# Patient Record
Sex: FEMALE | Race: WHITE | Employment: FULL TIME | URBAN - METROPOLITAN AREA
[De-identification: names, ages, dates, MRNs, and addresses within clinical notes are randomized per-mention and may not be internally consistent; named-entity substitution may affect disease eponyms.]

---

## 2017-03-31 ENCOUNTER — APPOINTMENT (OUTPATIENT)
Dept: GENERAL RADIOLOGY | Age: 42
End: 2017-03-31
Payer: MEDICAID

## 2017-03-31 ENCOUNTER — HOSPITAL ENCOUNTER (EMERGENCY)
Age: 42
Discharge: HOME | End: 2017-03-31
Attending: EMERGENCY MEDICINE
Payer: MEDICAID

## 2017-03-31 VITALS
HEIGHT: 64 IN | HEART RATE: 88 BPM | RESPIRATION RATE: 16 BRPM | DIASTOLIC BLOOD PRESSURE: 51 MMHG | TEMPERATURE: 98.4 F | WEIGHT: 170 LBS | OXYGEN SATURATION: 99 % | SYSTOLIC BLOOD PRESSURE: 90 MMHG | BODY MASS INDEX: 29.02 KG/M2

## 2017-03-31 DIAGNOSIS — R07.9 CHEST PAIN, UNSPECIFIED TYPE: Primary | ICD-10-CM

## 2017-03-31 LAB
ABSOLUTE EOS #: 0.4 K/UL (ref 0–0.4)
ABSOLUTE LYMPH #: 4.1 K/UL (ref 1–4.8)
ABSOLUTE MONO #: 0.6 K/UL (ref 0.1–1.2)
ANION GAP SERPL CALCULATED.3IONS-SCNC: 17 MMOL/L (ref 9–17)
BASOPHILS # BLD: 2 % (ref 0–2)
BASOPHILS ABSOLUTE: 0.2 K/UL (ref 0–0.2)
BILIRUBIN URINE: NEGATIVE
BUN BLDV-MCNC: 10 MG/DL (ref 6–20)
BUN/CREAT BLD: ABNORMAL (ref 9–20)
CALCIUM SERPL-MCNC: 8.7 MG/DL (ref 8.6–10.4)
CHLORIDE BLD-SCNC: 96 MMOL/L (ref 98–107)
CO2: 21 MMOL/L (ref 20–31)
COLOR: YELLOW
COMMENT UA: ABNORMAL
CREAT SERPL-MCNC: 0.61 MG/DL (ref 0.5–0.9)
DIFFERENTIAL TYPE: NORMAL
EKG ATRIAL RATE: 96 BPM
EKG P AXIS: 61 DEGREES
EKG P-R INTERVAL: 152 MS
EKG Q-T INTERVAL: 378 MS
EKG QRS DURATION: 92 MS
EKG QTC CALCULATION (BAZETT): 477 MS
EKG R AXIS: 14 DEGREES
EKG T AXIS: 55 DEGREES
EKG VENTRICULAR RATE: 96 BPM
EOSINOPHILS RELATIVE PERCENT: 4 % (ref 1–4)
ETHANOL PERCENT: 0.3 %
ETHANOL: 298 MG/DL
GFR AFRICAN AMERICAN: >60 ML/MIN
GFR NON-AFRICAN AMERICAN: >60 ML/MIN
GFR SERPL CREATININE-BSD FRML MDRD: ABNORMAL ML/MIN/{1.73_M2}
GFR SERPL CREATININE-BSD FRML MDRD: ABNORMAL ML/MIN/{1.73_M2}
GLUCOSE BLD-MCNC: 103 MG/DL (ref 70–99)
GLUCOSE URINE: NEGATIVE
HCG QUALITATIVE: NEGATIVE
HCT VFR BLD CALC: 40.3 % (ref 36–46)
HEMOGLOBIN: 13.7 G/DL (ref 12–16)
KETONES, URINE: NEGATIVE
LEUKOCYTE ESTERASE, URINE: NEGATIVE
LYMPHOCYTES # BLD: 43 % (ref 24–44)
MCH RBC QN AUTO: 30.9 PG (ref 26–34)
MCHC RBC AUTO-ENTMCNC: 34.1 G/DL (ref 31–37)
MCV RBC AUTO: 90.5 FL (ref 80–100)
MONOCYTES # BLD: 7 % (ref 2–11)
NITRITE, URINE: NEGATIVE
PDW BLD-RTO: 15.4 % (ref 12.5–15.4)
PH UA: 5 (ref 5–8)
PLATELET # BLD: 317 K/UL (ref 140–450)
PLATELET ESTIMATE: NORMAL
PMV BLD AUTO: 8.6 FL (ref 6–12)
POC TROPONIN I: 0 NG/ML (ref 0–0.1)
POC TROPONIN I: 0 NG/ML (ref 0–0.1)
POC TROPONIN INTERP: NORMAL
POC TROPONIN INTERP: NORMAL
POTASSIUM SERPL-SCNC: 3.9 MMOL/L (ref 3.7–5.3)
PROTEIN UA: NEGATIVE
RBC # BLD: 4.45 M/UL (ref 4–5.2)
RBC # BLD: NORMAL 10*6/UL
SEG NEUTROPHILS: 44 % (ref 36–66)
SEGMENTED NEUTROPHILS ABSOLUTE COUNT: 4.1 K/UL (ref 1.8–7.7)
SODIUM BLD-SCNC: 134 MMOL/L (ref 135–144)
SPECIFIC GRAVITY UA: 1 (ref 1–1.03)
TURBIDITY: CLEAR
URINE HGB: NEGATIVE
UROBILINOGEN, URINE: NORMAL
WBC # BLD: 9.4 K/UL (ref 3.5–11)
WBC # BLD: NORMAL 10*3/UL

## 2017-03-31 PROCEDURE — 81003 URINALYSIS AUTO W/O SCOPE: CPT

## 2017-03-31 PROCEDURE — G0480 DRUG TEST DEF 1-7 CLASSES: HCPCS

## 2017-03-31 PROCEDURE — 93005 ELECTROCARDIOGRAM TRACING: CPT

## 2017-03-31 PROCEDURE — 71020 XR CHEST STANDARD TWO VW: CPT

## 2017-03-31 PROCEDURE — 99285 EMERGENCY DEPT VISIT HI MDM: CPT

## 2017-03-31 PROCEDURE — 85025 COMPLETE CBC W/AUTO DIFF WBC: CPT

## 2017-03-31 PROCEDURE — 84484 ASSAY OF TROPONIN QUANT: CPT

## 2017-03-31 PROCEDURE — 84703 CHORIONIC GONADOTROPIN ASSAY: CPT

## 2017-03-31 PROCEDURE — 80048 BASIC METABOLIC PNL TOTAL CA: CPT

## 2017-03-31 RX ORDER — ASPIRIN 81 MG/1
324 TABLET, CHEWABLE ORAL ONCE
Status: DISCONTINUED | OUTPATIENT
Start: 2017-03-31 | End: 2017-03-31

## 2017-03-31 ASSESSMENT — PAIN DESCRIPTION - ORIENTATION: ORIENTATION: RIGHT

## 2017-03-31 ASSESSMENT — ENCOUNTER SYMPTOMS
COUGH: 0
NAUSEA: 0
ABDOMINAL PAIN: 0
ALLERGIC/IMMUNOLOGIC NEGATIVE: 1
VOMITING: 0
SHORTNESS OF BREATH: 0

## 2017-03-31 ASSESSMENT — PAIN DESCRIPTION - DESCRIPTORS: DESCRIPTORS: STABBING;SHARP

## 2017-03-31 ASSESSMENT — PAIN SCALES - GENERAL: PAINLEVEL_OUTOF10: 8

## 2017-03-31 ASSESSMENT — HEART SCORE: ECG: 0

## 2017-03-31 ASSESSMENT — PAIN DESCRIPTION - FREQUENCY: FREQUENCY: CONTINUOUS

## 2017-03-31 ASSESSMENT — PAIN DESCRIPTION - LOCATION: LOCATION: CHEST

## 2017-03-31 ASSESSMENT — PAIN DESCRIPTION - PAIN TYPE: TYPE: ACUTE PAIN

## 2017-04-01 LAB
EKG ATRIAL RATE: 103 BPM
EKG P AXIS: 64 DEGREES
EKG P-R INTERVAL: 140 MS
EKG Q-T INTERVAL: 356 MS
EKG QRS DURATION: 84 MS
EKG QTC CALCULATION (BAZETT): 466 MS
EKG R AXIS: 25 DEGREES
EKG T AXIS: 51 DEGREES
EKG VENTRICULAR RATE: 103 BPM

## 2017-07-02 ENCOUNTER — APPOINTMENT (OUTPATIENT)
Dept: GENERAL RADIOLOGY | Age: 42
End: 2017-07-02
Payer: MEDICAID

## 2017-07-02 ENCOUNTER — HOSPITAL ENCOUNTER (EMERGENCY)
Age: 42
Discharge: HOME | End: 2017-07-02
Attending: EMERGENCY MEDICINE
Payer: MEDICAID

## 2017-07-02 VITALS
DIASTOLIC BLOOD PRESSURE: 78 MMHG | TEMPERATURE: 98.8 F | WEIGHT: 185 LBS | SYSTOLIC BLOOD PRESSURE: 120 MMHG | BODY MASS INDEX: 31.76 KG/M2 | OXYGEN SATURATION: 98 % | RESPIRATION RATE: 18 BRPM | HEART RATE: 88 BPM

## 2017-07-02 DIAGNOSIS — R07.9 CHEST PAIN, UNSPECIFIED TYPE: Primary | ICD-10-CM

## 2017-07-02 LAB
ABSOLUTE EOS #: 0.2 K/UL (ref 0–0.4)
ABSOLUTE LYMPH #: 3 K/UL (ref 1–4.8)
ABSOLUTE MONO #: 0.8 K/UL (ref 0.1–1.2)
ANION GAP SERPL CALCULATED.3IONS-SCNC: 16 MMOL/L (ref 9–17)
BASOPHILS # BLD: 2 %
BASOPHILS ABSOLUTE: 0.2 K/UL (ref 0–0.2)
BUN BLDV-MCNC: 17 MG/DL (ref 6–20)
BUN/CREAT BLD: NORMAL (ref 9–20)
CALCIUM SERPL-MCNC: 8.8 MG/DL (ref 8.6–10.4)
CHLORIDE BLD-SCNC: 99 MMOL/L (ref 98–107)
CO2: 21 MMOL/L (ref 20–31)
CREAT SERPL-MCNC: 0.67 MG/DL (ref 0.5–0.9)
D-DIMER QUANTITATIVE: 0.32 MG/L FEU
DIFFERENTIAL TYPE: ABNORMAL
EOSINOPHILS RELATIVE PERCENT: 3 %
GFR AFRICAN AMERICAN: >60 ML/MIN
GFR NON-AFRICAN AMERICAN: >60 ML/MIN
GFR SERPL CREATININE-BSD FRML MDRD: NORMAL ML/MIN/{1.73_M2}
GFR SERPL CREATININE-BSD FRML MDRD: NORMAL ML/MIN/{1.73_M2}
GLUCOSE BLD-MCNC: 97 MG/DL (ref 70–99)
HCT VFR BLD CALC: 42.6 % (ref 36–46)
HEMOGLOBIN: 14 G/DL (ref 12–16)
LYMPHOCYTES # BLD: 32 %
MCH RBC QN AUTO: 30.2 PG (ref 26–34)
MCHC RBC AUTO-ENTMCNC: 32.9 G/DL (ref 31–37)
MCV RBC AUTO: 91.7 FL (ref 80–100)
MONOCYTES # BLD: 8 %
PDW BLD-RTO: 15.9 % (ref 12.5–15.4)
PLATELET # BLD: 265 K/UL (ref 140–450)
PLATELET ESTIMATE: ABNORMAL
PMV BLD AUTO: 8.5 FL (ref 6–12)
POC TROPONIN I: 0 NG/ML (ref 0–0.1)
POC TROPONIN I: 0 NG/ML (ref 0–0.1)
POC TROPONIN INTERP: NORMAL
POC TROPONIN INTERP: NORMAL
POTASSIUM SERPL-SCNC: 4 MMOL/L (ref 3.7–5.3)
RBC # BLD: 4.64 M/UL (ref 4–5.2)
RBC # BLD: ABNORMAL 10*6/UL
SEG NEUTROPHILS: 55 %
SEGMENTED NEUTROPHILS ABSOLUTE COUNT: 5.2 K/UL (ref 1.8–7.7)
SODIUM BLD-SCNC: 136 MMOL/L (ref 135–144)
WBC # BLD: 9.4 K/UL (ref 3.5–11)
WBC # BLD: ABNORMAL 10*3/UL

## 2017-07-02 PROCEDURE — 85025 COMPLETE CBC W/AUTO DIFF WBC: CPT

## 2017-07-02 PROCEDURE — 71020 XR CHEST STANDARD TWO VW: CPT

## 2017-07-02 PROCEDURE — 6360000002 HC RX W HCPCS: Performed by: EMERGENCY MEDICINE

## 2017-07-02 PROCEDURE — 93005 ELECTROCARDIOGRAM TRACING: CPT

## 2017-07-02 PROCEDURE — 99285 EMERGENCY DEPT VISIT HI MDM: CPT

## 2017-07-02 PROCEDURE — 84484 ASSAY OF TROPONIN QUANT: CPT

## 2017-07-02 PROCEDURE — 85379 FIBRIN DEGRADATION QUANT: CPT

## 2017-07-02 PROCEDURE — 96374 THER/PROPH/DIAG INJ IV PUSH: CPT

## 2017-07-02 PROCEDURE — 6370000000 HC RX 637 (ALT 250 FOR IP): Performed by: EMERGENCY MEDICINE

## 2017-07-02 PROCEDURE — 80048 BASIC METABOLIC PNL TOTAL CA: CPT

## 2017-07-02 RX ORDER — MORPHINE SULFATE 2 MG/ML
2 INJECTION, SOLUTION INTRAMUSCULAR; INTRAVENOUS ONCE
Status: COMPLETED | OUTPATIENT
Start: 2017-07-02 | End: 2017-07-02

## 2017-07-02 RX ORDER — HYDROCODONE BITARTRATE AND ACETAMINOPHEN 5; 325 MG/1; MG/1
1 TABLET ORAL ONCE
Status: COMPLETED | OUTPATIENT
Start: 2017-07-02 | End: 2017-07-02

## 2017-07-02 RX ORDER — HYDROCODONE BITARTRATE AND ACETAMINOPHEN 5; 325 MG/1; MG/1
1 TABLET ORAL EVERY 6 HOURS PRN
Qty: 6 TABLET | Refills: 0 | Status: SHIPPED | OUTPATIENT
Start: 2017-07-02 | End: 2017-08-31

## 2017-07-02 RX ADMIN — HYDROCODONE BITARTRATE AND ACETAMINOPHEN 1 TABLET: 5; 325 TABLET ORAL at 19:49

## 2017-07-02 RX ADMIN — MORPHINE SULFATE 2 MG: 2 INJECTION, SOLUTION INTRAMUSCULAR; INTRAVENOUS at 17:10

## 2017-07-02 ASSESSMENT — PAIN SCALES - GENERAL
PAINLEVEL_OUTOF10: 8
PAINLEVEL_OUTOF10: 10
PAINLEVEL_OUTOF10: 10

## 2017-07-02 ASSESSMENT — ENCOUNTER SYMPTOMS
DIARRHEA: 0
APNEA: 0
SHORTNESS OF BREATH: 1
NAUSEA: 0
BLOOD IN STOOL: 0
ABDOMINAL PAIN: 0
SORE THROAT: 0
VOMITING: 0
COUGH: 0
RHINORRHEA: 0
BACK PAIN: 0
CHEST TIGHTNESS: 0

## 2017-07-02 ASSESSMENT — PAIN DESCRIPTION - DIRECTION: RADIATING_TOWARDS: BACK

## 2017-07-02 ASSESSMENT — PAIN DESCRIPTION - ORIENTATION: ORIENTATION: MID;LEFT

## 2017-07-02 ASSESSMENT — PAIN DESCRIPTION - PROGRESSION: CLINICAL_PROGRESSION: GRADUALLY WORSENING

## 2017-07-02 ASSESSMENT — PAIN DESCRIPTION - PAIN TYPE: TYPE: ACUTE PAIN

## 2017-07-02 ASSESSMENT — PAIN DESCRIPTION - FREQUENCY: FREQUENCY: INTERMITTENT

## 2017-07-02 ASSESSMENT — PAIN DESCRIPTION - LOCATION: LOCATION: CHEST

## 2017-07-02 ASSESSMENT — PAIN DESCRIPTION - ONSET: ONSET: SUDDEN

## 2017-07-03 ENCOUNTER — HOSPITAL ENCOUNTER (EMERGENCY)
Age: 42
Discharge: HOME | End: 2017-07-03
Attending: EMERGENCY MEDICINE
Payer: MEDICAID

## 2017-07-03 ENCOUNTER — APPOINTMENT (OUTPATIENT)
Dept: GENERAL RADIOLOGY | Age: 42
End: 2017-07-03
Payer: MEDICAID

## 2017-07-03 VITALS
RESPIRATION RATE: 24 BRPM | HEIGHT: 64 IN | OXYGEN SATURATION: 98 % | HEART RATE: 77 BPM | WEIGHT: 191.5 LBS | SYSTOLIC BLOOD PRESSURE: 117 MMHG | BODY MASS INDEX: 32.69 KG/M2 | TEMPERATURE: 98.5 F | DIASTOLIC BLOOD PRESSURE: 81 MMHG

## 2017-07-03 DIAGNOSIS — R07.9 CHEST PAIN, UNSPECIFIED TYPE: Primary | ICD-10-CM

## 2017-07-03 LAB
% CKMB: 2 % (ref 0–3)
ABSOLUTE EOS #: 0.1 K/UL (ref 0–0.4)
ABSOLUTE LYMPH #: 1.9 K/UL (ref 1–4.8)
ABSOLUTE MONO #: 0.7 K/UL (ref 0.2–0.8)
ANION GAP SERPL CALCULATED.3IONS-SCNC: 13 MMOL/L (ref 9–17)
BASOPHILS # BLD: 1 %
BASOPHILS ABSOLUTE: 0.1 K/UL (ref 0–0.2)
BUN BLDV-MCNC: 11 MG/DL (ref 6–20)
BUN/CREAT BLD: 20 (ref 9–20)
CALCIUM SERPL-MCNC: 8.6 MG/DL (ref 8.6–10.4)
CHLORIDE BLD-SCNC: 102 MMOL/L (ref 98–107)
CK MB: 1.7 NG/ML
CKMB INTERPRETATION: NORMAL
CO2: 21 MMOL/L (ref 20–31)
CREAT SERPL-MCNC: 0.56 MG/DL (ref 0.5–0.9)
D-DIMER QUANTITATIVE: 0.53 MG/L FEU
DIFFERENTIAL TYPE: NORMAL
EOSINOPHILS RELATIVE PERCENT: 2 %
GFR AFRICAN AMERICAN: >60 ML/MIN
GFR NON-AFRICAN AMERICAN: >60 ML/MIN
GFR SERPL CREATININE-BSD FRML MDRD: ABNORMAL ML/MIN/{1.73_M2}
GFR SERPL CREATININE-BSD FRML MDRD: ABNORMAL ML/MIN/{1.73_M2}
GLUCOSE BLD-MCNC: 105 MG/DL (ref 70–99)
HCT VFR BLD CALC: 38.9 % (ref 36–46)
HEMOGLOBIN: 13.6 G/DL (ref 12–16)
LYMPHOCYTES # BLD: 27 %
MCH RBC QN AUTO: 31.7 PG (ref 26–34)
MCHC RBC AUTO-ENTMCNC: 35.1 G/DL (ref 31–37)
MCV RBC AUTO: 90.2 FL (ref 80–100)
MONOCYTES # BLD: 9 %
MYOGLOBIN: <21 NG/ML (ref 25–58)
PDW BLD-RTO: 14 % (ref 11.5–14.5)
PLATELET # BLD: 225 K/UL (ref 130–400)
PLATELET ESTIMATE: NORMAL
PMV BLD AUTO: NORMAL FL (ref 6–12)
POTASSIUM SERPL-SCNC: 4.2 MMOL/L (ref 3.7–5.3)
RBC # BLD: 4.31 M/UL (ref 4–5.2)
RBC # BLD: NORMAL 10*6/UL
SEG NEUTROPHILS: 61 %
SEGMENTED NEUTROPHILS ABSOLUTE COUNT: 4.5 K/UL (ref 1.8–7.7)
SODIUM BLD-SCNC: 136 MMOL/L (ref 135–144)
TOTAL CK: 87 U/L (ref 26–192)
TROPONIN INTERP: ABNORMAL
TROPONIN T: <0.03 NG/ML
WBC # BLD: 7.3 K/UL (ref 3.5–11)
WBC # BLD: NORMAL 10*3/UL

## 2017-07-03 PROCEDURE — 82553 CREATINE MB FRACTION: CPT

## 2017-07-03 PROCEDURE — 71010 XR CHEST PORTABLE: CPT

## 2017-07-03 PROCEDURE — 85025 COMPLETE CBC W/AUTO DIFF WBC: CPT

## 2017-07-03 PROCEDURE — 93005 ELECTROCARDIOGRAM TRACING: CPT

## 2017-07-03 PROCEDURE — 85379 FIBRIN DEGRADATION QUANT: CPT

## 2017-07-03 PROCEDURE — 80048 BASIC METABOLIC PNL TOTAL CA: CPT

## 2017-07-03 PROCEDURE — 83874 ASSAY OF MYOGLOBIN: CPT

## 2017-07-03 PROCEDURE — 96374 THER/PROPH/DIAG INJ IV PUSH: CPT

## 2017-07-03 PROCEDURE — 84484 ASSAY OF TROPONIN QUANT: CPT

## 2017-07-03 PROCEDURE — 6360000002 HC RX W HCPCS: Performed by: NURSE PRACTITIONER

## 2017-07-03 PROCEDURE — 82550 ASSAY OF CK (CPK): CPT

## 2017-07-03 PROCEDURE — 99285 EMERGENCY DEPT VISIT HI MDM: CPT

## 2017-07-03 RX ORDER — KETOROLAC TROMETHAMINE 30 MG/ML
30 INJECTION, SOLUTION INTRAMUSCULAR; INTRAVENOUS ONCE
Status: COMPLETED | OUTPATIENT
Start: 2017-07-03 | End: 2017-07-03

## 2017-07-03 RX ADMIN — KETOROLAC TROMETHAMINE 30 MG: 30 INJECTION, SOLUTION INTRAMUSCULAR at 08:53

## 2017-07-03 ASSESSMENT — ENCOUNTER SYMPTOMS
WHEEZING: 0
VOMITING: 0
SORE THROAT: 0
COLOR CHANGE: 0
COUGH: 0
SINUS PRESSURE: 0
RHINORRHEA: 0
SHORTNESS OF BREATH: 0
DIARRHEA: 0
NAUSEA: 0
ABDOMINAL PAIN: 0
CONSTIPATION: 0

## 2017-07-03 ASSESSMENT — PAIN SCALES - GENERAL
PAINLEVEL_OUTOF10: 10
PAINLEVEL_OUTOF10: 9

## 2017-07-03 ASSESSMENT — PAIN DESCRIPTION - ONSET: ONSET: ON-GOING

## 2017-07-03 ASSESSMENT — PAIN DESCRIPTION - LOCATION: LOCATION: CHEST

## 2017-07-03 ASSESSMENT — PAIN DESCRIPTION - PAIN TYPE: TYPE: ACUTE PAIN

## 2017-07-03 ASSESSMENT — PAIN DESCRIPTION - FREQUENCY: FREQUENCY: CONTINUOUS

## 2017-07-05 LAB
EKG ATRIAL RATE: 91 BPM
EKG P AXIS: 57 DEGREES
EKG P-R INTERVAL: 154 MS
EKG Q-T INTERVAL: 376 MS
EKG QRS DURATION: 84 MS
EKG QTC CALCULATION (BAZETT): 462 MS
EKG T AXIS: 26 DEGREES
EKG VENTRICULAR RATE: 91 BPM

## 2017-07-11 LAB
EKG ATRIAL RATE: 96 BPM
EKG P AXIS: 65 DEGREES
EKG P-R INTERVAL: 150 MS
EKG Q-T INTERVAL: 364 MS
EKG QRS DURATION: 82 MS
EKG QTC CALCULATION (BAZETT): 459 MS
EKG R AXIS: 2 DEGREES
EKG T AXIS: 47 DEGREES
EKG VENTRICULAR RATE: 96 BPM

## 2017-08-31 ENCOUNTER — HOSPITAL ENCOUNTER (EMERGENCY)
Age: 42
Discharge: HOME | End: 2017-08-31
Attending: EMERGENCY MEDICINE
Payer: MEDICAID

## 2017-08-31 ENCOUNTER — APPOINTMENT (OUTPATIENT)
Dept: GENERAL RADIOLOGY | Age: 42
End: 2017-08-31
Payer: MEDICAID

## 2017-08-31 VITALS
HEIGHT: 66 IN | DIASTOLIC BLOOD PRESSURE: 85 MMHG | RESPIRATION RATE: 18 BRPM | TEMPERATURE: 97.9 F | OXYGEN SATURATION: 100 % | SYSTOLIC BLOOD PRESSURE: 122 MMHG | WEIGHT: 186.56 LBS | HEART RATE: 100 BPM | BODY MASS INDEX: 29.98 KG/M2
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
## 2017-08-31 DIAGNOSIS — M79.18 MUSCULOSKELETAL PAIN: ICD-10-CM

## 2017-08-31 DIAGNOSIS — S39.012A LUMBAR STRAIN, INITIAL ENCOUNTER: Primary | ICD-10-CM

## 2017-08-31 PROCEDURE — 71020 XR CHEST STANDARD TWO VW: CPT

## 2017-08-31 PROCEDURE — 99284 EMERGENCY DEPT VISIT MOD MDM: CPT

## 2017-08-31 PROCEDURE — 72100 X-RAY EXAM L-S SPINE 2/3 VWS: CPT

## 2017-08-31 PROCEDURE — 6370000000 HC RX 637 (ALT 250 FOR IP): Performed by: NURSE PRACTITIONER

## 2017-08-31 RX ORDER — METHOCARBAMOL 500 MG/1
500 TABLET, FILM COATED ORAL 3 TIMES DAILY PRN
Qty: 20 TABLET | Refills: 0 | Status: SHIPPED | OUTPATIENT
Start: 2017-08-31 | End: 2017-09-10

## 2017-08-31 RX ORDER — HYDROCODONE BITARTRATE AND ACETAMINOPHEN 5; 325 MG/1; MG/1
1 TABLET ORAL ONCE
Status: COMPLETED | OUTPATIENT
Start: 2017-08-31 | End: 2017-08-31

## 2017-08-31 RX ORDER — HYDROCODONE BITARTRATE AND ACETAMINOPHEN 5; 325 MG/1; MG/1
1 TABLET ORAL EVERY 6 HOURS PRN
Qty: 20 TABLET | Refills: 0 | Status: SHIPPED | OUTPATIENT
Start: 2017-08-31 | End: 2017-09-07

## 2017-08-31 RX ADMIN — HYDROCODONE BITARTRATE AND ACETAMINOPHEN 1 TABLET: 5; 325 TABLET ORAL at 14:31

## 2017-08-31 ASSESSMENT — PAIN SCALES - WONG BAKER: WONGBAKER_NUMERICALRESPONSE: 6

## 2017-08-31 ASSESSMENT — PAIN DESCRIPTION - PAIN TYPE: TYPE: ACUTE PAIN

## 2017-08-31 ASSESSMENT — PAIN SCALES - GENERAL
PAINLEVEL_OUTOF10: 10
PAINLEVEL_OUTOF10: 10

## 2017-08-31 ASSESSMENT — PAIN DESCRIPTION - LOCATION: LOCATION: GENERALIZED

## 2017-08-31 ASSESSMENT — PAIN DESCRIPTION - FREQUENCY: FREQUENCY: CONTINUOUS

## 2017-08-31 ASSESSMENT — PAIN DESCRIPTION - DESCRIPTORS: DESCRIPTORS: ACHING;SORE

## 2017-09-25 ENCOUNTER — HOSPITAL ENCOUNTER (EMERGENCY)
Age: 42
Discharge: HOME | End: 2017-09-25
Attending: EMERGENCY MEDICINE
Payer: MEDICAID

## 2017-09-25 ENCOUNTER — APPOINTMENT (OUTPATIENT)
Dept: GENERAL RADIOLOGY | Age: 42
End: 2017-09-25
Payer: MEDICAID

## 2017-09-25 VITALS
HEART RATE: 85 BPM | RESPIRATION RATE: 22 BRPM | DIASTOLIC BLOOD PRESSURE: 94 MMHG | TEMPERATURE: 98.2 F | OXYGEN SATURATION: 95 % | SYSTOLIC BLOOD PRESSURE: 140 MMHG

## 2017-09-25 DIAGNOSIS — J40 BRONCHITIS: Primary | ICD-10-CM

## 2017-09-25 DIAGNOSIS — R07.89 OTHER CHEST PAIN: ICD-10-CM

## 2017-09-25 LAB
ABSOLUTE EOS #: 0.4 K/UL (ref 0–0.4)
ABSOLUTE LYMPH #: 2.2 K/UL (ref 1–4.8)
ABSOLUTE MONO #: 0.6 K/UL (ref 0.1–1.2)
ANION GAP SERPL CALCULATED.3IONS-SCNC: 9 MMOL/L (ref 9–17)
BASOPHILS # BLD: 1 %
BASOPHILS ABSOLUTE: 0.1 K/UL (ref 0–0.2)
BUN BLDV-MCNC: 12 MG/DL (ref 6–20)
BUN/CREAT BLD: ABNORMAL (ref 9–20)
CALCIUM SERPL-MCNC: 8.5 MG/DL (ref 8.6–10.4)
CHLORIDE BLD-SCNC: 108 MMOL/L (ref 98–107)
CO2: 22 MMOL/L (ref 20–31)
CREAT SERPL-MCNC: 0.58 MG/DL (ref 0.5–0.9)
D-DIMER QUANTITATIVE: 0.34 MG/L FEU
DIFFERENTIAL TYPE: ABNORMAL
EOSINOPHILS RELATIVE PERCENT: 6 %
GFR AFRICAN AMERICAN: >60 ML/MIN
GFR NON-AFRICAN AMERICAN: >60 ML/MIN
GFR SERPL CREATININE-BSD FRML MDRD: ABNORMAL ML/MIN/{1.73_M2}
GFR SERPL CREATININE-BSD FRML MDRD: ABNORMAL ML/MIN/{1.73_M2}
GLUCOSE BLD-MCNC: 81 MG/DL (ref 70–99)
HCT VFR BLD CALC: 34.9 % (ref 36–46)
HEMOGLOBIN: 11.4 G/DL (ref 12–16)
LYMPHOCYTES # BLD: 31 %
MCH RBC QN AUTO: 30.2 PG (ref 26–34)
MCHC RBC AUTO-ENTMCNC: 32.5 G/DL (ref 31–37)
MCV RBC AUTO: 92.7 FL (ref 80–100)
MONOCYTES # BLD: 8 %
PDW BLD-RTO: 16.2 % (ref 12.5–15.4)
PLATELET # BLD: 260 K/UL (ref 140–450)
PLATELET ESTIMATE: ABNORMAL
PMV BLD AUTO: 9.1 FL (ref 6–12)
POC TROPONIN I: 0 NG/ML (ref 0–0.1)
POC TROPONIN INTERP: NORMAL
POTASSIUM SERPL-SCNC: 3.9 MMOL/L (ref 3.7–5.3)
RBC # BLD: 3.76 M/UL (ref 4–5.2)
RBC # BLD: ABNORMAL 10*6/UL
SEG NEUTROPHILS: 54 %
SEGMENTED NEUTROPHILS ABSOLUTE COUNT: 3.8 K/UL (ref 1.8–7.7)
SODIUM BLD-SCNC: 139 MMOL/L (ref 135–144)
WBC # BLD: 7 K/UL (ref 3.5–11)
WBC # BLD: ABNORMAL 10*3/UL

## 2017-09-25 PROCEDURE — 80048 BASIC METABOLIC PNL TOTAL CA: CPT

## 2017-09-25 PROCEDURE — 85379 FIBRIN DEGRADATION QUANT: CPT

## 2017-09-25 PROCEDURE — 93005 ELECTROCARDIOGRAM TRACING: CPT

## 2017-09-25 PROCEDURE — 85025 COMPLETE CBC W/AUTO DIFF WBC: CPT

## 2017-09-25 PROCEDURE — 71020 XR CHEST STANDARD TWO VW: CPT

## 2017-09-25 PROCEDURE — 84484 ASSAY OF TROPONIN QUANT: CPT

## 2017-09-25 PROCEDURE — G0384 LEV 5 HOSP TYPE B ED VISIT: HCPCS

## 2017-09-25 RX ORDER — AZITHROMYCIN 250 MG/1
TABLET, FILM COATED ORAL
Qty: 1 PACKET | Refills: 0 | Status: SHIPPED | OUTPATIENT
Start: 2017-09-25 | End: 2017-10-05

## 2017-09-25 RX ORDER — BENZONATATE 100 MG/1
100 CAPSULE ORAL 3 TIMES DAILY PRN
Qty: 30 CAPSULE | Refills: 0 | Status: SHIPPED | OUTPATIENT
Start: 2017-09-25 | End: 2017-10-02

## 2017-09-25 ASSESSMENT — ENCOUNTER SYMPTOMS
ABDOMINAL PAIN: 0
SHORTNESS OF BREATH: 0
SORE THROAT: 0
BACK PAIN: 0
NAUSEA: 0
COUGH: 1
VOMITING: 0

## 2017-09-25 ASSESSMENT — PAIN DESCRIPTION - DESCRIPTORS: DESCRIPTORS: ACHING;BURNING

## 2017-09-25 ASSESSMENT — PAIN DESCRIPTION - LOCATION: LOCATION: HEAD;RIB CAGE

## 2017-09-25 ASSESSMENT — PAIN DESCRIPTION - PAIN TYPE: TYPE: ACUTE PAIN

## 2017-09-25 ASSESSMENT — PAIN SCALES - GENERAL: PAINLEVEL_OUTOF10: 9

## 2017-09-25 ASSESSMENT — PAIN DESCRIPTION - FREQUENCY: FREQUENCY: CONTINUOUS

## 2017-09-28 LAB
EKG ATRIAL RATE: 80 BPM
EKG P AXIS: 74 DEGREES
EKG P-R INTERVAL: 162 MS
EKG Q-T INTERVAL: 366 MS
EKG QRS DURATION: 78 MS
EKG QTC CALCULATION (BAZETT): 422 MS
EKG R AXIS: 27 DEGREES
EKG T AXIS: 41 DEGREES
EKG VENTRICULAR RATE: 80 BPM

## 2017-12-04 ENCOUNTER — HOSPITAL ENCOUNTER (EMERGENCY)
Age: 42
Discharge: HOME | End: 2017-12-04
Payer: MEDICAID

## 2017-12-04 VITALS
BODY MASS INDEX: 33.32 KG/M2 | OXYGEN SATURATION: 98 % | HEART RATE: 100 BPM | DIASTOLIC BLOOD PRESSURE: 86 MMHG | WEIGHT: 200 LBS | TEMPERATURE: 98.1 F | HEIGHT: 65 IN | RESPIRATION RATE: 16 BRPM | SYSTOLIC BLOOD PRESSURE: 137 MMHG

## 2017-12-04 DIAGNOSIS — H60.391 INFECTIVE OTITIS EXTERNA OF RIGHT EAR: Primary | ICD-10-CM

## 2017-12-04 PROCEDURE — 99282 EMERGENCY DEPT VISIT SF MDM: CPT

## 2017-12-04 RX ORDER — HYDROCODONE BITARTRATE AND ACETAMINOPHEN 5; 325 MG/1; MG/1
1 TABLET ORAL EVERY 6 HOURS PRN
Qty: 20 TABLET | Refills: 0 | Status: SHIPPED | OUTPATIENT
Start: 2017-12-04 | End: 2017-12-11

## 2017-12-04 RX ORDER — AMOXICILLIN AND CLAVULANATE POTASSIUM 875; 125 MG/1; MG/1
1 TABLET, FILM COATED ORAL 2 TIMES DAILY
Qty: 20 TABLET | Refills: 0 | Status: SHIPPED | OUTPATIENT
Start: 2017-12-04 | End: 2017-12-14

## 2017-12-04 RX ORDER — NEOMYCIN SULFATE, POLYMYXIN B SULFATE, HYDROCORTISONE 3.5; 10000; 1 MG/ML; [USP'U]/ML; MG/ML
2 SOLUTION/ DROPS AURICULAR (OTIC) EVERY 8 HOURS SCHEDULED
Qty: 1 BOTTLE | Refills: 0 | Status: SHIPPED | OUTPATIENT
Start: 2017-12-04 | End: 2017-12-11

## 2017-12-04 ASSESSMENT — PAIN DESCRIPTION - LOCATION: LOCATION: FACE;EAR;NECK

## 2017-12-04 ASSESSMENT — ENCOUNTER SYMPTOMS
VOMITING: 0
RHINORRHEA: 0
ABDOMINAL PAIN: 0
SINUS PAIN: 0
COUGH: 0
NAUSEA: 0
SINUS PRESSURE: 0
SHORTNESS OF BREATH: 0
SORE THROAT: 0

## 2017-12-04 ASSESSMENT — PAIN SCALES - GENERAL
PAINLEVEL_OUTOF10: 10
PAINLEVEL_OUTOF10: 10

## 2017-12-04 ASSESSMENT — PAIN DESCRIPTION - ORIENTATION: ORIENTATION: RIGHT

## 2017-12-04 NOTE — ED NOTES
Pt presents with c/o right ear pain. States right ear itching for past 2 months. Became worse this morning. Noted a green discharge on her pillow this morning. States ear is plugged and has difficulty hearing out of right ear. Rates pain 10/10.      Maria Antonia Moore, ABIEL  12/04/17 [id number]

## 2017-12-04 NOTE — ED PROVIDER NOTES
[address] [place] [address] ED  eMERGENCY dEPARTMENT eNCOUnter      Pt Name: Milly Ordonez  MRN: [id number]  [place] 1975  Date of evaluation: 12/4/2017  Provider: Yarelis Gavin NP    [address] [place] [address]       Chief Complaint   Patient presents with    Otalgia     rt ear pain with hearing loss         HISTORY OF PRESENT ILLNESS  (Location/Symptom, Timing/Onset, Context/Setting, Quality, Duration, Modifying Factors, Severity.)   Milly Ordonez is a 38 y.o. female who presents to the emergency department Via private auto with complaints of right ear pain. She has right ear pain that she rates at a 10 and describes as throbbing. Discussed it without aggravating factors. She has gotten some relief with ibuprofen. Ear pain started 2 months ago. Over the past several days pain has worsened and she developed decreased hearing and green drainage from the right ear over the past 24 hours. No additional upper respiratory symptoms. She denies nasal congestion, rhinorrhea, sore throat, cough or fevers. Nursing Notes were reviewed. ALLERGIES     Shellfish-derived products and Sulfa antibiotics    CURRENT MEDICATIONS       Previous Medications    CLONAZEPAM (KLONOPIN) 0.5 MG TABLET    Take 1 tablet by mouth 2 times daily as needed for Anxiety. PAST MEDICAL HISTORY         Diagnosis Date    Anxiety     Bipolar 1 disorder ([address]     Panic attacks        SURGICAL HISTORY           Procedure Laterality Date    TUBAL LIGATION           FAMILY HISTORY     History reviewed. No pertinent family history. No family status information on file. SOCIAL HISTORY      reports that she has been smoking Cigarettes. She has been smoking about 0.50 packs per day. She does not have any smokeless tobacco history on file. She reports that she drinks alcohol. She reports that she does not use drugs.     REVIEW OF SYSTEMS    (2-9 systems for level 4, 10 or more for level 5)     Review of Systems   Constitutional: Negative for activity change, fatigue and fever. HENT: Positive for ear discharge, ear pain and hearing loss. Negative for congestion, rhinorrhea, sinus pain, sinus pressure, sneezing and sore throat. Respiratory: Negative for cough and shortness of breath. Cardiovascular: Negative for chest pain and palpitations. Gastrointestinal: Negative for abdominal pain, nausea and vomiting. Musculoskeletal: Negative for neck pain and neck stiffness. Neurological: Negative for dizziness and headaches. Except as noted above the remainder of the review of systems was reviewed and negative. PHYSICAL EXAM    (up to 7 for level 4, 8 or more for level 5)     ED Triage Vitals [12/04/17 1130]   BP Temp Temp Source Pulse Resp SpO2 Height Weight   137/86 98.1 °F (36.7 °C) Oral 100 16 98 % 5' 5\" (1.651 m) 200 lb (90.7 kg)       Physical Exam   Constitutional: She is oriented to person, place, and time. She appears well-developed and well-nourished. HENT:   Head: Normocephalic and atraumatic. Left TM and ear canal are within normal limits. Right TM is slightly erythematous without retraction or bulging. No perforation. She has moderate amount of edema to the right ear canal with a scant amount of yellow to green drainage. Right tragal tenderness. No pre-or postauricular lymphadenopathy   Eyes: Conjunctivae are normal. No scleral icterus. Neck: Neck supple. Cardiovascular: Normal rate and regular rhythm. Pulmonary/Chest: Breath sounds normal. No respiratory distress. Lymphadenopathy:     She has no cervical adenopathy. Neurological: She is alert and oriented to person, place, and time. Skin: Skin is warm and dry.          DIAGNOSTIC RESULTS     EKG: All EKG's are interpreted by the Emergency Department Physician who either signs or Co-signs this chart in the absence of a cardiologist.    RADIOLOGY:   Non-plain film images such as CT, Ultrasound and MRI are read by the radiologist. Plain radiographic images are

## 2018-03-29 ENCOUNTER — HOSPITAL ENCOUNTER (EMERGENCY)
Age: 43
Discharge: HOME | End: 2018-03-29
Attending: EMERGENCY MEDICINE
Payer: MEDICAID

## 2018-03-29 ENCOUNTER — APPOINTMENT (OUTPATIENT)
Dept: CT IMAGING | Age: 43
End: 2018-03-29
Payer: MEDICAID

## 2018-03-29 VITALS
DIASTOLIC BLOOD PRESSURE: 93 MMHG | TEMPERATURE: 97.3 F | HEART RATE: 95 BPM | RESPIRATION RATE: 16 BRPM | OXYGEN SATURATION: 98 % | SYSTOLIC BLOOD PRESSURE: 134 MMHG

## 2018-03-29 DIAGNOSIS — I10 HYPERTENSION, UNSPECIFIED TYPE: ICD-10-CM

## 2018-03-29 DIAGNOSIS — R10.9 ABDOMINAL PAIN, UNSPECIFIED ABDOMINAL LOCATION: ICD-10-CM

## 2018-03-29 DIAGNOSIS — N76.0 BACTERIAL VAGINOSIS: Primary | ICD-10-CM

## 2018-03-29 DIAGNOSIS — F17.200 SMOKER: ICD-10-CM

## 2018-03-29 DIAGNOSIS — D25.9 UTERINE LEIOMYOMA, UNSPECIFIED LOCATION: ICD-10-CM

## 2018-03-29 DIAGNOSIS — B96.89 BACTERIAL VAGINOSIS: Primary | ICD-10-CM

## 2018-03-29 LAB
-: ABNORMAL
ABSOLUTE EOS #: 0.3 K/UL (ref 0–0.44)
ABSOLUTE IMMATURE GRANULOCYTE: 0.06 K/UL (ref 0–0.3)
ABSOLUTE LYMPH #: 2.17 K/UL (ref 1.1–3.7)
ABSOLUTE MONO #: 0.61 K/UL (ref 0.1–1.2)
AMORPHOUS: ABNORMAL
ANION GAP SERPL CALCULATED.3IONS-SCNC: 13 MMOL/L (ref 9–17)
BACTERIA: ABNORMAL
BASOPHILS # BLD: 1 % (ref 0–2)
BASOPHILS ABSOLUTE: 0.04 K/UL (ref 0–0.2)
BILIRUBIN URINE: NEGATIVE
BUN BLDV-MCNC: 13 MG/DL (ref 6–20)
BUN/CREAT BLD: ABNORMAL (ref 9–20)
CALCIUM SERPL-MCNC: 9.1 MG/DL (ref 8.6–10.4)
CASTS UA: ABNORMAL /LPF (ref 0–2)
CHLORIDE BLD-SCNC: 101 MMOL/L (ref 98–107)
CO2: 23 MMOL/L (ref 20–31)
COLOR: YELLOW
CREAT SERPL-MCNC: 0.51 MG/DL (ref 0.5–0.9)
CRYSTALS, UA: ABNORMAL /HPF
DIFFERENTIAL TYPE: ABNORMAL
DIRECT EXAM: ABNORMAL
EOSINOPHILS RELATIVE PERCENT: 5 % (ref 1–4)
EPITHELIAL CELLS UA: ABNORMAL /HPF (ref 0–5)
GFR AFRICAN AMERICAN: >60 ML/MIN
GFR NON-AFRICAN AMERICAN: >60 ML/MIN
GFR SERPL CREATININE-BSD FRML MDRD: ABNORMAL ML/MIN/{1.73_M2}
GFR SERPL CREATININE-BSD FRML MDRD: ABNORMAL ML/MIN/{1.73_M2}
GLUCOSE BLD-MCNC: 109 MG/DL (ref 70–99)
GLUCOSE URINE: NEGATIVE
HCG QUALITATIVE: NEGATIVE
HCT VFR BLD CALC: 40.2 % (ref 36.3–47.1)
HEMOGLOBIN: 13 G/DL (ref 11.9–15.1)
IMMATURE GRANULOCYTES: 1 %
KETONES, URINE: NEGATIVE
LACTIC ACID, WHOLE BLOOD: 1.2 MMOL/L (ref 0.7–2.1)
LEUKOCYTE ESTERASE, URINE: NEGATIVE
LYMPHOCYTES # BLD: 35 % (ref 24–43)
Lab: ABNORMAL
MCH RBC QN AUTO: 30.4 PG (ref 25.2–33.5)
MCHC RBC AUTO-ENTMCNC: 32.3 G/DL (ref 28.4–34.8)
MCV RBC AUTO: 94.1 FL (ref 82.6–102.9)
MONOCYTES # BLD: 10 % (ref 3–12)
MUCUS: ABNORMAL
NITRITE, URINE: NEGATIVE
NRBC AUTOMATED: 0 PER 100 WBC
OTHER OBSERVATIONS UA: ABNORMAL
PDW BLD-RTO: 13.2 % (ref 11.8–14.4)
PH UA: 5 (ref 5–8)
PLATELET # BLD: 266 K/UL (ref 138–453)
PLATELET ESTIMATE: ABNORMAL
PMV BLD AUTO: 10.1 FL (ref 8.1–13.5)
POTASSIUM SERPL-SCNC: 4.3 MMOL/L (ref 3.7–5.3)
PROTEIN UA: NEGATIVE
RBC # BLD: 4.27 M/UL (ref 3.95–5.11)
RBC # BLD: ABNORMAL 10*6/UL
RBC UA: ABNORMAL /HPF (ref 0–2)
RENAL EPITHELIAL, UA: ABNORMAL /HPF
SEG NEUTROPHILS: 48 % (ref 36–65)
SEGMENTED NEUTROPHILS ABSOLUTE COUNT: 2.97 K/UL (ref 1.5–8.1)
SODIUM BLD-SCNC: 137 MMOL/L (ref 135–144)
SPECIFIC GRAVITY UA: 1.01 (ref 1–1.03)
SPECIMEN DESCRIPTION: ABNORMAL
STATUS: ABNORMAL
TRICHOMONAS: ABNORMAL
TURBIDITY: CLEAR
URINE HGB: NEGATIVE
UROBILINOGEN, URINE: NORMAL
WBC # BLD: 6.2 K/UL (ref 3.5–11.3)
WBC # BLD: ABNORMAL 10*3/UL
WBC UA: ABNORMAL /HPF (ref 0–5)
YEAST: ABNORMAL

## 2018-03-29 PROCEDURE — 6360000002 HC RX W HCPCS: Performed by: EMERGENCY MEDICINE

## 2018-03-29 PROCEDURE — 81001 URINALYSIS AUTO W/SCOPE: CPT

## 2018-03-29 PROCEDURE — 87591 N.GONORRHOEAE DNA AMP PROB: CPT

## 2018-03-29 PROCEDURE — 87660 TRICHOMONAS VAGIN DIR PROBE: CPT

## 2018-03-29 PROCEDURE — 84703 CHORIONIC GONADOTROPIN ASSAY: CPT

## 2018-03-29 PROCEDURE — 87086 URINE CULTURE/COLONY COUNT: CPT

## 2018-03-29 PROCEDURE — 85025 COMPLETE CBC W/AUTO DIFF WBC: CPT

## 2018-03-29 PROCEDURE — 96374 THER/PROPH/DIAG INJ IV PUSH: CPT

## 2018-03-29 PROCEDURE — 83605 ASSAY OF LACTIC ACID: CPT

## 2018-03-29 PROCEDURE — 74177 CT ABD & PELVIS W/CONTRAST: CPT

## 2018-03-29 PROCEDURE — 87510 GARDNER VAG DNA DIR PROBE: CPT

## 2018-03-29 PROCEDURE — 87491 CHLMYD TRACH DNA AMP PROBE: CPT

## 2018-03-29 PROCEDURE — 6370000000 HC RX 637 (ALT 250 FOR IP): Performed by: EMERGENCY MEDICINE

## 2018-03-29 PROCEDURE — 99284 EMERGENCY DEPT VISIT MOD MDM: CPT

## 2018-03-29 PROCEDURE — 6360000004 HC RX CONTRAST MEDICATION: Performed by: EMERGENCY MEDICINE

## 2018-03-29 PROCEDURE — 80048 BASIC METABOLIC PNL TOTAL CA: CPT

## 2018-03-29 PROCEDURE — 87480 CANDIDA DNA DIR PROBE: CPT

## 2018-03-29 RX ORDER — IBUPROFEN 600 MG/1
600 TABLET ORAL EVERY 6 HOURS PRN
Qty: 30 TABLET | Refills: 0 | Status: SHIPPED | OUTPATIENT
Start: 2018-03-29 | End: 2018-06-17

## 2018-03-29 RX ORDER — MORPHINE SULFATE 4 MG/ML
2 INJECTION, SOLUTION INTRAMUSCULAR; INTRAVENOUS ONCE
Status: COMPLETED | OUTPATIENT
Start: 2018-03-29 | End: 2018-03-29

## 2018-03-29 RX ORDER — NAPROXEN 250 MG/1
250 TABLET ORAL ONCE
Status: COMPLETED | OUTPATIENT
Start: 2018-03-29 | End: 2018-03-29

## 2018-03-29 RX ORDER — METRONIDAZOLE 500 MG/1
500 TABLET ORAL 2 TIMES DAILY
Qty: 14 TABLET | Refills: 0 | Status: SHIPPED | OUTPATIENT
Start: 2018-03-29 | End: 2018-04-05

## 2018-03-29 RX ADMIN — MORPHINE SULFATE 2 MG: 4 INJECTION INTRAVENOUS at 11:39

## 2018-03-29 RX ADMIN — NAPROXEN 250 MG: 250 TABLET ORAL at 09:55

## 2018-03-29 RX ADMIN — IOPAMIDOL 75 ML: 755 INJECTION, SOLUTION INTRAVENOUS at 10:46

## 2018-03-29 ASSESSMENT — PAIN DESCRIPTION - ORIENTATION: ORIENTATION: LOWER

## 2018-03-29 ASSESSMENT — PAIN DESCRIPTION - PAIN TYPE: TYPE: ACUTE PAIN

## 2018-03-29 ASSESSMENT — PAIN DESCRIPTION - LOCATION: LOCATION: ABDOMEN

## 2018-03-29 ASSESSMENT — PAIN SCALES - GENERAL
PAINLEVEL_OUTOF10: 7
PAINLEVEL_OUTOF10: 7
PAINLEVEL_OUTOF10: 8

## 2018-03-29 ASSESSMENT — PAIN DESCRIPTION - FREQUENCY: FREQUENCY: CONTINUOUS

## 2018-03-29 ASSESSMENT — PAIN DESCRIPTION - ONSET: ONSET: ON-GOING

## 2018-03-29 ASSESSMENT — PAIN DESCRIPTION - DESCRIPTORS: DESCRIPTORS: ACHING

## 2018-03-30 LAB
C TRACH DNA GENITAL QL NAA+PROBE: NEGATIVE
CULTURE: NO GROWTH
CULTURE: NORMAL
Lab: NORMAL
N. GONORRHOEAE DNA: NEGATIVE
SPECIMEN DESCRIPTION: NORMAL
STATUS: NORMAL

## 2018-04-05 ENCOUNTER — OFFICE VISIT (OUTPATIENT)
Dept: FAMILY MEDICINE CLINIC | Age: 43
End: 2018-04-05
Payer: MEDICAID

## 2018-04-05 ENCOUNTER — HOSPITAL ENCOUNTER (OUTPATIENT)
Age: 43
Setting detail: SPECIMEN
Discharge: HOME | End: 2018-04-05
Payer: MEDICAID

## 2018-04-05 ENCOUNTER — HOSPITAL ENCOUNTER (OUTPATIENT)
Dept: GENERAL RADIOLOGY | Age: 43
Discharge: HOME | End: 2018-04-07
Payer: MEDICAID

## 2018-04-05 ENCOUNTER — HOSPITAL ENCOUNTER (OUTPATIENT)
Age: 43
Discharge: HOME | End: 2018-04-07
Payer: MEDICAID

## 2018-04-05 VITALS
SYSTOLIC BLOOD PRESSURE: 130 MMHG | BODY MASS INDEX: 36.49 KG/M2 | HEIGHT: 65 IN | HEART RATE: 93 BPM | TEMPERATURE: 98.3 F | WEIGHT: 219 LBS | DIASTOLIC BLOOD PRESSURE: 85 MMHG

## 2018-04-05 DIAGNOSIS — N92.1 MENORRHAGIA WITH IRREGULAR CYCLE: ICD-10-CM

## 2018-04-05 DIAGNOSIS — M25.551 CHRONIC RIGHT HIP PAIN: ICD-10-CM

## 2018-04-05 DIAGNOSIS — G89.29 CHRONIC RIGHT HIP PAIN: ICD-10-CM

## 2018-04-05 DIAGNOSIS — R10.31 RIGHT LOWER QUADRANT ABDOMINAL PAIN: Primary | ICD-10-CM

## 2018-04-05 LAB
HCT VFR BLD CALC: 44.6 % (ref 36.3–47.1)
HEMOGLOBIN: 14.1 G/DL (ref 11.9–15.1)

## 2018-04-05 PROCEDURE — 4004F PT TOBACCO SCREEN RCVD TLK: CPT | Performed by: HOSPITALIST

## 2018-04-05 PROCEDURE — G8417 CALC BMI ABV UP PARAM F/U: HCPCS | Performed by: HOSPITALIST

## 2018-04-05 PROCEDURE — 99213 OFFICE O/P EST LOW 20 MIN: CPT

## 2018-04-05 PROCEDURE — G8427 DOCREV CUR MEDS BY ELIG CLIN: HCPCS | Performed by: HOSPITALIST

## 2018-04-05 PROCEDURE — 73502 X-RAY EXAM HIP UNI 2-3 VIEWS: CPT

## 2018-04-05 PROCEDURE — 99203 OFFICE O/P NEW LOW 30 MIN: CPT | Performed by: HOSPITALIST

## 2018-04-05 RX ORDER — NAPROXEN 500 MG/1
500 TABLET ORAL 2 TIMES DAILY WITH MEALS
Qty: 60 TABLET | Refills: 0 | Status: SHIPPED | OUTPATIENT
Start: 2018-04-05 | End: 2018-06-17

## 2018-04-05 ASSESSMENT — ENCOUNTER SYMPTOMS
DIARRHEA: 0
RHINORRHEA: 0
ABDOMINAL PAIN: 1
SHORTNESS OF BREATH: 0
BACK PAIN: 0
VOMITING: 0
NAUSEA: 0
CONSTIPATION: 0
WHEEZING: 0
COUGH: 0
SORE THROAT: 0

## 2018-04-06 ENCOUNTER — TELEPHONE (OUTPATIENT)
Dept: FAMILY MEDICINE CLINIC | Age: 43
End: 2018-04-06

## 2018-04-12 ENCOUNTER — OFFICE VISIT (OUTPATIENT)
Dept: FAMILY MEDICINE CLINIC | Age: 43
End: 2018-04-12
Payer: MEDICAID

## 2018-04-12 VITALS
BODY MASS INDEX: 36.61 KG/M2 | HEART RATE: 102 BPM | TEMPERATURE: 98.4 F | DIASTOLIC BLOOD PRESSURE: 95 MMHG | WEIGHT: 220 LBS | SYSTOLIC BLOOD PRESSURE: 135 MMHG

## 2018-04-12 DIAGNOSIS — N92.1 MENORRHAGIA WITH IRREGULAR CYCLE: ICD-10-CM

## 2018-04-12 DIAGNOSIS — R10.31 RIGHT LOWER QUADRANT ABDOMINAL PAIN: Primary | ICD-10-CM

## 2018-04-12 PROCEDURE — 4004F PT TOBACCO SCREEN RCVD TLK: CPT | Performed by: HOSPITALIST

## 2018-04-12 PROCEDURE — 99213 OFFICE O/P EST LOW 20 MIN: CPT

## 2018-04-12 PROCEDURE — 99213 OFFICE O/P EST LOW 20 MIN: CPT | Performed by: HOSPITALIST

## 2018-04-12 PROCEDURE — G8428 CUR MEDS NOT DOCUMENT: HCPCS | Performed by: HOSPITALIST

## 2018-04-12 PROCEDURE — G8417 CALC BMI ABV UP PARAM F/U: HCPCS | Performed by: HOSPITALIST

## 2018-04-12 RX ORDER — ETODOLAC 400 MG/1
400 TABLET, FILM COATED ORAL 2 TIMES DAILY
Qty: 30 TABLET | Refills: 0 | Status: SHIPPED | OUTPATIENT
Start: 2018-04-12 | End: 2018-06-17

## 2018-04-12 ASSESSMENT — ENCOUNTER SYMPTOMS
SORE THROAT: 0
SHORTNESS OF BREATH: 0
COUGH: 0
DIARRHEA: 0
CONSTIPATION: 0
WHEEZING: 0
ABDOMINAL PAIN: 0
VOMITING: 0
NAUSEA: 0
RHINORRHEA: 0
BACK PAIN: 0

## 2018-05-08 ENCOUNTER — OFFICE VISIT (OUTPATIENT)
Dept: OBGYN | Age: 43
End: 2018-05-08
Payer: COMMERCIAL

## 2018-05-08 ENCOUNTER — HOSPITAL ENCOUNTER (OUTPATIENT)
Age: 43
Setting detail: SPECIMEN
Discharge: HOME | End: 2018-05-08
Payer: MEDICAID

## 2018-05-08 VITALS
HEART RATE: 101 BPM | BODY MASS INDEX: 35.36 KG/M2 | DIASTOLIC BLOOD PRESSURE: 98 MMHG | WEIGHT: 220 LBS | HEIGHT: 66 IN | SYSTOLIC BLOOD PRESSURE: 128 MMHG

## 2018-05-08 DIAGNOSIS — N93.9 ABNORMAL UTERINE BLEEDING: ICD-10-CM

## 2018-05-08 DIAGNOSIS — Z98.51 HX OF TUBAL LIGATION: ICD-10-CM

## 2018-05-08 DIAGNOSIS — D25.9 UTERINE LEIOMYOMA, UNSPECIFIED LOCATION: Primary | ICD-10-CM

## 2018-05-08 DIAGNOSIS — Z01.419 WOMEN'S ANNUAL ROUTINE GYNECOLOGICAL EXAMINATION: ICD-10-CM

## 2018-05-08 PROBLEM — D21.9 FIBROID: Status: ACTIVE | Noted: 2018-05-08

## 2018-05-08 PROCEDURE — G8417 CALC BMI ABV UP PARAM F/U: HCPCS | Performed by: STUDENT IN AN ORGANIZED HEALTH CARE EDUCATION/TRAINING PROGRAM

## 2018-05-08 PROCEDURE — 99202 OFFICE O/P NEW SF 15 MIN: CPT | Performed by: STUDENT IN AN ORGANIZED HEALTH CARE EDUCATION/TRAINING PROGRAM

## 2018-05-08 PROCEDURE — 99213 OFFICE O/P EST LOW 20 MIN: CPT | Performed by: OBSTETRICS & GYNECOLOGY

## 2018-05-08 PROCEDURE — G8427 DOCREV CUR MEDS BY ELIG CLIN: HCPCS | Performed by: STUDENT IN AN ORGANIZED HEALTH CARE EDUCATION/TRAINING PROGRAM

## 2018-05-08 PROCEDURE — 4004F PT TOBACCO SCREEN RCVD TLK: CPT | Performed by: STUDENT IN AN ORGANIZED HEALTH CARE EDUCATION/TRAINING PROGRAM

## 2018-05-08 ASSESSMENT — PATIENT HEALTH QUESTIONNAIRE - PHQ9
2. FEELING DOWN, DEPRESSED OR HOPELESS: 0
SUM OF ALL RESPONSES TO PHQ9 QUESTIONS 1 & 2: 0
SUM OF ALL RESPONSES TO PHQ QUESTIONS 1-9: 0
1. LITTLE INTEREST OR PLEASURE IN DOING THINGS: 0

## 2018-06-01 ENCOUNTER — OFFICE VISIT (OUTPATIENT)
Dept: OBGYN | Age: 43
End: 2018-06-01
Payer: MEDICAID

## 2018-06-01 VITALS
HEIGHT: 66 IN | WEIGHT: 229 LBS | SYSTOLIC BLOOD PRESSURE: 127 MMHG | BODY MASS INDEX: 36.8 KG/M2 | HEART RATE: 93 BPM | DIASTOLIC BLOOD PRESSURE: 89 MMHG

## 2018-06-01 DIAGNOSIS — N93.9 ABNORMAL UTERINE BLEEDING: ICD-10-CM

## 2018-06-01 PROCEDURE — 99215 OFFICE O/P EST HI 40 MIN: CPT | Performed by: STUDENT IN AN ORGANIZED HEALTH CARE EDUCATION/TRAINING PROGRAM

## 2018-06-01 PROCEDURE — G8417 CALC BMI ABV UP PARAM F/U: HCPCS | Performed by: STUDENT IN AN ORGANIZED HEALTH CARE EDUCATION/TRAINING PROGRAM

## 2018-06-01 PROCEDURE — 4004F PT TOBACCO SCREEN RCVD TLK: CPT | Performed by: STUDENT IN AN ORGANIZED HEALTH CARE EDUCATION/TRAINING PROGRAM

## 2018-06-01 PROCEDURE — G8427 DOCREV CUR MEDS BY ELIG CLIN: HCPCS | Performed by: STUDENT IN AN ORGANIZED HEALTH CARE EDUCATION/TRAINING PROGRAM

## 2018-06-01 PROCEDURE — 99212 OFFICE O/P EST SF 10 MIN: CPT | Performed by: STUDENT IN AN ORGANIZED HEALTH CARE EDUCATION/TRAINING PROGRAM

## 2018-06-01 RX ORDER — DOCUSATE SODIUM 100 MG/1
100 CAPSULE, LIQUID FILLED ORAL 2 TIMES DAILY PRN
Qty: 60 CAPSULE | Refills: 0 | Status: SHIPPED | OUTPATIENT
Start: 2018-06-01 | End: 2018-06-01 | Stop reason: SDUPTHER

## 2018-06-01 RX ORDER — DOCUSATE SODIUM 100 MG/1
100 CAPSULE, LIQUID FILLED ORAL 2 TIMES DAILY PRN
Qty: 60 CAPSULE | Refills: 0 | Status: SHIPPED | OUTPATIENT
Start: 2018-06-01 | End: 2018-06-17

## 2018-06-05 LAB — CYTOLOGY REPORT: NORMAL

## 2018-06-12 ENCOUNTER — TELEPHONE (OUTPATIENT)
Dept: OBGYN | Age: 43
End: 2018-06-12

## 2018-06-12 NOTE — TELEPHONE ENCOUNTER
[facility]   Caller: Unspecified (1 week ago)             Please schedule patient for H scope D&C possible ablation, possible myosure as soon as available.  Please let me know when patient is scheduled so I can try to make it to the surgery.      Thanks :)   Memo Crowley

## 2018-06-17 ENCOUNTER — APPOINTMENT (OUTPATIENT)
Dept: GENERAL RADIOLOGY | Age: 43
End: 2018-06-17
Payer: MEDICAID

## 2018-06-17 ENCOUNTER — HOSPITAL ENCOUNTER (EMERGENCY)
Age: 43
Discharge: HOME | End: 2018-06-17
Attending: EMERGENCY MEDICINE
Payer: MEDICAID

## 2018-06-17 VITALS
HEIGHT: 66 IN | HEART RATE: 88 BPM | WEIGHT: 222.5 LBS | OXYGEN SATURATION: 96 % | TEMPERATURE: 98.1 F | SYSTOLIC BLOOD PRESSURE: 133 MMHG | BODY MASS INDEX: 35.76 KG/M2 | DIASTOLIC BLOOD PRESSURE: 86 MMHG | RESPIRATION RATE: 16 BRPM

## 2018-06-17 DIAGNOSIS — M79.672 LEFT FOOT PAIN: Primary | ICD-10-CM

## 2018-06-17 PROCEDURE — 73630 X-RAY EXAM OF FOOT: CPT

## 2018-06-17 PROCEDURE — 6370000000 HC RX 637 (ALT 250 FOR IP): Performed by: NURSE PRACTITIONER

## 2018-06-17 PROCEDURE — 99283 EMERGENCY DEPT VISIT LOW MDM: CPT

## 2018-06-17 RX ORDER — CEPHALEXIN 500 MG/1
500 CAPSULE ORAL 3 TIMES DAILY
Qty: 21 CAPSULE | Refills: 0 | Status: SHIPPED | OUTPATIENT
Start: 2018-06-17 | End: 2018-06-24

## 2018-06-17 RX ORDER — HYDROCODONE BITARTRATE AND ACETAMINOPHEN 5; 325 MG/1; MG/1
1 TABLET ORAL EVERY 6 HOURS PRN
Qty: 20 TABLET | Refills: 0 | Status: SHIPPED | OUTPATIENT
Start: 2018-06-17 | End: 2018-06-24

## 2018-06-17 RX ORDER — CLONAZEPAM 1 MG/1
1 TABLET ORAL 2 TIMES DAILY PRN
COMMUNITY

## 2018-06-17 RX ORDER — HYDROCODONE BITARTRATE AND ACETAMINOPHEN 5; 325 MG/1; MG/1
1 TABLET ORAL ONCE
Status: DISCONTINUED | OUTPATIENT
Start: 2018-06-17 | End: 2018-06-17 | Stop reason: HOSPADM

## 2018-06-17 RX ORDER — HYDROCODONE BITARTRATE AND ACETAMINOPHEN 5; 325 MG/1; MG/1
1 TABLET ORAL ONCE
Status: COMPLETED | OUTPATIENT
Start: 2018-06-17 | End: 2018-06-17

## 2018-06-17 RX ADMIN — HYDROCODONE BITARTRATE AND ACETAMINOPHEN 1 TABLET: 5; 325 TABLET ORAL at 20:50

## 2018-06-17 ASSESSMENT — ENCOUNTER SYMPTOMS
SHORTNESS OF BREATH: 0
VOMITING: 0
NAUSEA: 0
COLOR CHANGE: 0

## 2018-06-17 ASSESSMENT — PAIN SCALES - GENERAL
PAINLEVEL_OUTOF10: 10
PAINLEVEL_OUTOF10: 10

## 2018-06-25 ENCOUNTER — TELEPHONE (OUTPATIENT)
Dept: OBGYN | Age: 43
End: 2018-06-25

## 2018-06-25 NOTE — TELEPHONE ENCOUNTER
Patient walked into clinic this morning frustrated and  concerned because she has not received a phone call regarding surgery to be scheduled for H-scope, myosure, D&C, possible Jennifer endometrial ablation. Writer apologized to patient for inconvenience and explained that she will receive a call with update as soon as possible.

## 2018-06-29 NOTE — TELEPHONE ENCOUNTER
Please schedule H-scope, D&C and endometrial ablation with Jennifer next available dates. Suggest 8/6/18. Please  Re-confirm with the patient that she wishes to have endometrial ablation performed. Confirm date with Jennifer rep. Order can be entered for preop labs after case is scheduled.

## 2018-07-02 ENCOUNTER — TELEPHONE (OUTPATIENT)
Dept: OBGYN | Age: 43
End: 2018-07-02

## 2018-07-02 NOTE — TELEPHONE ENCOUNTER
pt returned a call but she didnt know who it was from. I told her she has a procedure set up for 8/6/18 but she has questions. Please call her back at your earliest convenience.

## 2018-07-03 DIAGNOSIS — Z01.818 PREOP TESTING: Primary | ICD-10-CM

## 2018-07-23 ENCOUNTER — HOSPITAL ENCOUNTER (OUTPATIENT)
Dept: PREADMISSION TESTING | Age: 43
Discharge: HOME | End: 2018-07-27
Payer: MEDICAID

## 2018-07-23 VITALS
HEIGHT: 66 IN | SYSTOLIC BLOOD PRESSURE: 116 MMHG | HEART RATE: 107 BPM | DIASTOLIC BLOOD PRESSURE: 86 MMHG | OXYGEN SATURATION: 95 % | WEIGHT: 229 LBS | TEMPERATURE: 98.4 F | BODY MASS INDEX: 36.8 KG/M2 | RESPIRATION RATE: 18 BRPM

## 2018-07-23 LAB
ABSOLUTE EOS #: 0.29 K/UL (ref 0–0.44)
ABSOLUTE IMMATURE GRANULOCYTE: 0.1 K/UL (ref 0–0.3)
ABSOLUTE LYMPH #: 2.22 K/UL (ref 1.1–3.7)
ABSOLUTE MONO #: 0.57 K/UL (ref 0.1–1.2)
BASOPHILS # BLD: 1 % (ref 0–2)
BASOPHILS ABSOLUTE: 0.05 K/UL (ref 0–0.2)
DIFFERENTIAL TYPE: ABNORMAL
EKG ATRIAL RATE: 100 BPM
EKG P AXIS: 59 DEGREES
EKG P-R INTERVAL: 146 MS
EKG Q-T INTERVAL: 340 MS
EKG QRS DURATION: 92 MS
EKG QTC CALCULATION (BAZETT): 438 MS
EKG R AXIS: -11 DEGREES
EKG T AXIS: 38 DEGREES
EKG VENTRICULAR RATE: 100 BPM
EOSINOPHILS RELATIVE PERCENT: 4 % (ref 1–4)
HCG(URINE) PREGNANCY TEST: NEGATIVE
HCT VFR BLD CALC: 45.9 % (ref 36.3–47.1)
HEMOGLOBIN: 14.8 G/DL (ref 11.9–15.1)
IMMATURE GRANULOCYTES: 2 %
LYMPHOCYTES # BLD: 33 % (ref 24–43)
MCH RBC QN AUTO: 30.3 PG (ref 25.2–33.5)
MCHC RBC AUTO-ENTMCNC: 32.2 G/DL (ref 28.4–34.8)
MCV RBC AUTO: 94.1 FL (ref 82.6–102.9)
MONOCYTES # BLD: 9 % (ref 3–12)
NRBC AUTOMATED: 0 PER 100 WBC
PDW BLD-RTO: 14.5 % (ref 11.8–14.4)
PLATELET # BLD: 276 K/UL (ref 138–453)
PLATELET ESTIMATE: ABNORMAL
PMV BLD AUTO: 10.4 FL (ref 8.1–13.5)
RBC # BLD: 4.88 M/UL (ref 3.95–5.11)
RBC # BLD: ABNORMAL 10*6/UL
SEG NEUTROPHILS: 51 % (ref 36–65)
SEGMENTED NEUTROPHILS ABSOLUTE COUNT: 3.42 K/UL (ref 1.5–8.1)
WBC # BLD: 6.7 K/UL (ref 3.5–11.3)
WBC # BLD: ABNORMAL 10*3/UL

## 2018-07-23 PROCEDURE — 93005 ELECTROCARDIOGRAM TRACING: CPT

## 2018-07-23 PROCEDURE — 85025 COMPLETE CBC W/AUTO DIFF WBC: CPT

## 2018-07-23 PROCEDURE — 84703 CHORIONIC GONADOTROPIN ASSAY: CPT

## 2018-07-23 PROCEDURE — 36415 COLL VENOUS BLD VENIPUNCTURE: CPT

## 2018-07-23 RX ORDER — SODIUM CHLORIDE, SODIUM LACTATE, POTASSIUM CHLORIDE, CALCIUM CHLORIDE 600; 310; 30; 20 MG/100ML; MG/100ML; MG/100ML; MG/100ML
1000 INJECTION, SOLUTION INTRAVENOUS CONTINUOUS
Status: CANCELLED | OUTPATIENT
Start: 2018-07-23

## 2018-07-23 ASSESSMENT — PAIN DESCRIPTION - ORIENTATION: ORIENTATION: LOWER

## 2018-07-23 ASSESSMENT — PAIN DESCRIPTION - DESCRIPTORS: DESCRIPTORS: ACHING;CONSTANT;SHARP;SHOOTING

## 2018-07-23 ASSESSMENT — PAIN DESCRIPTION - PAIN TYPE: TYPE: CHRONIC PAIN

## 2018-07-23 ASSESSMENT — PAIN DESCRIPTION - PROGRESSION: CLINICAL_PROGRESSION: GRADUALLY WORSENING

## 2018-07-23 ASSESSMENT — PAIN SCALES - GENERAL: PAINLEVEL_OUTOF10: 10

## 2018-07-23 ASSESSMENT — PAIN DESCRIPTION - FREQUENCY: FREQUENCY: CONTINUOUS

## 2018-07-23 ASSESSMENT — PAIN DESCRIPTION - LOCATION: LOCATION: ABDOMEN

## 2018-07-23 NOTE — ANESTHESIA PRE-OP
Anesthesia Focused Assessment    STOP-BANG Sleep Apnea Questionnaire    Obstructive Sleep Apnea:                                                                 No     Machine used:                                                                                  No            SNORE loudly (heard through closed doors)? No      TIRED, fatigued, sleepy during daytime? No      OBSERVED stopping breathing during sleep? No      High blood PRESSURE being treated? No      BMI over 35? Yes  AGE over 48? No  NECK circumference over 16\"? No  GENDER (male)? No                High risk 5-8  Intermediate risk 3-4  Low risk 0-2    Total 3  High risk 5-8  Intermediate risk 3-4  Low risk 0-2      Type 1 DM:                                                                                        No    TYPE 2:                                                                                             No    If yes, insulin dependent? No    Coronary Artery Disease :                                                                No        Active smoker                                                                                   Yes    Drinks Alcohol                                                                                   Yes    Dentition: Dentures                                                                            No              Partial                                                                                                 No    Any loose or missing teeth                                                                 No    Defib / AICD / Pacemaker:                                                               No    Renal Failure: No    If Yes, On dialysis?       Hx of anesthesia complications with Patient                               No    Hx of anesthesia complications with family

## 2018-07-26 ENCOUNTER — HOSPITAL ENCOUNTER (EMERGENCY)
Age: 43
Discharge: HOME | End: 2018-07-26
Attending: EMERGENCY MEDICINE
Payer: MEDICAID

## 2018-07-26 VITALS
HEART RATE: 113 BPM | HEIGHT: 65 IN | WEIGHT: 229 LBS | SYSTOLIC BLOOD PRESSURE: 148 MMHG | BODY MASS INDEX: 38.15 KG/M2 | OXYGEN SATURATION: 97 % | TEMPERATURE: 97.9 F | DIASTOLIC BLOOD PRESSURE: 83 MMHG | RESPIRATION RATE: 16 BRPM

## 2018-07-26 DIAGNOSIS — M72.2 PLANTAR FASCIITIS, BILATERAL: Primary | ICD-10-CM

## 2018-07-26 PROCEDURE — 6370000000 HC RX 637 (ALT 250 FOR IP): Performed by: STUDENT IN AN ORGANIZED HEALTH CARE EDUCATION/TRAINING PROGRAM

## 2018-07-26 PROCEDURE — 99282 EMERGENCY DEPT VISIT SF MDM: CPT

## 2018-07-26 RX ORDER — IBUPROFEN 800 MG/1
800 TABLET ORAL ONCE
Status: COMPLETED | OUTPATIENT
Start: 2018-07-26 | End: 2018-07-26

## 2018-07-26 RX ADMIN — IBUPROFEN 800 MG: 800 TABLET ORAL at 20:51

## 2018-07-26 ASSESSMENT — ENCOUNTER SYMPTOMS
RHINORRHEA: 0
CONSTIPATION: 0
WHEEZING: 0
DIARRHEA: 0
COLOR CHANGE: 1
SHORTNESS OF BREATH: 0
ABDOMINAL PAIN: 0
NAUSEA: 0
VOMITING: 0
PHOTOPHOBIA: 0

## 2018-07-26 ASSESSMENT — PAIN DESCRIPTION - PROGRESSION: CLINICAL_PROGRESSION: GRADUALLY WORSENING

## 2018-07-26 ASSESSMENT — PAIN SCALES - GENERAL
PAINLEVEL_OUTOF10: 10
PAINLEVEL_OUTOF10: 10

## 2018-07-26 ASSESSMENT — PAIN DESCRIPTION - LOCATION: LOCATION: FOOT

## 2018-07-26 ASSESSMENT — PAIN DESCRIPTION - PAIN TYPE: TYPE: ACUTE PAIN

## 2018-07-26 ASSESSMENT — PAIN DESCRIPTION - FREQUENCY: FREQUENCY: CONTINUOUS

## 2018-07-26 ASSESSMENT — PAIN DESCRIPTION - DESCRIPTORS: DESCRIPTORS: BURNING

## 2018-07-26 ASSESSMENT — PAIN DESCRIPTION - ORIENTATION: ORIENTATION: RIGHT;LEFT

## 2018-07-27 NOTE — ED PROVIDER NOTES
[address]  ED  eMERGENCY dEPARTMENT eNCOUnter  Resident    Pt Name: Quyen Leo  MRN: [id number]  [place] 1975  Date of evaluation: 7/26/2018  PCP:  No primary care provider on file. CHIEF COMPLAINT       Chief Complaint   Patient presents with    Foot Pain     bilat foot pain and bluish discoloration when elevating legs X1 day         HISTORY OF PRESENT ILLNESS   HPI  Quyen Leo is a 37 y.o. female who presents To the ED with bilateral feet pain. Patient states that last night pain started on the bottom of her right foot. She said it started to feel warm and painful when walking. She woke up this morning with the same symptoms in her left foot. She states that she has never had this before and the pain has gotten worse over the course of the day. She says her toes feel like they are sausages about to explode, referencing how they feel swollen. She states that symptoms have worsened she feels her entire foot is tender to touch. She says she stands on her feet all day but this pain has not gone away when she attempts to lay down with her period. Patient has taken ibuprofen without significant relief. She denies any trauma or other inciting event, states she is not diabetic. She says she wears shoes all the time and does not walk on any hot surfaces. REVIEW OF SYSTEMS       Review of Systems   Constitutional: Negative for chills and fever. HENT: Negative for congestion and rhinorrhea. Eyes: Negative for photophobia and visual disturbance. Respiratory: Negative for shortness of breath and wheezing. Cardiovascular: Negative for chest pain, palpitations and leg swelling. Gastrointestinal: Negative for abdominal pain, constipation, diarrhea, nausea and vomiting. Genitourinary: Negative for dysuria and hematuria. Musculoskeletal: Positive for gait problem and joint swelling. Skin: Positive for color change. Negative for rash.    Neurological: Negative for dizziness, weakness, numbness and headaches. PAST MEDICAL HISTORY    has a past medical history of Abnormal uterine bleeding; Anxiety; Bipolar 1 disorder ([address] Fibroid; and Panic attacks. SURGICAL HISTORY      has a past surgical history that includes Tubal ligation (). CURRENT MEDICATIONS       Discharge Medication List as of 2018  9:09 PM      CONTINUE these medications which have NOT CHANGED    Details   clonazePAM (KLONOPIN) 1 MG tablet Take 1 mg by mouth daily. Chel Mccord Historical Med             ALLERGIES     is allergic to naproxen; sulfa antibiotics; sulfasalazine; and shellfish-derived products. FAMILY HISTORY     indicated that her mother is alive. She indicated that her father is . She indicated that both of her sisters are alive. She indicated that her maternal grandmother is . She indicated that her maternal grandfather is . She indicated that her paternal grandmother is . She indicated that her paternal grandfather is . She indicated that her daughter is alive. She indicated that her son is alive. family history includes Brain Cancer in her father; Diabetes in her mother; High Blood Pressure in her mother; Robles Marlyn in her father; Mult Sclerosis in her maternal grandmother; No Known Problems in her maternal grandfather, paternal grandfather, and paternal grandmother; Other in her sister. SOCIAL HISTORY      reports that she has been smoking Cigarettes. She started smoking about 34 years ago. She has been smoking about 0.50 packs per day. She has never used smokeless tobacco. She reports that she does not drink alcohol or use drugs. PHYSICAL EXAM     INITIAL VITALS:  height is 5' 5\" (1.651 m) and weight is 229 lb (103.9 kg). Her oral temperature is 97.9 °F (36.6 °C). Her blood pressure is 148/83 (abnormal) and her pulse is 113. Her respiration is 16 and oxygen saturation is 97%.       Physical Exam   Constitutional: She is

## 2018-07-27 NOTE — ED PROVIDER NOTES
[address] [facility]     Emergency Department     Faculty Attestation    I performed a history and physical examination of the patient and discussed management with the resident. I have reviewed and agree with the residents findings including all diagnostic interpretations, and treatment plans as written. Any areas of disagreement are noted on the chart. I was personally present for the key portions of any procedures. I have documented in the chart those procedures where I was not present during the key portions. I have reviewed the emergency nurses triage note. I agree with the chief complaint, past medical history, past surgical history, allergies, medications, social and family history as documented unless otherwise noted below. Documentation of the HPI, Physical Exam and Medical Decision Making performed by scribdot is based on my personal performance of the HPI, PE and MDM. For Physician Assistant/ Nurse Practitioner cases/documentation I have personally evaluated this patient and have completed at least one if not all key elements of the E/M (history, physical exam, and MDM). Additional findings are as noted. Primary Care Physician: No primary care provider on file. History: This is a 37 y.o. female who presents to the Emergency Department with complaint of Foot pain. The patient presents emergency room complaining of pain in the right foot that started yesterday that is now in her left foot as well. She denies any trauma. The patient denies any fever, chills or sweats. She took Motrin one dose over-the-counter this morning without any improvement of her symptoms. The patient was seen approximately one month ago at [facility] ED  for similar complaints and the left foot. At that time she was diagnosed with cellulitis and sent home on Keflex. Patient is also noted to be tachycardic 125 at that time.     Physical:   height is 5' 5\" (1.651 m) and weight is 229

## 2018-07-30 ENCOUNTER — TELEPHONE (OUTPATIENT)
Dept: OBGYN | Age: 43
End: 2018-07-30

## 2018-10-28 ENCOUNTER — HOSPITAL ENCOUNTER (EMERGENCY)
Age: 43
Discharge: HOME | End: 2018-10-28
Attending: EMERGENCY MEDICINE
Payer: MEDICARE

## 2018-10-28 ENCOUNTER — APPOINTMENT (OUTPATIENT)
Dept: GENERAL RADIOLOGY | Age: 43
End: 2018-10-28
Payer: MEDICARE

## 2018-10-28 VITALS
OXYGEN SATURATION: 97 % | RESPIRATION RATE: 17 BRPM | DIASTOLIC BLOOD PRESSURE: 79 MMHG | HEART RATE: 95 BPM | SYSTOLIC BLOOD PRESSURE: 96 MMHG

## 2018-10-28 DIAGNOSIS — R07.89 ATYPICAL CHEST PAIN: Primary | ICD-10-CM

## 2018-10-28 LAB
ABSOLUTE EOS #: 0.32 K/UL (ref 0–0.44)
ABSOLUTE IMMATURE GRANULOCYTE: 0.03 K/UL (ref 0–0.3)
ABSOLUTE LYMPH #: 2.17 K/UL (ref 1.1–3.7)
ABSOLUTE MONO #: 0.56 K/UL (ref 0.1–1.2)
ANION GAP SERPL CALCULATED.3IONS-SCNC: 14 MMOL/L (ref 9–17)
BASOPHILS # BLD: 0 % (ref 0–2)
BASOPHILS ABSOLUTE: 0.03 K/UL (ref 0–0.2)
BUN BLDV-MCNC: 8 MG/DL (ref 6–20)
BUN/CREAT BLD: ABNORMAL (ref 9–20)
CALCIUM SERPL-MCNC: 10.1 MG/DL (ref 8.6–10.4)
CHLORIDE BLD-SCNC: 98 MMOL/L (ref 98–107)
CO2: 22 MMOL/L (ref 20–31)
CREAT SERPL-MCNC: 0.56 MG/DL (ref 0.5–0.9)
D-DIMER QUANTITATIVE: 0.48 MG/L FEU
DIFFERENTIAL TYPE: ABNORMAL
EKG ATRIAL RATE: 99 BPM
EKG P AXIS: 64 DEGREES
EKG P-R INTERVAL: 150 MS
EKG Q-T INTERVAL: 360 MS
EKG QRS DURATION: 88 MS
EKG QTC CALCULATION (BAZETT): 462 MS
EKG R AXIS: 13 DEGREES
EKG T AXIS: 49 DEGREES
EKG VENTRICULAR RATE: 99 BPM
EOSINOPHILS RELATIVE PERCENT: 5 % (ref 1–4)
GFR AFRICAN AMERICAN: >60 ML/MIN
GFR NON-AFRICAN AMERICAN: >60 ML/MIN
GFR SERPL CREATININE-BSD FRML MDRD: ABNORMAL ML/MIN/{1.73_M2}
GFR SERPL CREATININE-BSD FRML MDRD: ABNORMAL ML/MIN/{1.73_M2}
GLUCOSE BLD-MCNC: 108 MG/DL (ref 70–99)
HCT VFR BLD CALC: 41.8 % (ref 36.3–47.1)
HEMOGLOBIN: 13.5 G/DL (ref 11.9–15.1)
IMMATURE GRANULOCYTES: 0 %
LYMPHOCYTES # BLD: 32 % (ref 24–43)
MCH RBC QN AUTO: 29.7 PG (ref 25.2–33.5)
MCHC RBC AUTO-ENTMCNC: 32.3 G/DL (ref 28.4–34.8)
MCV RBC AUTO: 92.1 FL (ref 82.6–102.9)
MONOCYTES # BLD: 8 % (ref 3–12)
NRBC AUTOMATED: 0 PER 100 WBC
PDW BLD-RTO: 14.6 % (ref 11.8–14.4)
PLATELET # BLD: 259 K/UL (ref 138–453)
PLATELET ESTIMATE: ABNORMAL
PMV BLD AUTO: 11.1 FL (ref 8.1–13.5)
POC TROPONIN I: 0 NG/ML (ref 0–0.1)
POC TROPONIN I: 0 NG/ML (ref 0–0.1)
POC TROPONIN INTERP: NORMAL
POC TROPONIN INTERP: NORMAL
POTASSIUM SERPL-SCNC: 3.5 MMOL/L (ref 3.7–5.3)
RBC # BLD: 4.54 M/UL (ref 3.95–5.11)
RBC # BLD: ABNORMAL 10*6/UL
SEG NEUTROPHILS: 55 % (ref 36–65)
SEGMENTED NEUTROPHILS ABSOLUTE COUNT: 3.67 K/UL (ref 1.5–8.1)
SODIUM BLD-SCNC: 134 MMOL/L (ref 135–144)
WBC # BLD: 6.8 K/UL (ref 3.5–11.3)
WBC # BLD: ABNORMAL 10*3/UL

## 2018-10-28 PROCEDURE — 84484 ASSAY OF TROPONIN QUANT: CPT

## 2018-10-28 PROCEDURE — 93005 ELECTROCARDIOGRAM TRACING: CPT

## 2018-10-28 PROCEDURE — 85025 COMPLETE CBC W/AUTO DIFF WBC: CPT

## 2018-10-28 PROCEDURE — 99285 EMERGENCY DEPT VISIT HI MDM: CPT

## 2018-10-28 PROCEDURE — 85379 FIBRIN DEGRADATION QUANT: CPT

## 2018-10-28 PROCEDURE — 80048 BASIC METABOLIC PNL TOTAL CA: CPT

## 2018-10-28 PROCEDURE — 71046 X-RAY EXAM CHEST 2 VIEWS: CPT

## 2018-10-28 ASSESSMENT — PAIN DESCRIPTION - PAIN TYPE: TYPE: ACUTE PAIN

## 2018-10-28 ASSESSMENT — PAIN DESCRIPTION - ORIENTATION: ORIENTATION: MID

## 2018-10-28 ASSESSMENT — PAIN DESCRIPTION - FREQUENCY: FREQUENCY: CONTINUOUS

## 2018-10-28 ASSESSMENT — ENCOUNTER SYMPTOMS
COUGH: 0
SHORTNESS OF BREATH: 0
WHEEZING: 0
VOMITING: 0
NAUSEA: 1
ABDOMINAL PAIN: 0

## 2018-10-28 ASSESSMENT — PAIN DESCRIPTION - DESCRIPTORS: DESCRIPTORS: TIGHTNESS;PRESSURE

## 2018-10-28 ASSESSMENT — PAIN SCALES - GENERAL: PAINLEVEL_OUTOF10: 10

## 2018-10-28 ASSESSMENT — PAIN DESCRIPTION - LOCATION: LOCATION: CHEST

## 2018-10-28 ASSESSMENT — PAIN DESCRIPTION - ONSET: ONSET: SUDDEN

## 2018-10-28 ASSESSMENT — HEART SCORE
ECG: 0
ECG: 0

## 2018-12-14 ENCOUNTER — HOSPITAL ENCOUNTER (EMERGENCY)
Age: 43
Discharge: HOME | End: 2018-12-14
Attending: EMERGENCY MEDICINE
Payer: COMMERCIAL

## 2018-12-14 ENCOUNTER — APPOINTMENT (OUTPATIENT)
Dept: GENERAL RADIOLOGY | Age: 43
End: 2018-12-14
Payer: COMMERCIAL

## 2018-12-14 VITALS
DIASTOLIC BLOOD PRESSURE: 88 MMHG | WEIGHT: 186 LBS | RESPIRATION RATE: 16 BRPM | TEMPERATURE: 97.8 F | HEART RATE: 105 BPM | HEIGHT: 65 IN | SYSTOLIC BLOOD PRESSURE: 129 MMHG | OXYGEN SATURATION: 94 % | BODY MASS INDEX: 30.99 KG/M2

## 2018-12-14 DIAGNOSIS — M25.572 ACUTE LEFT ANKLE PAIN: Primary | ICD-10-CM

## 2018-12-14 PROCEDURE — 99283 EMERGENCY DEPT VISIT LOW MDM: CPT

## 2018-12-14 PROCEDURE — 73610 X-RAY EXAM OF ANKLE: CPT

## 2018-12-14 RX ORDER — HYDROCODONE BITARTRATE AND ACETAMINOPHEN 5; 325 MG/1; MG/1
1 TABLET ORAL EVERY 6 HOURS PRN
Qty: 20 TABLET | Refills: 0 | Status: SHIPPED | OUTPATIENT
Start: 2018-12-14 | End: 2018-12-21

## 2018-12-14 RX ORDER — PREDNISONE 10 MG/1
40 TABLET ORAL DAILY
Qty: 20 TABLET | Refills: 0 | Status: SHIPPED | OUTPATIENT
Start: 2018-12-14 | End: 2018-12-19

## 2018-12-14 ASSESSMENT — ENCOUNTER SYMPTOMS
COLOR CHANGE: 0
BACK PAIN: 0

## 2018-12-14 ASSESSMENT — PAIN DESCRIPTION - PROGRESSION: CLINICAL_PROGRESSION: GRADUALLY WORSENING

## 2018-12-14 ASSESSMENT — PAIN DESCRIPTION - PAIN TYPE: TYPE: ACUTE PAIN

## 2018-12-14 ASSESSMENT — PAIN SCALES - GENERAL: PAINLEVEL_OUTOF10: 10

## 2018-12-14 ASSESSMENT — PAIN DESCRIPTION - LOCATION: LOCATION: ANKLE

## 2018-12-14 ASSESSMENT — PAIN DESCRIPTION - ORIENTATION: ORIENTATION: LEFT

## 2018-12-14 ASSESSMENT — PAIN DESCRIPTION - DESCRIPTORS: DESCRIPTORS: STABBING;NUMBNESS

## 2018-12-14 ASSESSMENT — PAIN DESCRIPTION - FREQUENCY: FREQUENCY: INTERMITTENT

## 2018-12-14 NOTE — ED PROVIDER NOTES
[address] ED  eMERGENCY dEPARTMENT eNCOUnter      Pt Name: Candy Simpson  MRN: [id number]  [place] 1975  Date of evaluation: 2018  Provider: TOR Terry CNP    CHIEF COMPLAINT       Chief Complaint   Patient presents with    Ankle Pain     L sided no known injury onset this AM         HISTORY OF PRESENT ILLNESS  (Location/Symptom, Timing/Onset, Context/Setting, Quality, Duration, Modifying Factors, Severity.)   Candy Simpson is a 37 y.o. female who presents to the emergency department via private auto for pain to her left posterior ankle. Onset was upon waking this morning. Denies recent injury. States she had a previous achilles tendon injury. Denies fever, chills. Rates her pain 10/10 at this time. It increases with ambulation. Nursing Notes were reviewed. ALLERGIES     Naproxen; Sulfa antibiotics; Sulfasalazine; and Shellfish-derived products    CURRENT MEDICATIONS       Previous Medications    CLONAZEPAM (KLONOPIN) 1 MG TABLET    Take 1 mg by mouth daily. Adrianne Gobble PAST MEDICAL HISTORY         Diagnosis Date    Abnormal uterine bleeding 2018    Anxiety     Bipolar 1 disorder (HCC)     No Rx.      Fibroid 2018    Panic attacks        SURGICAL HISTORY           Procedure Laterality Date    TUBAL LIGATION           FAMILY HISTORY       Family History   Problem Relation Age of Onset    High Blood Pressure Mother     Diabetes Mother     Lung Cancer Father         primary    Brain Cancer Father     Other Sister         DDD of the back    Mult Sclerosis Maternal Grandmother     No Known Problems Maternal Grandfather     No Known Problems Paternal Grandmother     No Known Problems Paternal Grandfather      Family Status   Relation Status    Mother Alive    Father     Sister Alive    MGM     MGF     PGM     PGF     Son Alive    Benito Alive    Sister Alive        SOCIAL HISTORY      reports that she

## 2019-01-25 ENCOUNTER — HOSPITAL ENCOUNTER (EMERGENCY)
Age: 44
Discharge: HOME | End: 2019-01-25
Attending: EMERGENCY MEDICINE
Payer: COMMERCIAL

## 2019-01-25 VITALS
HEART RATE: 105 BPM | SYSTOLIC BLOOD PRESSURE: 139 MMHG | OXYGEN SATURATION: 94 % | TEMPERATURE: 97.3 F | BODY MASS INDEX: 33.32 KG/M2 | DIASTOLIC BLOOD PRESSURE: 88 MMHG | RESPIRATION RATE: 20 BRPM | WEIGHT: 200 LBS | HEIGHT: 65 IN

## 2019-01-25 DIAGNOSIS — H66.011 ACUTE SUPPURATIVE OTITIS MEDIA OF RIGHT EAR WITH SPONTANEOUS RUPTURE OF TYMPANIC MEMBRANE, RECURRENCE NOT SPECIFIED: ICD-10-CM

## 2019-01-25 DIAGNOSIS — H66.90 ACUTE OTITIS MEDIA, UNSPECIFIED OTITIS MEDIA TYPE: Primary | ICD-10-CM

## 2019-01-25 DIAGNOSIS — H60.391 INFECTIVE OTITIS EXTERNA OF RIGHT EAR: ICD-10-CM

## 2019-01-25 PROCEDURE — 99282 EMERGENCY DEPT VISIT SF MDM: CPT

## 2019-01-25 PROCEDURE — 6370000000 HC RX 637 (ALT 250 FOR IP): Performed by: EMERGENCY MEDICINE

## 2019-01-25 RX ORDER — AMOXICILLIN 250 MG/1
500 CAPSULE ORAL ONCE
Status: COMPLETED | OUTPATIENT
Start: 2019-01-25 | End: 2019-01-25

## 2019-01-25 RX ORDER — AMOXICILLIN 500 MG/1
500 CAPSULE ORAL 3 TIMES DAILY
Qty: 30 CAPSULE | Refills: 0 | Status: SHIPPED | OUTPATIENT
Start: 2019-01-25 | End: 2019-02-04

## 2019-01-25 RX ORDER — HYDROCODONE BITARTRATE AND ACETAMINOPHEN 5; 325 MG/1; MG/1
1 TABLET ORAL EVERY 8 HOURS PRN
Qty: 10 TABLET | Refills: 0 | Status: SHIPPED | OUTPATIENT
Start: 2019-01-25 | End: 2019-01-28

## 2019-01-25 RX ORDER — CIPROFLOXACIN AND DEXAMETHASONE 3; 1 MG/ML; MG/ML
4 SUSPENSION/ DROPS AURICULAR (OTIC) ONCE
Status: COMPLETED | OUTPATIENT
Start: 2019-01-25 | End: 2019-01-25

## 2019-01-25 RX ADMIN — CIPROFLOXACIN AND DEXAMETHASONE 4 DROP: 3; 1 SUSPENSION/ DROPS AURICULAR (OTIC) at 14:03

## 2019-01-25 RX ADMIN — AMOXICILLIN 500 MG: 250 CAPSULE ORAL at 13:09

## 2019-01-25 ASSESSMENT — PAIN DESCRIPTION - ORIENTATION: ORIENTATION: RIGHT

## 2019-01-25 ASSESSMENT — ENCOUNTER SYMPTOMS
COUGH: 0
NAUSEA: 0
EYE PAIN: 0
ABDOMINAL PAIN: 0
EYE DISCHARGE: 0
VOMITING: 0
DIARRHEA: 0
SHORTNESS OF BREATH: 0
SORE THROAT: 0

## 2019-01-25 ASSESSMENT — PAIN SCALES - GENERAL: PAINLEVEL_OUTOF10: 10

## 2019-01-25 ASSESSMENT — PAIN DESCRIPTION - LOCATION: LOCATION: EAR

## 2019-01-25 ASSESSMENT — PAIN DESCRIPTION - PAIN TYPE: TYPE: ACUTE PAIN

## 2019-05-08 ENCOUNTER — OFFICE VISIT (OUTPATIENT)
Dept: OBGYN | Age: 44
End: 2019-05-08
Payer: COMMERCIAL

## 2019-05-08 VITALS
DIASTOLIC BLOOD PRESSURE: 93 MMHG | WEIGHT: 215.1 LBS | BODY MASS INDEX: 34.57 KG/M2 | HEIGHT: 66 IN | HEART RATE: 102 BPM | SYSTOLIC BLOOD PRESSURE: 124 MMHG

## 2019-05-08 DIAGNOSIS — N93.9 ABNORMAL UTERINE BLEEDING (AUB): Primary | ICD-10-CM

## 2019-05-08 DIAGNOSIS — Z01.419 WELL WOMAN EXAM: ICD-10-CM

## 2019-05-08 PROBLEM — Z72.0 TOBACCO ABUSE: Status: ACTIVE | Noted: 2019-05-08

## 2019-05-08 PROBLEM — F41.0 PANIC ATTACKS: Status: ACTIVE | Noted: 2019-05-08

## 2019-05-08 PROCEDURE — G8427 DOCREV CUR MEDS BY ELIG CLIN: HCPCS | Performed by: OBSTETRICS & GYNECOLOGY

## 2019-05-08 PROCEDURE — 81025 URINE PREGNANCY TEST: CPT | Performed by: OBSTETRICS & GYNECOLOGY

## 2019-05-08 PROCEDURE — 4004F PT TOBACCO SCREEN RCVD TLK: CPT | Performed by: OBSTETRICS & GYNECOLOGY

## 2019-05-08 PROCEDURE — 99215 OFFICE O/P EST HI 40 MIN: CPT | Performed by: OBSTETRICS & GYNECOLOGY

## 2019-05-08 PROCEDURE — G8417 CALC BMI ABV UP PARAM F/U: HCPCS | Performed by: OBSTETRICS & GYNECOLOGY

## 2019-05-08 NOTE — PROGRESS NOTES
Gaurang Jefferst  2019    YOB: 1975    HPI:  Matthieu Rivera is a 37 y.o. female  who is here today requesting a hysterectomy 2/2 a hx of heavy menses since her last child was born in . The patient states she has menses 3 out of every 4 weeks of the month which requires her to change a super plus tampon every 30 minutes as well as wear a pad which she soaks through. She also has severe cramps w/ her menses. She states she takes Motrin around the clock without relief. She states her menses are affecting her ADLs and is requesting intervention. The patient is a 1/2 PPD smoker and is not a good candidate for OCPs. She is interested in definitive surgical management and would like a hysterectomy. An EMB has previously been attempted in office (2018) but was unable to be completed 2/2 patient intolerance. TVUS on 18 showed possible uterine fibroids vs adenomyosis. OB History    Para Term  AB Living   3 3 3 0 0 3   SAB TAB Ectopic Molar Multiple Live Births   0 0 0 0 0 1      # Outcome Date GA Lbr Bhavik/2nd Weight Sex Delivery Anes PTL Lv   3 Term 95    M Vag-Spont      2 Term 94    F Vag-Spont      1 Term 90    Deveron Saver   BRISA     Past Medical History:   Diagnosis Date    Abnormal uterine bleeding 2018    Anxiety     Bipolar 1 disorder ([facility]     No Rx.      Fibroid 2018    Panic attacks      Past Surgical History:   Procedure Laterality Date    TUBAL LIGATION       Family History   Problem Relation Age of Onset    High Blood Pressure Mother     Diabetes Mother     Lung Cancer Father         primary   Nicolas Ishihara Brain Cancer Father     Other Sister         DDD of the back    Mult Sclerosis Maternal Grandmother     No Known Problems Maternal Grandfather     No Known Problems Paternal Grandmother     No Known Problems Paternal Grandfather      Social History     Socioeconomic History    Marital status:      Spouse name: Not on file    Number of children: 3    Years of education: Not on file    Highest education level: Not on file   Occupational History    Not on file   Social Needs    Financial resource strain: Not on file    Food insecurity:     Worry: Not on file     Inability: Not on file    Transportation needs:     Medical: Not on file     Non-medical: Not on file   Tobacco Use    Smoking status: Current Every Day Smoker     Packs/day: 0.50     Types: Cigarettes     Start date: 1984    Smokeless tobacco: Never Used   Substance and Sexual Activity    Alcohol use: No     Comment: seldom    Drug use: No    Sexual activity: Not on file   Lifestyle    Physical activity:     Days per week: Not on file     Minutes per session: Not on file    Stress: Not on file   Relationships    Social connections:     Talks on phone: Not on file     Gets together: Not on file     Attends Islam service: Not on file     Active member of club or organization: Not on file     Attends meetings of clubs or organizations: Not on file     Relationship status: Not on file    Intimate partner violence:     Fear of current or ex partner: Not on file     Emotionally abused: Not on file     Physically abused: Not on file     Forced sexual activity: Not on file   Other Topics Concern    Not on file   Social History Narrative    Not on file     MEDICATIONS:  Current Outpatient Medications   Medication Sig Dispense Refill    clonazePAM (KLONOPIN) 1 MG tablet Take 1 mg by mouth 4 times daily as needed. .       No current facility-administered medications for this visit.       ALLERGIES:  Allergies as of 05/08/2019 - Review Complete 05/08/2019   Allergen Reaction Noted    Naproxen Other (See Comments) 07/23/2018    Sulfa antibiotics Hives 04/10/2014    Sulfasalazine Hives 04/10/2014    Shellfish-derived products Diarrhea and Nausea And Vomiting 07/02/2017     REVIEW OF SYSTEMS:  Constitutional: negative fever, negative chills  HEENT: negative visual disturbances, negative headaches  Respiratory: negative dyspnea, negative cough  Cardiovascular: negative chest pain,  negative palpitations  Gastrointestinal: positive abdominal pain 2/2 cramping, negative RUQ pain, negative N/V, negative diarrhea, negative constipation  Genitourinary: negative dysuria, positive vaginal bleeding   Dermatological: negative rash  Hematologic: negative bruising  Immunologic/Lymphatic: negative recent illness, negative recent sick contact  Musculoskeletal: negative back pain, negative myalgias, negative arthralgias  Neurological:  negative dizziness, negative weakness  Behavior/Psych: negative depression, negative anxiety    Blood pressure (!) 124/93, pulse 102, height 5' 6\" (1.676 m), weight 215 lb 1.6 oz (97.6 kg), last menstrual period 04/19/2019, not currently breastfeeding. Gen: Alert, NAD  Abdomen: Soft non-tender; good bowel sounds. No guarding, rebound or rigidity. No CVA tenderness bilaterally. Extremities: No calf tenderness, DTR 2/4, and No edema bilaterally  Pelvic: Declined today, deferred to OR     Assessment:   Diagnosis Orders   1. Abnormal uterine bleeding (AUB)     2. Well woman exam  KEVIN DIGITAL SCREEN W  [facility], [address] [place] [address], MD, Internal Medicine, [facility]     Patient Active Problem List    Diagnosis Date Noted    Panic attacks 05/08/2019    Tobacco abuse 05/08/2019    Fibroid 05/08/2018     Seen on ED CT scan 3/29/18 \"subcentimeter\", patient scheduled for TVUS       Abnormal uterine bleeding 05/08/2018     Heavy menses, possibly 2/2 fibroids, TVUS pending. Consider EMB pending US results.  Hx of tubal ligation (1994) 05/08/2018 1994 per patient       PLAN:  Return in about 1 month (around 6/5/2019) for Post-op F/U . Will schedule for hysteroscopy, D&C to biopsy endometrium due to AUB in a woman aged >45 y/o  Consent obtained. PAT ordered (CBC, BMP, UPT, EKG). May be done the day of surgery.    Encouraged tobacco cessation   Referral made to [address] [place] [address] for patient to establish primary care   Mammogram ordered. Patient has not yet completed one. Repeat Annual every 1 year  Cervical Cytology Evaluation begins at 24years old. If Negative Cytology, Follow-up screening per current guidelines. Last PAP on 5/8/18 WNL. Counseled on preventative health maintenance follow-up. Orders Placed This Encounter   Procedures    KEVIN DIGITAL SCREEN W CAD BILATERAL     Standing Status:   Future     Standing Expiration Date:   5/8/2020     Order Specific Question:   Reason for exam:     Answer:   SCREENING    CBC Auto Differential     Standing Status:   Future     Standing Expiration Date:   5/8/2020    Basic Metabolic Panel     Standing Status:   Future     Standing Expiration Date:   5/8/2020   Rosa Martin MD, Internal Medicine, [place]     Referral Priority:   Routine     Referral Type:   Eval and Treat     Referral Reason:   Specialty Services Required     Referred to Provider:   Mason Jason MD     Requested Specialty:   Internal Medicine     Number of Visits Requested:   1    POCT urine pregnancy    EKG 12 lead     Standing Status:   Future     Standing Expiration Date:   7/7/2019     Order Specific Question:   Reason for Exam?     Answer:    Other     Comments:   PAT

## 2019-05-10 ENCOUNTER — TELEPHONE (OUTPATIENT)
Dept: OBGYN | Age: 44
End: 2019-05-10

## 2019-05-10 NOTE — PROGRESS NOTES
Attending Physician Statement  I have discussed the care of [address] [facility],[address], including pertinent history and exam findings,  with the resident. I have reviewed the key elements of all parts of the encounter with the resident. I agree with the assessment, plan and orders as documented by the resident. (GE Modifier)      Patient to be scheduled for hysteroscopy, D&C- previously failed EMB. Will have PAT day of surgery.

## 2019-05-13 ENCOUNTER — HOSPITAL ENCOUNTER (OUTPATIENT)
Age: 44
Discharge: HOME | End: 2019-05-13
Payer: COMMERCIAL

## 2019-05-13 DIAGNOSIS — N93.9 ABNORMAL UTERINE BLEEDING (AUB): ICD-10-CM

## 2019-05-13 LAB
ABSOLUTE EOS #: 0.26 K/UL (ref 0–0.44)
ABSOLUTE IMMATURE GRANULOCYTE: 0.06 K/UL (ref 0–0.3)
ABSOLUTE LYMPH #: 2.53 K/UL (ref 1.1–3.7)
ABSOLUTE MONO #: 0.44 K/UL (ref 0.1–1.2)
ANION GAP SERPL CALCULATED.3IONS-SCNC: 12 MMOL/L (ref 9–17)
BASOPHILS # BLD: 1 % (ref 0–2)
BASOPHILS ABSOLUTE: 0.04 K/UL (ref 0–0.2)
BUN BLDV-MCNC: 10 MG/DL (ref 6–20)
BUN/CREAT BLD: ABNORMAL (ref 9–20)
CALCIUM SERPL-MCNC: 9 MG/DL (ref 8.6–10.4)
CHLORIDE BLD-SCNC: 104 MMOL/L (ref 98–107)
CO2: 26 MMOL/L (ref 20–31)
CREAT SERPL-MCNC: 0.67 MG/DL (ref 0.5–0.9)
DIFFERENTIAL TYPE: ABNORMAL
EOSINOPHILS RELATIVE PERCENT: 4 % (ref 1–4)
GFR AFRICAN AMERICAN: >60 ML/MIN
GFR NON-AFRICAN AMERICAN: >60 ML/MIN
GFR SERPL CREATININE-BSD FRML MDRD: ABNORMAL ML/MIN/{1.73_M2}
GFR SERPL CREATININE-BSD FRML MDRD: ABNORMAL ML/MIN/{1.73_M2}
GLUCOSE BLD-MCNC: 137 MG/DL (ref 70–99)
HCT VFR BLD CALC: 41.8 % (ref 36.3–47.1)
HEMOGLOBIN: 13.1 G/DL (ref 11.9–15.1)
IMMATURE GRANULOCYTES: 1 %
LYMPHOCYTES # BLD: 36 % (ref 24–43)
MCH RBC QN AUTO: 29.7 PG (ref 25.2–33.5)
MCHC RBC AUTO-ENTMCNC: 31.3 G/DL (ref 28.4–34.8)
MCV RBC AUTO: 94.8 FL (ref 82.6–102.9)
MONOCYTES # BLD: 6 % (ref 3–12)
NRBC AUTOMATED: 0 PER 100 WBC
PDW BLD-RTO: 14.6 % (ref 11.8–14.4)
PLATELET # BLD: 321 K/UL (ref 138–453)
PLATELET ESTIMATE: ABNORMAL
PMV BLD AUTO: 10.4 FL (ref 8.1–13.5)
POTASSIUM SERPL-SCNC: 4.1 MMOL/L (ref 3.7–5.3)
RBC # BLD: 4.41 M/UL (ref 3.95–5.11)
RBC # BLD: ABNORMAL 10*6/UL
SEG NEUTROPHILS: 52 % (ref 36–65)
SEGMENTED NEUTROPHILS ABSOLUTE COUNT: 3.65 K/UL (ref 1.5–8.1)
SODIUM BLD-SCNC: 142 MMOL/L (ref 135–144)
WBC # BLD: 7 K/UL (ref 3.5–11.3)
WBC # BLD: ABNORMAL 10*3/UL

## 2019-05-13 PROCEDURE — 93005 ELECTROCARDIOGRAM TRACING: CPT

## 2019-05-13 PROCEDURE — 36415 COLL VENOUS BLD VENIPUNCTURE: CPT

## 2019-05-13 PROCEDURE — 80048 BASIC METABOLIC PNL TOTAL CA: CPT

## 2019-05-13 PROCEDURE — 85025 COMPLETE CBC W/AUTO DIFF WBC: CPT

## 2019-05-14 LAB
EKG ATRIAL RATE: 92 BPM
EKG P AXIS: 61 DEGREES
EKG P-R INTERVAL: 160 MS
EKG Q-T INTERVAL: 372 MS
EKG QRS DURATION: 88 MS
EKG QTC CALCULATION (BAZETT): 460 MS
EKG R AXIS: 15 DEGREES
EKG T AXIS: 50 DEGREES
EKG VENTRICULAR RATE: 92 BPM

## 2019-05-16 ENCOUNTER — ANESTHESIA EVENT (OUTPATIENT)
Dept: OPERATING ROOM | Age: 44
End: 2019-05-16
Payer: COMMERCIAL

## 2019-05-16 NOTE — H&P
attacks. PAST SURGICAL HISTORY:   has a past surgical history that includes Tubal ligation (1994). ALLERGIES:  Allergies as of 05/14/2019 - Review Complete 05/08/2019   Allergen Reaction Noted    Naproxen Other (See Comments) 07/23/2018    Sulfa antibiotics Hives 04/10/2014    Sulfasalazine Hives 04/10/2014    Shellfish-derived products Diarrhea and Nausea And Vomiting 07/02/2017       MEDICATIONS:  Current Facility-Administered Medications   Medication Dose Route Frequency Provider Last Rate Last Dose    lactated ringers infusion   Intravenous Continuous Ej Gutierrez MD 50 mL/hr at 05/17/19 0753      lidocaine PF 1 % injection 1 mL  1 mL Intradermal Once PRN Ej Gutierrez MD           FAMILY HISTORY:  family history includes Brain Cancer in her father; Diabetes in her mother; High Blood Pressure in her mother; Huseyin Jabs in her father; Mult Sclerosis in her maternal grandmother; No Known Problems in her maternal grandfather, paternal grandfather, and paternal grandmother; Other in her sister. SOCIAL HISTORY:   reports that she has been smoking cigarettes. She started smoking about 35 years ago. She has been smoking about 0.50 packs per day. She has never used smokeless tobacco. She reports that she does not drink alcohol or use drugs.     VITALS:  Vitals:    05/17/19 0732   BP: 133/87   Pulse: 85   Resp: 18   Temp: 97.2 °F (36.2 °C)   TempSrc: Tympanic   Weight: 215 lb (97.5 kg)   Height: 5' 5\" (1.651 m)                                                                                                                               PHYSICAL EXAM:     Unchanged from Prior H&P    LAB RESULTS:  Hospital Outpatient Visit on 05/13/2019   Component Date Value Ref Range Status    Ventricular Rate 05/13/2019 92  BPM Final    Atrial Rate 05/13/2019 92  BPM Final    P-R Interval 05/13/2019 160  ms Final    QRS Duration 05/13/2019 88  ms Final    Q-T Interval 05/13/2019 372  ms Final    QTc Calculation Final    Monocytes 2019 6  3 - 12 % Final    Eosinophils % 2019 4  1 - 4 % Final    Basophils 2019 1  0 - 2 % Final    Immature Granulocytes 2019 1* 0 % Final    Segs Absolute 2019 3.65  1.50 - 8.10 k/uL Final    Absolute Lymph # 2019 2.53  1.10 - 3.70 k/uL Final    Absolute Mono # 2019 0.44  0.10 - 1.20 k/uL Final    Absolute Eos # 2019 0.26  0.00 - 0.44 k/uL Final    Basophils # 2019 0.04  0.00 - 0.20 k/uL Final    Absolute Immature Granulocyte 2019 0.06  0.00 - 0.30 k/uL Final    WBC Morphology 2019 NOT REPORTED   Final    RBC Morphology 2019 ANISOCYTOSIS PRESENT   Final    Platelet Estimate  NOT REPORTED   Final       DIAGNOSTICS:  EXAMINATION:   PELVIC ULTRASOUND       2018       TECHNIQUE:   Transvaginal pelvic ultrasound was performed.  Color Doppler evaluation was   performed.       COMPARISON:   CT pelvis 2018       HISTORY:   ORDERING SYSTEM PROVIDED HISTORY: Uterine leiomyoma, unspecified location       , abnormal uterine bleeding       FINDINGS:   Measurements:       Uterus:  9.1 x 6.8 x 5.4 cm       Endometrial stripe:  7 mm thickness       Right Ovary:  Not measured       Left Ovary: 2.2 x 2.1 x 3.4 cm       Ultrasound Findings:       Uterus: Uterus demonstrates mildly diffusely heterogeneous myometrial   echotexture.  No discrete mass lesion is seen.       Endometrial stripe: Endometrial stripe is within normal limits.       Right Ovary: Right ovary is not visualized.       Left Ovary:  Left ovary is within normal limits. There is normal arterial and   venous doppler flow.       Free Fluid: No evidence of free fluid.           Impression   Nonspecific findings of the uterine myometrium can be seen with underlying   fibroids or adenomyomatosis.       Unremarkable appearance of the left ovary.  Right ovary is not visualized.        DIAGNOSIS & PLAN:  1. AUB, Dysmenorrhea    - Proceed with planned procedure: hysteroscopy, D&C with Symphion   - Consent signed, on chart. - The patient is ready for transport to the operative suite.       Kalyn Piña DO  Ob/Gyn Resident  Pager: [phone]  5/17/2019, 8:10 AM

## 2019-05-17 ENCOUNTER — HOSPITAL ENCOUNTER (OUTPATIENT)
Age: 44
Setting detail: OUTPATIENT SURGERY
Discharge: HOME | End: 2019-05-17
Attending: OBSTETRICS & GYNECOLOGY | Admitting: OBSTETRICS & GYNECOLOGY
Payer: COMMERCIAL

## 2019-05-17 ENCOUNTER — ANESTHESIA (OUTPATIENT)
Dept: OPERATING ROOM | Age: 44
End: 2019-05-17
Payer: COMMERCIAL

## 2019-05-17 VITALS
OXYGEN SATURATION: 97 % | WEIGHT: 215 LBS | RESPIRATION RATE: 18 BRPM | BODY MASS INDEX: 35.82 KG/M2 | HEIGHT: 65 IN | HEART RATE: 78 BPM | DIASTOLIC BLOOD PRESSURE: 84 MMHG | SYSTOLIC BLOOD PRESSURE: 127 MMHG | TEMPERATURE: 97.7 F

## 2019-05-17 VITALS — DIASTOLIC BLOOD PRESSURE: 72 MMHG | OXYGEN SATURATION: 98 % | SYSTOLIC BLOOD PRESSURE: 106 MMHG | TEMPERATURE: 96.4 F

## 2019-05-17 DIAGNOSIS — Z98.890 STATUS POST HYSTEROSCOPY: Primary | ICD-10-CM

## 2019-05-17 PROCEDURE — 7100000040 HC SPAR PHASE II RECOVERY - FIRST 15 MIN: Performed by: OBSTETRICS & GYNECOLOGY

## 2019-05-17 PROCEDURE — 6360000002 HC RX W HCPCS: Performed by: ANESTHESIOLOGY

## 2019-05-17 PROCEDURE — 58558 HYSTEROSCOPY BIOPSY: CPT | Performed by: OBSTETRICS & GYNECOLOGY

## 2019-05-17 PROCEDURE — 3600000014 HC SURGERY LEVEL 4 ADDTL 15MIN: Performed by: OBSTETRICS & GYNECOLOGY

## 2019-05-17 PROCEDURE — 2580000003 HC RX 258: Performed by: NURSE ANESTHETIST, CERTIFIED REGISTERED

## 2019-05-17 PROCEDURE — 2709999900 HC NON-CHARGEABLE SUPPLY: Performed by: OBSTETRICS & GYNECOLOGY

## 2019-05-17 PROCEDURE — 3700000001 HC ADD 15 MINUTES (ANESTHESIA): Performed by: OBSTETRICS & GYNECOLOGY

## 2019-05-17 PROCEDURE — 7100000000 HC PACU RECOVERY - FIRST 15 MIN: Performed by: OBSTETRICS & GYNECOLOGY

## 2019-05-17 PROCEDURE — 7100000041 HC SPAR PHASE II RECOVERY - ADDTL 15 MIN: Performed by: OBSTETRICS & GYNECOLOGY

## 2019-05-17 PROCEDURE — 3700000000 HC ANESTHESIA ATTENDED CARE: Performed by: OBSTETRICS & GYNECOLOGY

## 2019-05-17 PROCEDURE — 88305 TISSUE EXAM BY PATHOLOGIST: CPT

## 2019-05-17 PROCEDURE — 2720000010 HC SURG SUPPLY STERILE: Performed by: OBSTETRICS & GYNECOLOGY

## 2019-05-17 PROCEDURE — 2500000003 HC RX 250 WO HCPCS: Performed by: NURSE ANESTHETIST, CERTIFIED REGISTERED

## 2019-05-17 PROCEDURE — 7100000001 HC PACU RECOVERY - ADDTL 15 MIN: Performed by: OBSTETRICS & GYNECOLOGY

## 2019-05-17 PROCEDURE — 6360000002 HC RX W HCPCS: Performed by: NURSE ANESTHETIST, CERTIFIED REGISTERED

## 2019-05-17 PROCEDURE — 2580000003 HC RX 258: Performed by: OBSTETRICS & GYNECOLOGY

## 2019-05-17 PROCEDURE — 2580000003 HC RX 258: Performed by: ANESTHESIOLOGY

## 2019-05-17 PROCEDURE — 3600000004 HC SURGERY LEVEL 4 BASE: Performed by: OBSTETRICS & GYNECOLOGY

## 2019-05-17 RX ORDER — ONDANSETRON 2 MG/ML
4 INJECTION INTRAMUSCULAR; INTRAVENOUS
Status: DISCONTINUED | OUTPATIENT
Start: 2019-05-17 | End: 2019-05-17 | Stop reason: HOSPADM

## 2019-05-17 RX ORDER — FENTANYL CITRATE 50 UG/ML
50 INJECTION, SOLUTION INTRAMUSCULAR; INTRAVENOUS EVERY 5 MIN PRN
Status: DISCONTINUED | OUTPATIENT
Start: 2019-05-17 | End: 2019-05-17 | Stop reason: HOSPADM

## 2019-05-17 RX ORDER — FENTANYL CITRATE 50 UG/ML
25 INJECTION, SOLUTION INTRAMUSCULAR; INTRAVENOUS EVERY 5 MIN PRN
Status: DISCONTINUED | OUTPATIENT
Start: 2019-05-17 | End: 2019-05-17 | Stop reason: HOSPADM

## 2019-05-17 RX ORDER — HYDROCODONE BITARTRATE AND ACETAMINOPHEN 5; 325 MG/1; MG/1
1 TABLET ORAL EVERY 8 HOURS PRN
Qty: 6 TABLET | Refills: 0 | Status: SHIPPED | OUTPATIENT
Start: 2019-05-17 | End: 2019-05-19

## 2019-05-17 RX ORDER — DIPHENHYDRAMINE HYDROCHLORIDE 50 MG/ML
12.5 INJECTION INTRAMUSCULAR; INTRAVENOUS
Status: DISCONTINUED | OUTPATIENT
Start: 2019-05-17 | End: 2019-05-17 | Stop reason: HOSPADM

## 2019-05-17 RX ORDER — IBUPROFEN 800 MG/1
800 TABLET ORAL EVERY 8 HOURS PRN
Qty: 30 TABLET | Refills: 1 | Status: SHIPPED | OUTPATIENT
Start: 2019-05-17 | End: 2020-04-26 | Stop reason: SDUPTHER

## 2019-05-17 RX ORDER — MAGNESIUM HYDROXIDE 1200 MG/15ML
LIQUID ORAL CONTINUOUS PRN
Status: COMPLETED | OUTPATIENT
Start: 2019-05-17 | End: 2019-05-17

## 2019-05-17 RX ORDER — HYDROCODONE BITARTRATE AND ACETAMINOPHEN 5; 325 MG/1; MG/1
1 TABLET ORAL ONCE
Status: DISCONTINUED | OUTPATIENT
Start: 2019-05-17 | End: 2019-05-17 | Stop reason: HOSPADM

## 2019-05-17 RX ORDER — SODIUM CHLORIDE, SODIUM LACTATE, POTASSIUM CHLORIDE, CALCIUM CHLORIDE 600; 310; 30; 20 MG/100ML; MG/100ML; MG/100ML; MG/100ML
INJECTION, SOLUTION INTRAVENOUS CONTINUOUS PRN
Status: DISCONTINUED | OUTPATIENT
Start: 2019-05-17 | End: 2019-05-17 | Stop reason: SDUPTHER

## 2019-05-17 RX ORDER — MIDAZOLAM HYDROCHLORIDE 1 MG/ML
2 INJECTION INTRAMUSCULAR; INTRAVENOUS ONCE
Status: COMPLETED | OUTPATIENT
Start: 2019-05-17 | End: 2019-05-17

## 2019-05-17 RX ORDER — LIDOCAINE HYDROCHLORIDE 10 MG/ML
INJECTION, SOLUTION EPIDURAL; INFILTRATION; INTRACAUDAL; PERINEURAL PRN
Status: DISCONTINUED | OUTPATIENT
Start: 2019-05-17 | End: 2019-05-17 | Stop reason: SDUPTHER

## 2019-05-17 RX ORDER — SODIUM CHLORIDE, SODIUM LACTATE, POTASSIUM CHLORIDE, CALCIUM CHLORIDE 600; 310; 30; 20 MG/100ML; MG/100ML; MG/100ML; MG/100ML
INJECTION, SOLUTION INTRAVENOUS CONTINUOUS
Status: DISCONTINUED | OUTPATIENT
Start: 2019-05-17 | End: 2019-05-17 | Stop reason: HOSPADM

## 2019-05-17 RX ORDER — FENTANYL CITRATE 50 UG/ML
INJECTION, SOLUTION INTRAMUSCULAR; INTRAVENOUS PRN
Status: DISCONTINUED | OUTPATIENT
Start: 2019-05-17 | End: 2019-05-17 | Stop reason: SDUPTHER

## 2019-05-17 RX ORDER — ONDANSETRON 2 MG/ML
INJECTION INTRAMUSCULAR; INTRAVENOUS PRN
Status: DISCONTINUED | OUTPATIENT
Start: 2019-05-17 | End: 2019-05-17 | Stop reason: SDUPTHER

## 2019-05-17 RX ORDER — DEXAMETHASONE SODIUM PHOSPHATE 4 MG/ML
INJECTION, SOLUTION INTRA-ARTICULAR; INTRALESIONAL; INTRAMUSCULAR; INTRAVENOUS; SOFT TISSUE PRN
Status: DISCONTINUED | OUTPATIENT
Start: 2019-05-17 | End: 2019-05-17 | Stop reason: SDUPTHER

## 2019-05-17 RX ORDER — MIDAZOLAM HYDROCHLORIDE 1 MG/ML
INJECTION INTRAMUSCULAR; INTRAVENOUS PRN
Status: DISCONTINUED | OUTPATIENT
Start: 2019-05-17 | End: 2019-05-17 | Stop reason: SDUPTHER

## 2019-05-17 RX ORDER — LIDOCAINE HYDROCHLORIDE 10 MG/ML
1 INJECTION, SOLUTION EPIDURAL; INFILTRATION; INTRACAUDAL; PERINEURAL
Status: DISCONTINUED | OUTPATIENT
Start: 2019-05-17 | End: 2019-05-17 | Stop reason: HOSPADM

## 2019-05-17 RX ORDER — PROPOFOL 10 MG/ML
INJECTION, EMULSION INTRAVENOUS PRN
Status: DISCONTINUED | OUTPATIENT
Start: 2019-05-17 | End: 2019-05-17 | Stop reason: SDUPTHER

## 2019-05-17 RX ADMIN — MIDAZOLAM HYDROCHLORIDE 2 MG: 1 INJECTION, SOLUTION INTRAMUSCULAR; INTRAVENOUS at 08:38

## 2019-05-17 RX ADMIN — FENTANYL CITRATE 25 MCG: 50 INJECTION INTRAMUSCULAR; INTRAVENOUS at 08:58

## 2019-05-17 RX ADMIN — FENTANYL CITRATE 25 MCG: 50 INJECTION INTRAMUSCULAR; INTRAVENOUS at 09:01

## 2019-05-17 RX ADMIN — LIDOCAINE HYDROCHLORIDE 50 MG: 10 INJECTION, SOLUTION EPIDURAL; INFILTRATION; INTRACAUDAL; PERINEURAL at 08:38

## 2019-05-17 RX ADMIN — DEXAMETHASONE SODIUM PHOSPHATE 10 MG: 4 INJECTION, SOLUTION INTRAMUSCULAR; INTRAVENOUS at 08:53

## 2019-05-17 RX ADMIN — SODIUM CHLORIDE, POTASSIUM CHLORIDE, SODIUM LACTATE AND CALCIUM CHLORIDE: 600; 310; 30; 20 INJECTION, SOLUTION INTRAVENOUS at 08:38

## 2019-05-17 RX ADMIN — FENTANYL CITRATE 50 MCG: 50 INJECTION INTRAMUSCULAR; INTRAVENOUS at 09:05

## 2019-05-17 RX ADMIN — ONDANSETRON 4 MG: 2 INJECTION INTRAMUSCULAR; INTRAVENOUS at 09:05

## 2019-05-17 RX ADMIN — SODIUM CHLORIDE, POTASSIUM CHLORIDE, SODIUM LACTATE AND CALCIUM CHLORIDE: 600; 310; 30; 20 INJECTION, SOLUTION INTRAVENOUS at 07:53

## 2019-05-17 RX ADMIN — FENTANYL CITRATE 25 MCG: 50 INJECTION, SOLUTION INTRAMUSCULAR; INTRAVENOUS at 10:06

## 2019-05-17 RX ADMIN — PROPOFOL 200 MG: 10 INJECTION, EMULSION INTRAVENOUS at 08:38

## 2019-05-17 RX ADMIN — FENTANYL CITRATE 25 MCG: 50 INJECTION INTRAMUSCULAR; INTRAVENOUS at 08:53

## 2019-05-17 RX ADMIN — MIDAZOLAM HYDROCHLORIDE 2 MG: 1 INJECTION, SOLUTION INTRAMUSCULAR; INTRAVENOUS at 07:55

## 2019-05-17 RX ADMIN — FENTANYL CITRATE 75 MCG: 50 INJECTION INTRAMUSCULAR; INTRAVENOUS at 08:47

## 2019-05-17 ASSESSMENT — PULMONARY FUNCTION TESTS
PIF_VALUE: 3
PIF_VALUE: 3
PIF_VALUE: 2
PIF_VALUE: 6
PIF_VALUE: 2
PIF_VALUE: 0
PIF_VALUE: 4
PIF_VALUE: 2
PIF_VALUE: 3
PIF_VALUE: 3
PIF_VALUE: 2
PIF_VALUE: 2
PIF_VALUE: 3
PIF_VALUE: 2
PIF_VALUE: 2
PIF_VALUE: 1
PIF_VALUE: 4
PIF_VALUE: 3
PIF_VALUE: 2
PIF_VALUE: 3
PIF_VALUE: 2
PIF_VALUE: 1
PIF_VALUE: 0
PIF_VALUE: 2
PIF_VALUE: 3
PIF_VALUE: 1
PIF_VALUE: 6
PIF_VALUE: 4

## 2019-05-17 ASSESSMENT — PAIN SCALES - GENERAL
PAINLEVEL_OUTOF10: 10
PAINLEVEL_OUTOF10: 0
PAINLEVEL_OUTOF10: 10

## 2019-05-17 ASSESSMENT — ENCOUNTER SYMPTOMS
STRIDOR: 0
SHORTNESS OF BREATH: 0

## 2019-05-17 ASSESSMENT — PAIN - FUNCTIONAL ASSESSMENT: PAIN_FUNCTIONAL_ASSESSMENT: 0-10

## 2019-05-17 NOTE — ANESTHESIA PRE PROCEDURE
Department of Anesthesiology  Preprocedure Note       Name:  Kylah Angel   Age:  37 y.o.  :  1975                                          MRN:  [id number]         Date:  2019      Surgeon: Ant Gil):  Raquel Guadalupe DO    Procedure: HYSTEROSCOPY WITH A D AND C , WITH SYNTHION (N/A )    Medications prior to admission:   Prior to Admission medications    Medication Sig Start Date End Date Taking? Authorizing Provider   clonazePAM (KLONOPIN) 1 MG tablet Take 1 mg by mouth 4 times daily as needed. .   Yes Historical Provider, MD       Current medications:    Current Facility-Administered Medications   Medication Dose Route Frequency Provider Last Rate Last Dose    lactated ringers infusion   Intravenous Continuous Francoise Hidalgo MD        lidocaine PF 1 % injection 1 mL  1 mL Intradermal Once PRN Luigi Gallardo MD           Allergies: Allergies   Allergen Reactions    Naproxen Other (See Comments)     headache    Sulfa Antibiotics Hives    Sulfasalazine Hives    Shellfish-Derived Products Diarrhea and Nausea And Vomiting       Problem List:    Patient Active Problem List   Diagnosis Code    Fibroid D21.9    Abnormal uterine bleeding N93.9    Hx of tubal ligation () Z98.51    Panic attacks F41.0    Tobacco abuse Z72.0       Past Medical History:        Diagnosis Date    Abnormal uterine bleeding 2018    Anxiety     Bipolar 1 disorder (HCC)     No Rx.      Fibroid 2018    Panic attacks        Past Surgical History:        Procedure Laterality Date    TUBAL LIGATION         Social History:    Social History     Tobacco Use    Smoking status: Current Every Day Smoker     Packs/day: 0.50     Types: Cigarettes     Start date:     Smokeless tobacco: Never Used   Substance Use Topics    Alcohol use: No     Comment: seldom                                Ready to quit: Not Answered  Counseling given: Not Answered      Vital Signs (Current):   Vitals:    19 0732   BP: 133/87   Pulse: 85   Resp: 18   Temp: 97.2 °F (36.2 °C)   TempSrc: Tympanic   Weight: 215 lb (97.5 kg)   Height: 5' 5\" (1.651 m)                                              BP Readings from Last 3 Encounters:   05/17/19 133/87   05/08/19 (!) 124/93   01/25/19 139/88       NPO Status: Time of last liquid consumption: 2000                        Time of last solid consumption: 2000                        Date of last liquid consumption: 05/16/19                        Date of last solid food consumption: 05/16/19    BMI:   Wt Readings from Last 3 Encounters:   05/17/19 215 lb (97.5 kg)   05/08/19 215 lb 1.6 oz (97.6 kg)   01/25/19 200 lb (90.7 kg)     Body mass index is 35.78 kg/m². CBC:   Lab Results   Component Value Date    WBC 7.0 05/13/2019    RBC 4.41 05/13/2019    HGB 13.1 05/13/2019    HCT 41.8 05/13/2019    MCV 94.8 05/13/2019    RDW 14.6 05/13/2019     05/13/2019       CMP:   Lab Results   Component Value Date     05/13/2019    K 4.1 05/13/2019     05/13/2019    CO2 26 05/13/2019    BUN 10 05/13/2019    CREATININE 0.67 05/13/2019    GFRAA >60 05/13/2019    LABGLOM >60 05/13/2019    GLUCOSE 137 05/13/2019    CALCIUM 9.0 05/13/2019    BILITOT 0.33 12/29/2012    ALKPHOS 58 12/29/2012    AST 26 12/29/2012    ALT 39 12/29/2012       POC Tests: No results for input(s): POCGLU, POCNA, POCK, POCCL, POCBUN, POCHEMO, POCHCT in the last 72 hours.     Coags:   Lab Results   Component Value Date    PROTIME 10.0 01/29/2014    INR 0.9 01/29/2014    APTT 31.9 01/29/2014       HCG (If Applicable):   Lab Results   Component Value Date    PREGTESTUR NEGATIVE 07/23/2018    HCG NEGATIVE 11/12/2015        ABGs: No results found for: PHART, PO2ART, YDY5ZEB, HII9UIL, BEART, P1KFYSBC     Type & Screen (If Applicable):  No results found for: LABABO, LABRH    Anesthesia Evaluation   no history of anesthetic complications:   Airway: Mallampati: II     Neck ROM: full  Mouth opening: > = 3 FB Dental: Pulmonary:       (-) COPD, asthma, shortness of breath, sleep apnea and stridor                           Cardiovascular:        (-) pacemaker, past MI, CABG/stent,  angina and  CHF        Rate: normal                    Neuro/Psych:   (+) depression/anxiety    (-) seizures, TIA and CVA           GI/Hepatic/Renal:        (-) GERD, liver disease and no renal disease       Endo/Other:        (-) diabetes mellitus, hypothyroidism, hyperthyroidism, blood dyscrasia                ROS comment: ABNORMAL UTERINE BLEEDING Abdominal:       Abdomen: soft. Vascular:                                   Narrative   Performed by: MIRNA STV MUSE   Normal sinus rhythm  Cannot rule out Anterior infarct , age undetermined  Abnormal ECG  When compared with ECG of 28-OCT-2018 03:17,  No significant change was found   Lab and Collection     EKG 12 Lead - 5/13/2019        Anesthesia Plan      general     ASA 2       Induction: intravenous. Anesthetic plan and risks discussed with patient.                       Sigrid Neal MD   5/17/2019

## 2019-05-17 NOTE — OP NOTE
draped in the usual sterile fashion. The bladder was drained with a straight catheter, and a weighted speculum was placed into the vagina to visualize the cervix. The cervix was grasped with a single-tooth tenaculum. The uterus and the cervix were sounded and measured 10 cm. The cervix was dilated with andres dilators to size 12.  6mm size hysteroscope was inserted into the uterine cavity showing normal endocervical and endometrial cavity. Endometrial curettage was carried out with sharp curettage yielding curettings which were collected and sent for pathology examination. All instruments were then removed. Hemostasis was visualized at the tenaculum site on the cervix. Instrument, sponge, and needle counts were correct at the conclusion of the case. SCDs for DVT prophylaxis remain in place for the post operative period. Dr. Rkaesh Kearney was present for the entire operation.     Findings:  Two 5mm condylomas on right buttocks, normal appearing vulva without lesions, normal appearing vaginal mucosa, 10cm sized anteverted uterus, normal endocervical and endometrial canal without polyps or fibroids, bilateral tubal ostia visualized  Estimated Blood Loss: 10ml  Drains:  None  Total IV Fluids: 1000ml  Urine output: 50ml clear urine   Specimens:  Sharp endometrial curettings  Instrument and Sponge Count: Correct  Complications: none  Condition: stable, transfer to post anesthesia recovery    Nicholas Nguyen DO  Ob/Gyn Resident  5/17/2019, 8:32 AM

## 2019-05-17 NOTE — ANESTHESIA POSTPROCEDURE EVALUATION
Department of Anesthesiology  Postprocedure Note    Patient: Louis Hamilton  MRN: [id number]  YOB: 1975  Date of evaluation: 5/17/2019  Time:  10:41 AM     Procedure Summary     Date:  05/17/19 Room / Location:  [facility] OR [address] [place] [address] OR    Anesthesia Start:  9308 Anesthesia Stop:  4219    Procedure:  HYSTEROSCOPY WITH A D AND C (N/A ) Diagnosis:  (ABNORMAL UTERINE BLEEDING)    Surgeon:  Moshe Buck DO Responsible Provider:  Camryn Yarbrough MD    Anesthesia Type:  general ASA Status:  2          Anesthesia Type: general    Tanner Phase I: Tanner Score: 10    Tanner Phase II:      Last vitals: Reviewed and per EMR flowsheets.        Anesthesia Post Evaluation    Patient location during evaluation: PACU  Patient participation: complete - patient participated  Level of consciousness: awake  Pain score: 2  Airway patency: patent  Nausea & Vomiting: no vomiting  Complications: no  Cardiovascular status: hemodynamically stable  Respiratory status: spontaneous ventilation  Hydration status: stable

## 2019-05-20 ENCOUNTER — HOSPITAL ENCOUNTER (OUTPATIENT)
Dept: MAMMOGRAPHY | Age: 44
Discharge: HOME | End: 2019-05-22
Payer: COMMERCIAL

## 2019-05-20 DIAGNOSIS — Z01.419 WELL WOMAN EXAM: ICD-10-CM

## 2019-05-20 LAB — SURGICAL PATHOLOGY REPORT: NORMAL

## 2019-05-20 PROCEDURE — 77067 SCR MAMMO BI INCL CAD: CPT

## 2019-05-21 DIAGNOSIS — R92.8 ABNORMAL MAMMOGRAM OF LEFT BREAST: Primary | ICD-10-CM

## 2019-05-23 ENCOUNTER — OFFICE VISIT (OUTPATIENT)
Dept: INTERNAL MEDICINE | Age: 44
End: 2019-05-23
Payer: COMMERCIAL

## 2019-05-23 VITALS
HEART RATE: 120 BPM | HEIGHT: 64 IN | SYSTOLIC BLOOD PRESSURE: 129 MMHG | WEIGHT: 215.8 LBS | DIASTOLIC BLOOD PRESSURE: 89 MMHG | OXYGEN SATURATION: 98 % | BODY MASS INDEX: 36.84 KG/M2

## 2019-05-23 DIAGNOSIS — Z13.220 SCREENING FOR HYPERLIPIDEMIA: ICD-10-CM

## 2019-05-23 DIAGNOSIS — Z13.1 ENCOUNTER FOR SCREENING FOR DIABETES MELLITUS: ICD-10-CM

## 2019-05-23 LAB — HBA1C MFR BLD: 5.4 %

## 2019-05-23 PROCEDURE — 99203 OFFICE O/P NEW LOW 30 MIN: CPT | Performed by: STUDENT IN AN ORGANIZED HEALTH CARE EDUCATION/TRAINING PROGRAM

## 2019-05-23 PROCEDURE — G8427 DOCREV CUR MEDS BY ELIG CLIN: HCPCS | Performed by: STUDENT IN AN ORGANIZED HEALTH CARE EDUCATION/TRAINING PROGRAM

## 2019-05-23 PROCEDURE — 4004F PT TOBACCO SCREEN RCVD TLK: CPT | Performed by: STUDENT IN AN ORGANIZED HEALTH CARE EDUCATION/TRAINING PROGRAM

## 2019-05-23 PROCEDURE — 83036 HEMOGLOBIN GLYCOSYLATED A1C: CPT | Performed by: STUDENT IN AN ORGANIZED HEALTH CARE EDUCATION/TRAINING PROGRAM

## 2019-05-23 PROCEDURE — 99211 OFF/OP EST MAY X REQ PHY/QHP: CPT | Performed by: INTERNAL MEDICINE

## 2019-05-23 PROCEDURE — G8417 CALC BMI ABV UP PARAM F/U: HCPCS | Performed by: STUDENT IN AN ORGANIZED HEALTH CARE EDUCATION/TRAINING PROGRAM

## 2019-05-23 ASSESSMENT — PATIENT HEALTH QUESTIONNAIRE - PHQ9
SUM OF ALL RESPONSES TO PHQ QUESTIONS 1-9: 1
1. LITTLE INTEREST OR PLEASURE IN DOING THINGS: 1
2. FEELING DOWN, DEPRESSED OR HOPELESS: 0
SUM OF ALL RESPONSES TO PHQ9 QUESTIONS 1 & 2: 1
SUM OF ALL RESPONSES TO PHQ QUESTIONS 1-9: 1

## 2019-05-23 NOTE — PROGRESS NOTES
Visit Information    Have you changed or started any medications since your last visit including any over-the-counter medicines, vitamins, or herbal medicines? no   Have you stopped taking any of your medications? Is so, why? -  no  Are you having any side effects from any of your medications? - no    Have you seen any other physician or provider since your last visit?  no   Have you had any other diagnostic tests since your last visit?  no   Have you been seen in the emergency room and/or had an admission in a hospital since we last saw you?  no   Have you had your routine dental cleaning in the past 6 months?  no     Do you have an active MyChart account? If no, what is the barrier?   Yes    Patient Care Team:  Fanny Tinslye MD as PCP - General (Internal Medicine)  TOR Harris CNP as PCP - [facility] Attributed Provider    Medical History Review  Past Medical, Family, and Social History reviewed and does contribute to the patient presenting condition    Health Maintenance   Topic Date Due    Pneumococcal 0-64 years Vaccine (1 of 1 - PPSV23) 05/20/1979    HIV screen  05/20/1988    DTaP/Tdap/Td vaccine (1 - Tdap) 05/20/1992    Diabetes screen  05/20/2013    Lipid screen  12/29/2017    Flu vaccine (Season Ended) 09/01/2019    Cervical cancer screen  05/08/2021

## 2019-05-23 NOTE — PATIENT INSTRUCTIONS
Return To Clinic 8/27/19. After Visit Summary given and reviewed. BB    It is very important for your care that you keep your appointment. If for some reason you are unable to keep your appointment it is equally important that you call our office at [phone] to cancel your appointment and reschedule. Failure to do so may result in your termination from our practice. LABORATORY INSTRUCTIONS    Your doctor has ordered blood or urine testing. You can get this testing done at the Lab located on the first floor of the [facility], or at any other [facility]. Please stop at Main Registration, before going to the lab, as you must be registered first.     Please get this lab done before your next visit. You may NOT eat, drink, smoke, or chew anything before this test for 8 hours. You may still have water.

## 2019-05-23 NOTE — PROGRESS NOTES
Internal Medicine Clinic New Patient Note    Date of patient's visit: 5/23/2019  Name:  Clarice Sierra  Primary Care Physician: Felix Portillo MD    Reason for visit: First Visit, establish care     HISTORY OF PRESENTING ILLNESS:    History was obtained from the patient. Clarice Sierra is a 53 y.o. with PMH of Anxiety, BPD and heavy menstrual bleeding is here to establish care. No Formal PCP. She had recent hysteroscopy with D and C  last week for heavy menstrual bleeding and postoperatively she was doing well clinically without any Symptoms. Her Hemoglobin Has Been Stable. She also is recently diagnosed with a lump in the left outer quadrant of breast and is scheduled for ultrasound and further testing tomorrow at [place] [address]. She complains of generalized body aches and joint pains. Denies any acute issues chest pain, shortness of breath, fever chills, flulike symptoms or any change in bowel habits. She denies any medical problems in the past.  She has been taking clonazepam for her anxiety every day and has been following up at [facility] for her bipolar disorder. She denied any s/s/o depression or diomedes currently. Not on any medication currently for bipolar disorder. She is a smoker and has been smoking 0.5-1 pack per day since many years. Was counseled for smoking cessation. Patient not interested in quitting currently. Denied History of alcohol or drug abuse. Health maintenance due- HIV screen, pneumococcal and flu vaccine- patient denied all screening currently. Sent HbA1c for diabetes screening and lipid screen for now. PAST MEDICAL HISTORY:          Diagnosis Date    Abnormal uterine bleeding 05/2018    Anxiety     Bipolar 1 disorder (HCC)     No Rx.      Fibroid 05/2018    Panic attacks        PAST SURGICAL HISTORY:          Procedure Laterality Date    HYSTEROSCOPY N/A 5/17/2019    HYSTEROSCOPY WITH A D AND C performed by Ann aClderon DO at [facility] ALLERGIES:      Allergies   Allergen Reactions    Naproxen Other (See Comments)     headache    Sulfa Antibiotics Hives    Sulfasalazine Hives    Shellfish-Derived Products Diarrhea and Nausea And Vomiting         HOME MEDICATION:      Current Outpatient Medications on File Prior to Visit   Medication Sig Dispense Refill    ibuprofen (ADVIL;MOTRIN) 800 MG tablet Take 1 tablet by mouth every 8 hours as needed for Pain 30 tablet 1    clonazePAM (KLONOPIN) 1 MG tablet Take 1 mg by mouth 4 times daily as needed. .       No current facility-administered medications on file prior to visit. SOCIAL HISTORY:    TOBACCO:   reports that she has been smoking cigarettes. She started smoking about 35 years ago. She has been smoking about 0.50 packs per day. She has never used smokeless tobacco.  ETOH:   reports that she does not drink alcohol. DRUGS:  reports that she does not use drugs. OCCUPATION:      FAMILY HISTORY:          Problem Relation Age of Onset    High Blood Pressure Mother     Diabetes Mother     Lung Cancer Father         primary    Brain Cancer Father     Other Sister         DDD of the back    Mult Sclerosis Maternal Grandmother     No Known Problems Maternal Grandfather     No Known Problems Paternal Grandmother     No Known Problems Paternal Grandfather        REVIEW OF SYSTEMS:    ENT: negative  Respiratory: no cough, shortness of breath, or wheezing  Cardiovascular: no chest pain or dyspnea on exertion  Breast- lump in left breast  Gastrointestinal: no abdominal pain, black or bloody stools  Neurological: no TIA or stroke symptoms  Endocrine: negative  Genito-Urinary: no dysuria, trouble voiding, or hematuria  Allergy and Immunology: negative  Hematological and Lymphatic: negative  Musculoskeletal: negative  Dermatological: negative  Menstrual history -positive for menorrhagia.       PHYSICAL EXAM:      Vitals:    05/23/19 1321   BP: (!) 132/91   Pulse: 122   SpO2: 98% Physician Exam:  General appearance - alert, well appearing, and in no distress  Lymphatics - no palpable lymphadenopathy, no hepatosplenomegaly  Chest -lump in left breast clear to auscultation, no wheezes, rales or rhonchi, symmetric air entry  Heart - normal rate, regular rhythm, normal S1, S2, no murmurs, rubs, clicks or gallops  Abdomen - soft, nontender, nondistended, no masses or organomegaly  Back exam - full range of motion, no tenderness, palpable spasm or pain on motion  Neurological - alert, oriented, normal speech, no focal findings or movement disorder noted  Musculoskeletal - generalised joint tenderness, deformity or swelling  Extremities - peripheral pulses normal, no pedal edema, no clubbing or cyanosis  Skin - normal coloration and turgor, no rashes, no suspicious skin lesions noted    LABORATORY FINDINGS:    CBC:   Lab Results   Component Value Date    WBC 7.0 05/13/2019    HGB 13.1 05/13/2019     05/13/2019     BMP:    Lab Results   Component Value Date     05/13/2019    K 4.1 05/13/2019     05/13/2019    CO2 26 05/13/2019    BUN 10 05/13/2019    CREATININE 0.67 05/13/2019    GLUCOSE 137 05/13/2019     Hemoglobin A1C: No results found for: LABA1C  Lipid profile:   Lab Results   Component Value Date    CHOL 162 12/29/2012    TRIG 159 12/29/2012    HDL 36 12/29/2012     Thyroid functions: No results found for: TSH   Hepatic functions:   Lab Results   Component Value Date    ALT 39 12/29/2012    AST 26 12/29/2012    BILITOT 0.33 12/29/2012    BILIDIR 0.09 12/29/2012    LABALBU 4.2 12/29/2012       Any additional findings:    Assessment and Plan:   1. Abnormal Menstrual bleeding s/o hysteroscopy and D&C  Follows up with obs/gynae    2. Left breast lump of 1.4 cms in upper outer quadrant  Had mammogram showing microcalcifications in left breast with 1.4 cm Mass in upper outer left breast.  Scheduled for ultrasound and further testing tomorrow.     3.  Generalized joint pains  F/u ca and vit d levels . On ibuprofen 800 mg aspirin needed    4. Anxiety and bipolar disorder  On clonazepam as per needed. [facility]    5. Nicotine dependence  History of smoking 0.5-1 pack per day. Counseled for smoking cessation. Denying quitting at this time. 6.Screening for hyperlipidemia  - Lipid, Fasting; Future    7 Encounter for screening for diabetes mellitus  - Glucose, Fasting; Future        Follow-up:   1. Follow up in 3 months    Gaurang received counseling on the following healthy behaviors: nutrition, exercise, medication adherence and tobacco cessation      Was a self-tracking handout given in paper form or via [employer]? No  If yes, see orders or list here. Discussed use, benefit, and side effects of prescribed medications. Barriers to medication compliance addressed. All patient questions answered. Pt voiced understanding. Nancie Seip, MD           5/23/2019, 1:55 PM   Attending Physician Statement  I have discussed the care of [address],[address], including pertinent history and exam findings,  with the resident. I have reviewed the key elements of all parts of the encounter with the resident. I agree with the assessment, plan and orders as documented by the resident. Pt seen discussed and examined. ([address] [place] [address]) 54 yo woman with hx cigarette smoking, menorrhagia, anxiety, abnormal mammogram. Following with Gyn service for eval of menorrhagia. Follows with [facility] for anxiety/possible bipolar. Pt reports that she is scheduled to have ultrasound of breast tomorrow for followup of abnormal mammogram. Urged smoking cessation, urged careful followup with radiology and Gyn re menorrhagia and abnormal mammogram and [address] [facility] [facility]. re psychologic issues. Pt agrees.

## 2019-05-24 ENCOUNTER — HOSPITAL ENCOUNTER (OUTPATIENT)
Dept: WOMENS IMAGING | Age: 44
Discharge: HOME | End: 2019-05-26
Payer: COMMERCIAL

## 2019-05-24 DIAGNOSIS — R92.8 ABNORMAL MAMMOGRAM OF LEFT BREAST: ICD-10-CM

## 2019-05-24 PROCEDURE — 77065 DX MAMMO INCL CAD UNI: CPT

## 2019-05-24 PROCEDURE — 76642 ULTRASOUND BREAST LIMITED: CPT

## 2019-05-28 DIAGNOSIS — R92.8 ABNORMAL MAMMOGRAM OF LEFT BREAST: Primary | ICD-10-CM

## 2019-06-18 ENCOUNTER — TELEPHONE (OUTPATIENT)
Dept: OBGYN | Age: 44
End: 2019-06-18

## 2019-06-18 NOTE — TELEPHONE ENCOUNTER
Called and left a message for patient to give the office a call back regarding her post op appointment.

## 2019-06-18 NOTE — TELEPHONE ENCOUNTER
----- Message from Loren Carmen DO sent at 6/13/2019  9:20 AM EDT -----  Hello,    Please notify this patient that she needs to schedule an appt to follow up on results from her surgery last month and discuss next steps. Please schedule her w/ Ranjana Steiner or Alexi Del Angel. Thank you.      Maida Hernandez

## 2019-06-19 ENCOUNTER — TELEPHONE (OUTPATIENT)
Dept: OBGYN | Age: 44
End: 2019-06-19

## 2019-06-19 PROBLEM — Z91.199 NON-COMPLIANT PATIENT: Status: ACTIVE | Noted: 2019-06-19

## 2019-08-30 ENCOUNTER — TELEPHONE (OUTPATIENT)
Dept: INTERNAL MEDICINE | Age: 44
End: 2019-08-30

## 2019-09-26 ENCOUNTER — APPOINTMENT (OUTPATIENT)
Dept: ULTRASOUND IMAGING | Age: 44
End: 2019-09-26
Payer: COMMERCIAL

## 2019-09-26 ENCOUNTER — HOSPITAL ENCOUNTER (EMERGENCY)
Age: 44
Discharge: HOME | End: 2019-09-26
Attending: EMERGENCY MEDICINE
Payer: COMMERCIAL

## 2019-09-26 ENCOUNTER — TELEPHONE (OUTPATIENT)
Dept: OBGYN | Age: 44
End: 2019-09-26

## 2019-09-26 VITALS
HEART RATE: 87 BPM | SYSTOLIC BLOOD PRESSURE: 129 MMHG | WEIGHT: 190 LBS | RESPIRATION RATE: 18 BRPM | OXYGEN SATURATION: 94 % | DIASTOLIC BLOOD PRESSURE: 88 MMHG | TEMPERATURE: 97.9 F | HEIGHT: 65 IN | BODY MASS INDEX: 31.65 KG/M2

## 2019-09-26 DIAGNOSIS — N93.8 DYSFUNCTIONAL UTERINE BLEEDING: Primary | ICD-10-CM

## 2019-09-26 LAB
-: ABNORMAL
ABSOLUTE EOS #: 0.3 K/UL (ref 0–0.44)
ABSOLUTE IMMATURE GRANULOCYTE: 0.03 K/UL (ref 0–0.3)
ABSOLUTE LYMPH #: 2.4 K/UL (ref 1.1–3.7)
ABSOLUTE MONO #: 0.68 K/UL (ref 0.1–1.2)
AMORPHOUS: ABNORMAL
BACTERIA: ABNORMAL
BASOPHILS # BLD: 1 % (ref 0–2)
BASOPHILS ABSOLUTE: 0.06 K/UL (ref 0–0.2)
BILIRUBIN URINE: NEGATIVE
CASTS UA: ABNORMAL /LPF (ref 0–8)
COLOR: YELLOW
CRYSTALS, UA: ABNORMAL /HPF
DIFFERENTIAL TYPE: ABNORMAL
DIRECT EXAM: ABNORMAL
EOSINOPHILS RELATIVE PERCENT: 3 % (ref 1–4)
EPITHELIAL CELLS UA: ABNORMAL /HPF (ref 0–5)
GLUCOSE URINE: NEGATIVE
HCG QUALITATIVE: NEGATIVE
HCT VFR BLD CALC: 43.3 % (ref 36.3–47.1)
HEMOGLOBIN: 13 G/DL (ref 11.9–15.1)
IMMATURE GRANULOCYTES: 0 %
KETONES, URINE: NEGATIVE
LEUKOCYTE ESTERASE, URINE: ABNORMAL
LYMPHOCYTES # BLD: 27 % (ref 24–43)
Lab: ABNORMAL
MCH RBC QN AUTO: 28.5 PG (ref 25.2–33.5)
MCHC RBC AUTO-ENTMCNC: 30 G/DL (ref 28.4–34.8)
MCV RBC AUTO: 95 FL (ref 82.6–102.9)
MONOCYTES # BLD: 8 % (ref 3–12)
MUCUS: ABNORMAL
NITRITE, URINE: NEGATIVE
NRBC AUTOMATED: 0 PER 100 WBC
OTHER OBSERVATIONS UA: ABNORMAL
PDW BLD-RTO: 14.5 % (ref 11.8–14.4)
PH UA: 5 (ref 5–8)
PLATELET # BLD: 359 K/UL (ref 138–453)
PLATELET ESTIMATE: ABNORMAL
PMV BLD AUTO: 10.5 FL (ref 8.1–13.5)
PROTEIN UA: ABNORMAL
RBC # BLD: 4.56 M/UL (ref 3.95–5.11)
RBC # BLD: ABNORMAL 10*6/UL
RBC UA: ABNORMAL /HPF (ref 0–4)
RENAL EPITHELIAL, UA: ABNORMAL /HPF
SEG NEUTROPHILS: 61 % (ref 36–65)
SEGMENTED NEUTROPHILS ABSOLUTE COUNT: 5.47 K/UL (ref 1.5–8.1)
SPECIFIC GRAVITY UA: 1.03 (ref 1–1.03)
SPECIMEN DESCRIPTION: ABNORMAL
TRICHOMONAS: ABNORMAL
TURBIDITY: ABNORMAL
URINE HGB: ABNORMAL
UROBILINOGEN, URINE: NORMAL
WBC # BLD: 8.9 K/UL (ref 3.5–11.3)
WBC # BLD: ABNORMAL 10*3/UL
WBC UA: ABNORMAL /HPF (ref 0–5)
YEAST: ABNORMAL

## 2019-09-26 PROCEDURE — 85025 COMPLETE CBC W/AUTO DIFF WBC: CPT

## 2019-09-26 PROCEDURE — 81001 URINALYSIS AUTO W/SCOPE: CPT

## 2019-09-26 PROCEDURE — 6370000000 HC RX 637 (ALT 250 FOR IP): Performed by: STUDENT IN AN ORGANIZED HEALTH CARE EDUCATION/TRAINING PROGRAM

## 2019-09-26 PROCEDURE — 87660 TRICHOMONAS VAGIN DIR PROBE: CPT

## 2019-09-26 PROCEDURE — 87480 CANDIDA DNA DIR PROBE: CPT

## 2019-09-26 PROCEDURE — 87591 N.GONORRHOEAE DNA AMP PROB: CPT

## 2019-09-26 PROCEDURE — 84703 CHORIONIC GONADOTROPIN ASSAY: CPT

## 2019-09-26 PROCEDURE — 76830 TRANSVAGINAL US NON-OB: CPT

## 2019-09-26 PROCEDURE — 87491 CHLMYD TRACH DNA AMP PROBE: CPT

## 2019-09-26 PROCEDURE — 99284 EMERGENCY DEPT VISIT MOD MDM: CPT

## 2019-09-26 PROCEDURE — 87510 GARDNER VAG DNA DIR PROBE: CPT

## 2019-09-26 RX ORDER — HYDROCODONE BITARTRATE AND ACETAMINOPHEN 5; 325 MG/1; MG/1
1 TABLET ORAL ONCE
Status: COMPLETED | OUTPATIENT
Start: 2019-09-26 | End: 2019-09-26

## 2019-09-26 RX ADMIN — HYDROCODONE BITARTRATE AND ACETAMINOPHEN 1 TABLET: 5; 325 TABLET ORAL at 17:31

## 2019-09-26 ASSESSMENT — PAIN DESCRIPTION - ORIENTATION: ORIENTATION: LOWER;RIGHT

## 2019-09-26 ASSESSMENT — ENCOUNTER SYMPTOMS
NAUSEA: 1
ABDOMINAL PAIN: 1
CONSTIPATION: 0
VOMITING: 0
SHORTNESS OF BREATH: 0
COUGH: 0
EYES NEGATIVE: 1
RECTAL PAIN: 0
BLOOD IN STOOL: 0
DIARRHEA: 0
ALLERGIC/IMMUNOLOGIC NEGATIVE: 1

## 2019-09-26 ASSESSMENT — PAIN SCALES - GENERAL
PAINLEVEL_OUTOF10: 10
PAINLEVEL_OUTOF10: 8

## 2019-09-26 ASSESSMENT — PAIN DESCRIPTION - LOCATION: LOCATION: ABDOMEN

## 2019-09-26 ASSESSMENT — PAIN DESCRIPTION - PAIN TYPE: TYPE: ACUTE PAIN

## 2019-09-26 ASSESSMENT — PAIN DESCRIPTION - DESCRIPTORS: DESCRIPTORS: THROBBING

## 2019-09-26 NOTE — ED NOTES
Pt medicated for pain, provided with hospital socks, boxed lunch and tony mist.     Kevin Johnston RN  09/26/19 6273

## 2019-09-26 NOTE — ED PROVIDER NOTES
[facility]  ED  Emergency Department Encounter  EmergencyMedicine Resident     Pt Name:Gaurang Morelos  MRN: [id number]  [place] 1975  Date of evaluation: 9/26/19  PCP:  Nayely Cruz MD    [address] [place] [address]       Chief Complaint   Patient presents with    Vaginal Bleeding    Abdominal Pain       HISTORY OF PRESENT ILLNESS  (Location/Symptom, Timing/Onset, Context/Setting, Quality, Duration, Modifying Factors, Severity.)      Donaldo Chavez is a 53 y.o. female who presents with abnormal vaginal bleeding for 4 days. States she had a D&C and endometrial ablation in May 2019 and was told she [address] ay more periods but she is still getting periods on/off but for past 4 days she is having severe vaginal bleeding, require her to change tampons every 15 to 30 minutes with large clots. Has been unable to follow-up with OB/GYN states due to work schedule issues that she cannot get to the clinic. She states,this is affecting her ADLs. She requested for hysterectomy in the past but because of her busy schedule she could not get off of work for that. She has been feeling nauseous for the past two days without vomiting and aslo have right lower abdominal pain. She denied fever, vomiting, bowel/bladder changes, OCP use, blood thinner use, foul smelling odor of the vaginal bleed, discharge with the bleeding, trauma, shortness of breath, chest pain, palpitation. TVUS on 5/17/18 showed possible uterine fibroids vs adenomyosis. PAST MEDICAL / SURGICAL / SOCIAL / FAMILY HISTORY      has a past medical history of Abnormal uterine bleeding, Anxiety, Bipolar 1 disorder ([address] Fibroid, and Panic attacks. has a past surgical history that includes Tubal ligation (6253) and hysteroscopy (N/A, 5/17/2019).     Social History     Socioeconomic History    Marital status:      Spouse name: Not on file    Number of children: 3    Years of education: Not on file    Highest education level: COMPARISON: None HISTORY: ORDERING SYSTEM PROVIDED HISTORY: abnormal vaginal bleeding, history of D&C and ablation. history of fibroids FINDINGS: Measurements: Uterus:  11.2 x 6 x 7.4 cm Endometrial stripe:  13 mm Right Ovary:  2.8 x 2.4 x 2.3 cm Left Ovary:  2.5 x 2.4 x 2.4 cm Ultrasound Findings: Uterus: Uterus is heterogeneous in echotexture. Endometrial stripe: Endometrial stripe is heterogeneous in echotexture. Right Ovary: Right ovary is within normal limits. Left Ovary:  Left ovary is within normal limits. Free Fluid: No evidence of free fluid. Heterogeneous thickening of the immature stripe without discrete lesion. Correlate with pelvic exam. Heterogeneous echotexture of the uterus is noted without discrete lesion. Similar findings were noted on the old exam from 2018. EKG  None    All EKG's are interpreted by the Emergency Department Physician who either signs or Co-signs this chart in the absence of a cardiologist.    EMERGENCY DEPARTM  Patient with history of uterine fibroids presented to the emergency department for vaginal bleeding for 4 days, requires her to change tampons every 15 to 30 minutes, affecting her ADL. Had D&C and ablation in May 2019. On examination patient is in NAD, abdomen soft, respiratory and cardiovascular examination unremarkable. Pelvic examination showed blood in the vagina with clots. Spoke to the OB, advised transvaginal ultrasound and follow-up as outpatient with OB. Hemoglobin stable, urinalysis showed large Hb, pregnancy test negative. TV US findings as above. Advised patient follow up with OB and advised ED return precaution. PROCEDURES:    CONSULTS:  IP CONSULT TO OB GYN    CRITICAL CARE:  None    FINAL IMPRESSION      1.  Dysfunctional uterine bleeding          DISPOSITION / PLAN     DISPOSITION    home    PATIENT REFERRED TO:  [facility] [address] [place],[address] [phone]    Call  ed follow up       DISCHARGE MEDICATIONS:  New Prescriptions    No medications on file       Arina Reyes MD  Emergency Medicine Resident    (Please note that portions of thisnote were completed with a voice recognition program.  Efforts were made to edit the dictations but occasionally words are mis-transcribed.)       Renae Pichardo MD  Resident  09/26/19 7014

## 2019-09-30 LAB
C TRACH DNA GENITAL QL NAA+PROBE: ABNORMAL
N. GONORRHOEAE DNA: ABNORMAL
SPECIMEN DESCRIPTION: ABNORMAL

## 2019-10-01 ENCOUNTER — TELEPHONE (OUTPATIENT)
Dept: PHARMACY | Age: 44
End: 2019-10-01

## 2019-10-02 ENCOUNTER — TELEPHONE (OUTPATIENT)
Dept: OBGYN | Age: 44
End: 2019-10-02

## 2019-10-14 ENCOUNTER — TELEPHONE (OUTPATIENT)
Dept: OBGYN | Age: 44
End: 2019-10-14

## 2019-10-31 ENCOUNTER — TELEPHONE (OUTPATIENT)
Dept: ADMINISTRATIVE | Age: 44
End: 2019-10-31

## 2019-11-06 ENCOUNTER — OFFICE VISIT (OUTPATIENT)
Dept: OBGYN | Age: 44
End: 2019-11-06
Payer: COMMERCIAL

## 2019-11-06 VITALS
DIASTOLIC BLOOD PRESSURE: 76 MMHG | HEIGHT: 65 IN | WEIGHT: 193 LBS | SYSTOLIC BLOOD PRESSURE: 115 MMHG | HEART RATE: 83 BPM | BODY MASS INDEX: 32.15 KG/M2

## 2019-11-06 DIAGNOSIS — N93.9 ABNORMAL UTERINE BLEEDING (AUB): ICD-10-CM

## 2019-11-06 DIAGNOSIS — N93.9 VAGINAL BLEEDING: Primary | ICD-10-CM

## 2019-11-06 PROCEDURE — 99213 OFFICE O/P EST LOW 20 MIN: CPT | Performed by: OBSTETRICS & GYNECOLOGY

## 2019-11-06 RX ORDER — IBUPROFEN 800 MG/1
800 TABLET ORAL 2 TIMES DAILY PRN
Qty: 60 TABLET | Refills: 5 | Status: SHIPPED | OUTPATIENT
Start: 2019-11-06 | End: 2020-01-29

## 2020-01-29 ENCOUNTER — OFFICE VISIT (OUTPATIENT)
Dept: OBGYN | Age: 45
End: 2020-01-29
Payer: COMMERCIAL

## 2020-01-29 VITALS
BODY MASS INDEX: 33.26 KG/M2 | OXYGEN SATURATION: 93 % | WEIGHT: 199.6 LBS | DIASTOLIC BLOOD PRESSURE: 109 MMHG | SYSTOLIC BLOOD PRESSURE: 153 MMHG | HEART RATE: 100 BPM | HEIGHT: 65 IN

## 2020-01-29 PROCEDURE — 99213 OFFICE O/P EST LOW 20 MIN: CPT | Performed by: STUDENT IN AN ORGANIZED HEALTH CARE EDUCATION/TRAINING PROGRAM

## 2020-01-29 ASSESSMENT — PATIENT HEALTH QUESTIONNAIRE - PHQ9
1. LITTLE INTEREST OR PLEASURE IN DOING THINGS: 0
SUM OF ALL RESPONSES TO PHQ QUESTIONS 1-9: 0
2. FEELING DOWN, DEPRESSED OR HOPELESS: 0
SUM OF ALL RESPONSES TO PHQ9 QUESTIONS 1 & 2: 0
SUM OF ALL RESPONSES TO PHQ QUESTIONS 1-9: 0

## 2020-01-29 NOTE — PROGRESS NOTES
OB/GYN Problem Visit    Pallavi Leroy  1/29/2020                       Primary Care Physician: Manohar Crystal MD    CC:   Chief Complaint   Patient presents with    Other     discuss hysterectomy, set up appointment? due to AUB         HPI: Pallavi Leroy is a 53 y.o. female [id number]    The patient was seen and examined. She is here for follow up AUB and is complaining of continued heavy bleeding. She states her menstrual cycles are so heavy she misses up to two weeks of work per menses. They affect her sleep. She tries tampons and pads and bleeds through them together. Her Patient's last menstrual period was 01/06/2020. She has had 3 vaginal deliveries in the past, denies abdominal surgery other than tubal ligation. Her bowel habits are regular. She denies any bloating. She denies dysuria. She denies urinary leaking. She denies vaginal discharge. She is not sexually active for the past 6 months, possibly more. Patient states \"it's uncomfortable and I dont have time for that sh*t. \" She is s/p tubal ligation.     REVIEW OF SYSTEMS:   Constitutional: negative fever, negative chills   HEENT: negative visual disturbances, negative headaches  Respiratory: negative dyspnea, negative cough  Cardiovascular: negative chest pain,  negative palpitations  Gastrointestinal: positive abdominal pain, negative RUQ pain, negative N/V, negative diarrhea, negative constipation  Genitourinary: negative dysuria, negative vaginal discharge  Dermatological: negative rash  Hematologic: negative bruising  Immunologic/Lymphatic: negative recent illness, negative recent sick contact  Musculoskeletal: negative back pain, negative myalgias, negative arthralgias  Neurological:  negative dizziness, negative weakness  Behavior/Psych: negative depression, negative anxiety  ________________________________________________________________________    GYNECOLOGICAL HISTORY:  Age of Menarche: 13  Age of Menopause: NA Sexually Active: has sex with males, reports it has been >6mo  STD History: denies, indeterminate     Pap History: Last PAP was normal; May/2018. Permanent Sterilization: yes - na  Reversible Birth Control: no  Hormone Replacement Exposure: no    OBSTETRICAL HISTORY:  OB History    Para Term  AB Living   3 3 3     3   SAB TAB Ectopic Molar Multiple Live Births             1      # Outcome Date GA Lbr Bhavik/2nd Weight Sex Delivery Anes PTL Lv   3 Term 95    M Vag-Spont      2 Term 94    F Vag-Spont      1 Term 90    M Vag-Spont   BRISA       PAST MEDICAL HISTORY:      Diagnosis Date    Abnormal uterine bleeding 2018    Anxiety     Bipolar 1 disorder (HCC)     No Rx.  Fibroid 2018    Panic attacks        PAST SURGICAL HISTORY:                                                                    Procedure Laterality Date    HYSTEROSCOPY N/A 2019    HYSTEROSCOPY WITH A D AND C performed by Abhi Olivarez DO at [address] [place] [address] [place]:  Current Outpatient Medications   Medication Sig Dispense Refill    ibuprofen (ADVIL;MOTRIN) 800 MG tablet Take 1 tablet by mouth every 8 hours as needed for Pain 30 tablet 1    clonazePAM (KLONOPIN) 1 MG tablet Take 1 mg by mouth 4 times daily as needed. .       No current facility-administered medications for this visit.         ALLERGIES:  Allergies as of 2020 - Review Complete 2020   Allergen Reaction Noted    Naproxen Other (See Comments) 2018    Sulfa antibiotics Hives 04/10/2014    Sulfasalazine Hives 04/10/2014    Shellfish-derived products Diarrhea and Nausea And Vomiting 2017                                   VITALS:  Vitals:    20 1459 20 1500   BP: (!) 138/105 (!) 153/109   Site: Right Upper Arm    Position: Sitting    Cuff Size: Large Adult    Pulse: 97 100   SpO2: 93%    Weight: 199 lb 9.6 oz (90.5 kg)    Height: 5' 5\" (1.651 m) Heterogeneous thickening of the immature stripe without discrete lesion. Correlate with pelvic exam.       Heterogeneous echotexture of the uterus is noted without discrete lesion. Similar findings were noted on the old exam from 2018. ASSESSMENT & PLAN:    Roger Godoy is a 53 y.o. female       Birads cat 4A    - Patient states she was informed of her need for biopsy and is declining biopsy because \"no one is putting a needle in my boob while i'm awake\"    - R/B/A discussed in length, patient declining further treatment    - Patient offered referral to alternative provider for biopsy and declining     AUB w/ known fibroids    - GC/C collected, Vaginitis collected    - Schedule for TLH, BSO, cysto possible open if patient decides to have breast biopsy performed     Elevated BP    - Denies hx htn, denies s/s stroke MI     Hx tubal ligation       Patient Active Problem List    Diagnosis Date Noted    Non-compliant patient 2019     Patient did not present for scheduled breast biopsy 19. Registered letter sent 19.  Abnormal mammogram of left breast 2019     Stereotactic biopsy planned 19.  Hysteroscopy, D&C 2019    Panic attacks 2019    Tobacco abuse 2019    Fibroid 2018     Seen on ED CT scan 3/29/18 \"subcentimeter\", patient scheduled for TVUS       Abnormal uterine bleeding (AUB) 2018     Heavy menses, possibly 2/2 fibroids, TVUS pending. Consider EMB pending US results.  Hx of tubal ligation () 2018     1994 per patient         Return in about 6 weeks (around 3/11/2020) for post op follow up . Counseling Completed:  discussed need for repeat pap every 5 years, if negative. discussed need for mammograms every 1 year, If >44 yo and last mammogram was negative. discussed birth control and barrier recommendations. discussed STD counseling and prevention.   Hereditary Breast, Ovarian, Colon and Uterine Cancer screening discussed. Tobacco & Secondary smoke risks discussed; with recommendation for cessation and avoidance. Routine health maintenance per patients PCP discussed. Patient was seen with total face to face time of 15 minutes. More than 50% of this visit was on counseling and education regarding her diagnose(s) as listed below and options. She was also counseled on her preventative health maintenance recommendations and follow-up.       Maya Haines  Ob/Gyn Resident  [facility], [address]  1/29/2020, 3:31 PM

## 2020-03-26 ENCOUNTER — TELEPHONE (OUTPATIENT)
Dept: INTERNAL MEDICINE | Age: 45
End: 2020-03-26

## 2020-03-26 NOTE — LETTER
[address]  [address] [address]  Phone: [phone]  Fax: [phone]    Lisandro Hernández MD        March 26, 2020    Karen Fioer  [address]      Dear [facility]:    We are sending this letter because your PCP ordered [facility] for you to have done at your last visit here and they have not yet been completed. If you can please come to our office on the 2nd floor to  your orders to have them compelted. If you do not have a follow-up appointment scheduled you can either contact the office to make an appointment with us or you can make one when you come in to pick-up your orders. If you have any questions or concerns, please don't hesitate to call.     Sincerely,        Lisandro Hernández MD

## 2020-04-25 ENCOUNTER — APPOINTMENT (OUTPATIENT)
Dept: CT IMAGING | Age: 45
End: 2020-04-25
Payer: COMMERCIAL

## 2020-04-25 ENCOUNTER — APPOINTMENT (OUTPATIENT)
Dept: GENERAL RADIOLOGY | Age: 45
End: 2020-04-25
Payer: COMMERCIAL

## 2020-04-25 ENCOUNTER — HOSPITAL ENCOUNTER (EMERGENCY)
Age: 45
Discharge: HOME | End: 2020-04-26
Attending: EMERGENCY MEDICINE
Payer: COMMERCIAL

## 2020-04-25 VITALS
TEMPERATURE: 97.4 F | WEIGHT: 180 LBS | SYSTOLIC BLOOD PRESSURE: 111 MMHG | HEIGHT: 66 IN | RESPIRATION RATE: 20 BRPM | BODY MASS INDEX: 28.93 KG/M2 | HEART RATE: 91 BPM | OXYGEN SATURATION: 97 % | DIASTOLIC BLOOD PRESSURE: 77 MMHG

## 2020-04-25 LAB
ABSOLUTE EOS #: 0.33 K/UL (ref 0–0.44)
ABSOLUTE IMMATURE GRANULOCYTE: 0.05 K/UL (ref 0–0.3)
ABSOLUTE LYMPH #: 3.31 K/UL (ref 1.1–3.7)
ABSOLUTE MONO #: 0.89 K/UL (ref 0.1–1.2)
ANION GAP SERPL CALCULATED.3IONS-SCNC: 15 MMOL/L (ref 9–17)
BASOPHILS # BLD: 1 % (ref 0–2)
BASOPHILS ABSOLUTE: 0.05 K/UL (ref 0–0.2)
BUN BLDV-MCNC: 15 MG/DL (ref 6–20)
BUN/CREAT BLD: ABNORMAL (ref 9–20)
CALCIUM SERPL-MCNC: 9.4 MG/DL (ref 8.6–10.4)
CHLORIDE BLD-SCNC: 101 MMOL/L (ref 98–107)
CO2: 22 MMOL/L (ref 20–31)
CREAT SERPL-MCNC: 0.62 MG/DL (ref 0.5–0.9)
D-DIMER QUANTITATIVE: 0.65 MG/L FEU
DIFFERENTIAL TYPE: ABNORMAL
EOSINOPHILS RELATIVE PERCENT: 4 % (ref 1–4)
GFR AFRICAN AMERICAN: >60 ML/MIN
GFR NON-AFRICAN AMERICAN: >60 ML/MIN
GFR SERPL CREATININE-BSD FRML MDRD: ABNORMAL ML/MIN/{1.73_M2}
GFR SERPL CREATININE-BSD FRML MDRD: ABNORMAL ML/MIN/{1.73_M2}
GLUCOSE BLD-MCNC: 82 MG/DL (ref 70–99)
HCT VFR BLD CALC: 44.2 % (ref 36.3–47.1)
HEMOGLOBIN: 14.2 G/DL (ref 11.9–15.1)
IMMATURE GRANULOCYTES: 1 %
LYMPHOCYTES # BLD: 37 % (ref 24–43)
MCH RBC QN AUTO: 29.8 PG (ref 25.2–33.5)
MCHC RBC AUTO-ENTMCNC: 32.1 G/DL (ref 28.4–34.8)
MCV RBC AUTO: 92.7 FL (ref 82.6–102.9)
MONOCYTES # BLD: 10 % (ref 3–12)
NRBC AUTOMATED: 0 PER 100 WBC
PDW BLD-RTO: 14.9 % (ref 11.8–14.4)
PLATELET # BLD: 347 K/UL (ref 138–453)
PLATELET ESTIMATE: ABNORMAL
PMV BLD AUTO: 10.5 FL (ref 8.1–13.5)
POTASSIUM SERPL-SCNC: 3.3 MMOL/L (ref 3.7–5.3)
RBC # BLD: 4.77 M/UL (ref 3.95–5.11)
RBC # BLD: ABNORMAL 10*6/UL
SEG NEUTROPHILS: 47 % (ref 36–65)
SEGMENTED NEUTROPHILS ABSOLUTE COUNT: 4.39 K/UL (ref 1.5–8.1)
SODIUM BLD-SCNC: 138 MMOL/L (ref 135–144)
TROPONIN INTERP: NORMAL
TROPONIN T: NORMAL NG/ML
TROPONIN, HIGH SENSITIVITY: <6 NG/L (ref 0–14)
WBC # BLD: 9 K/UL (ref 3.5–11.3)
WBC # BLD: ABNORMAL 10*3/UL

## 2020-04-25 PROCEDURE — 85379 FIBRIN DEGRADATION QUANT: CPT

## 2020-04-25 PROCEDURE — 2580000003 HC RX 258: Performed by: EMERGENCY MEDICINE

## 2020-04-25 PROCEDURE — 93005 ELECTROCARDIOGRAM TRACING: CPT | Performed by: EMERGENCY MEDICINE

## 2020-04-25 PROCEDURE — 6370000000 HC RX 637 (ALT 250 FOR IP): Performed by: EMERGENCY MEDICINE

## 2020-04-25 PROCEDURE — 96374 THER/PROPH/DIAG INJ IV PUSH: CPT

## 2020-04-25 PROCEDURE — 6360000002 HC RX W HCPCS: Performed by: EMERGENCY MEDICINE

## 2020-04-25 PROCEDURE — 6360000004 HC RX CONTRAST MEDICATION: Performed by: EMERGENCY MEDICINE

## 2020-04-25 PROCEDURE — 84484 ASSAY OF TROPONIN QUANT: CPT

## 2020-04-25 PROCEDURE — 96375 TX/PRO/DX INJ NEW DRUG ADDON: CPT

## 2020-04-25 PROCEDURE — 71260 CT THORAX DX C+: CPT

## 2020-04-25 PROCEDURE — 99285 EMERGENCY DEPT VISIT HI MDM: CPT

## 2020-04-25 PROCEDURE — 71045 X-RAY EXAM CHEST 1 VIEW: CPT

## 2020-04-25 PROCEDURE — 80048 BASIC METABOLIC PNL TOTAL CA: CPT

## 2020-04-25 PROCEDURE — 85025 COMPLETE CBC W/AUTO DIFF WBC: CPT

## 2020-04-25 RX ORDER — KETOROLAC TROMETHAMINE 30 MG/ML
30 INJECTION, SOLUTION INTRAMUSCULAR; INTRAVENOUS ONCE
Status: COMPLETED | OUTPATIENT
Start: 2020-04-25 | End: 2020-04-25

## 2020-04-25 RX ORDER — DIPHENHYDRAMINE HYDROCHLORIDE 50 MG/ML
25 INJECTION INTRAMUSCULAR; INTRAVENOUS ONCE
Status: COMPLETED | OUTPATIENT
Start: 2020-04-25 | End: 2020-04-25

## 2020-04-25 RX ORDER — ONDANSETRON 2 MG/ML
4 INJECTION INTRAMUSCULAR; INTRAVENOUS ONCE
Status: DISCONTINUED | OUTPATIENT
Start: 2020-04-25 | End: 2020-04-26 | Stop reason: HOSPADM

## 2020-04-25 RX ORDER — 0.9 % SODIUM CHLORIDE 0.9 %
1000 INTRAVENOUS SOLUTION INTRAVENOUS ONCE
Status: COMPLETED | OUTPATIENT
Start: 2020-04-25 | End: 2020-04-26

## 2020-04-25 RX ORDER — ASPIRIN 81 MG/1
324 TABLET, CHEWABLE ORAL ONCE
Status: COMPLETED | OUTPATIENT
Start: 2020-04-25 | End: 2020-04-25

## 2020-04-25 RX ORDER — PROCHLORPERAZINE EDISYLATE 5 MG/ML
10 INJECTION INTRAMUSCULAR; INTRAVENOUS ONCE
Status: COMPLETED | OUTPATIENT
Start: 2020-04-25 | End: 2020-04-25

## 2020-04-25 RX ADMIN — KETOROLAC TROMETHAMINE 30 MG: 30 INJECTION, SOLUTION INTRAMUSCULAR at 23:26

## 2020-04-25 RX ADMIN — PROCHLORPERAZINE EDISYLATE 10 MG: 5 INJECTION INTRAMUSCULAR; INTRAVENOUS at 23:26

## 2020-04-25 RX ADMIN — SODIUM CHLORIDE 1000 ML: 9 INJECTION, SOLUTION INTRAVENOUS at 23:25

## 2020-04-25 RX ADMIN — ASPIRIN 324 MG: 81 TABLET ORAL at 23:33

## 2020-04-25 RX ADMIN — DIPHENHYDRAMINE HYDROCHLORIDE 25 MG: 50 INJECTION, SOLUTION INTRAMUSCULAR; INTRAVENOUS at 23:25

## 2020-04-25 RX ADMIN — IOHEXOL 75 ML: 350 INJECTION, SOLUTION INTRAVENOUS at 23:59

## 2020-04-25 ASSESSMENT — PAIN DESCRIPTION - LOCATION: LOCATION: CHEST;HEAD;BACK

## 2020-04-25 ASSESSMENT — PAIN DESCRIPTION - PAIN TYPE: TYPE: ACUTE PAIN

## 2020-04-25 ASSESSMENT — PAIN SCALES - GENERAL: PAINLEVEL_OUTOF10: 10

## 2020-04-26 LAB
TROPONIN INTERP: NORMAL
TROPONIN T: NORMAL NG/ML
TROPONIN, HIGH SENSITIVITY: <6 NG/L (ref 0–14)

## 2020-04-26 PROCEDURE — 6370000000 HC RX 637 (ALT 250 FOR IP): Performed by: EMERGENCY MEDICINE

## 2020-04-26 PROCEDURE — 6360000002 HC RX W HCPCS: Performed by: EMERGENCY MEDICINE

## 2020-04-26 PROCEDURE — 96375 TX/PRO/DX INJ NEW DRUG ADDON: CPT

## 2020-04-26 PROCEDURE — 84484 ASSAY OF TROPONIN QUANT: CPT

## 2020-04-26 RX ORDER — IBUPROFEN 800 MG/1
800 TABLET ORAL EVERY 8 HOURS PRN
Qty: 30 TABLET | Refills: 0 | Status: SHIPPED | OUTPATIENT
Start: 2020-04-26 | End: 2021-08-31

## 2020-04-26 RX ORDER — FAMOTIDINE 20 MG/1
20 TABLET, FILM COATED ORAL 2 TIMES DAILY
Qty: 60 TABLET | Refills: 0 | Status: SHIPPED | OUTPATIENT
Start: 2020-04-26 | End: 2021-06-08

## 2020-04-26 RX ADMIN — POTASSIUM & SODIUM PHOSPHATES POWDER PACK 280-160-250 MG 500 MG: 280-160-250 PACK at 00:03

## 2020-04-26 ASSESSMENT — ENCOUNTER SYMPTOMS
ABDOMINAL PAIN: 0
DIARRHEA: 0
SORE THROAT: 0
COUGH: 0
NAUSEA: 0
VOMITING: 0
EYE PAIN: 0
SHORTNESS OF BREATH: 1

## 2020-04-26 NOTE — ED PROVIDER NOTES
[facility]  ED  Emergency Department Encounter  EmergencyMedicine Resident     Pt Name:Gaurang Proctor  MRN: [id number]  [place] 1975  Date of evaluation: 4/26/20  PCP:  Pilo Zelaya MD    CHIEF COMPLAINT       Chief Complaint   Patient presents with    Chest Pain     Pt c/o CP substernal denies radiation, headache all over, back pain constant for the last 5 days       HISTORY OF PRESENT ILLNESS  (Location/Symptom, Timing/Onset, Context/Setting, Quality, Duration, Modifying Factors, Severity.)      Ezio Wilkins is a 53 y.o. female who presents with complaints of chest pain and headache for the past 4 days. Patient reports the symptoms have been pretty constant since that time. She reports the pain in her chest is from her epigastrium all the way up through her neck. She reports it is more of a pressure that goes through to her back and she feels short of breath associated with it. No nausea or vomiting. No fevers or chills. No cough. She has never had this in the past.  She has no other abdominal pain. Normal urination and defecation. Normal menstrual cycle, she is not pregnant. She reports the headache worsened yesterday, she feels it in the frontal region, not a migraine no other associated aura no lightheadedness or dizziness, no syncope. She does have some slight numbness and tingling in her bilateral hands associated with the headache and chest pain. She reports no lower extremity swelling or calf tenderness. No history of DVT or PE. She is not on any blood thinners. PAST MEDICAL / SURGICAL / SOCIAL / FAMILY HISTORY      has a past medical history of Abnormal uterine bleeding, Anxiety, Bipolar 1 disorder ([address] Fibroid, and Panic attacks. has a past surgical history that includes Tubal ligation (5712) and hysteroscopy (N/A, 5/17/2019).     Social History     Socioeconomic History    Marital status:      Spouse name: Not on file    Number of children: 3  Years of education: Not on file    Highest education level: Not on file   Occupational History    Not on file   Social Needs    Financial resource strain: Not on file    Food insecurity     Worry: Not on file     Inability: Not on file    Transportation needs     Medical: Not on file     Non-medical: Not on file   Tobacco Use    Smoking status: Current Every Day Smoker     Packs/day: 0.50     Types: Cigarettes     Start date: 44    Smokeless tobacco: Never Used   Substance and Sexual Activity    Alcohol use: No     Comment: seldom    Drug use: No    Sexual activity: Not on file   Lifestyle    Physical activity     Days per week: Not on file     Minutes per session: Not on file    Stress: Not on file   Relationships    Social connections     Talks on phone: Not on file     Gets together: Not on file     Attends Scientologist service: Not on file     Active member of club or organization: Not on file     Attends meetings of clubs or organizations: Not on file     Relationship status: Not on file    Intimate partner violence     Fear of current or ex partner: Not on file     Emotionally abused: Not on file     Physically abused: Not on file     Forced sexual activity: Not on file   Other Topics Concern    Not on file   Social History Narrative    Not on file       Family History   Problem Relation Age of Onset    High Blood Pressure Mother     Diabetes Mother     Lung Cancer Father         primary    Brain Cancer Father     Other Sister         DDD of the back    Mult Sclerosis Maternal Grandmother     No Known Problems Maternal Grandfather     No Known Problems Paternal Grandmother     No Known Problems Paternal Grandfather        Allergies:  Naproxen; Sulfa antibiotics; Sulfasalazine; and Shellfish-derived products    Home Medications:  Prior to Admission medications    Medication Sig Start Date End Date Taking?  Authorizing Provider   ibuprofen (ADVIL;MOTRIN) 800 MG tablet Take 1 tablet by Pupils: Pupils are equal, round, and reactive to light. Neck:      Musculoskeletal: Normal range of motion and neck supple. Cardiovascular:      Rate and Rhythm: Normal rate and regular rhythm. Heart sounds: Normal heart sounds. No murmur. No friction rub. No gallop. Pulmonary:      Effort: Pulmonary effort is normal. No respiratory distress. Breath sounds: Normal breath sounds. No wheezing, rhonchi or rales. Chest:      Chest wall: Tenderness present. Abdominal:      General: Bowel sounds are normal. There is no distension. Palpations: Abdomen is soft. Tenderness: There is no abdominal tenderness. There is no right CVA tenderness, left CVA tenderness, guarding or rebound. Musculoskeletal: Normal range of motion. Thoracic back: She exhibits tenderness. She exhibits normal range of motion and no bony tenderness. Skin:     General: Skin is warm and dry. Findings: No rash. Neurological:      Mental Status: She is alert and oriented to person, place, and time. GCS: GCS eye subscore is 4. GCS verbal subscore is 5. GCS motor subscore is 6. Cranial Nerves: Cranial nerves are intact. Sensory: Sensation is intact. Motor: Motor function is intact.       Comments: Neuro exam intact   Psychiatric:         Behavior: Behavior normal.         DIFFERENTIAL  DIAGNOSIS     PLAN (LABS / IMAGING / EKG):  Orders Placed This Encounter   Procedures    XR CHEST PORTABLE    CT Chest Pulmonary Embolism W Contrast    CBC WITH AUTO DIFFERENTIAL    BASIC METABOLIC PANEL    D-Dimer, Quantitative    Troponin    EKG 12 Lead       MEDICATIONS ORDERED:  Orders Placed This Encounter   Medications    aspirin chewable tablet 324 mg    ketorolac (TORADOL) injection 30 mg    diphenhydrAMINE (BENADRYL) injection 25 mg    prochlorperazine (COMPAZINE) injection 10 mg    0.9 % sodium chloride bolus    DISCONTD: ondansetron (ZOFRAN) injection 4 mg    potassium & sodium mmol/L    Chloride 101 98 - 107 mmol/L    CO2 22 20 - 31 mmol/L    Anion Gap 15 9 - 17 mmol/L    GFR Non-African American >60 >60 mL/min    GFR African American >60 >60 mL/min    GFR Comment          GFR Staging NOT REPORTED    D-Dimer, Quantitative   Result Value Ref Range    D-Dimer, Quant 0.65 mg/L FEU   Troponin   Result Value Ref Range    Troponin, High Sensitivity <6 0 - 14 ng/L    Troponin T NOT REPORTED <0.03 ng/mL    Troponin Interp NOT REPORTED    Troponin   Result Value Ref Range    Troponin, High Sensitivity <6 0 - 14 ng/L    Troponin T NOT REPORTED <0.03 ng/mL    Troponin Interp NOT REPORTED        RADIOLOGY:  Xr Chest Portable    Result Date: 4/25/2020  EXAMINATION: ONE XRAY VIEW OF THE CHEST 4/25/2020 11:15 pm COMPARISON: 10/28/2018 HISTORY: ORDERING SYSTEM PROVIDED HISTORY: chest pain TECHNOLOGIST PROVIDED HISTORY: chest pain Reason for Exam: chest pain Acuity: Unknown Type of Exam: Unknown FINDINGS: Heart size and configuration are normal.  The lungs are clear. No pneumothorax or pleural fluid. No acute bone finding. No acute cardiopulmonary disease. Ct Chest Pulmonary Embolism W Contrast    Result Date: 4/26/2020  EXAMINATION: CTA OF THE CHEST 4/25/2020 11:55 pm TECHNIQUE: CTA of the chest was performed after the administration of intravenous contrast.  Multiplanar reformatted images are provided for review. MIP images are provided for review. Dose modulation, iterative reconstruction, and/or weight based adjustment of the mA/kV was utilized to reduce the radiation dose to as low as reasonably achievable. COMPARISON: None. HISTORY: ORDERING SYSTEM PROVIDED HISTORY: chest pain, elevated d-dimer TECHNOLOGIST PROVIDED HISTORY: No suspected COVID chest pain, elevated d-dimer Reason for Exam: History: This is a 53 y.o. female who presents to the Emergency Department with complaint of chest pain. The patient presents emerged with complaint of chest pain that is been ongoing for last 5 days. The patient says the pain comes and goes. There is some radiation of the pain. She does feel a nauseated and short of breath. Patient denies any diaphoresis. Coughing exacerbates her symptoms. She denies any recent long travel or pain or swelling in her legs. Acuity: Unknown Type of Exam: Unknown FINDINGS: Pulmonary Arteries: Pulmonary arteries are adequately opacified for evaluation. No evidence of intraluminal filling defect to suggest pulmonary embolism. Main pulmonary artery is normal in caliber. Mediastinum: No evidence of mediastinal lymphadenopathy. The heart and pericardium demonstrate no acute abnormality. There is no acute abnormality of the thoracic aorta. Lungs/pleura: The lungs are without acute process apart from minor atelectatic changes. No focal consolidation or pulmonary edema. No evidence of pleural effusion or pneumothorax. Upper Abdomen: Limited images of the upper abdomen are unremarkable. Soft Tissues/Bones: No acute bone or soft tissue abnormality. No evidence of pulmonary embolism or acute pulmonary abnormality. EKG  EKG Interpretation    Interpreted by emergency department physician    Rhythm: sinus tachycardia  Rate: tachycardia  Axis: normal  Ectopy: none  Conduction: normal  ST Segments: normal  T Waves: normal  Q Waves: none    EKG  Impression: no acute changes, non-specific EKG and sinus tachycardia        All EKG's are interpreted by the Emergency Department Physician who either signs or Co-signs this chart in the absence of a cardiologist.    EMERGENCY DEPARTMENT COURSE:  Patient seen and evaluated. She is laying in the bed comfortably, in no acute distress. Nontoxic-appearing. She appears as though she does not feel well. On examination, patient is mildly tachycardic in the low 100s, regular rhythm. Her lungs are clear to auscultation bilaterally. She has minimal reproducible anterior and posterior chest pain, but reports it is most of the deep within. were completed with a voice recognition program.  Efforts were made to edit the dictations but occasionally words are mis-transcribed.)       Nikhil Gutierres MD  Resident  04/26/20 0699

## 2020-04-26 NOTE — ED PROVIDER NOTES
travel or pain or swelling in her legs. Physical:   height is 5' 6\" (1.676 m) and weight is 180 lb (81.6 kg). Her temperature is 97.4 °F (36.3 °C). Her blood pressure is 111/77 and her pulse is 91. Her respiration is 20 and oxygen saturation is 97%. Patient is awake alert nontoxic-appearing, patient is not tachycardic or tachypneic and has a normal oxygen saturation. Impression: Chest pain    Plan: Chest x-ray, EKG, CBC, BMP, troponin      EKG Interpretation    Interpreted by me  Sinus tachycardia ventricular 101, normal NJ interval, normal QRS duration, normal axis, cannot rule out anterior infarct, age undetermined,  Compared EKG of May 13, 2019, ventricular rate increased by 9        (Please note that portions of this note were completed with a voice recognition program.  Efforts were made to edit the dictations but occasionally words are mis-transcribed.)    Monserrat Langford.  Nancy Reyes MD, [facility]  Attending Emergency Medicine Physician        Abhay Abbasi MD  04/25/20 7072

## 2020-04-27 LAB
EKG ATRIAL RATE: 101 BPM
EKG P AXIS: 75 DEGREES
EKG P-R INTERVAL: 158 MS
EKG Q-T INTERVAL: 356 MS
EKG QRS DURATION: 88 MS
EKG QTC CALCULATION (BAZETT): 461 MS
EKG R AXIS: 49 DEGREES
EKG T AXIS: 63 DEGREES
EKG VENTRICULAR RATE: 101 BPM

## 2020-04-27 PROCEDURE — 93010 ELECTROCARDIOGRAM REPORT: CPT | Performed by: INTERNAL MEDICINE

## 2020-05-16 ENCOUNTER — APPOINTMENT (OUTPATIENT)
Dept: CT IMAGING | Age: 45
End: 2020-05-16
Payer: COMMERCIAL

## 2020-05-16 ENCOUNTER — HOSPITAL ENCOUNTER (EMERGENCY)
Age: 45
Discharge: HOME | End: 2020-05-17
Attending: EMERGENCY MEDICINE
Payer: COMMERCIAL

## 2020-05-16 LAB
ABSOLUTE EOS #: 0.14 K/UL (ref 0–0.4)
ABSOLUTE IMMATURE GRANULOCYTE: ABNORMAL K/UL (ref 0–0.3)
ABSOLUTE LYMPH #: 2.79 K/UL (ref 1–4.8)
ABSOLUTE MONO #: 0.54 K/UL (ref 0.1–1.3)
ALBUMIN SERPL-MCNC: 4.1 G/DL (ref 3.5–5.2)
ALBUMIN/GLOBULIN RATIO: ABNORMAL (ref 1–2.5)
ALP BLD-CCNC: 56 U/L (ref 35–104)
ALT SERPL-CCNC: 26 U/L (ref 5–33)
ANION GAP SERPL CALCULATED.3IONS-SCNC: 15 MMOL/L (ref 9–17)
AST SERPL-CCNC: 19 U/L
ATYPICAL LYMPHOCYTE ABSOLUTE COUNT: 0.07 K/UL
ATYPICAL LYMPHOCYTES: 1 %
BASOPHILS # BLD: 0 % (ref 0–2)
BASOPHILS ABSOLUTE: 0 K/UL (ref 0–0.2)
BILIRUB SERPL-MCNC: 0.18 MG/DL (ref 0.3–1.2)
BUN BLDV-MCNC: 15 MG/DL (ref 6–20)
BUN/CREAT BLD: ABNORMAL (ref 9–20)
C-REACTIVE PROTEIN: 0.4 MG/L (ref 0–5)
CALCIUM SERPL-MCNC: 9.1 MG/DL (ref 8.6–10.4)
CHLORIDE BLD-SCNC: 102 MMOL/L (ref 98–107)
CO2: 23 MMOL/L (ref 20–31)
CREAT SERPL-MCNC: 0.72 MG/DL (ref 0.5–0.9)
DIFFERENTIAL TYPE: ABNORMAL
EOSINOPHILS RELATIVE PERCENT: 2 % (ref 0–4)
GFR AFRICAN AMERICAN: >60 ML/MIN
GFR NON-AFRICAN AMERICAN: >60 ML/MIN
GFR SERPL CREATININE-BSD FRML MDRD: ABNORMAL ML/MIN/{1.73_M2}
GFR SERPL CREATININE-BSD FRML MDRD: ABNORMAL ML/MIN/{1.73_M2}
GLUCOSE BLD-MCNC: 112 MG/DL (ref 70–99)
HCG QUALITATIVE: NEGATIVE
HCT VFR BLD CALC: 38.9 % (ref 36–46)
HEMOGLOBIN: 13.2 G/DL (ref 12–16)
IMMATURE GRANULOCYTES: ABNORMAL %
INR BLD: 0.9
LACTATE DEHYDROGENASE: 196 U/L (ref 135–214)
LYMPHOCYTES # BLD: 41 % (ref 24–44)
MCH RBC QN AUTO: 29.8 PG (ref 26–34)
MCHC RBC AUTO-ENTMCNC: 33.8 G/DL (ref 31–37)
MCV RBC AUTO: 88 FL (ref 80–100)
MONOCYTES # BLD: 8 % (ref 1–7)
MORPHOLOGY: ABNORMAL
NRBC AUTOMATED: ABNORMAL PER 100 WBC
PARTIAL THROMBOPLASTIN TIME: 33.6 SEC (ref 24–36)
PDW BLD-RTO: 16.1 % (ref 11.5–14.9)
PLATELET # BLD: 316 K/UL (ref 150–450)
PLATELET ESTIMATE: ABNORMAL
PMV BLD AUTO: 8.5 FL (ref 6–12)
POTASSIUM SERPL-SCNC: 3.9 MMOL/L (ref 3.7–5.3)
PROCALCITONIN: 0.05 NG/ML
PROTHROMBIN TIME: 12.3 SEC (ref 11.8–14.6)
RBC # BLD: 4.42 M/UL (ref 4–5.2)
RBC # BLD: ABNORMAL 10*6/UL
SEG NEUTROPHILS: 48 % (ref 36–66)
SEGMENTED NEUTROPHILS ABSOLUTE COUNT: 3.26 K/UL (ref 1.3–9.1)
SODIUM BLD-SCNC: 140 MMOL/L (ref 135–144)
TOTAL PROTEIN: 7.1 G/DL (ref 6.4–8.3)
TROPONIN INTERP: NORMAL
TROPONIN T: NORMAL NG/ML
TROPONIN, HIGH SENSITIVITY: <6 NG/L (ref 0–14)
WBC # BLD: 6.8 K/UL (ref 3.5–11)
WBC # BLD: ABNORMAL 10*3/UL

## 2020-05-16 PROCEDURE — 93005 ELECTROCARDIOGRAM TRACING: CPT | Performed by: EMERGENCY MEDICINE

## 2020-05-16 PROCEDURE — 85610 PROTHROMBIN TIME: CPT

## 2020-05-16 PROCEDURE — 84484 ASSAY OF TROPONIN QUANT: CPT

## 2020-05-16 PROCEDURE — 99285 EMERGENCY DEPT VISIT HI MDM: CPT

## 2020-05-16 PROCEDURE — 85025 COMPLETE CBC W/AUTO DIFF WBC: CPT

## 2020-05-16 PROCEDURE — 82728 ASSAY OF FERRITIN: CPT

## 2020-05-16 PROCEDURE — 80053 COMPREHEN METABOLIC PANEL: CPT

## 2020-05-16 PROCEDURE — 86140 C-REACTIVE PROTEIN: CPT

## 2020-05-16 PROCEDURE — 85730 THROMBOPLASTIN TIME PARTIAL: CPT

## 2020-05-16 PROCEDURE — 84703 CHORIONIC GONADOTROPIN ASSAY: CPT

## 2020-05-16 PROCEDURE — 2580000003 HC RX 258: Performed by: EMERGENCY MEDICINE

## 2020-05-16 PROCEDURE — 6360000004 HC RX CONTRAST MEDICATION: Performed by: EMERGENCY MEDICINE

## 2020-05-16 PROCEDURE — 71260 CT THORAX DX C+: CPT

## 2020-05-16 PROCEDURE — 36415 COLL VENOUS BLD VENIPUNCTURE: CPT

## 2020-05-16 PROCEDURE — 6370000000 HC RX 637 (ALT 250 FOR IP): Performed by: EMERGENCY MEDICINE

## 2020-05-16 PROCEDURE — 83615 LACTATE (LD) (LDH) ENZYME: CPT

## 2020-05-16 PROCEDURE — 84145 PROCALCITONIN (PCT): CPT

## 2020-05-16 RX ORDER — 0.9 % SODIUM CHLORIDE 0.9 %
80 INTRAVENOUS SOLUTION INTRAVENOUS ONCE
Status: COMPLETED | OUTPATIENT
Start: 2020-05-16 | End: 2020-05-16

## 2020-05-16 RX ORDER — ASPIRIN 81 MG/1
324 TABLET, CHEWABLE ORAL ONCE
Status: COMPLETED | OUTPATIENT
Start: 2020-05-16 | End: 2020-05-16

## 2020-05-16 RX ORDER — 0.9 % SODIUM CHLORIDE 0.9 %
1000 INTRAVENOUS SOLUTION INTRAVENOUS ONCE
Status: COMPLETED | OUTPATIENT
Start: 2020-05-16 | End: 2020-05-16

## 2020-05-16 RX ORDER — SODIUM CHLORIDE 0.9 % (FLUSH) 0.9 %
10 SYRINGE (ML) INJECTION ONCE
Status: COMPLETED | OUTPATIENT
Start: 2020-05-16 | End: 2020-05-16

## 2020-05-16 RX ADMIN — SODIUM CHLORIDE 1000 ML: 9 INJECTION, SOLUTION INTRAVENOUS at 22:22

## 2020-05-16 RX ADMIN — ASPIRIN 81 MG 324 MG: 81 TABLET ORAL at 22:22

## 2020-05-16 RX ADMIN — IOVERSOL 75 ML: 741 INJECTION INTRA-ARTERIAL; INTRAVENOUS at 23:07

## 2020-05-16 RX ADMIN — Medication 10 ML: at 23:07

## 2020-05-16 RX ADMIN — SODIUM CHLORIDE 80 ML: 9 INJECTION, SOLUTION INTRAVENOUS at 23:07

## 2020-05-16 ASSESSMENT — HEART SCORE: ECG: 0

## 2020-05-16 ASSESSMENT — PAIN SCALES - GENERAL: PAINLEVEL_OUTOF10: 5

## 2020-05-17 VITALS
HEIGHT: 65 IN | SYSTOLIC BLOOD PRESSURE: 155 MMHG | HEART RATE: 87 BPM | RESPIRATION RATE: 23 BRPM | BODY MASS INDEX: 33.32 KG/M2 | TEMPERATURE: 99.7 F | DIASTOLIC BLOOD PRESSURE: 90 MMHG | WEIGHT: 200 LBS | OXYGEN SATURATION: 97 %

## 2020-05-17 LAB
FERRITIN: 25 UG/L (ref 13–150)
TROPONIN INTERP: NORMAL
TROPONIN T: NORMAL NG/ML
TROPONIN, HIGH SENSITIVITY: <6 NG/L (ref 0–14)

## 2020-05-17 NOTE — ED NOTES
Mode of arrival (squad #, walk in, police, etc) : Walk in from home        Chief complaint(s): Shortness of breath, chest pain        Arrival Note (brief scenario, treatment PTA, etc). : Pt presented to the ED c/o shortness of breath and chest pain. Pt states she recently had diarrhea for the past two days, and today developed shortness of breath and chest pain. Pt states the chest pain is mid sternal that radiated straight up to her neck. Pt states the pain is an intermittent sharp shooting. Pt states she feels nauseous at times. Pt denies chills and cough. Pt alert and oriented, lung sounds clear. C= \"Have you ever felt that you should Cut down on your drinking? \"  No  A= \"Have people Annoyed you by criticizing your drinking? \"  No  G= \"Have you ever felt bad or Guilty about your drinking? \"  No  E= \"Have you ever had a drink as an Eye-opener first thing in the morning to steady your nerves or to help a hangover? \"  No      Deferred []      Reason for deferring: N/A    *If yes to two or more: probable alcohol abuse. Rylee Pollard RN  05/17/20 0193

## 2020-05-18 ENCOUNTER — CARE COORDINATION (OUTPATIENT)
Dept: CARE COORDINATION | Age: 45
End: 2020-05-18

## 2020-05-18 LAB
EKG ATRIAL RATE: 103 BPM
EKG P AXIS: 58 DEGREES
EKG P-R INTERVAL: 148 MS
EKG Q-T INTERVAL: 336 MS
EKG QRS DURATION: 82 MS
EKG QTC CALCULATION (BAZETT): 440 MS
EKG R AXIS: 19 DEGREES
EKG T AXIS: 52 DEGREES
EKG VENTRICULAR RATE: 103 BPM

## 2020-05-19 ASSESSMENT — ENCOUNTER SYMPTOMS
NAUSEA: 0
DIARRHEA: 1
SINUS PAIN: 0
CHEST TIGHTNESS: 0
BACK PAIN: 0
ABDOMINAL PAIN: 0
VOMITING: 0
CONSTIPATION: 0
EYE PAIN: 0
SHORTNESS OF BREATH: 1
RHINORRHEA: 0
COUGH: 0

## 2020-05-19 NOTE — ED PROVIDER NOTES
Home Meds:   Prior to Visit Medications    Medication Sig Taking? Authorizing Provider   ibuprofen (ADVIL;MOTRIN) 800 MG tablet Take 1 tablet by mouth every 8 hours as needed for Pain Yes Maryann Pinzon MD   famotidine (PEPCID) 20 MG tablet Take 1 tablet by mouth 2 times daily Yes Maryann Pinzon MD   aluminum & magnesium hydroxide-simethicone (MAALOX ADVANCED MAX ST) 400-400-40 MG/5ML SUSP Take 30 mLs by mouth 3 times daily as needed (heartburn) Yes Maryann Pinzon MD   clonazePAM (KLONOPIN) 1 MG tablet Take 1 mg by mouth 4 times daily as needed. . Yes Historical Provider, MD     Please note that medications prescribed at discharge will auto-populate into this medication list when note is refreshed. Please look at prescription date andprescriber to clarify. Family History:family history includes Brain Cancer in her father; Diabetes in her mother; High Blood Pressure in her mother; Lucita Nadine in her father; Mult Sclerosis in her maternal grandmother; No Known Problems in her maternal grandfather, paternal grandfather, and paternal grandmother; Other in her sister. Social History: She reports that she has been smoking cigarettes. She started smoking about 36 years ago. She has been smoking about 0.50 packs per day. She has never used smokeless tobacco. She reports that she does not drink alcohol or use drugs. She has no history on file for sexual activity. REVIEW OF SYSTEMS    (2-9 systems for level 4, 10 or more for level 5)      Review of Systems   Constitutional: Negative for chills and fever. HENT: Negative for congestion, rhinorrhea and sinus pain. Eyes: Negative for pain and visual disturbance. Respiratory: Positive for shortness of breath. Negative for cough and chest tightness. Cardiovascular: Positive for chest pain. Negative for palpitations. Gastrointestinal: Positive for diarrhea. Negative for abdominal pain, constipation, nausea and vomiting.    Genitourinary: Neurological:      Mental Status: She is alert and oriented to person, place, and time. Motor: No weakness. Coordination: Coordination normal.   Psychiatric:         Behavior: Behavior normal.         Thought Content: Thought content normal.         Judgment: Judgment normal.         DIFFERENTIAL DIAGNOSIS/IMPRESSION     DDX: angina, ACS, NSTEMI, STEMI, arrhythmia, PE, pneumonia, GERD, musculoskeletal, anxiety, CoVID infection     Impression: 53 y.o. female who presents with chest pain and shortness of breath starting today. Chest pain is left sided and intermittent and associated with deep inspiration. Patient does have a history of DVT and not on anticoagulation. Also had diarrhea 2 days ago. On exam is tachycardic, otherwise no actue findings. Lungs clear and no leg swelling. Suspcion of anxiety vs msk. However, given the prior DVT and tachycardia will get CT r/o PE. ACS workup. Check electrolytes. Given these symptoms and diarrhea, placed on CoVID precautions and will check the inflammatory markers. PPE:  Given that patient presented with viral like symptoms consistent with possible CoVID infection, proper PPE was donned. Patient was wearing mask at all time when I was in the room. I was wearing mask and goggles at all times when in the room including doorway and when > 6 feet away. During direct patient exam and when I was within 6 feet of the patient I was wearing hair net, procedural mask, goggles, gown, and double gloves.                DIAGNOSTIC RESULTS     EKG: All EKG's are interpreted by the Emergency Department Physician who either signs or Co-signs this chart in the absence of a cardiologist.    EKG Interpretation    Interpreted by emergency department physician    Rhythm: sinus tachycardia  Rate: tachycardia  Axis: normal  Ectopy: none  Conduction: normal  ST Segments: no acute change  T Waves: no acute change  Q Waves: none    Clinical Impression: sinus tachycardia demonstrate no acute abnormality. There is no acute abnormality of the thoracic aorta. Lungs/pleura: The lungs are without acute process. No focal consolidation or pulmonary edema. No evidence of pleural effusion or pneumothorax. Upper Abdomen: Limited images of the upper abdomen are unremarkable. Soft Tissues/Bones: No acute bone or soft tissue abnormality. No evidence of pulmonary embolism or acute pulmonary abnormality. BEDSIDE ULTRASOUND:  Not clinically indicated at this time. ED COURSE      ED Medication Orders (From admission, onward)    Start Ordered     Status Ordering Provider    05/16/20 2300 05/16/20 2250  sodium chloride flush 0.9 % injection 10 mL  ONCE      Last MAR action:  [facility]    05/16/20 2300 05/16/20 2250  0.9 % sodium chloride bolus  ONCE      Last MAR action:  [facility]    05/16/20 2250 05/16/20 2250  ioversol (OPTIRAY) 74 % injection 75 mL  IMG ONCE PRN      Last MAR action:  Given - by Wendy Walker on 05/16/20 at [address]    05/16/20 2215 05/16/20 2212  0.9 % sodium chloride bolus  ONCE      Last MAR action:  Stopped - by My Bolton on 05/16/20 at [address]    05/16/20 2215 05/16/20 2212  aspirin chewable tablet 324 mg  ONCE      Last MAR action:  Given - by My Bolton on 05/16/20 at [address]        Heart Score:   Heart Score for chest pain patients  History:  Moderately Suspicious  ECG: Normal  Patient Age: < 45 years  *Risk factors for Atherosclerotic disease: Obesity, Cigarette smoking  Risk Factors: 1 or 2 risk factors  Troponin: < 1X normal limit  Heart Score Total: 2    Score 0-3: 2.5% MACE over next 6 weeks = Discharge Home  Score 4-6: 20.3% MACE over next 6 weeks = Admit for Clinical Observation  Score 7-10: 72.7% MACE over next 6 weeks = Early Invasive Strategies   Chest Pain in the Emergency Room:

## 2020-07-27 ENCOUNTER — TELEPHONE (OUTPATIENT)
Dept: OBGYN | Age: 45
End: 2020-07-27

## 2020-07-27 NOTE — TELEPHONE ENCOUNTER
Called to discuss surgery scheduling. Mailbox full. Letter to be mailed to patient asking her to call the office.

## 2021-05-24 ENCOUNTER — APPOINTMENT (OUTPATIENT)
Dept: GENERAL RADIOLOGY | Age: 46
End: 2021-05-24
Payer: COMMERCIAL

## 2021-05-24 ENCOUNTER — HOSPITAL ENCOUNTER (EMERGENCY)
Age: 46
Discharge: HOME | End: 2021-05-24
Attending: EMERGENCY MEDICINE
Payer: COMMERCIAL

## 2021-05-24 VITALS
HEART RATE: 99 BPM | OXYGEN SATURATION: 95 % | SYSTOLIC BLOOD PRESSURE: 126 MMHG | RESPIRATION RATE: 20 BRPM | TEMPERATURE: 100.2 F | DIASTOLIC BLOOD PRESSURE: 83 MMHG

## 2021-05-24 DIAGNOSIS — S62.616A CLOSED DISPLACED FRACTURE OF PROXIMAL PHALANX OF RIGHT LITTLE FINGER, INITIAL ENCOUNTER: Primary | ICD-10-CM

## 2021-05-24 PROCEDURE — 99282 EMERGENCY DEPT VISIT SF MDM: CPT

## 2021-05-24 PROCEDURE — 73130 X-RAY EXAM OF HAND: CPT

## 2021-05-24 PROCEDURE — 29131 APPL FINGER SPLINT DYNAMIC: CPT

## 2021-05-24 RX ORDER — ACETAMINOPHEN 325 MG/1
325 TABLET ORAL EVERY 6 HOURS PRN
Qty: 30 TABLET | Refills: 0 | Status: SHIPPED | OUTPATIENT
Start: 2021-05-24

## 2021-05-24 ASSESSMENT — PAIN DESCRIPTION - PAIN TYPE: TYPE: ACUTE PAIN

## 2021-05-24 ASSESSMENT — PAIN DESCRIPTION - LOCATION: LOCATION: HAND

## 2021-05-25 PROBLEM — S62.616A CLOSED DISPLACED FRACTURE OF PROXIMAL PHALANX OF RIGHT LITTLE FINGER: Status: ACTIVE | Noted: 2021-05-25

## 2021-05-25 NOTE — ED NOTES
Patient arrived alert and oriented x4  Patient has complaints or right hand pain/ swelling S/P fall down steps  Patient states she has been taking ibuprofen for pain  Patient just wanted to make sure it was not broken       Veto Gutierrez RN  05/24/21 2053

## 2021-05-25 NOTE — ED PROVIDER NOTES
[facility]     Emergency Department     Faculty Note/ Attestation      Pt Name: Jerald Saenz                                       MRN: [id number]  [place] 1975  Date of evaluation: 5/24/2021    Patients PCP:    Phyllis Mathur MD      Attestation  I performed a history and physical examination of the patient and discussed management with the resident. I reviewed the residents note and agree with the documented findings and plan of care. Any areas of disagreement are noted on the chart. I was personally present for the key portions of any procedures. I have documented in the chart those procedures where I was not present during the key portions. I have reviewed the emergency nurses triage note. I agree with the chief complaint, past medical history, past surgical history, allergies, medications, social and family history as documented unless otherwise noted below. For Physician Assistant/ Nurse Practitioner cases/documentation I have personally evaluated this patient and have completed at least one if not all key elements of the E/M (history, physical exam, and MDM). Additional findings are as noted. Initial Screens:             Vitals:    Vitals:    05/24/21 2004   BP: 126/83   Pulse: 99   Resp: 20   Temp: 100.2 °F (37.9 °C)   TempSrc: Oral   SpO2: 95%       CHIEF COMPLAINT       Chief Complaint   Patient presents with    Hand Injury     Pain to right hand s/p fall down steps             DIAGNOSTIC RESULTS             RADIOLOGY:   XR HAND RIGHT (MIN 3 VIEWS)   Final Result   Acute transverse fracture of the proximal aspect of the right 5th proximal   phalanx. Mild ulnar and dorsal angulation of the distal fracture fragment.                LABS:  Labs Reviewed - No data to display      EMERGENCY DEPARTMENT COURSE:     -------------------------  BP: 126/83, Temp: 100.2 °F (37.9 °C), Pulse: 99, Resp: 20      Comments    Fall, proximal fifth phalanx fracture, does not seem

## 2021-05-28 ENCOUNTER — HOSPITAL ENCOUNTER (OUTPATIENT)
Dept: LAB | Age: 46
Setting detail: SPECIMEN
Discharge: HOME | End: 2021-05-28
Payer: COMMERCIAL

## 2021-05-28 DIAGNOSIS — Z20.822 COVID-19 RULED OUT BY LABORATORY TESTING: Primary | ICD-10-CM

## 2021-05-28 PROCEDURE — U0005 INFEC AGEN DETEC AMPLI PROBE: HCPCS

## 2021-05-28 PROCEDURE — U0003 INFECTIOUS AGENT DETECTION BY NUCLEIC ACID (DNA OR RNA); SEVERE ACUTE RESPIRATORY SYNDROME CORONAVIRUS 2 (SARS-COV-2) (CORONAVIRUS DISEASE [COVID-19]), AMPLIFIED PROBE TECHNIQUE, MAKING USE OF HIGH THROUGHPUT TECHNOLOGIES AS DESCRIBED BY CMS-2020-01-R: HCPCS

## 2021-05-30 LAB
SARS-COV-2: NORMAL
SARS-COV-2: NOT DETECTED
SOURCE: NORMAL

## 2021-06-01 ENCOUNTER — ANESTHESIA EVENT (OUTPATIENT)
Dept: OPERATING ROOM | Age: 46
End: 2021-06-01
Payer: COMMERCIAL

## 2021-06-01 ENCOUNTER — APPOINTMENT (OUTPATIENT)
Dept: GENERAL RADIOLOGY | Age: 46
End: 2021-06-01
Attending: PLASTIC SURGERY
Payer: COMMERCIAL

## 2021-06-01 ENCOUNTER — ANESTHESIA (OUTPATIENT)
Dept: OPERATING ROOM | Age: 46
End: 2021-06-01
Payer: COMMERCIAL

## 2021-06-01 ENCOUNTER — HOSPITAL ENCOUNTER (OUTPATIENT)
Age: 46
Setting detail: OUTPATIENT SURGERY
Discharge: HOME | End: 2021-06-01
Attending: PLASTIC SURGERY | Admitting: PLASTIC SURGERY
Payer: COMMERCIAL

## 2021-06-01 VITALS
TEMPERATURE: 97.9 F | OXYGEN SATURATION: 100 % | SYSTOLIC BLOOD PRESSURE: 88 MMHG | DIASTOLIC BLOOD PRESSURE: 55 MMHG | RESPIRATION RATE: 10 BRPM

## 2021-06-01 VITALS
DIASTOLIC BLOOD PRESSURE: 69 MMHG | RESPIRATION RATE: 16 BRPM | HEART RATE: 81 BPM | BODY MASS INDEX: 32.77 KG/M2 | HEIGHT: 65 IN | TEMPERATURE: 96.8 F | SYSTOLIC BLOOD PRESSURE: 100 MMHG | OXYGEN SATURATION: 98 % | WEIGHT: 196.68 LBS

## 2021-06-01 DIAGNOSIS — S62.616A CLOSED DISPLACED FRACTURE OF PROXIMAL PHALANX OF RIGHT LITTLE FINGER, INITIAL ENCOUNTER: Primary | ICD-10-CM

## 2021-06-01 DIAGNOSIS — G89.18 POSTOPERATIVE PAIN: ICD-10-CM

## 2021-06-01 LAB — HCG, PREGNANCY URINE (POC): NEGATIVE

## 2021-06-01 PROCEDURE — 2500000003 HC RX 250 WO HCPCS: Performed by: NURSE ANESTHETIST, CERTIFIED REGISTERED

## 2021-06-01 PROCEDURE — 6360000002 HC RX W HCPCS: Performed by: ANESTHESIOLOGY

## 2021-06-01 PROCEDURE — 7100000001 HC PACU RECOVERY - ADDTL 15 MIN: Performed by: PLASTIC SURGERY

## 2021-06-01 PROCEDURE — 3209999900 FLUORO FOR SURGICAL PROCEDURES

## 2021-06-01 PROCEDURE — 6370000000 HC RX 637 (ALT 250 FOR IP): Performed by: PLASTIC SURGERY

## 2021-06-01 PROCEDURE — 3700000000 HC ANESTHESIA ATTENDED CARE: Performed by: PLASTIC SURGERY

## 2021-06-01 PROCEDURE — 3700000001 HC ADD 15 MINUTES (ANESTHESIA): Performed by: PLASTIC SURGERY

## 2021-06-01 PROCEDURE — 2500000003 HC RX 250 WO HCPCS: Performed by: PLASTIC SURGERY

## 2021-06-01 PROCEDURE — 2580000003 HC RX 258: Performed by: ANESTHESIOLOGY

## 2021-06-01 PROCEDURE — 3600000004 HC SURGERY LEVEL 4 BASE: Performed by: PLASTIC SURGERY

## 2021-06-01 PROCEDURE — 2709999900 HC NON-CHARGEABLE SUPPLY: Performed by: PLASTIC SURGERY

## 2021-06-01 PROCEDURE — 3600000014 HC SURGERY LEVEL 4 ADDTL 15MIN: Performed by: PLASTIC SURGERY

## 2021-06-01 PROCEDURE — C1713 ANCHOR/SCREW BN/BN,TIS/BN: HCPCS | Performed by: PLASTIC SURGERY

## 2021-06-01 PROCEDURE — 6370000000 HC RX 637 (ALT 250 FOR IP): Performed by: ANESTHESIOLOGY

## 2021-06-01 PROCEDURE — 2580000003 HC RX 258: Performed by: PLASTIC SURGERY

## 2021-06-01 PROCEDURE — 7100000000 HC PACU RECOVERY - FIRST 15 MIN: Performed by: PLASTIC SURGERY

## 2021-06-01 PROCEDURE — 81025 URINE PREGNANCY TEST: CPT

## 2021-06-01 PROCEDURE — 7100000040 HC SPAR PHASE II RECOVERY - FIRST 15 MIN: Performed by: PLASTIC SURGERY

## 2021-06-01 PROCEDURE — 6360000002 HC RX W HCPCS: Performed by: NURSE ANESTHETIST, CERTIFIED REGISTERED

## 2021-06-01 DEVICE — K WIRE FIX L4IN DIA0.045IN DBL END TRCR SMOOTH: Type: IMPLANTABLE DEVICE | Status: FUNCTIONAL

## 2021-06-01 RX ORDER — PROPOFOL 10 MG/ML
INJECTION, EMULSION INTRAVENOUS PRN
Status: DISCONTINUED | OUTPATIENT
Start: 2021-06-01 | End: 2021-06-01 | Stop reason: SDUPTHER

## 2021-06-01 RX ORDER — DEXAMETHASONE SODIUM PHOSPHATE 4 MG/ML
INJECTION, SOLUTION INTRA-ARTICULAR; INTRALESIONAL; INTRAMUSCULAR; INTRAVENOUS; SOFT TISSUE PRN
Status: DISCONTINUED | OUTPATIENT
Start: 2021-06-01 | End: 2021-06-01 | Stop reason: SDUPTHER

## 2021-06-01 RX ORDER — HYDROCODONE BITARTRATE AND ACETAMINOPHEN 5; 325 MG/1; MG/1
2 TABLET ORAL PRN
Status: COMPLETED | OUTPATIENT
Start: 2021-06-01 | End: 2021-06-01

## 2021-06-01 RX ORDER — SODIUM CHLORIDE, SODIUM LACTATE, POTASSIUM CHLORIDE, CALCIUM CHLORIDE 600; 310; 30; 20 MG/100ML; MG/100ML; MG/100ML; MG/100ML
INJECTION, SOLUTION INTRAVENOUS CONTINUOUS
Status: DISCONTINUED | OUTPATIENT
Start: 2021-06-01 | End: 2021-06-01 | Stop reason: HOSPADM

## 2021-06-01 RX ORDER — BUPIVACAINE HYDROCHLORIDE 2.5 MG/ML
INJECTION, SOLUTION INFILTRATION; PERINEURAL PRN
Status: DISCONTINUED | OUTPATIENT
Start: 2021-06-01 | End: 2021-06-01 | Stop reason: ALTCHOICE

## 2021-06-01 RX ORDER — KETOROLAC TROMETHAMINE 30 MG/ML
INJECTION, SOLUTION INTRAMUSCULAR; INTRAVENOUS PRN
Status: DISCONTINUED | OUTPATIENT
Start: 2021-06-01 | End: 2021-06-01 | Stop reason: SDUPTHER

## 2021-06-01 RX ORDER — DIPHENHYDRAMINE HYDROCHLORIDE 50 MG/ML
INJECTION INTRAMUSCULAR; INTRAVENOUS PRN
Status: DISCONTINUED | OUTPATIENT
Start: 2021-06-01 | End: 2021-06-01 | Stop reason: SDUPTHER

## 2021-06-01 RX ORDER — FENTANYL CITRATE 50 UG/ML
INJECTION, SOLUTION INTRAMUSCULAR; INTRAVENOUS PRN
Status: DISCONTINUED | OUTPATIENT
Start: 2021-06-01 | End: 2021-06-01 | Stop reason: SDUPTHER

## 2021-06-01 RX ORDER — LIDOCAINE HYDROCHLORIDE 10 MG/ML
INJECTION, SOLUTION EPIDURAL; INFILTRATION; INTRACAUDAL; PERINEURAL PRN
Status: DISCONTINUED | OUTPATIENT
Start: 2021-06-01 | End: 2021-06-01 | Stop reason: SDUPTHER

## 2021-06-01 RX ORDER — ONDANSETRON 2 MG/ML
INJECTION INTRAMUSCULAR; INTRAVENOUS PRN
Status: DISCONTINUED | OUTPATIENT
Start: 2021-06-01 | End: 2021-06-01 | Stop reason: SDUPTHER

## 2021-06-01 RX ORDER — FENTANYL CITRATE 50 UG/ML
50 INJECTION, SOLUTION INTRAMUSCULAR; INTRAVENOUS EVERY 5 MIN PRN
Status: COMPLETED | OUTPATIENT
Start: 2021-06-01 | End: 2021-06-01

## 2021-06-01 RX ORDER — MAGNESIUM HYDROXIDE 1200 MG/15ML
LIQUID ORAL CONTINUOUS PRN
Status: COMPLETED | OUTPATIENT
Start: 2021-06-01 | End: 2021-06-01

## 2021-06-01 RX ORDER — SCOLOPAMINE TRANSDERMAL SYSTEM 1 MG/1
1 PATCH, EXTENDED RELEASE TRANSDERMAL
Status: DISCONTINUED | OUTPATIENT
Start: 2021-06-01 | End: 2021-06-01 | Stop reason: HOSPADM

## 2021-06-01 RX ORDER — HYDROCODONE BITARTRATE AND ACETAMINOPHEN 5; 325 MG/1; MG/1
1 TABLET ORAL EVERY 4 HOURS PRN
Qty: 42 TABLET | Refills: 0 | Status: SHIPPED | OUTPATIENT
Start: 2021-06-01 | End: 2021-06-08

## 2021-06-01 RX ORDER — HYDROCODONE BITARTRATE AND ACETAMINOPHEN 5; 325 MG/1; MG/1
1 TABLET ORAL PRN
Status: COMPLETED | OUTPATIENT
Start: 2021-06-01 | End: 2021-06-01

## 2021-06-01 RX ORDER — MIDAZOLAM HYDROCHLORIDE 2 MG/2ML
2 INJECTION, SOLUTION INTRAMUSCULAR; INTRAVENOUS ONCE
Status: COMPLETED | OUTPATIENT
Start: 2021-06-01 | End: 2021-06-01

## 2021-06-01 RX ORDER — FENTANYL CITRATE 50 UG/ML
25 INJECTION, SOLUTION INTRAMUSCULAR; INTRAVENOUS EVERY 5 MIN PRN
Status: DISCONTINUED | OUTPATIENT
Start: 2021-06-01 | End: 2021-06-01 | Stop reason: HOSPADM

## 2021-06-01 RX ADMIN — HYDROCODONE BITARTRATE AND ACETAMINOPHEN 2 TABLET: 5; 325 TABLET ORAL at 08:34

## 2021-06-01 RX ADMIN — DEXAMETHASONE SODIUM PHOSPHATE 4 MG: 4 INJECTION, SOLUTION INTRAMUSCULAR; INTRAVENOUS at 07:47

## 2021-06-01 RX ADMIN — PROPOFOL 200 MG: 10 INJECTION, EMULSION INTRAVENOUS at 07:42

## 2021-06-01 RX ADMIN — FENTANYL CITRATE 25 MCG: 50 INJECTION, SOLUTION INTRAMUSCULAR; INTRAVENOUS at 07:50

## 2021-06-01 RX ADMIN — FENTANYL CITRATE 50 MCG: 50 INJECTION, SOLUTION INTRAMUSCULAR; INTRAVENOUS at 08:34

## 2021-06-01 RX ADMIN — ONDANSETRON 4 MG: 2 INJECTION INTRAMUSCULAR; INTRAVENOUS at 07:59

## 2021-06-01 RX ADMIN — CEFAZOLIN SODIUM 2000 MG: 10 INJECTION, POWDER, FOR SOLUTION INTRAVENOUS at 07:49

## 2021-06-01 RX ADMIN — KETOROLAC TROMETHAMINE 30 MG: 30 INJECTION, SOLUTION INTRAMUSCULAR at 07:59

## 2021-06-01 RX ADMIN — LIDOCAINE HYDROCHLORIDE 50 MG: 10 INJECTION, SOLUTION EPIDURAL; INFILTRATION; INTRACAUDAL; PERINEURAL at 07:42

## 2021-06-01 RX ADMIN — MIDAZOLAM HYDROCHLORIDE 2 MG: 1 INJECTION, SOLUTION INTRAMUSCULAR; INTRAVENOUS at 07:12

## 2021-06-01 RX ADMIN — FENTANYL CITRATE 25 MCG: 50 INJECTION, SOLUTION INTRAMUSCULAR; INTRAVENOUS at 07:57

## 2021-06-01 RX ADMIN — FENTANYL CITRATE 25 MCG: 50 INJECTION, SOLUTION INTRAMUSCULAR; INTRAVENOUS at 07:47

## 2021-06-01 RX ADMIN — FENTANYL CITRATE 50 MCG: 50 INJECTION, SOLUTION INTRAMUSCULAR; INTRAVENOUS at 08:39

## 2021-06-01 RX ADMIN — FENTANYL CITRATE 50 MCG: 50 INJECTION, SOLUTION INTRAMUSCULAR; INTRAVENOUS at 08:20

## 2021-06-01 RX ADMIN — Medication 12.5 MG: at 07:47

## 2021-06-01 RX ADMIN — FENTANYL CITRATE 25 MCG: 50 INJECTION, SOLUTION INTRAMUSCULAR; INTRAVENOUS at 07:54

## 2021-06-01 RX ADMIN — SODIUM CHLORIDE, POTASSIUM CHLORIDE, SODIUM LACTATE AND CALCIUM CHLORIDE: 600; 310; 30; 20 INJECTION, SOLUTION INTRAVENOUS at 07:27

## 2021-06-01 RX ADMIN — FENTANYL CITRATE 50 MCG: 50 INJECTION, SOLUTION INTRAMUSCULAR; INTRAVENOUS at 08:12

## 2021-06-01 ASSESSMENT — PULMONARY FUNCTION TESTS
PIF_VALUE: 1
PIF_VALUE: 1
PIF_VALUE: 11
PIF_VALUE: 1
PIF_VALUE: 11
PIF_VALUE: 1
PIF_VALUE: 11
PIF_VALUE: 12
PIF_VALUE: 11
PIF_VALUE: 12
PIF_VALUE: 12
PIF_VALUE: 11
PIF_VALUE: 1
PIF_VALUE: 8
PIF_VALUE: 12
PIF_VALUE: 11
PIF_VALUE: 13
PIF_VALUE: 11
PIF_VALUE: 1
PIF_VALUE: 11
PIF_VALUE: 12

## 2021-06-01 ASSESSMENT — PAIN - FUNCTIONAL ASSESSMENT: PAIN_FUNCTIONAL_ASSESSMENT: FLACC

## 2021-06-01 ASSESSMENT — LIFESTYLE VARIABLES: SMOKING_STATUS: 1

## 2021-06-01 ASSESSMENT — PAIN SCALES - GENERAL
PAINLEVEL_OUTOF10: 10
PAINLEVEL_OUTOF10: 9

## 2021-06-01 ASSESSMENT — ENCOUNTER SYMPTOMS
STRIDOR: 0
SHORTNESS OF BREATH: 0

## 2021-06-01 ASSESSMENT — PAIN DESCRIPTION - PAIN TYPE
TYPE: SURGICAL PAIN
TYPE: SURGICAL PAIN

## 2021-06-01 NOTE — ANESTHESIA POSTPROCEDURE EVALUATION
Department of Anesthesiology  Postprocedure Note    Patient: Sylwia Lagos  MRN: [id number]  YOB: 1975  Date of evaluation: 6/1/2021  Time:  9:42 AM     Procedure Summary     Date: 06/01/21 Room / Location: [address]    Anesthesia Start: 6150 Anesthesia Stop: 3074    Procedure: CLOSED REDUCTION PERCUTANEOUS  PINNING , RIGHT LITTLE FINGER , SPLINT C-ARM (Right ) Diagnosis: (FRACTURE RIGHT SMALL PROXIMAL PHALANX)    Surgeons: Latesha Rice MD Responsible Provider: Jonathan Cunningham MD    Anesthesia Type: general ASA Status: 2          Anesthesia Type: general    Tanner Phase I: Tanner Score: 10    Tanner Phase II: Tanner Score: 10    Last vitals: Reviewed and per EMR flowsheets.        Anesthesia Post Evaluation    Patient location during evaluation: PACU  Patient participation: complete - patient participated  Level of consciousness: awake and alert  Pain score: 2  Airway patency: patent  Nausea & Vomiting: no nausea and no vomiting  Complications: no  Cardiovascular status: hemodynamically stable  Respiratory status: acceptable  Hydration status: euvolemic

## 2021-06-01 NOTE — BRIEF OP NOTE
Brief Postoperative Note      Patient: Karly Middleton  YOB: 1975  MRN: [id number]    Date of Procedure: 6/1/2021    Pre-Op Diagnosis: FRACTURE RIGHT SMALL PROXIMAL PHALANX    Post-Op Diagnosis: Same       Procedure(s):  CLOSED REDUCTION PERCUTANEOUS  PINNING, C-ARM    Surgeon(s):  Lexii Mims MD    Assistant:  * No surgical staff found *    Anesthesia: General    Estimated Blood Loss (mL): Minimal    Complications: None    Specimens:   * No specimens in log *    Implants:  Implant Name Type Inv.  Item Serial No.  Lot No. LRB No. Used Action   K WIRE FIX L4IN DIA0.045IN DBL END TRCR SMOOTH  K WIRE FIX L4IN DIA0.045IN [address] END TRCR SMOOTH  [employer]  Right 1 Implanted         Drains: * No LDAs found *    Findings:     Electronically signed by Lexii Mims MD on 6/1/2021 at 8:08 AM

## 2021-06-01 NOTE — OP NOTE
[facility]                                OPERATIVE REPORT    PATIENT NAME: Edna Rivera                    :        1975  MED REC NO:   [id number]                             ROOM:  ACCOUNT NO:   [de-identified]                           ADMIT DATE: 2021  PROVIDER:     Alyssa Kearney    DATE OF PROCEDURE:  2021    PREOPERATIVE DIAGNOSIS:  Displaced fracture, right small finger proximal  phalanx. POSTOPERATIVE DIAGNOSIS:  Displaced fracture, right small finger  proximal phalanx. OPERATION PERFORMED:  Closed reduction and percutaneous pin fixation,  right small finger proximal phalanx fracture. ATTENDING PHYSICIAN AND SURGEON:  Dr. Coni Severin:  General.    INDICATIONS:  The patient is a 49-year-old female who suffered a fall  causing the above-listed fracture. She presents at this time for  reduction and fixation. The procedure, the risks, the benefits were  discussed with her. All questions were answered to her satisfaction. Consent was signed. NARRATIVE SUMMARY:  The patient was brought to the operating suite,  placed under general anesthetic in the supine position. She was prepped  and draped in usual sterile fashion. Initially, manual reduction was  carried out of the fracture. Then, checked with C-arm, which showed  good reduction. A 0.045-inch pin was then fired proximal to distal  maintaining the fracture in reduction. The pin was trimmed and capped. She was then dressed with 4x4s, Kerlix roll, and a 4-inch one-step  splint for immobilization. The patient tolerated the procedure well. Blood loss minimal.  Specimens none. Complications none. The patient  was transferred to Recovery in stable condition.         Deng Solorzano    D: 2021 8:44:26       T: 2021 8:50:27     [id number]  Job#: [id number]     Doc#: [id number]    CC:

## 2021-06-01 NOTE — ANESTHESIA PRE PROCEDURE
Department of Anesthesiology  Preprocedure Note       Name:  Sam Figures   Age:  48 y.o.  :  1975                                          MRN:  [id number]         Date:  2021      Surgeon: Margarito Colmenares):  Susan Magana MD    Procedure: CLOSED REDUCTION PERC. PINNING, POSSIBLE ORIF LITTLE FINGER PROXIMAL PHALANX FRACTURE, C-ARM (Right )    Medications prior to admission:   Prior to Admission medications    Medication Sig Start Date End Date Taking? Authorizing Provider   cephALEXin (KEFLEX) 250 MG capsule Take one PO QID til gone. Start one day prior to surgery / procedure. 21   Susan Magana MD   metoclopramide (REGLAN) 10 MG tablet Take 1 tablet by mouth once for 1 dose Take morning of surgery with SMALL SIP of water. 21  Susan Magana MD   famotidine (PEPCID) 20 MG tablet Take 1 tablet by mouth once for 1 dose Take morning of surgery with minimal sip of water. 21  Susan Magana MD   amphetamine-dextroamphetamine (ADDERALL) 20 MG tablet dextroamphetamine-amphetamine 20 mg tablet    Historical Provider, MD   acetaminophen (TYLENOL) 325 MG tablet Take 1 tablet by mouth every 6 hours as needed for Pain 21   Kelly Avila DO   ibuprofen (ADVIL;MOTRIN) 800 MG tablet Take 1 tablet by mouth every 8 hours as needed for Pain  Patient not taking: Reported on 2021   Abelino Trevino MD   famotidine (PEPCID) 20 MG tablet Take 1 tablet by mouth 2 times daily 20   Abelino Trevino MD   aluminum & magnesium hydroxide-simethicone (MAALOX ADVANCED MAX ST) 400-400-40 MG/5ML SUSP Take 30 mLs by mouth 3 times daily as needed (heartburn) 20   Abelino Trevino MD   clonazePAM (KLONOPIN) 1 MG tablet Take 1 mg by mouth 4 times daily as needed. Yadira Collins Historical Provider, MD       Current medications:    No current facility-administered medications for this visit. No current outpatient medications on file.      Facility-Administered Medications Ordered in Other Visits   Medication Dose Route Frequency Provider Last Rate Last Admin    scopolamine (TRANSDERM-SCOP) transdermal patch 1 patch  1 patch Transdermal Q72H Ned Javier MD           Allergies: Allergies   Allergen Reactions    Naproxen Other (See Comments)     headache    Sulfa Antibiotics Hives    Sulfasalazine Hives    Shellfish-Derived Products Diarrhea and Nausea And Vomiting       Problem List:    Patient Active Problem List   Diagnosis Code    Fibroid D21.9    Abnormal uterine bleeding (AUB) N93.9    Hx of tubal ligation (1994) Z98.51    Panic attacks F41.0    Tobacco abuse Z72.0    Hysteroscopy, D&C 5/17/19 Z98.890    Abnormal mammogram of left breast R92.8    Non-compliant patient Z91.19    Closed displaced fracture of proximal phalanx of right little finger S62.945T       Past Medical History:        Diagnosis Date    Abnormal uterine bleeding 05/2018    Anxiety     [facility]     No Rx.  Blood clot in vein 2000    right calf    Fibroid 05/2018    Panic attacks        Past Surgical History:        Procedure Laterality Date    [facility]    HYSTEROSCOPY N/A 05/17/2019    HYSTEROSCOPY WITH A D AND C performed by Oleksandr Lira DO at [facility]       Social History:    Social History     Tobacco Use    Smoking status: Current Every Day Smoker     Packs/day: 0.50     Types: Cigarettes     Start date: 1984    Smokeless tobacco: Never Used   Substance Use Topics    Alcohol use: No     Comment: seldom                                Ready to quit: Not Answered  Counseling given: Not Answered      Vital Signs (Current): There were no vitals filed for this visit.                                            BP Readings from Last 3 Encounters:   05/24/21 126/83   05/17/20 (!) 155/90   04/25/20 111/77       NPO Status: BMI:   Wt Readings from Last 3 Encounters:   05/25/21 150 lb (68 kg)   05/16/20 200 lb (90.7 kg)   04/25/20 180 lb (81.6 kg)     There is no height or weight on file to calculate BMI.    CBC:   Lab Results   Component Value Date    WBC 6.8 05/16/2020    RBC 4.42 05/16/2020    HGB 13.2 05/16/2020    HCT 38.9 05/16/2020    MCV 88.0 05/16/2020    RDW 16.1 05/16/2020     05/16/2020       CMP:   Lab Results   Component Value Date     05/16/2020    K 3.9 05/16/2020     05/16/2020    CO2 23 05/16/2020    BUN 15 05/16/2020    CREATININE 0.72 05/16/2020    GFRAA >60 05/16/2020    LABGLOM >60 05/16/2020    GLUCOSE 112 05/16/2020    PROT 7.1 05/16/2020    CALCIUM 9.1 05/16/2020    BILITOT 0.18 05/16/2020    ALKPHOS 56 05/16/2020    AST 19 05/16/2020    ALT 26 05/16/2020       POC Tests: No results for input(s): POCGLU, POCNA, POCK, POCCL, POCBUN, POCHEMO, POCHCT in the last 72 hours.     Coags:   Lab Results   Component Value Date    PROTIME 12.3 05/16/2020    INR 0.9 05/16/2020    APTT 33.6 05/16/2020       HCG (If Applicable):   Lab Results   Component Value Date    PREGTESTUR NEGATIVE 07/23/2018    HCG NEGATIVE 11/12/2015        ABGs: No results found for: PHART, PO2ART, AHK1YRW, YBD6FMX, BEART, O0NMDQRL     Type & Screen (If Applicable):  No results found for: LABABO, LABRH    Anesthesia Evaluation   no history of anesthetic complications:   Airway: Mallampati: II     Neck ROM: full  Mouth opening: > = 3 FB Dental:          Pulmonary:   (+) current smoker    (-) COPD, asthma, shortness of breath, sleep apnea and stridor                           Cardiovascular:        (-) pacemaker, past MI, CABG/stent,  angina and  CHF      Rhythm: regular  Rate: normal                    Neuro/Psych:   (+) psychiatric history:depression/anxiety    (-) seizures, TIA and CVA           GI/Hepatic/Renal:        (-) GERD, liver disease and no renal disease       Endo/Other:        (-) diabetes mellitus, hypothyroidism, hyperthyroidism, blood dyscrasia                ROS comment: ABNORMAL UTERINE BLEEDING Abdominal:       Abdomen: soft. Vascular:                                   Narrative   Performed by: [facility]   Normal sinus rhythm  Cannot rule out Anterior infarct , age undetermined  Abnormal ECG  When compared with ECG of 28-OCT-2018 03:17,  No significant change was found   Lab and Collection     EKG 12 Lead - 5/13/2019          Anesthesia Plan      general     ASA 2       Induction: intravenous. Anesthetic plan and risks discussed with patient. Plan discussed with CRNA.                   Fátima Pang MD   6/1/2021

## 2021-06-01 NOTE — H&P
History and Physical    Pt Name: Yomaira Guerra  MRN: [id number]  YOB: 1975  Date of evaluation: 6/1/2021    SUBJECTIVE:   History of Chief Complaint:    Patient presents preprocedure for CRPP possible ORIF 5th digit. She had a fall injury 5/23, fell on the stairs with her grandchildren. She states that she braced herself with her hand and suffered fracture. She has been in a splint and has been scheduled for above procedure. Past Medical History    has a past medical history of Abnormal uterine bleeding, Anxiety, Bipolar 1 disorder ([address] Blood clot in vein, Fibroid, and Panic attacks. Past Surgical History   has a past surgical history that includes Tubal ligation (1994); hysteroscopy (N/A, 05/17/2019); and Hysterectomy (01/07/2021). Medications  Prior to Admission medications    Medication Sig Start Date End Date Taking? Authorizing Provider   cephALEXin (KEFLEX) 250 MG capsule Take one PO QID til gone. Start one day prior to surgery / procedure. 5/27/21  Yes Erick Reyna MD   metoclopramide (REGLAN) 10 MG tablet Take 1 tablet by mouth once for 1 dose Take morning of surgery with SMALL SIP of water. 5/27/21 6/1/21 Yes Erick Reyna MD   clonazePAM (KLONOPIN) 1 MG tablet Take 1 mg by mouth 4 times daily as needed. .   Yes Historical Provider, MD   famotidine (PEPCID) 20 MG tablet Take 1 tablet by mouth once for 1 dose Take morning of surgery with minimal sip of water.  5/27/21 5/27/21  Erick Reyna MD   amphetamine-dextroamphetamine (ADDERALL) 20 MG tablet dextroamphetamine-amphetamine 20 mg tablet    Historical Provider, MD   acetaminophen (TYLENOL) 325 MG tablet Take 1 tablet by mouth every 6 hours as needed for Pain 5/24/21   Wallace Appiah DO   ibuprofen (ADVIL;MOTRIN) 800 MG tablet Take 1 tablet by mouth every 8 hours as needed for Pain  Patient not taking: Reported on 5/25/2021 4/26/20   Kvng Pittman MD   famotidine (PEPCID) 20 MG tablet Take 1 tablet by mouth 2 times daily 4/26/20   Ayah Holden MD   aluminum & magnesium hydroxide-simethicone (MAALOX ADVANCED MAX ST) 400-400-40 MG/5ML SUSP Take 30 mLs by mouth 3 times daily as needed (heartburn) 4/26/20   Ayah Holden MD     Allergies  is allergic to naproxen, sulfa antibiotics, sulfasalazine, and shellfish-derived products. Family History  family history includes Brain Cancer in her father; Diabetes in her mother; High Blood Pressure in her mother; Sharran Gather in her father; Mult Sclerosis in her maternal grandmother; No Known Problems in her maternal grandfather, paternal grandfather, and paternal grandmother; Other in her sister. Social History   reports that she has been smoking cigarettes. She started smoking about 37 years ago. She has been smoking about 0.50 packs per day. She has never used smokeless tobacco.   reports no history of alcohol use. reports no history of drug use. Marital Status     OBJECTIVE:   VITALS:  height is 5' 5\" (1.651 m) and weight is 196 lb 10.9 oz (89.2 kg). Her temporal temperature is 98.1 °F (36.7 °C). Her blood pressure is 113/73 and her pulse is 92. Her respiration is 16 and oxygen saturation is 98%. CONSTITUTIONAL:alert & cooperative, no acute distress. SKIN:  Warm and dry, no rashes on exposed areas of skin  HEAD:  Normocephalic, atraumatic   EYES: EOMs intact. EARS:  Hearing grossly WNL. NOSE:  Nares patent. No rhinorrhea. MOUTH/THROAT:  benign  NECK:supple, no lymphadenopathy  LUNGS: Clear to auscultation bilaterally, no wheezes. CARDIOVASCULAR: Heart sounds are normal.  Regular rate and rhythm without murmur. ABDOMEN: soft, non tender, non distended. EXTREMITIES: no edema bilateral lower extremities. Right 5th digit in splint    IMPRESSIONS:     1.  has a past medical history of Abnormal uterine bleeding (05/2018), Anxiety, Bipolar 1 disorder ([address] Blood clot in vein (2000), Fibroid (05/2018), and Panic attacks.    PLANS:   CRPP possible ORIF 5th digit    AJ Singleton PA-C  Electronically signed 6/1/2021 at 6:57 AM\

## 2021-07-20 ENCOUNTER — HOSPITAL ENCOUNTER (OUTPATIENT)
Dept: OCCUPATIONAL THERAPY | Age: 46
Setting detail: THERAPIES SERIES
Discharge: HOME | End: 2021-07-20
Payer: COMMERCIAL

## 2021-07-20 PROCEDURE — 97033 APP MDLTY 1+IONTPHRSIS EA 15: CPT

## 2021-07-20 PROCEDURE — 97165 OT EVAL LOW COMPLEX 30 MIN: CPT

## 2021-07-20 PROCEDURE — 97110 THERAPEUTIC EXERCISES: CPT

## 2021-07-20 NOTE — CONSULTS
[x] [facility]       Occupational Therapy 1st floor       [address], [place]         Phone: [phone]       Fax: [phone] [] [place] [address] Occupational Therapy  [address], [place]  Phone: [phone]  Fax: [phone]       Occupational Therapy Hand & Upper Extremity  Initial Evaluation    Date: 2021      Patient: Low Bruner  : 1975  MRN: [id number]  Referring Provider:  Vanna Mensah MD  Insurance: Other [employer] 60/60 visit per year hard max no Preauth required  Medical Diagnosis: S62.616A Closed displaced fracture of proximal phalanx of R little finger, initial encounter   Rehab Codes: pain in hand M79.646,, pain in wrist M25.53,, stiffness in hand M25.64,, stiffness in wrist M25.63,, numbness R20.2,, malaise R53.0, , lack of coordination R27.8,, fine motor skills loss R29.818,, muscle weakness generalized M62.81,, adherent scar L90.5, or edema localized R60.0,  Onset Date: May [address] 2021   Next Dr. Kirby Jackson: 8- pm    Subjective:   CC: I cannot stand the pain all the time, I was writing and it hurt so bad. It shakes, the more it shakes the more it hurts  HPI: (onset date) Pt reports having overstepped a grand child and fell onto a set of steps    Closed reduction and percutaneous pin fixation,  right small finger proximal phalanx fracture. Mechanism of Injury: s/p fall onto steps  Surgery Date: CRPP   Precautions: None    Involved Extremity: Right   Dominant Extremity: Right    Past Medical History: Unremarkable          Comorbidities: NA    Medications: None Allergies:   Other sulfer    Pain: Intensity:  9 /10 Location: Ulnar distribution dorsal side R hand proximal to wrist    Pain Type: constant  Pain Altered Tx: no  Action Taken:none            Home Environment:    Pt lives in a  and House With 2 and R Handrail NILSA  The washing machine is on and the main level    [facility]   Job Status []Normal duty []Light duty [x] Off due to condition []Retired  []Not employed []Disability  []Other:  []  Return to work: Work activities/duties [employer]     Avocational Activities     [] [address]     [x] Grandparenting     [] Care giver [] OTHER    [] NA       ADL/IADL Previous level of function Current level of function Who assists or modifications made or needed   Bathing  [x] Independent    [] Assist  [x] Independent    [] Assist  difficult   Dress/grooming [x] Independent    [] Assist  [x] Independent    [] Assist  difficult   Transfer/mobility [x] Independent    [] Assist  [x] Independent    [] Assist     Feeding [x] Independent    [] Assist  [] Independent    [x] Assist  Cutting up food   Toileting [x] Independent    [] Assist  [x] Independent    [] Assist  difficult   Driving [x] Independent    [] Assist  [x] Independent    [] Assist     Housekeeping [x] Independent    [] Assist  [x] Independent    [] Assist     Grocery shop/meal prep [x] Independent    [] Assist  [x] Independent    [] Assist       Device: Independent   Orthosis: NA    Objective: Tests/Measurements: Upper Extremity Functional Index  Current Functional Level:  2/80 functionally impaired as measured with the Upper Extremity Functional Index Survey. 0-80 scale, with 80 = no Deficits  (The UEFI model does not provide any specific cut off points that could classify the upper limb disability degree, however, a minimal detectable change of 9 points is provided. This means that for improvement or deterioration to be considered, between two subsequent evaluations, the scores must differ by at least 9 points.)    Sensibility: Burning Edema: Min      Skin Color: Red Skin/Scar condition Dry, Scar Firm and Scar Tethered    Problems: Pain, ROM, Strength, Function, Edema, Adherent Scar, Coordination, Sensation and Falls: History or Risk of     STRENGTH (Measured in pounds)  7-20-21  pounds RIGHT LEFT    9 47   Lateral pinch 11 14   2 point pinch 4 11   3 jaw pinch 9 16   The affected extremity is 81% weaker than the unaffected extremity. (affected score/unaffected score, take the total and subtract from 100)    R MCP  19.2 mm  L MCP   18.6 mm    R Small P1 5.8 mm L Small P1 4.9 mm  R small P2      4.4 mm L Small P2 4.4    DIGITS   Right Extension/Flexion  degrees INDEX MIDDLE RING LITTLE   MCP 0\64 0\44 0\70 +5\30   PIP 0\70 0\75 0\60 -14\24   DIP 0\35 0\30 0\38 0\7            Assessment:  Patient would benefit from skilled occupational therapy services in order to: Improve and Promote  functional /grasp, ROM, Strength, Activity tolerance and Complaint of pain in order to Ensure safe return to work    Short Term Goals: (  12 Treatments)  1. Decrease Pain: to 7/10 with simple ROM for ADLS  2. Increase AROM (degrees) (extension/flexion)  a. R MCPs to 0/75  b. R PIPs to 0/80  c. R DIPs to 0/40  3. Increase strength (pounds);  a. R UE  strength to 20 pounds for increased function with ADLs  b. R Lateral pinch to 14 for increased I with self care tasks  c. R two point pinch to 10 for increased I with home care tasks  d. R three jaw miguel to 15 for increased I with all work tasks  4. Increase function:UE Functional Index Score 25 or more points to promote increased functional abilities  5. Scar will be soft and pliable with minimal tethering. 6. Decrease Edema: R P1 to 5.0 mm and R P2 4.2 mm  7. Demo knowledge of fall prevention in 3 sessions. 8. Patient to be independent with home exercise program as demonstrated by performance with correct form without cues. Long Term Goals: (  24 Treatments)   9. Increase strength (pounds);  a. R UE  strength to 45 pounds for increased function with ADLs  b. R Lateral pinch to 16 for increased I with self care tasks  c. R two point pinch to 13 for increased I with home care tasks  d. R three jaw miguel to 17 for increased I with all work tasks  10. UEFI score of 50 or more for increased function  11.  Scar will be soft and supple with no scar adhesions    Patient Goals: to get back to work    Treatment Potential: Good Suggested Professional Referral: No  Domestic Concerns: No   Barriers to Goal Achievement: No    Comments/Assessment: Pt presents s/p fall with acute transfer fracture of the proximal aspect of the right 5th proximal phalanx. Mild ulnar and dorsal angulation of the distal fracture fragment. Pt s/p CRPP on 6/1/21 for reduction. Pt has noted adhesion of pin site, swelling of hand, and discolored portions of dorsal aspect of the R hand. Pt reports having tremors due to pain in the R hand. Upon inspection of hand pt had noted tenderness, swelling, mild skin deformity (adhesion), crepitus with PROM and decreased motion. Plan to continue to assess for scissoring of digits which would indicate a rotational component. Pt reports extreme numbness/itching in the small digit ulnar side (indicating a potential digital nerve injury). Issued towel crunches this date with HEP for home completion. Pt requesting all notes be faxed to [facility] () at  [phone]    Home Program Initiated: Written, Verbal and Demo Comprehension of Education: Needs Review       Plans, Goals, Risks, Benefits, Discussed with and Patient    Treatment Plan:  Therapeutic Ex 25897, Therapeutic Act 87111, Manual 50722, HEP, Cold/Hot Pack, Ultrasound [id number] and ADL 73966    Treatment Plan:  Frequency: 3 x/weeks for 24 visits     Today's Treatment:  Modalities:  Precautions: na   Exercise Flow Sheet:  Exercise Reps/Time Weight/Level Comments   Towel Crunches 10  Added this date                                 Other: Pt advised on importance of home completion of exercises. Specific Instructions for Next Treatment:   Evaluation Complexity:  History (Personal factors, comorbidities) [x]  0 []  1-2 []  3+   Exam (limitations, restrictions) [x]  1-2 []  3 []  4+   Decision Making [x]  Low []  Moderate []  High   ?  [x]  Low Complexity [] Moderate   Complexity []  High Complexity      Treatment Charges: Mins Units Time In/Out   Evaluation  []  High  []  Moderate  [x]  Low 25  1    [x]  Ther Exercise 15 1    []  Manual Therapy      []  Ther Activities      []  Orthotic fit/train      []  Orthotic recheck      []        Total Treatment time 40 min       Time In: 1000  Time out: 1100  Electronically signed by Sim Schlatter, OTR/L on 7/20/2021 at 10:17 AM    Physician Signature: _________________________ Date: _______________  By signing above or cosigning this note, I have reviewed this plan of care and certify a need for medically necessary rehabilitation services.      *PLEASE SIGN ABOVE AND FAX BACK ALL PAGES*

## 2021-07-23 ENCOUNTER — HOSPITAL ENCOUNTER (OUTPATIENT)
Dept: OCCUPATIONAL THERAPY | Age: 46
Setting detail: THERAPIES SERIES
Discharge: HOME | End: 2021-07-23
Payer: COMMERCIAL

## 2021-07-23 PROCEDURE — 97140 MANUAL THERAPY 1/> REGIONS: CPT

## 2021-07-23 PROCEDURE — 97035 APP MDLTY 1+ULTRASOUND EA 15: CPT

## 2021-07-23 NOTE — FLOWSHEET NOTE
[x] [facility]       Occupational Therapy            1st floor       [address], [place]         Phone: [phone]       Fax: [phone] [] [facility]  [address] [place] [address]  Phone: [phone]  Fax: [phone]      Occupational Therapy Daily Treatment Note    Date:  2021  Patient Name:  Melissa Harris    :  1975  MRN: [id number]  Referring Provider:  Abdiel Moise MD  Insurance: Other [employer] 60/60 visit per year hard max no Preauth required  Medical Diagnosis: S62.616A Closed displaced fracture of proximal phalanx of R little finger, initial encounter   Rehab Codes: pain in hand M79.646,, pain in wrist M25.53,, stiffness in hand M25.64,, stiffness in wrist M25.63,, numbness R20.2,, malaise R53.0, , lack of coordination R27.8,, fine motor skills loss R29.818,, muscle weakness generalized M62.81,, adherent scar L90.5, or edema localized R60.0,  Onset Date: May [address] 2021              Next Dr. Ravi Paci: 8-  Visit# / total visits: ; Progress note for Medicare patient due at visit 8-10    Cancels/No Shows: 0/1    Subjective:    Pain:  [x] Yes  [] No Location: \"the whole hand\" Pain Rating: (0-10 scale) 10/10 Declined ER  Pain altered Tx:  [x] No  [] Yes  Action:  Pt Comments: \" It always hurts\"     Objective:  Modalities: Modality Flow Sheet:  START STOP Tx Modality     Electrical Stim:     21  Ultrasound: _.8__ W/cm2 x __8_ mins  Duty factor: _x_100%  __50%  __33% __20%  Head size:    2_ cm  MHz: __1mHz  _x_3mHz  Location: dorsal side 5th MC     Hot Pack:     Cold Pack:     Precautions: NA  Exercises:  EXERCISE    REPS/     TIME  WEIGHT/    LEVEL COMMENTS   US   See above   PROM 23 mins  MCP, PIP, DIP                                 Other: Pt called due to being stuck by train this date. Pt arrived 30  mins late as a result. Pt tolerated US to dorsal side of R hand 5th MC bone/scar.  Pt tolerated PROM of R digits of the 4th and 5th for promotion of increased I with adls. Treatment Charges: Mins Units   []  Modalities:      [x]  Ultrasound 8 1   []  Ther Exercise     [x]  Manual Therapy 23 2   []  Ther Activities     []  Orthotic fit/train     []  Orthotic recheck     []  Other     Total Treatment time 31      Assessment: [x] Progressing toward goals. [] No change. [] Other    Assessment:  Patient would benefit from skilled occupational therapy services in order to: Improve and Promote  functional /grasp, ROM, Strength, Activity tolerance and Complaint of pain in order to Ensure safe return to work     Short Term Goals: (  12 Treatments)  1. Decrease Pain: to 7/10 with simple ROM for ADLS  2. Increase AROM (degrees) (extension/flexion)  a. R MCPs to 0/75  b. R PIPs to 0/80  c. R DIPs to 0/40  3. Increase strength (pounds);  a. R UE  strength to 20 pounds for increased function with ADLs  b. R Lateral pinch to 14 for increased I with self care tasks  c. R two point pinch to 10 for increased I with home care tasks  d. R three jaw miguel to 15 for increased I with all work tasks  4. Increase function:UE Functional Index Score 25 or more points to promote increased functional abilities  5. Scar will be soft and pliable with minimal tethering. 6. Decrease Edema: R P1 to 5.0 mm and R P2 4.2 mm  7. Demo knowledge of fall prevention in 3 sessions. 8. Patient to be independent with home exercise program as demonstrated by performance with correct form without cues.     Long Term Goals: (  24 Treatments)   9. Increase strength (pounds);  a. R UE  strength to 45 pounds for increased function with ADLs  b. R Lateral pinch to 16 for increased I with self care tasks  c. R two point pinch to 13 for increased I with home care tasks  d. R three jaw miguel to 17 for increased I with all work tasks  10. UEFI score of 50 or more for increased function  11.  Scar will be soft and supple with no scar adhesions     Patient Goals: to get back to work    Pt. Education:  [x] Yes  [] No  [x] Reviewed Prior HEP/Ed  Method of Education: [x] Verbal  [x] Demo  [] Written  Re:  Comprehension of Education:  [] Verbalizes understanding. [] Demonstrates understanding. [] Needs review. [x] Demonstrates/verbalizes HEP/Ed previously given. Plan: [] Continue current frequency toward short and long term goals.   [x] Specific Instructions for subsequent treatments: add in AROM and strengthening for future exercises   [] Other:       Time In:0830 Time Out: 0900       Electronically signed by:  EDMUND Murrieta/GISEL

## 2021-07-26 ENCOUNTER — HOSPITAL ENCOUNTER (OUTPATIENT)
Dept: OCCUPATIONAL THERAPY | Age: 46
Setting detail: THERAPIES SERIES
Discharge: HOME | End: 2021-07-26
Payer: COMMERCIAL

## 2021-07-26 PROCEDURE — 97110 THERAPEUTIC EXERCISES: CPT

## 2021-07-26 PROCEDURE — 97140 MANUAL THERAPY 1/> REGIONS: CPT

## 2021-07-26 PROCEDURE — 97035 APP MDLTY 1+ULTRASOUND EA 15: CPT

## 2021-07-26 NOTE — FLOWSHEET NOTE
[x] [facility]       Occupational Therapy            1st floor       [address] [facility]         Phone: [phone]       Fax: [phone] [] [facility]  [address]   [facility]  Phone: [phone]  Fax: [phone]      Occupational Therapy Daily Treatment Note    Date:  2021  Patient Name:  Sharon Davis    :  1975  MRN: [id number]  Referring Provider:  Tha Castellanos MD  Insurance: Other [employer] 60/60 visit per year hard max no Preauth required  Medical Diagnosis: S62.616A Closed displaced fracture of proximal phalanx of R little finger, initial encounter   Rehab Codes: pain in hand M79.646,, pain in wrist M25.53,, stiffness in hand M25.64,, stiffness in wrist M25.63,, numbness R20.2,, malaise R53.0, , lack of coordination R27.8,, fine motor skills loss R29.818,, muscle weakness generalized M62.81,, adherent scar L90.5, or edema localized R60.0,  Onset Date: May 23 [address] 2021              Next Dr. Abdifatah Lewiser: 8-  Visit# / total visits: 3/24; Progress note for Medicare patient due at visit 8-10    Cancels/No Shows: 0/1    Subjective:    Pain:  [x] Yes  [] No Location: \"the whole hand\" Pain Rating: (0-10 scale) 10/10   Pain altered Tx:  [x] No  [] Yes  Action:  Pt Comments: \" It hurts like F**! \"     Objective:  Modalities: Modality Flow Sheet:  START STOP Tx Modality     Electrical Stim:     21  Ultrasound: _.8__ W/cm2 x __8_ mins  Duty factor: _x_100%  __50%  __33% __20%  Head size:    2_ cm  MHz: __1mHz  _x_3mHz  Location: dorsal side 5th MC     Hot Pack:     Cold Pack:     Precautions: NA  Exercises:  EXERCISE    REPS/     TIME  WEIGHT/    LEVEL COMMENTS   US   See above   PROM 23 mins  MCP, PIP, DIP   Towel Crunches 10  completed   Foam block yellow soft Added this date   Theraputty   Strength  Finger flexion  Finger extension  Radial Dev  Ulnar Dev  Palmar Pinch  Lateral Pinch  Thumb ext  Three jaw miguel  Finger thumb ext       Added this date, not all completed  Plan to issue putty once all exercises are reviewed with pt. Other:   Pt tolerated US to dorsal side of R hand 5th MC bone/scar. Pt tolerated PROM of R digits of the 4th and 5th for promotion of increased I with adls. Pt reports getting physically ill from the trade off of motrin and tylenol (as per doc recommendations). Pt advised to discuss with doctor. Treatment Charges: Mins Units   []  Modalities:      [x]  Ultrasound 8 1   []  Ther Exercise     [x]  Manual Therapy 23 2   [x]  Ther Activities 20 1   []  Orthotic fit/train     []  Orthotic recheck     []  Other     Total Treatment time 51      Assessment: [x] Progressing toward goals. [] No change. [] Other    Assessment:  Patient would benefit from skilled occupational therapy services in order to: Improve and Promote  functional /grasp, ROM, Strength, Activity tolerance and Complaint of pain in order to Ensure safe return to work     Short Term Goals: (  12 Treatments)  1. Decrease Pain: to 7/10 with simple ROM for ADLS  2. Increase AROM (degrees) (extension/flexion)  a. R MCPs to 0/75  b. R PIPs to 0/80  c. R DIPs to 0/40  3. Increase strength (pounds);  a. R UE  strength to 20 pounds for increased function with ADLs  b. R Lateral pinch to 14 for increased I with self care tasks  c. R two point pinch to 10 for increased I with home care tasks  d. R three jaw miguel to 15 for increased I with all work tasks  4. Increase function:UE Functional Index Score 25 or more points to promote increased functional abilities  5. Scar will be soft and pliable with minimal tethering. 6. Decrease Edema: R P1 to 5.0 mm and R P2 4.2 mm  7. Demo knowledge of fall prevention in 3 sessions. 8. Patient to be independent with home exercise program as demonstrated by performance with correct form without cues.     Long Term Goals: (  24 Treatments)   9.  Increase strength (pounds);  a. R UE  strength to 45 pounds for increased function with ADLs  b. R Lateral pinch to 16 for increased I with self care tasks  c. R two point pinch to 13 for increased I with home care tasks  d. R three jaw miguel to 17 for increased I with all work tasks  10. UEFI score of 50 or more for increased function  11. Scar will be soft and supple with no scar adhesions     Patient Goals: to get back to work    Pt. Education:  [x] Yes  [] No  [x] Reviewed Prior HEP/Ed  Method of Education: [x] Verbal  [x] Demo  [] Written  Re:  Comprehension of Education:  [] Verbalizes understanding. [] Demonstrates understanding. [] Needs review. [x] Demonstrates/verbalizes HEP/Ed previously given. Plan: [] Continue current frequency toward short and long term goals.   [x] Specific Instructions for subsequent treatments: add in AROM and strengthening for future exercises   [] Other:       Time In:0700 Time Out: 0800       Electronically signed by:  EDMUND Longo/GISEL

## 2021-07-28 ENCOUNTER — HOSPITAL ENCOUNTER (OUTPATIENT)
Dept: OCCUPATIONAL THERAPY | Age: 46
Setting detail: THERAPIES SERIES
Discharge: HOME | End: 2021-07-28
Payer: COMMERCIAL

## 2021-07-28 PROCEDURE — 97140 MANUAL THERAPY 1/> REGIONS: CPT

## 2021-07-28 PROCEDURE — 97035 APP MDLTY 1+ULTRASOUND EA 15: CPT

## 2021-07-28 NOTE — FLOWSHEET NOTE
ext       Added this date, not all completed  Plan to issue putty once all exercises are reviewed with pt. Other:   Pt tolerated US to dorsal side of R hand 5th MC bone/scar. Pt tolerated PROM of R digits of the 4th and 5th for promotion of increased I with adls. Pt continues to report getting physically ill from the trade off of motrin and tylenol (as per doc recommendations). Pt advised to discuss with doctor. Pt demo min improvement with AROM for towel crunches. Treatment Charges: Mins Units   []  Modalities:      [x]  Ultrasound 8 1   []  Ther Exercise     [x]  Manual Therapy 40 2   []  Ther Activities     []  Orthotic fit/train     []  Orthotic recheck     []  Other     Total Treatment time 48      Assessment: [x] Progressing toward goals. [] No change. [] Other    Assessment:  Patient would benefit from skilled occupational therapy services in order to: Improve and Promote  functional /grasp, ROM, Strength, Activity tolerance and Complaint of pain in order to Ensure safe return to work     Short Term Goals: (  12 Treatments)  1. Decrease Pain: to 7/10 with simple ROM for ADLS  2. Increase AROM (degrees) (extension/flexion)  a. R MCPs to 0/75  b. R PIPs to 0/80  c. R DIPs to 0/40  3. Increase strength (pounds);  a. R UE  strength to 20 pounds for increased function with ADLs  b. R Lateral pinch to 14 for increased I with self care tasks  c. R two point pinch to 10 for increased I with home care tasks  d. R three jaw miguel to 15 for increased I with all work tasks  4. Increase function:UE Functional Index Score 25 or more points to promote increased functional abilities  5. Scar will be soft and pliable with minimal tethering. 6. Decrease Edema: R P1 to 5.0 mm and R P2 4.2 mm  7. Demo knowledge of fall prevention in 3 sessions.   8. Patient to be independent with home exercise program as demonstrated by performance with correct form without cues.     Long Term Goals: (  24 Treatments)   9. Increase strength (pounds);  a. R UE  strength to 45 pounds for increased function with ADLs  b. R Lateral pinch to 16 for increased I with self care tasks  c. R two point pinch to 13 for increased I with home care tasks  d. R three jaw miguel to 17 for increased I with all work tasks  10. UEFI score of 50 or more for increased function  11. Scar will be soft and supple with no scar adhesions     Patient Goals: to get back to work    Pt. Education:  [x] Yes  [] No  [x] Reviewed Prior HEP/Ed  Method of Education: [x] Verbal  [x] Demo  [] Written  Re:  Comprehension of Education:  [] Verbalizes understanding. [] Demonstrates understanding. [] Needs review. [x] Demonstrates/verbalizes HEP/Ed previously given. Plan: [] Continue current frequency toward short and long term goals.   [x] Specific Instructions for subsequent treatments: add in AROM and strengthening for future exercises   [] Other:       Time In:0800 Time Out: 0900       Electronically signed by:  EDMUND Simpson/GISEL

## 2021-07-30 ENCOUNTER — HOSPITAL ENCOUNTER (OUTPATIENT)
Dept: OCCUPATIONAL THERAPY | Age: 46
Setting detail: THERAPIES SERIES
Discharge: HOME | End: 2021-07-30
Payer: COMMERCIAL

## 2021-07-30 PROCEDURE — 97140 MANUAL THERAPY 1/> REGIONS: CPT

## 2021-07-30 PROCEDURE — 97110 THERAPEUTIC EXERCISES: CPT

## 2021-07-30 PROCEDURE — 97035 APP MDLTY 1+ULTRASOUND EA 15: CPT

## 2021-07-30 NOTE — FLOWSHEET NOTE
[x] John Martin       Occupational Therapy            1st floor       [address], [place]         Phone: [phone]       Fax: [phone] [] [facility]  [address] [place] [address]  Phone: [phone]  Fax: [phone]      Occupational Therapy Daily Treatment Note    Date:  2021  Patient Name:  Ayana Lynne    :  1975  MRN: [id number]  Referring Provider:  Ingrid Early MD  Insurance: Other [employer] 60/60 visit per year hard max no Preauth required  Medical Diagnosis: S62.616A Closed displaced fracture of proximal phalanx of R little finger, initial encounter   Rehab Codes: pain in hand M79.646,, pain in wrist M25.53,, stiffness in hand M25.64,, stiffness in wrist M25.63,, numbness R20.2,, malaise R53.0, , lack of coordination R27.8,, fine motor skills loss R29.818,, muscle weakness generalized M62.81,, adherent scar L90.5, or edema localized R60.0,  Onset Date: May [address] 2021              Next Dr. Rodriguez Plant: 8-  Visit# / total visits: ; Progress note for Medicare patient due at visit 8-10    Cancels/No Shows: 0/1    Subjective:    Pain:  [x] Yes  [] No Location: \"the whole hand\" Pain Rating: (0-10 scale) 10/10   Pain altered Tx:  [x] No  [] Yes  Action:  Pt Comments: \" It keeps me awake at night\"     Objective:  Modalities: Modality Flow Sheet:  START STOP Tx Modality     Electrical Stim:     21  Ultrasound: _.8__ W/cm2 x __8_ mins  Duty factor: _x_100%  __50%  __33% __20%  Head size:    2_ cm  MHz: __1mHz  _x_3mHz  Location: dorsal side 5th MC     Hot Pack:     Cold Pack:     Precautions: NA  Exercises:  EXERCISE    REPS/     TIME  WEIGHT/    LEVEL COMMENTS   US   See above   PROM 23 mins  MCP, PIP, DIP   Towel Crunches 10  completed   Foam block yellow soft HEP   Theraputty   Strength  Finger flexion  Finger extension  Radial Dev  Ulnar Dev  Palmar Pinch  Lateral Pinch  Thumb ext  Three jaw miguel  Finger thumb ext       not all completed  As time was short due to manual therapy this date               Other:   Pt tolerated US to dorsal side of R hand 5th MC bone/scar. Pt tolerated PROM of R digits of the 4th and 5th for promotion of increased I with adls. Pt reports doctor appt has been moved to a closer date and medications to contorl pain are to be discussed. Pt has demo min improvement with AROM at MCP of R little digit for towel crunches. Treatment Charges: Mins Units   []  Modalities:      [x]  Ultrasound 8 1   []  Ther Exercise     [x]  Manual Therapy 50 3   []  Ther Activities     []  Orthotic fit/train     []  Orthotic recheck     []  Other     Total Treatment time 58      Assessment: [x] Progressing toward goals. [] No change. [] Other    Assessment:  Patient would benefit from skilled occupational therapy services in order to: Improve and Promote  functional /grasp, ROM, Strength, Activity tolerance and Complaint of pain in order to Ensure safe return to work     Short Term Goals: (  12 Treatments)  1. Decrease Pain: to 7/10 with simple ROM for ADLS  2. Increase AROM (degrees) (extension/flexion)  a. R MCPs to 0/75  b. R PIPs to 0/80  c. R DIPs to 0/40  3. Increase strength (pounds);  a. R UE  strength to 20 pounds for increased function with ADLs  b. R Lateral pinch to 14 for increased I with self care tasks  c. R two point pinch to 10 for increased I with home care tasks  d. R three jaw miguel to 15 for increased I with all work tasks  4. Increase function:UE Functional Index Score 25 or more points to promote increased functional abilities  5. Scar will be soft and pliable with minimal tethering. 6. Decrease Edema: R P1 to 5.0 mm and R P2 4.2 mm  7. Demo knowledge of fall prevention in 3 sessions. 8. Patient to be independent with home exercise program as demonstrated by performance with correct form without cues.     Long Term Goals: (  24 Treatments)   9.  Increase strength (pounds);  a. R

## 2021-08-02 ENCOUNTER — HOSPITAL ENCOUNTER (OUTPATIENT)
Dept: OCCUPATIONAL THERAPY | Age: 46
Setting detail: THERAPIES SERIES
Discharge: HOME | End: 2021-08-02
Payer: COMMERCIAL

## 2021-08-02 PROCEDURE — 97035 APP MDLTY 1+ULTRASOUND EA 15: CPT

## 2021-08-02 PROCEDURE — 97140 MANUAL THERAPY 1/> REGIONS: CPT

## 2021-08-02 NOTE — FLOWSHEET NOTE
[x] John Martin       Occupational Therapy            1st floor       [address], [place]         Phone: [phone]       Fax: [phone] [] [facility]  [address] [place] [address]  Phone: [phone]  Fax: [phone]      Occupational Therapy Daily Treatment Note    Date:  2021  Patient Name:  Anh Nicholas    :  1975  MRN: [id number]  Referring Provider:  Adama Hoang MD  Insurance: Other [employer] 60/60 visit per year hard max no Preauth required  Medical Diagnosis: S62.616A Closed displaced fracture of proximal phalanx of R little finger, initial encounter   Rehab Codes: pain in hand M79.646,, pain in wrist M25.53,, stiffness in hand M25.64,, stiffness in wrist M25.63,, numbness R20.2,, malaise R53.0, , lack of coordination R27.8,, fine motor skills loss R29.818,, muscle weakness generalized M62.81,, adherent scar L90.5, or edema localized R60.0,  Onset Date: May [address] 2021              Next Dr. Zavala Drillin-  Visit# / total visits: ; Progress note for Medicare patient due at visit 8-10    Cancels/No Shows: 0/1    Subjective:    Pain:  [x] Yes  [] No Location: \"the whole hand\" Pain Rating: (0-10 scale) 10/10   Pain altered Tx:  [x] No  [] Yes  Action:  Pt Comments: \"I don't want to sit in the ER for hours, I want to go outside today\"     Objective:  Modalities: Modality Flow Sheet:  START STOP Tx Modality     Electrical Stim:     21  Ultrasound: _.8__ W/cm2 x __8_ mins  Duty factor: _x_100%  __50%  __33% __20%  Head size:    2_ cm  MHz: __1mHz  _x_3mHz  Location: dorsal side 5th MC     Hot Pack:     Cold Pack:     Precautions: NA  Exercises:  EXERCISE    REPS/     TIME  WEIGHT/    LEVEL COMMENTS   US   See above   PROM 23 mins  MCP, PIP, DIP   Towel Crunches 10  completed   Foam block yellow soft HEP   Theraputty   Strength  Finger flexion  Finger extension  Radial Dev  Ulnar Dev  Palmar Pinch  Lateral

## 2021-08-04 ENCOUNTER — HOSPITAL ENCOUNTER (OUTPATIENT)
Dept: OCCUPATIONAL THERAPY | Age: 46
Setting detail: THERAPIES SERIES
Discharge: HOME | End: 2021-08-04
Payer: COMMERCIAL

## 2021-08-04 PROCEDURE — 97110 THERAPEUTIC EXERCISES: CPT

## 2021-08-04 PROCEDURE — 97140 MANUAL THERAPY 1/> REGIONS: CPT

## 2021-08-04 PROCEDURE — 97035 APP MDLTY 1+ULTRASOUND EA 15: CPT

## 2021-08-04 NOTE — FLOWSHEET NOTE
[x] John Martin       Occupational Therapy            1st floor       [address], [place]         Phone: [phone]       Fax: [phone] [] [facility]  [address] [place] [address]  Phone: [phone]  Fax: [phone]      Occupational Therapy Daily Treatment Note    Date:  2021  Patient Name:  Natacha Ferrera    :  1975  MRN: [id number]  Referring Provider:  Erin Nguyen MD  Insurance: Other [employer] 60/60 visit per year hard max no Preauth required  Medical Diagnosis: S62.616A Closed displaced fracture of proximal phalanx of R little finger, initial encounter   Rehab Codes: pain in hand M79.646,, pain in wrist M25.53,, stiffness in hand M25.64,, stiffness in wrist M25.63,, numbness R20.2,, malaise R53.0, , lack of coordination R27.8,, fine motor skills loss R29.818,, muscle weakness generalized M62.81,, adherent scar L90.5, or edema localized R60.0,  Onset Date: May [address] 2021              Next Dr. Radha Goldstein: 8-  Visit# / total visits: ; Progress note for Medicare patient due at visit 8-10    Cancels/No Shows: 0/1    Subjective:    Pain:  [x] Yes  [] No Location: \"the whole hand\" Pain Rating: (0-10 scale) 10/10   Pain altered Tx:  [x] No  [] Yes  Action:  Pt Comments: \"I am so unhappy about my appointment with the doctor yesterday\"     Objective:  Modalities: Modality Flow Sheet:  START STOP Tx Modality     Electrical Stim:     21  Ultrasound: _.8__ W/cm2 x __8_ mins  Duty factor: _x_100%  __50%  __33% __20%  Head size:    2_ cm  MHz: __1mHz  _x_3mHz  Location: dorsal side 5th MC     Hot Pack:     Cold Pack:     Precautions: NA  Exercises:  EXERCISE    REPS/     TIME  WEIGHT/    LEVEL COMMENTS   US   See above   PROM 23 mins  MCP, PIP, DIP   Towel Crunches 10  completed   Foam block yellow soft HEP   Theraputty   Strength  Finger flexion  Finger extension  Radial Dev  Ulnar Dev  Palmar Pinch  Lateral Pinch  Thumb ext  Three jaw miguel  Finger thumb ext       not all completed  As time was short due to manual therapy this date   Wooden pegs with velcro 1 series  Used thumb, ring and small digit to pull up NOT completed   Pinch pins with foam blocks 1/2 series Yellow ( 1 pound) Not completed    Active blocked finger exercises 10  Added and completed   Other:   Pt tolerated US to dorsal side of R hand 5th MC bone/scar. Pt tolerated PROM of R digits of the 4th and 5th for promotion of increased I with adls. Pt reports doctor appt \"went terrible and I cried the whole day\". Pt reports being worried about losing employment as a result of the injury. Treatment Charges: Mins Units   []  Modalities:      [x]  Ultrasound 8 1   [x]  Ther Exercise 4 0   [x]  Manual Therapy 45 3   []  Ther Activities     []  Orthotic fit/train     []  Orthotic recheck     []  Other     Total Treatment time 57      Assessment: [x] Progressing toward goals. [] No change. [] Other    Assessment:  Patient would benefit from skilled occupational therapy services in order to: Improve and Promote  functional /grasp, ROM, Strength, Activity tolerance and Complaint of pain in order to Ensure safe return to work     Short Term Goals: (  12 Treatments)  1. Decrease Pain: to 7/10 with simple ROM for ADLS  2. Increase AROM (degrees) (extension/flexion)  a. R MCPs to 0/75  b. R PIPs to 0/80  c. R DIPs to 0/40  3. Increase strength (pounds);  a. R UE  strength to 20 pounds for increased function with ADLs  b. R Lateral pinch to 14 for increased I with self care tasks  c. R two point pinch to 10 for increased I with home care tasks  d. R three jaw miguel to 15 for increased I with all work tasks  4. Increase function:UE Functional Index Score 25 or more points to promote increased functional abilities  5. Scar will be soft and pliable with minimal tethering. 6. Decrease Edema: R P1 to 5.0 mm and R P2 4.2 mm  7.  Demo knowledge of fall prevention in 3 sessions. 8. Patient to be independent with home exercise program as demonstrated by performance with correct form without cues.     Long Term Goals: (  24 Treatments)   9. Increase strength (pounds);  a. R UE  strength to 45 pounds for increased function with ADLs  b. R Lateral pinch to 16 for increased I with self care tasks  c. R two point pinch to 13 for increased I with home care tasks  d. R three jaw miguel to 17 for increased I with all work tasks  10. UEFI score of 50 or more for increased function  11. Scar will be soft and supple with no scar adhesions     Patient Goals: to get back to work    Pt. Education:  [x] Yes  [] No  [x] Reviewed Prior HEP/Ed  Method of Education: [x] Verbal  [x] Demo  [] Written  Re:  Comprehension of Education:  [] Verbalizes understanding. [] Demonstrates understanding. [] Needs review. [x] Demonstrates/verbalizes HEP/Ed previously given. Plan: [] Continue current frequency toward short and long term goals.   [x] Specific Instructions for subsequent treatments: add in additional AROM and strengthening for future exercises   [] Other:       Time In:0700 Time Out: 0800       Electronically signed by:  EDMUND Dixon/GISEL

## 2021-08-06 ENCOUNTER — HOSPITAL ENCOUNTER (OUTPATIENT)
Dept: OCCUPATIONAL THERAPY | Age: 46
Setting detail: THERAPIES SERIES
Discharge: HOME | End: 2021-08-06
Payer: COMMERCIAL

## 2021-08-06 PROCEDURE — 97140 MANUAL THERAPY 1/> REGIONS: CPT

## 2021-08-06 PROCEDURE — 97035 APP MDLTY 1+ULTRASOUND EA 15: CPT

## 2021-08-06 NOTE — FLOWSHEET NOTE
[x] John Martin       Occupational Therapy            1st floor       [address], [place]         Phone: [phone]       Fax: [phone] [] [facility]  [address], [place]  Phone: [phone]  Fax: [phone]      Occupational Therapy Daily Treatment Note    Date:  2021  Patient Name:  Candi Marrero    :  1975  MRN: [id number]  Referring Provider:  Mario Shearer MD  Insurance: Other [employer] 60/60 visit per year hard max no Preauth required  Medical Diagnosis: S62.616A Closed displaced fracture of proximal phalanx of R little finger, initial encounter   Rehab Codes: pain in hand M79.646,, pain in wrist M25.53,, stiffness in hand M25.64,, stiffness in wrist M25.63,, numbness R20.2,, malaise R53.0, , lack of coordination R27.8,, fine motor skills loss R29.818,, muscle weakness generalized M62.81,, adherent scar L90.5, or edema localized R60.0,  Onset Date: May [address] 2021              Next Dr. Nikkie Fortune: 8-  Visit# / total visits: ; Progress note for Medicare patient due at visit 8-10    Cancels/No Shows: 0/1    Subjective:    Pain:  [x] Yes  [] No Location: \"the whole hand\" Pain Rating: (0-10 scale) 10/10   Pain altered Tx:  [x] No  [] Yes  Action:  Pt Comments: \"I am concerned about the paperwork, if it is not turned in I will have to pay back my disability pay\"     Objective:  Modalities: Modality Flow Sheet:  START STOP Tx Modality     Electrical Stim:     21  Ultrasound: _.8__ W/cm2 x __8_ mins  Duty factor: _x_100%  __50%  __33% __20%  Head size:    2_ cm  MHz: __1mHz  _x_3mHz  Location: dorsal side 5th MC     Hot Pack:     Cold Pack:     Precautions: NA  Exercises:  EXERCISE    REPS/     TIME  WEIGHT/    LEVEL COMMENTS   US   See above   PROM 23 mins  MCP, PIP, DIP   Towel Crunches 10  Not completed   Foam block yellow soft HEP   Theraputty   Strength  Finger flexion  Finger extension  Radial Dev  Ulnar Dev  Palmar Pinch  Lateral Pinch  Thumb ext  Three jaw miguel  Finger thumb ext       not all completed  As time was short due to manual therapy this date   Wooden pegs with velcro 1 series  Used thumb, ring and small digit to pull up NOT completed   Pinch pins with foam blocks 1/2 series Yellow ( 1 pound) Not completed    Active blocked finger exercises 10  NOT  completed   Other:   Pt tolerated US to dorsal side of R hand 5th MC bone/scar. Pt tolerated PROM of R digits of the 4th and 5th for promotion of increased I with adls. Pt reports being worried about losing disability as a result of the paperwork that has not been submitted. Treatment Charges: Mins Units   []  Modalities:      [x]  Ultrasound 8 1   []  Ther Exercise     [x]  Manual Therapy 50 3   []  Ther Activities     []  Orthotic fit/train     []  Orthotic recheck     []  Other     Total Treatment time 58      Assessment: [x] Progressing toward goals. [] No change. [] Other    Assessment:  Patient would benefit from skilled occupational therapy services in order to: Improve and Promote  functional /grasp, ROM, Strength, Activity tolerance and Complaint of pain in order to Ensure safe return to work     Short Term Goals: (  12 Treatments)  1. Decrease Pain: to 7/10 with simple ROM for ADLS  2. Increase AROM (degrees) (extension/flexion)  a. R MCPs to 0/75  b. R PIPs to 0/80  c. R DIPs to 0/40  3. Increase strength (pounds);  a. R UE  strength to 20 pounds for increased function with ADLs  b. R Lateral pinch to 14 for increased I with self care tasks  c. R two point pinch to 10 for increased I with home care tasks  d. R three jaw miguel to 15 for increased I with all work tasks  4. Increase function:UE Functional Index Score 25 or more points to promote increased functional abilities  5. Scar will be soft and pliable with minimal tethering. 6. Decrease Edema: R P1 to 5.0 mm and R P2 4.2 mm  7.  Demo knowledge of fall prevention in 3 sessions. 8. Patient to be independent with home exercise program as demonstrated by performance with correct form without cues.     Long Term Goals: (  24 Treatments)   9. Increase strength (pounds);  a. R UE  strength to 45 pounds for increased function with ADLs  b. R Lateral pinch to 16 for increased I with self care tasks  c. R two point pinch to 13 for increased I with home care tasks  d. R three jaw miguel to 17 for increased I with all work tasks  10. UEFI score of 50 or more for increased function  11. Scar will be soft and supple with no scar adhesions     Patient Goals: to get back to work    Pt. Education:  [x] Yes  [] No  [x] Reviewed Prior HEP/Ed  Method of Education: [x] Verbal  [x] Demo  [] Written  Re:  Comprehension of Education:  [] Verbalizes understanding. [] Demonstrates understanding. [] Needs review. [x] Demonstrates/verbalizes HEP/Ed previously given. Plan: [] Continue current frequency toward short and long term goals.   [x] Specific Instructions for subsequent treatments: add in additional AROM and strengthening for future exercises   [] Other:       Time In:0700 Time Out: 0800       Electronically signed by:  Sim Schlatter, OTR/GISEL

## 2021-08-09 ENCOUNTER — HOSPITAL ENCOUNTER (OUTPATIENT)
Dept: OCCUPATIONAL THERAPY | Age: 46
Setting detail: THERAPIES SERIES
Discharge: HOME | End: 2021-08-09
Payer: COMMERCIAL

## 2021-08-09 PROCEDURE — 97140 MANUAL THERAPY 1/> REGIONS: CPT

## 2021-08-09 PROCEDURE — 97035 APP MDLTY 1+ULTRASOUND EA 15: CPT

## 2021-08-09 NOTE — FLOWSHEET NOTE
miguel  Finger thumb ext       completed  As time was short due to manual therapy this date   Wooden pegs with velcro 1 series  Used thumb, ring and small digit to pull up NOT completed   Pinch pins with foam blocks 1/2 series Yellow ( 1 pound) Not completed    Active blocked finger exercises 10  NOT  completed   Other:   Pt tolerated US to dorsal side of R hand 5th MC bone/scar. Pt tolerated PROM of R digits of the 4th and 5th for promotion of increased I with adls. Pt reports not understanding why the hand is so painful. Questionable adherence with HEP due to difficulty with following the putty exercises. Treatment Charges: Mins Units   []  Modalities:      [x]  Ultrasound 8 1   [x]  Ther Exercise 7 0   [x]  Manual Therapy 47 3   []  Ther Activities     []  Orthotic fit/train     []  Orthotic recheck     []  Other     Total Treatment time 58      Assessment: [x] Progressing toward goals. [x] No change. [] Other    Assessment:  Patient would benefit from skilled occupational therapy services in order to: Improve and Promote  functional /grasp, ROM, Strength, Activity tolerance and Complaint of pain in order to Ensure safe return to work     Short Term Goals: (  12 Treatments)  1. Decrease Pain: to 7/10 with simple ROM for ADLS  2. Increase AROM (degrees) (extension/flexion)  a. R MCPs to 0/75  b. R PIPs to 0/80  c. R DIPs to 0/40  3. Increase strength (pounds);  a. R UE  strength to 20 pounds for increased function with ADLs  b. R Lateral pinch to 14 for increased I with self care tasks  c. R two point pinch to 10 for increased I with home care tasks  d. R three jaw miguel to 15 for increased I with all work tasks  4. Increase function:UE Functional Index Score 25 or more points to promote increased functional abilities  5. Scar will be soft and pliable with minimal tethering. 6. Decrease Edema: R P1 to 5.0 mm and R P2 4.2 mm  7. Demo knowledge of fall prevention in 3 sessions.   8. Patient to be independent with home exercise program as demonstrated by performance with correct form without cues.     Long Term Goals: (  24 Treatments)   9. Increase strength (pounds);  a. R UE  strength to 45 pounds for increased function with ADLs  b. R Lateral pinch to 16 for increased I with self care tasks  c. R two point pinch to 13 for increased I with home care tasks  d. R three jaw miguel to 17 for increased I with all work tasks  10. UEFI score of 50 or more for increased function  11. Scar will be soft and supple with no scar adhesions     Patient Goals: to get back to work    Pt. Education:  [x] Yes  [] No  [x] Reviewed Prior HEP/Ed  Method of Education: [x] Verbal  [x] Demo  [] Written  Re:  Comprehension of Education:  [] Verbalizes understanding. [] Demonstrates understanding. [] Needs review. [x] Demonstrates/verbalizes HEP/Ed previously given. Plan: [] Continue current frequency toward short and long term goals.   [x] Specific Instructions for subsequent treatments: add in additional AROM and strengthening for future exercises   [] Other:       Time In:0700 Time Out: 0800       Electronically signed by:  Sim Schlatter, OTR/GISEL

## 2021-08-11 ENCOUNTER — HOSPITAL ENCOUNTER (OUTPATIENT)
Dept: OCCUPATIONAL THERAPY | Age: 46
Setting detail: THERAPIES SERIES
Discharge: HOME | End: 2021-08-11
Payer: COMMERCIAL

## 2021-08-11 PROCEDURE — 97140 MANUAL THERAPY 1/> REGIONS: CPT

## 2021-08-11 PROCEDURE — 97110 THERAPEUTIC EXERCISES: CPT

## 2021-08-11 NOTE — FLOWSHEET NOTE
[x] John Martin       Occupational Therapy            1st floor       [address], [place]         Phone: [phone]       Fax: [phone] [] [facility]  [address], [place]  Phone: [phone]  Fax: [phone]      Occupational Therapy Daily Treatment Note    Date:  2021  Patient Name:  Candi Marrero    :  1975  MRN: [id number]  Referring Provider:  Mario Shearer MD  Insurance: Other [employer] 60/60 visit per year hard max no Preauth required  Medical Diagnosis: S62.616A Closed displaced fracture of proximal phalanx of R little finger, initial encounter   Rehab Codes: pain in hand M79.646,, pain in wrist M25.53,, stiffness in hand M25.64,, stiffness in wrist M25.63,, numbness R20.2,, malaise R53.0, , lack of coordination R27.8,, fine motor skills loss R29.818,, muscle weakness generalized M62.81,, adherent scar L90.5, or edema localized R60.0,  Onset Date: May [address] 2021              Next Dr. Nikkie Fortune: 8-  Visit# / total visits: ; Progress note for Medicare patient due at visit 8-10    Cancels/No Shows: 0/1    Subjective:    Pain:  [x] Yes  [] No Location: \"the whole hand\" Pain Rating: (0-10 scale) 10/10   Pain altered Tx:  [x] No  [] Yes  Action:  Pt Comments: \"I don't understand how it can be this painful\"     Objective:  Modalities: Modality Flow Sheet:  START STOP Tx Modality     Electrical Stim:     21  Ultrasound: _.8__ W/cm2 x __8_ mins  Duty factor: _x_100%  __50%  __33% __20%  Head size:    2_ cm  MHz: __1mHz  _x_3mHz  Location: dorsal side 5th MC     Hot Pack:     Cold Pack:     Precautions: NA  Exercises:  EXERCISE    REPS/     TIME  WEIGHT/    LEVEL COMMENTS   US   See above   PROM 32 mins  MCP, PIP, DIP   Towel Crunches 10  completed   Foam block yellow soft HEP   Theraputty   Strength  Finger flexion  Finger extension  Radial Dev  Ulnar Dev  Palmar Pinch  Lateral Pinch  Thumb ext  Three jaw miguel  Finger thumb ext       Not completed  As time was short due to manual therapy this date   Wooden pegs with velcro 1 series  Used thumb, ring and small digit to pull up completed   Pinch pins with foam blocks 1/2 series Yellow ( 1 pound)  completed    Active blocked finger exercises 10   completed   Other:   Pt tolerated US to dorsal side of R hand 5th MC bone/scar. Pt tolerated PROM of R digits of the 4th and 5th for promotion of increased I with adls. Pt reports not understanding why the hand is so painful. Treatment Charges: Mins Units   []  Modalities:      [x]  Ultrasound 8    [x]  Ther Exercise 17 2   [x]  Manual Therapy 32 2   []  Ther Activities     []  Orthotic fit/train     []  Orthotic recheck     []  Other     Total Treatment time 58      Assessment: [x] Progressing toward goals. [x] No change. [] Other    Assessment:  Patient would benefit from skilled occupational therapy services in order to: Improve and Promote  functional /grasp, ROM, Strength, Activity tolerance and Complaint of pain in order to Ensure safe return to work     Short Term Goals: (  12 Treatments)  1. Decrease Pain: to 7/10 with simple ROM for ADLS  2. Increase AROM (degrees) (extension/flexion)  a. R MCPs to 0/75  b. R PIPs to 0/80  c. R DIPs to 0/40  3. Increase strength (pounds);  a. R UE  strength to 20 pounds for increased function with ADLs  b. R Lateral pinch to 14 for increased I with self care tasks  c. R two point pinch to 10 for increased I with home care tasks  d. R three jaw miguel to 15 for increased I with all work tasks  4. Increase function:UE Functional Index Score 25 or more points to promote increased functional abilities  5. Scar will be soft and pliable with minimal tethering. 6. Decrease Edema: R P1 to 5.0 mm and R P2 4.2 mm  7. Demo knowledge of fall prevention in 3 sessions.   8. Patient to be independent with home exercise program as demonstrated by performance with correct form without cues.     Long Term Goals: (  24 Treatments)   9. Increase strength (pounds);  a. R UE  strength to 45 pounds for increased function with ADLs  b. R Lateral pinch to 16 for increased I with self care tasks  c. R two point pinch to 13 for increased I with home care tasks  d. R three jaw miguel to 17 for increased I with all work tasks  10. UEFI score of 50 or more for increased function  11. Scar will be soft and supple with no scar adhesions     Patient Goals: to get back to work    Pt. Education:  [x] Yes  [] No  [x] Reviewed Prior HEP/Ed  Method of Education: [x] Verbal  [x] Demo  [] Written  Re:  Comprehension of Education:  [] Verbalizes understanding. [] Demonstrates understanding. [] Needs review. [x] Demonstrates/verbalizes HEP/Ed previously given. Plan: [] Continue current frequency toward short and long term goals.   [x] Specific Instructions for subsequent treatments: add in additional AROM and strengthening for future exercises   [] Other:       Time In:0700 Time Out: 0800       Electronically signed by:  EDMUND Longo/GISEL

## 2021-08-13 ENCOUNTER — HOSPITAL ENCOUNTER (OUTPATIENT)
Dept: OCCUPATIONAL THERAPY | Age: 46
Setting detail: THERAPIES SERIES
Discharge: HOME | End: 2021-08-13
Payer: COMMERCIAL

## 2021-08-13 PROCEDURE — 97140 MANUAL THERAPY 1/> REGIONS: CPT

## 2021-08-13 PROCEDURE — 97110 THERAPEUTIC EXERCISES: CPT

## 2021-08-13 NOTE — PROGRESS NOTES
[x] [facility]  Outpatient Rehabilitation &  Therapy  [address]  [phone]  F: [phone] [] [facility]  [address] [place] [address]  Phone: [phone]  Fax: [phone]        Occupational Therapy Progress Note    Date: 2021      Patient: Denisha Ladd  : 1975  MRN: [id number]    Referring Manuel Beckham MD  Insurance: Other [employer] 60/60 visit per year hard max no Preauth required  Medical Diagnosis: S62.616A Closed displaced fracture of proximal phalanx of R little finger, initial encounter   Rehab Codes: pain in hand M79.646,, pain in wrist M25.53,, stiffness in hand M25.64,, stiffness in wrist M25.63,, numbness R20.2,, malaise R53.0, , lack of coordination R27.8,, fine motor skills loss R29.818,, muscle weakness generalized M62.81,, adherent scar L90.5, or edema localized R60.0,  Onset Date: [address] 2021              Next Dr. Gabbie Albarran  Visit# / total visits: 10/24; Progress note for Medicare patient due at visit 8-10                     Cancels/No Shows: 0/1  Date range of services: 21 to 21    Subjective:  Pain:  [x] Yes  [] No  Location:  N/A Pain Rating: (0-10 scale)10 /10 Pain altered Tx:  [x] No  [] Yes  Action:  Comments: \"I am going to be honest today\" \"I am in a mood because my short term disability got cut off\"    Objective:  STRENGTH (Measured in pounds)  7-20-21  pounds RIGHT LEFT    12 inc 3 47   Lateral pinch 13 inc 2 14   2 point pinch 6 inc 2 11   3 jaw pinch 10 inc 1 16   The affected extremity is 75% weaker than the unaffected extremity.   (affected score/unaffected score, take the total and subtract from 100)     R MCP              19.4 mm inc .2  L MCP                         18.6 mm     R Small P1     5.5 mm  ? .3          L Small P1 4.6 mm  ? .3  R small P2      4.4 mm                L Small P2 4.4     DIGITS   Right Extension/Flexion  degrees LITTLE   MCP +5\30   PIP -14\27   DIP 0\13            Current Functional Level:  8/80 (inc 8) functionally impaired as measured with the Upper Extremity Functional Index Survey. 0-80 scale, with 80  = no Deficits  (The UEFI model does not provide any specific cut off points that could classify the upper limb disability degree, however, a minimal detectable change of 9 points is provided. This means that for improvement or deterioration to be considered, between two subsequent evaluations, the scores must differ by at least 9 points.)    Assessment:  Patient would benefit from skilled occupational therapy services in order to: Improve and Promote  functional /grasp, ROM, Strength, Activity tolerance and Complaint of pain in order to Ensure safe return to work     Short Term Goals: (  12 Treatments)  1. Decrease Pain: to 7/10 with simple ROM for ADLS  2. Increase AROM (degrees) (extension/flexion)  a. R Small MCP to 0/75 ONGOING +5/30  b. R  Small PIP to 0/80 ONGOING -14/27   c. R Small DIP to 0/40  ONGOING 0/13  3. Increase strength (pounds);  a. R UE  strength to 20 pounds for increased function with ADLs ONGOING, Improved 12 pounds  b. R Lateral pinch to 14 for increased I with self care tasks ONGOING, Improved 13 pounds  c. R two point pinch to 10 for increased I with home care tasks ONGOING Improved 6 pounds  4. R three jaw miguel to 15 for increased I with all work tasks ONGOING 10 pounds  5. Increase function:UE Functional Index Score 25 or more points to promote increased functional abilities ONGOING Improved 8/80  6. Scar will be soft and pliable with minimal tethering. ONGOING  7. Decrease Edema: R P1 to 5.0 mm and R P2 4.2 mm ONGOING  8. Demo knowledge of fall prevention in 3 sessions. MET  9. Patient to be independent with home exercise program as demonstrated by performance with correct form without cues. MET     Long Term Goals: (  24 Treatments)   9.  Increase strength (pounds);  a. R UE  strength to 45 pounds for increased function with ADLs  b. R Lateral pinch to 16 for increased I with self care tasks  c. R two point pinch to 13 for increased I with home care tasks  d. R three jaw miguel to 17 for increased I with all work tasks  10. UEFI score of 50 or more for increased function  11. Scar will be soft and supple with no scar adhesions     Patient Goals: to get back to work    Treatment Plan:   Therapeutic Ex 66669, Therapeutic Act 51584, Massage [id number], Manual 57546, HEP, Cold/Hot Pack, Ultrasound [id number] and Iontophoresis (4mg/mL dexamethasone sodium phosphate 40-120mA min) 41922    Patient Status: (requested frequency/duration)     [x] Continue per initial/current plan of care 2-3 times per week for 14 remaining visits. Pt may benefit from additional x ray as    hard end feel for MCP PROM flexion. Discussed pt concerns and advised to discuss this with doctor for further clarification. [] Additional visits necessary. Electronically signed by YAMILETH Krishna on 8/13/2021 at 7:13 AM      If you have any questions or concerns, please don't hesitate to call. Thank you for your referral.    Physician Signature:________________________________Date:__________________  By signing above or cosigning this note, I have reviewed this plan of care and certify a need for medically necessary rehabilitation services.      *PLEASE SIGN ABOVE AND FAX BACK ALL PAGES*

## 2021-08-13 NOTE — FLOWSHEET NOTE
[x] John [facility]       Occupational Therapy            1st floor       [address], [place]         Phone: [phone]       Fax: [phone] [] [facility]  [address], [place]  Phone: [phone]  Fax: [phone]      Occupational Therapy Daily Treatment Note    Date:  2021  Patient Name:  Jena Loza    :  1975  MRN: [id number]  Referring Provider:  Ricardo Almanzar MD  Insurance: Other [employer] 60/60 visit per year hard max no Preauth required  Medical Diagnosis: S62.616A Closed displaced fracture of proximal phalanx of R little finger, initial encounter   Rehab Codes: pain in hand M79.646,, pain in wrist M25.53,, stiffness in hand M25.64,, stiffness in wrist M25.63,, numbness R20.2,, malaise R53.0, , lack of coordination R27.8,, fine motor skills loss R29.818,, muscle weakness generalized M62.81,, adherent scar L90.5, or edema localized R60.0,  Onset Date: May [address] 2021              Next Dr. Heraclio Martinez: 8-  Visit# / total visits: 10/24; Progress note for Medicare patient due at visit 8-10    Cancels/No Shows: 0/1    Subjective:    Pain:  [x] Yes  [] No Location: \"the whole hand\" Pain Rating: (0-10 scale) 10/10   Pain altered Tx:  [x] No  [] Yes  Action:  Pt Comments: \"I think there is something wrong with my hand, I know my body\"     Objective:  Modalities: Modality Flow Sheet:  START STOP Tx Modality     Electrical Stim:     21  Ultrasound: _.8__ W/cm2 x __8_ mins  Duty factor: _x_100%  __50%  __33% __20%  Head size:    2_ cm  MHz: __1mHz  _x_3mHz  Location: dorsal side 5th MC     Hot Pack:     Cold Pack:     Precautions: NA  Exercises:  EXERCISE    REPS/     TIME  WEIGHT/    LEVEL COMMENTS   US   See above   PROM 32 mins  MCP, PIP, DIP   Towel Crunches 10  completed   Foam block yellow soft HEP   Theraputty   Strength  Finger flexion  Finger extension  Radial Dev  Ulnar Dev  Palmar Pinch  Lateral Pinch  Thumb ext  Three jaw miguel  Finger thumb ext       Not completed  As time was short due to manual therapy this date   Wooden pegs with velcro 1 series  NOT completed Used thumb, ring and small digit to pull up completed   Pinch pins with foam blocks 1/2 series Yellow ( 1 pound)  NOT completed    Active blocked finger exercises 10   completed   Other:   Pt tolerated US to dorsal side of R hand 5th MC bone/scar. Pt tolerated PROM of R digits of the 4th and 5th for promotion of increased I with adls. Pt reports not understanding why the hand is so painful. Pt has a hard end feel to the MCP of the 5th MC with flexion. Pt reports concern regarding return to work due to the short term disability being cut off. Pt reports waiting on doctors office to send paperwork for short term disability. Pt asking about additional x ray to the R hand as reporting \"I know my hand and something is wrong\". Treatment Charges: Mins Units   []  Modalities:      [x]  Ultrasound 8 0   [x]  Ther Exercise 15 2   [x]  Manual Therapy 32 2   []  Ther Activities     []  Orthotic fit/train     []  Orthotic recheck     []  Other     Total Treatment time 56      Assessment: [] Progressing toward goals. [x] No change. [] Other    Assessment:  Patient would benefit from skilled occupational therapy services in order to: Improve and Promote  functional /grasp, ROM, Strength, Activity tolerance and Complaint of pain in order to Ensure safe return to work     Patient Goals: to get back to work    Pt. Education:  [x] Yes  [] No  [x] Reviewed Prior HEP/Ed  Method of Education: [x] Verbal  [x] Demo  [] Written  Re:  Comprehension of Education:  [] Verbalizes understanding. [] Demonstrates understanding. [] Needs review. [x] Demonstrates/verbalizes HEP/Ed previously given. Short Term Goals: (  12 Treatments)  1. Decrease Pain: to 7/10 with simple ROM for ADLS  2.  Increase AROM (degrees) (extension/flexion)  a. R Small MCP to 0/75 ONGOING +5/30  b. R  Small PIP to 0/80 ONGOING -14/27   c. R Small DIP to 0/40  ONGOING 0/13  3. Increase strength (pounds);  a. R UE  strength to 20 pounds for increased function with ADLs ONGOING, Improved 12 pounds  b. R Lateral pinch to 14 for increased I with self care tasks ONGOING, Improved 13 pounds  c. R two point pinch to 10 for increased I with home care tasks ONGOING Improved 6 pounds  4. R three jaw miguel to 15 for increased I with all work tasks ONGOING 10 pounds  5. Increase function:UE Functional Index Score 25 or more points to promote increased functional abilities ONGOING Improved 8/80  6. Scar will be soft and pliable with minimal tethering. ONGOING  7. Decrease Edema: R P1 to 5.0 mm and R P2 4.2 mm ONGOING  8. Demo knowledge of fall prevention in 3 sessions. MET  9. Patient to be independent with home exercise program as demonstrated by performance with correct form without cues. MET     Long Term Goals: (  24 Treatments)   9. Increase strength (pounds);  a. R UE  strength to 45 pounds for increased function with ADLs  b. R Lateral pinch to 16 for increased I with self care tasks  c. R two point pinch to 13 for increased I with home care tasks  d. R three jaw miguel to 17 for increased I with all work tasks  10. UEFI score of 50 or more for increased function  11. Scar will be soft and supple with no scar adhesions       Plan: [] Continue current frequency toward short and long term goals.   [x] Specific Instructions for subsequent treatments: continue to add in additional AROM and strengthening for future exercises   [] Other:       Time In:0700 Time Out: 0800       Electronically signed by:  EDMUND Holland/GISEL

## 2021-08-16 ENCOUNTER — HOSPITAL ENCOUNTER (OUTPATIENT)
Dept: OCCUPATIONAL THERAPY | Age: 46
Setting detail: THERAPIES SERIES
Discharge: HOME | End: 2021-08-16
Payer: COMMERCIAL

## 2021-08-16 NOTE — SIGNIFICANT EVENT
[x] [facility]  [address]    [phone]  F: [phone]     Occupational Therapy Cancel/No Show note    Date: 2021  Patient: Awanda Schirmer  : 1975  MRN: [id number]    Cancels/No Shows to date:     For today's appointment patient:    [x]  Cancelled    [] Rescheduled appointment    [] No-show     Reason given by patient:    []  Patient ill    []  Conflicting appointment    [] No transportation      [] Conflict with work    [] No reason given    [] Weather related    [] [id number]    [x] Other:   Caught by train, resched for 21 Comments:        [] Next appointment was confirmed    Electronically signed by: EDMUND Saleem/GISEL

## 2021-08-17 ENCOUNTER — HOSPITAL ENCOUNTER (OUTPATIENT)
Dept: OCCUPATIONAL THERAPY | Age: 46
Setting detail: THERAPIES SERIES
Discharge: HOME | End: 2021-08-17
Payer: COMMERCIAL

## 2021-08-17 PROCEDURE — 97035 APP MDLTY 1+ULTRASOUND EA 15: CPT

## 2021-08-17 PROCEDURE — 97140 MANUAL THERAPY 1/> REGIONS: CPT

## 2021-08-17 NOTE — FLOWSHEET NOTE
[x] John Martin       Occupational Therapy            1st floor       [address] [facility]         Phone: [phone]       Fax: [phone] [] [facility]  [address] [facility]  Phone: [phone]  Fax: [phone]      Occupational Therapy Daily Treatment Note    Date:  2021  Patient Name:  Natacha Ferrera    :  1975  MRN: [id number]  Referring Provider:  Erin Nguyen MD  Insurance: Other [employer] 60/60 visit per year hard max no Preauth required  Medical Diagnosis: S62.616A Closed displaced fracture of proximal phalanx of R little finger, initial encounter   Rehab Codes: pain in hand M79.646,, pain in wrist M25.53,, stiffness in hand M25.64,, stiffness in wrist M25.63,, numbness R20.2,, malaise R53.0, , lack of coordination R27.8,, fine motor skills loss R29.818,, muscle weakness generalized M62.81,, adherent scar L90.5, or edema localized R60.0,  Onset Date: May 23 [address] 2021              Next Dr. Rdaha Goldstein: 8-  Visit# / total visits: ; Progress note for Medicare patient completed at visit 8-10    Cancels/No Shows: 0/1    Subjective:    Pain:  [x] Yes  [] No Location: \"the whole hand\" Pain Rating: (0-10 scale) 10/10   Pain altered Tx:  [x] No  [] Yes  Action:  Pt Comments: \"I really want an xray, I think something is wrong\"     Objective:  Modalities: Modality Flow Sheet:  START STOP Tx Modality     Electrical Stim:     21  Ultrasound: _.8__ W/cm2 x __8_ mins  Duty factor: _x_100%  __50%  __33% __20%  Head size:    2_ cm  MHz: __1mHz  _x_3mHz  Location: dorsal side 5th MC     Hot Pack:     Cold Pack:     Precautions: NA  Exercises:  EXERCISE    REPS/     TIME  WEIGHT/    LEVEL COMMENTS   US   See above   PROM 32 mins  MCP, PIP, DIP   Towel Crunches 10  Not completed   Foam block yellow soft HEP   Theraputty   Strength  Finger flexion  Finger extension  Radial Dev  Ulnar Dev  Palmar Pinch  Lateral Pinch  Thumb ext  Three jaw miguel  Finger thumb ext       Not completed  As time was short due to manual therapy this date   Wooden pegs with velcro 1 series  NOT completed Used thumb, ring and small digit to pull up completed   Pinch pins with foam blocks 1/2 series Yellow ( 1 pound)  NOT completed    Active blocked finger exercises 10   completed   Other:   Pt tolerated US to dorsal side of R hand 5th MC bone/scar. Pt tolerated PROM of R digits of the 4th and 5th for promotion of increased I with adls. Pt reports continuing to not understand why the hand is so painful. Pt has a hard end feel to the MCP of the 5th MC with flexion. Pt reports concern regarding return to work due to the short term disability cut off. Pt reports  doctors office had sent wrong paperwork for short term disability. Pt continues to ask about additional x ray to the R hand as reporting \"I know my hand and something is wrong\". Treatment Charges: Mins Units   []  Modalities:      [x]  Ultrasound 8 1   [x]  Ther Exercise 5 0   [x]  Manual Therapy 40 3   []  Ther Activities     []  Orthotic fit/train     []  Orthotic recheck     []  Other     Total Treatment time 53      Assessment: [] Progressing toward goals. [x] No change. [] Other    Assessment:  Patient would benefit from skilled occupational therapy services in order to: Improve and Promote  functional /grasp, ROM, Strength, Activity tolerance and Complaint of pain in order to Ensure safe return to work     Patient Goals: to get back to work    Pt. Education:  [x] Yes  [] No  [x] Reviewed Prior HEP/Ed  Method of Education: [x] Verbal  [x] Demo  [] Written  Re:  Comprehension of Education:  [] Verbalizes understanding. [] Demonstrates understanding. [] Needs review. [x] Demonstrates/verbalizes HEP/Ed previously given. Short Term Goals: (  12 Treatments)  1. Decrease Pain: to 7/10 with simple ROM for ADLS  2.  Increase AROM (degrees) (extension/flexion)  a. R Small MCP to 0/75 ONGOING +5/30  b. R  Small PIP to 0/80 ONGOING -14/27   c. R Small DIP to 0/40  ONGOING 0/13  3. Increase strength (pounds);  a. R UE  strength to 20 pounds for increased function with ADLs ONGOING, Improved 12 pounds  b. R Lateral pinch to 14 for increased I with self care tasks ONGOING, Improved 13 pounds  c. R two point pinch to 10 for increased I with home care tasks ONGOING Improved 6 pounds  4. R three jaw miguel to 15 for increased I with all work tasks ONGOING 10 pounds  5. Increase function:UE Functional Index Score 25 or more points to promote increased functional abilities ONGOING Improved 8/80  6. Scar will be soft and pliable with minimal tethering. ONGOING  7. Decrease Edema: R P1 to 5.0 mm and R P2 4.2 mm ONGOING  8. Demo knowledge of fall prevention in 3 sessions. MET  9. Patient to be independent with home exercise program as demonstrated by performance with correct form without cues. MET     Long Term Goals: (  24 Treatments)   9. Increase strength (pounds);  a. R UE  strength to 45 pounds for increased function with ADLs  b. R Lateral pinch to 16 for increased I with self care tasks  c. R two point pinch to 13 for increased I with home care tasks  d. R three jaw miguel to 17 for increased I with all work tasks  10. UEFI score of 50 or more for increased function  11. Scar will be soft and supple with no scar adhesions       Plan: [] Continue current frequency toward short and long term goals.   [x] Specific Instructions for subsequent treatments: continue to add in additional AROM and strengthening for future exercises   [] Other:       Time In:0700 Time Out: 0800       Electronically signed by:  Abdirahman Guadarrama OTR/GISEL

## 2021-08-18 ENCOUNTER — HOSPITAL ENCOUNTER (OUTPATIENT)
Dept: OCCUPATIONAL THERAPY | Age: 46
Setting detail: THERAPIES SERIES
Discharge: HOME | End: 2021-08-18
Payer: COMMERCIAL

## 2021-08-18 PROCEDURE — 97140 MANUAL THERAPY 1/> REGIONS: CPT

## 2021-08-18 PROCEDURE — 97035 APP MDLTY 1+ULTRASOUND EA 15: CPT

## 2021-08-18 NOTE — FLOWSHEET NOTE
[x] John Martin       Occupational Therapy            1st floor       [address], [place]         Phone: [phone]       Fax: [phone] [] [facility]  [address] [place] [address]  Phone: [phone]  Fax: [phone]      Occupational Therapy Daily Treatment Note    Date:  2021  Patient Name:  Katie Cedeno    :  1975  MRN: [id number]  Referring Provider:  Janie Darden MD  Insurance: Other [employer] 60/60 visit per year hard max no Preauth required  Medical Diagnosis: S62.616A Closed displaced fracture of proximal phalanx of R little finger, initial encounter   Rehab Codes: pain in hand M79.646,, pain in wrist M25.53,, stiffness in hand M25.64,, stiffness in wrist M25.63,, numbness R20.2,, malaise R53.0, , lack of coordination R27.8,, fine motor skills loss R29.818,, muscle weakness generalized M62.81,, adherent scar L90.5, or edema localized R60.0,  Onset Date: May [address] 2021              Next Dr. Isma Cook: 8-  Visit# / total visits: ; Progress note for Medicare patient completed at visit 8-10    Cancels/No Shows: 0/1    Subjective:    Pain:  [x] Yes  [] No Location: \"the whole hand\" Pain Rating: (0-10 scale) 8/10   Pain altered Tx:  [x] No  [] Yes  Action:  Pt Comments: \"I think I just want my hand cut off\"     Objective:  Modalities: Modality Flow Sheet:  START STOP Tx Modality     Electrical Stim:     21  Ultrasound: _.8__ W/cm2 x __8_ mins  Duty factor: _x_100%  __50%  __33% __20%  Head size:    2_ cm  MHz: __1mHz  _x_3mHz  Location: dorsal side 5th MC     Hot Pack:     Cold Pack:     Precautions: NA  Exercises:  EXERCISE    REPS/     TIME  WEIGHT/    LEVEL COMMENTS   US   See above   PROM 32 mins  MCP, PIP, DIP   Towel Crunches 10  Not completed   Foam block yellow soft HEP   Theraputty   Strength  Finger flexion  Finger extension  Radial Dev  Ulnar Dev  Palmar Pinch  Lateral Pinch  Thumb ext  Three jaw miguel  Finger thumb ext       Not completed     Wooden pegs with velcro 1 series  NOT completed Used thumb, ring and small digit to pull up completed   Pinch pins with foam blocks 1/2 series Yellow ( 1 pound)  NOT completed    Active blocked finger exercises 10   completed   Other:   Pt tolerated US to dorsal side of R hand 5th MC bone/scar. Pt tolerated PROM of R digits of the 4th and 5th for promotion of increased I with adls. Pt asking about where to get medical records, advised pt of where medical records office is for  of necessary paperwork. Treatment Charges: Mins Units   []  Modalities:      [x]  Ultrasound 8 1   [x]  Ther Exercise 5 0   [x]  Manual Therapy 40 3   []  Ther Activities     []  Orthotic fit/train     []  Orthotic recheck     []  Other     Total Treatment time 53      Assessment: [] Progressing toward goals. [x] No change. [] Other    Assessment:  Patient would benefit from skilled occupational therapy services in order to: Improve and Promote  functional /grasp, ROM, Strength, Activity tolerance and Complaint of pain in order to Ensure safe return to work     Patient Goals: to get back to work    Pt. Education:  [x] Yes  [] No  [x] Reviewed Prior HEP/Ed  Method of Education: [x] Verbal  [x] Demo  [] Written  Re:  Comprehension of Education:  [] Verbalizes understanding. [] Demonstrates understanding. [] Needs review. [x] Demonstrates/verbalizes HEP/Ed previously given. Short Term Goals: (  12 Treatments)  1. Decrease Pain: to 7/10 with simple ROM for ADLS  2. Increase AROM (degrees) (extension/flexion)  a. R Small MCP to 0/75 ONGOING +5/30  b. R  Small PIP to 0/80 ONGOING -14/27   c. R Small DIP to 0/40  ONGOING 0/13  3.  Increase strength (pounds);  a. R UE  strength to 20 pounds for increased function with ADLs ONGOING, Improved 12 pounds  b. R Lateral pinch to 14 for increased I with self care tasks ONGOING, Improved 13 pounds  c. R two point pinch to 10 for increased I with home care tasks ONGOING Improved 6 pounds  4. R three jaw miguel to 15 for increased I with all work tasks ONGOING 10 pounds  5. Increase function:UE Functional Index Score 25 or more points to promote increased functional abilities ONGOING Improved 8/80  6. Scar will be soft and pliable with minimal tethering. ONGOING  7. Decrease Edema: R P1 to 5.0 mm and R P2 4.2 mm ONGOING  8. Demo knowledge of fall prevention in 3 sessions. MET  9. Patient to be independent with home exercise program as demonstrated by performance with correct form without cues. MET     Long Term Goals: (  24 Treatments)   9. Increase strength (pounds);  a. R UE  strength to 45 pounds for increased function with ADLs  b. R Lateral pinch to 16 for increased I with self care tasks  c. R two point pinch to 13 for increased I with home care tasks  d. R three jaw miguel to 17 for increased I with all work tasks  10. UEFI score of 50 or more for increased function  11. Scar will be soft and supple with no scar adhesions       Plan: [] Continue current frequency toward short and long term goals.   [x] Specific Instructions for subsequent treatments: continue to add in additional AROM and strengthening for future exercises   [] Other:       Time In:0700 Time Out: 0800       Electronically signed by:  EDMUND Yousif/GISEL

## 2021-08-20 ENCOUNTER — HOSPITAL ENCOUNTER (OUTPATIENT)
Dept: OCCUPATIONAL THERAPY | Age: 46
Setting detail: THERAPIES SERIES
Discharge: HOME | End: 2021-08-20
Payer: COMMERCIAL

## 2021-08-20 NOTE — SIGNIFICANT EVENT
[x] [facility]  [address]    [phone]  F: [phone]     Occupational Therapy Cancel/No Show note    Date: 2021  Patient: Ravi Pandey  : 1975  MRN: [id number]    Cancels/No Shows to date:     For today's appointment patient:    [x]  Cancelled    [] Rescheduled appointment    [] No-show     Reason given by patient:    []  Patient ill    []  Conflicting appointment    [] No transportation      [] Conflict with work    [] No reason given    [] Weather related    [] [id number]    [] Other:    Comments:        [] Next appointment was confirmed    Electronically signed by: Sahil QUACHS

## 2021-08-23 ENCOUNTER — HOSPITAL ENCOUNTER (OUTPATIENT)
Dept: OCCUPATIONAL THERAPY | Age: 46
Setting detail: THERAPIES SERIES
Discharge: HOME | End: 2021-08-23
Payer: COMMERCIAL

## 2021-08-23 PROCEDURE — 97110 THERAPEUTIC EXERCISES: CPT

## 2021-08-23 PROCEDURE — 97140 MANUAL THERAPY 1/> REGIONS: CPT

## 2021-08-23 NOTE — FLOWSHEET NOTE
ext  Three jaw miguel  Finger thumb ext       Not completed     Wooden pegs with velcro 1 series  completed Used thumb, ring and small digit to pull up completed   Pinch pins with foam blocks 1/2 series Yellow ( 1 pound)  NOT completed    Active blocked finger exercises 10   completed   Flexbar   strength  Wrist Flexion/Ext  Wrist pronation  Wrist supination  Wrist ulnar deviation  Wrist radial dev  Shoulder Adb  Shoulder Add   added this date   Other:   Pt tolerated US to dorsal side of R hand 5th MC bone/scar. Pt tolerated PROM of R digits of the 4th and 5th for promotion of increased I with adls. The Flexbar has been designed specifically for improving  strength and upper extremity function. It also is vital for other applications such as oscillation and mobilization. Its specific applications Strength (improve  strength and UE strength), Oscillation (allows osillation movements for neuromuscular training and balance training) and Mobilization (provides soft tissue mobilization and joint mobilization) help to enhance the recovery process with different levels of resistance starting at 6 pounds to 25 pounds. Treatment Charges: Mins Units   []  Modalities:      [x]  Ultrasound 8 0   [x]  Ther Exercise 15 2   [x]  Manual Therapy 33 2   []  Ther Activities     []  Orthotic fit/train     []  Orthotic recheck     []  Other     Total Treatment time 56      Assessment: [] Progressing toward goals. [x] No change. [] Other    Assessment:  Patient would benefit from skilled occupational therapy services in order to: Improve and Promote  functional /grasp, ROM, Strength, Activity tolerance and Complaint of pain in order to Ensure safe return to work     Patient Goals: to get back to work    Pt. Education:  [x] Yes  [] No  [x] Reviewed Prior HEP/Ed  Method of Education: [x] Verbal  [x] Demo  [] Written  Re:  Comprehension of Education:  [] Verbalizes understanding. [] Demonstrates understanding.   []

## 2021-08-25 ENCOUNTER — HOSPITAL ENCOUNTER (EMERGENCY)
Age: 46
Discharge: HOME | End: 2021-08-25
Attending: EMERGENCY MEDICINE
Payer: COMMERCIAL

## 2021-08-25 ENCOUNTER — APPOINTMENT (OUTPATIENT)
Dept: GENERAL RADIOLOGY | Age: 46
End: 2021-08-25
Payer: COMMERCIAL

## 2021-08-25 ENCOUNTER — HOSPITAL ENCOUNTER (OUTPATIENT)
Dept: OCCUPATIONAL THERAPY | Age: 46
Setting detail: THERAPIES SERIES
Discharge: HOME | End: 2021-08-25
Payer: COMMERCIAL

## 2021-08-25 VITALS
RESPIRATION RATE: 16 BRPM | HEART RATE: 100 BPM | SYSTOLIC BLOOD PRESSURE: 143 MMHG | OXYGEN SATURATION: 95 % | WEIGHT: 220 LBS | HEIGHT: 65 IN | DIASTOLIC BLOOD PRESSURE: 102 MMHG | BODY MASS INDEX: 36.65 KG/M2 | TEMPERATURE: 98.4 F

## 2021-08-25 DIAGNOSIS — M79.644 PAIN OF FINGER OF RIGHT HAND: Primary | ICD-10-CM

## 2021-08-25 PROCEDURE — 97110 THERAPEUTIC EXERCISES: CPT

## 2021-08-25 PROCEDURE — 73130 X-RAY EXAM OF HAND: CPT

## 2021-08-25 PROCEDURE — 97140 MANUAL THERAPY 1/> REGIONS: CPT

## 2021-08-25 PROCEDURE — 99282 EMERGENCY DEPT VISIT SF MDM: CPT

## 2021-08-25 RX ORDER — ACETAMINOPHEN AND CODEINE PHOSPHATE 300; 30 MG/1; MG/1
1 TABLET ORAL EVERY 6 HOURS PRN
Qty: 20 TABLET | Refills: 0 | Status: SHIPPED | OUTPATIENT
Start: 2021-08-25 | End: 2021-08-30

## 2021-08-25 ASSESSMENT — PAIN DESCRIPTION - PAIN TYPE: TYPE: CHRONIC PAIN

## 2021-08-25 ASSESSMENT — ENCOUNTER SYMPTOMS
ABDOMINAL PAIN: 0
COUGH: 0
EYE DISCHARGE: 0
CONSTIPATION: 0
EYE REDNESS: 0
SHORTNESS OF BREATH: 0
COLOR CHANGE: 0
DIARRHEA: 0
VOMITING: 0
FACIAL SWELLING: 0

## 2021-08-25 ASSESSMENT — PAIN SCALES - GENERAL: PAINLEVEL_OUTOF10: 10

## 2021-08-25 ASSESSMENT — PAIN DESCRIPTION - ORIENTATION: ORIENTATION: RIGHT

## 2021-08-25 ASSESSMENT — PAIN DESCRIPTION - LOCATION: LOCATION: HAND

## 2021-08-25 NOTE — FLOWSHEET NOTE
ext       Not completed     Wooden pegs with velcro 1 series  completed Used thumb, ring and small digit to pull up completed   Pinch pins with foam blocks 1/2 series Yellow ( 1 pound)  NOT completed    Active blocked finger exercises 10   completed   Flexbar   strength  Wrist Flexion/Ext  Wrist pronation  Wrist supination  Wrist ulnar deviation  Wrist radial dev  Shoulder Adb  Shoulder Add     added this date   Other:   Pt tolerated US to dorsal side of R hand 5th MC bone/scar. Pt tolerated PROM of R digits of the 4th and 5th for promotion of increased I with adls. Pt reports wanting an x ray to determine bone alignment. The Flexbar has been designed specifically for improving  strength and upper extremity function. It also is vital for other applications such as oscillation and mobilization. Its specific applications Strength (improve  strength and UE strength), Oscillation (allows osillation movements for neuromuscular training and balance training) and Mobilization (provides soft tissue mobilization and joint mobilization) help to enhance the recovery process with different levels of resistance starting at 6 pounds to 25 pounds. Treatment Charges: Mins Units   []  Modalities:      [x]  Ultrasound     [x]  Ther Exercise 25 2   [x]  Manual Therapy 33 2   []  Ther Activities     []  Orthotic fit/train     []  Orthotic recheck     []  Other     Total Treatment time 58      Assessment: [] Progressing toward goals. [x] No change. [] Other    Assessment:  Patient would benefit from skilled occupational therapy services in order to: Improve and Promote  functional /grasp, ROM, Strength, Activity tolerance and Complaint of pain in order to Ensure safe return to work     Patient Goals: to get back to work    Pt. Education:  [x] Yes  [] No  [x] Reviewed Prior HEP/Ed  Method of Education: [x] Verbal  [x] Demo  [] Written  Re:  Comprehension of Education:  [] Verbalizes understanding.   [] Demonstrates understanding. [] Needs review. [x] Demonstrates/verbalizes HEP/Ed previously given. Short Term Goals: (  12 Treatments)  1. Decrease Pain: to 7/10 with simple ROM for ADLS  2. Increase AROM (degrees) (extension/flexion)  a. R Small MCP to 0/75 ONGOING +5/30  b. R  Small PIP to 0/80 ONGOING -14/27   c. R Small DIP to 0/40  ONGOING 0/13  3. Increase strength (pounds);  a. R UE  strength to 20 pounds for increased function with ADLs ONGOING, Improved 12 pounds  b. R Lateral pinch to 14 for increased I with self care tasks ONGOING, Improved 13 pounds  c. R two point pinch to 10 for increased I with home care tasks ONGOING Improved 6 pounds  4. R three jaw miguel to 15 for increased I with all work tasks ONGOING 10 pounds  5. Increase function:UE Functional Index Score 25 or more points to promote increased functional abilities ONGOING Improved 8/80  6. Scar will be soft and pliable with minimal tethering. ONGOING  7. Decrease Edema: R P1 to 5.0 mm and R P2 4.2 mm ONGOING  8. Demo knowledge of fall prevention in 3 sessions. MET  9. Patient to be independent with home exercise program as demonstrated by performance with correct form without cues. MET     Long Term Goals: (  24 Treatments)   9. Increase strength (pounds);  a. R UE  strength to 45 pounds for increased function with ADLs  b. R Lateral pinch to 16 for increased I with self care tasks  c. R two point pinch to 13 for increased I with home care tasks  d. R three jaw miguel to 17 for increased I with all work tasks  10. UEFI score of 50 or more for increased function  11. Scar will be soft and supple with no scar adhesions     Plan: [] Continue current frequency toward short and long term goals.   [x] Specific Instructions for subsequent treatments: continue to add in additional AROM and strengthening for future exercises   [] Other:       Time In:0700 Time Out: 0800       Electronically signed by:  EDMUND Yousif/GISEL

## 2021-08-26 NOTE — ED PROVIDER NOTES
[address] [place] [address] ED  EMERGENCY DEPARTMENT ENCOUNTER      Pt Name: Tyrone Alonso  MRN: [id number]  [place] 1975  Date of evaluation: 8/25/2021  Provider: Jose Grajeda MD    CHIEF COMPLAINT       Chief Complaint   Patient presents with    Hand Pain     Right. Onset May, 2021         HISTORY OF PRESENT ILLNESS  (Location/Symptom, Timing/Onset, Context/Setting, Quality, Duration, Modifying Factors, Severity.)   Tyrone Alonso is a 48 y.o. female who presents to the emergency department pain in her right hand and specifically the base of her right fifth finger. She had a fracture of the proximal phalanx in May and then had surgery. She has been physical therapy and does not feel like she is making any progress. She wants to see a different hand specialist.  She is been taking Motrin but it makes her sick to her stomach. She states she wants to get back to work. She rated the pain is a 10. Nursing Notes were reviewed. ALLERGIES     Naproxen, Sulfa antibiotics, Sulfasalazine, and Shellfish-derived products    CURRENT MEDICATIONS       Previous Medications    ACETAMINOPHEN (TYLENOL) 325 MG TABLET    Take 1 tablet by mouth every 6 hours as needed for Pain    ALUMINUM & MAGNESIUM HYDROXIDE-SIMETHICONE (MAALOX ADVANCED MAX ST) 400-400-40 MG/5ML SUSP    Take 30 mLs by mouth 3 times daily as needed (heartburn)    AMPHETAMINE-DEXTROAMPHETAMINE (ADDERALL) 20 MG TABLET    dextroamphetamine-amphetamine 20 mg tablet    CEPHALEXIN (KEFLEX) 250 MG CAPSULE    Take one PO QID til gone. Start one day prior to surgery / procedure. CLONAZEPAM (KLONOPIN) 1 MG TABLET    Take 1 mg by mouth 4 times daily as needed.  .    IBUPROFEN (ADVIL;MOTRIN) 800 MG TABLET    Take 1 tablet by mouth every 8 hours as needed for Pain    IBUPROFEN (ADVIL;MOTRIN) 800 MG TABLET    Take 1 tablet by mouth 3 times daily (with meals)       PAST MEDICAL HISTORY         Diagnosis Date    Abnormal uterine bleeding 05/2018    Anxiety     Bipolar 1 disorder (HCC)     No Rx.  Blood clot in vein     right calf    Fibroid 2018    Panic attacks        SURGICAL HISTORY           Procedure Laterality Date    FINGER CLOSED REDUCTION Right 2021    CLOSED REDUCTION PERCUTANEOUS  PINNING, C-ARM (Right    FINGER SURGERY Right 2021    CLOSED REDUCTION PERCUTANEOUS  PINNING , RIGHT LITTLE FINGER , SPLINT C-ARM performed by Alon [facility]der [facility] 5  2021    [facility]    HYSTEROSCOPY N/A 2019    HYSTEROSCOPY WITH A D AND C performed by Miguel Maravilla DO at [address] HISTORY           Problem Relation Age of Onset    High Blood Pressure Mother     Diabetes Mother    Juanda Bracket Father         primary    Brain Cancer Father     Other Sister         DDD of the back    Mult Sclerosis Maternal Grandmother     No Known Problems Maternal Grandfather     No Known Problems Paternal Grandmother     No Known Problems Paternal Grandfather      Family Status   Relation Name Status    Mother  Alive    Father      Sister  Alive    MGM      MGF      PGM      PGF      Son 2 Alive    Benito  Alive    Sister  Alive        SOCIAL HISTORY      reports that she has been smoking cigarettes. She started smoking about 37 years ago. She has been smoking about 0.50 packs per day. She has never used smokeless tobacco. She reports that she does not drink alcohol and does not use drugs. REVIEW OF SYSTEMS    (2-9 systems for level 4, 10 or more for level 5)     Review of Systems   Constitutional: Negative for chills, fatigue and fever. HENT: Negative for congestion, ear discharge and facial swelling. Eyes: Negative for discharge and redness. Respiratory: Negative for cough and shortness of breath. Cardiovascular: Negative for chest pain. Gastrointestinal: Negative for abdominal pain, constipation, diarrhea and vomiting. Genitourinary: Negative for dysuria and hematuria. Musculoskeletal: Negative for arthralgias. Skin: Negative for color change and rash. Neurological: Negative for syncope, numbness and headaches. Hematological: Negative for adenopathy. Psychiatric/Behavioral: Negative for confusion. The patient is not nervous/anxious. Except as noted above the remainder of the review of systems was reviewed and negative. PHYSICAL EXAM    (up to 7 for level 4, 8 or more for level 5)     Vitals:    08/25/21 2015 08/25/21 2017 08/25/21 2018   BP:   (!) 143/102   Pulse:  100    Resp:  16    Temp:  98.4 °F (36.9 °C)    TempSrc:  Oral    SpO2:  95%    Weight: 220 lb (99.8 kg)     Height: 5' 5\" (1.651 m)         Physical Exam  Vitals reviewed. Constitutional:       General: She is not in acute distress. Appearance: She is well-developed. She is not diaphoretic. HENT:      Head: Normocephalic and atraumatic. Eyes:      General: No scleral icterus. Right eye: No discharge. Left eye: No discharge. Cardiovascular:      Rate and Rhythm: Normal rate and regular rhythm. Pulmonary:      Effort: Pulmonary effort is normal. No respiratory distress. Breath sounds: Normal breath sounds. No stridor. No wheezing or rales. Abdominal:      General: There is no distension. Palpations: Abdomen is soft. Tenderness: There is no abdominal tenderness. Musculoskeletal:      Cervical back: Neck supple. Comments: No swelling in her right hand. The finger has limited range of motion. Surgical pin site is well-healed and closed. Lymphadenopathy:      Cervical: No cervical adenopathy. Skin:     General: Skin is warm and dry. Findings: No erythema or rash. Neurological:      Mental Status: She is alert and oriented to person, place, and time.    Psychiatric:         Behavior: Behavior normal.             DIAGNOSTIC RESULTS     EKG: All EKG's are interpreted by the Emergency Department Physician who either signs or Co-signs this chart in the absence of a cardiologist.    Not indicated    RADIOLOGY:   Non-plain film images such as CT, Ultrasound and MRI are read by the radiologist. Plain radiographic images are visualized and preliminarily interpreted by the emergency physician with the below findings:    Not indicated    Interpretation per the Radiologist below, if available at the time of this note:        LABS:  Labs Reviewed - No data to display    All other labs were within normal range or not returned as of this dictation. EMERGENCY DEPARTMENT COURSE and DIFFERENTIAL DIAGNOSIS/MDM:   Vitals:    Vitals:    08/25/21 2015 08/25/21 2017 08/25/21 2018   BP:   (!) 143/102   Pulse:  100    Resp:  16    Temp:  98.4 °F (36.9 °C)    TempSrc:  Oral    SpO2:  95%    Weight: 220 lb (99.8 kg)     Height: 5' 5\" (1.651 m)         Orders Placed This Encounter   Medications    acetaminophen-codeine (TYLENOL/CODEINE #3) 300-30 MG per tablet     Sig: Take 1 tablet by mouth every 6 hours as needed for Pain for up to 5 days. Dispense:  20 tablet     Refill:  0       Medical Decision Making: She is referred to hand specialist for follow-up. Treatment diagnosis and follow-up were discussed with the patient. CONSULTS:  None    PROCEDURES:  None    FINAL IMPRESSION      1. Pain of finger of right hand          DISPOSITION/PLAN   DISPOSITION Decision To Discharge 08/25/2021 10:02:49 PM      PATIENT REFERRED TO:   [facility] ED  [address] [facility]  [phone]    If symptoms worsen    Bhumi Griffin MD  [address] [facility]  [phone]            DISCHARGE MEDICATIONS:     New Prescriptions    ACETAMINOPHEN-CODEINE (TYLENOL/CODEINE #3) 300-30 MG PER TABLET    Take 1 tablet by mouth every 6 hours as needed for Pain for up to 5 days.          (Please note that portions of this note were completed with a voice recognition program.  Efforts were made to edit the dictations but occasionally words are mis-transcribed.)    Sanju Webster MD  Attending Emergency Physician           Sanju Webster MD  08/25/21 0934

## 2021-08-27 ENCOUNTER — HOSPITAL ENCOUNTER (OUTPATIENT)
Dept: OCCUPATIONAL THERAPY | Age: 46
Setting detail: THERAPIES SERIES
Discharge: HOME | End: 2021-08-27
Payer: COMMERCIAL

## 2021-08-27 PROCEDURE — 97035 APP MDLTY 1+ULTRASOUND EA 15: CPT

## 2021-08-27 PROCEDURE — 97140 MANUAL THERAPY 1/> REGIONS: CPT

## 2021-08-27 NOTE — FLOWSHEET NOTE
[x] John Martin       Occupational Therapy            1st floor       [address], [place]         Phone: [phone]       Fax: [phone] [] [facility]  [address] [place] [address]  Phone: [phone]  Fax: [phone]      Occupational Therapy Daily Treatment Note    Date:  2021  Patient Name:  Katie Cedeno    :  1975  MRN: [id number]  Referring Provider:  Janie Darden MD  Insurance: Other [employer] 60/60 visit per year hard max no Preauth required  Medical Diagnosis: S62.616A Closed displaced fracture of proximal phalanx of R little finger, initial encounter   Rehab Codes: pain in hand M79.646,, pain in wrist M25.53,, stiffness in hand M25.64,, stiffness in wrist M25.63,, numbness R20.2,, malaise R53.0, , lack of coordination R27.8,, fine motor skills loss R29.818,, muscle weakness generalized M62.81,, adherent scar L90.5, or edema localized R60.0,  Onset Date: May [address] 2021              Next Dr. Isma Cook: 8-  Visit# / total visits: 15/24; Progress note for Medicare patient completed at visit 8-10    Cancels/No Shows: 0/1    Subjective:    Pain:  [x] Yes  [] No Location: \"the whole hand\" Pain Rating: (0-10 scale) 8/10   Pain altered Tx:  [x] No  [] Yes  Action:  Pt Comments: \"I went to the ER for a new xray\"     Objective:  Modalities: Modality Flow Sheet:  START STOP Tx Modality     Electrical Stim:     21  Ultrasound: _.8__ W/cm2 x __8_ mins  Duty factor: _x_100%  __50%  __33% __20%  Head size:    2_ cm  MHz: __1mHz  _x_3mHz  Location: dorsal side 5th MC     Hot Pack:     Cold Pack:     Precautions: NA  Exercises:  EXERCISE    REPS/     TIME  WEIGHT/    LEVEL COMMENTS   US   See above   PROM 32 mins  MCP, PIP, DIP   Towel Crunches 10  completed   Foam block yellow soft HEP   Theraputty   Strength  Finger flexion  Finger extension  Radial Dev  Ulnar Dev  Palmar Pinch  Lateral Pinch  Thumb ext  Three jaw miguel  Finger thumb ext       Not completed     Wooden pegs with velcro 1 series  completed Used thumb, ring and small digit to pull up completed   Pinch pins with foam blocks 1/2 series Yellow ( 1 pound)  NOT completed    Active blocked finger exercises 10   completed   Flexbar   strength  Wrist Flexion/Ext  Wrist pronation  Wrist supination  Wrist ulnar deviation  Wrist radial dev  Shoulder Adb  Shoulder Add     Not completed   Other:   Pt tolerated US to dorsal side of R hand 5th MC bone/scar. Pt tolerated PROM of R digits of the 4th and 5th for promotion of increased I with adls. Pt reports getting an x ray to determine bone alignment. Pt sees Dr Talia Blanca on the 31st for follow up. Treatment Charges: Mins Units   []  Modalities:      [x]  Ultrasound 8 1   []  Ther Exercise     [x]  Manual Therapy 45 3   []  Ther Activities     []  Orthotic fit/train     []  Orthotic recheck     []  Other     Total Treatment time 53      Assessment: [] Progressing toward goals. [x] No change. [] Other    Assessment:  Patient would benefit from skilled occupational therapy services in order to: Improve and Promote  functional /grasp, ROM, Strength, Activity tolerance and Complaint of pain in order to Ensure safe return to work     Patient Goals: to get back to work    Pt. Education:  [x] Yes  [] No  [x] Reviewed Prior HEP/Ed  Method of Education: [x] Verbal  [x] Demo  [] Written  Re:  Comprehension of Education:  [] Verbalizes understanding. [] Demonstrates understanding. [] Needs review. [x] Demonstrates/verbalizes HEP/Ed previously given. Short Term Goals: (  12 Treatments)  1. Decrease Pain: to 7/10 with simple ROM for ADLS  2. Increase AROM (degrees) (extension/flexion)  a. R Small MCP to 0/75 ONGOING +5/30  b. R  Small PIP to 0/80 ONGOING -14/27   c. R Small DIP to 0/40  ONGOING 0/13  3.  Increase strength (pounds);  a. R UE  strength to 20 pounds for increased function with ADLs ONGOING, Improved 12 pounds  b. R Lateral pinch to 14 for increased I with self care tasks ONGOING, Improved 13 pounds  c. R two point pinch to 10 for increased I with home care tasks ONGOING Improved 6 pounds  4. R three jaw miguel to 15 for increased I with all work tasks ONGOING 10 pounds  5. Increase function:UE Functional Index Score 25 or more points to promote increased functional abilities ONGOING Improved 8/80  6. Scar will be soft and pliable with minimal tethering. ONGOING  7. Decrease Edema: R P1 to 5.0 mm and R P2 4.2 mm ONGOING  8. Demo knowledge of fall prevention in 3 sessions. MET  9. Patient to be independent with home exercise program as demonstrated by performance with correct form without cues. MET     Long Term Goals: (  24 Treatments)   9. Increase strength (pounds);  a. R UE  strength to 45 pounds for increased function with ADLs  b. R Lateral pinch to 16 for increased I with self care tasks  c. R two point pinch to 13 for increased I with home care tasks  d. R three jaw miguel to 17 for increased I with all work tasks  10. UEFI score of 50 or more for increased function  11. Scar will be soft and supple with no scar adhesions     Plan: [] Continue current frequency toward short and long term goals.   [x] Specific Instructions for subsequent treatments: continue to add in additional AROM and strengthening for future exercises   [] Other:       Time In:0700 Time Out: 0800       Electronically signed by:  Urmila Chavez OTR/GISEL

## 2021-08-30 ENCOUNTER — HOSPITAL ENCOUNTER (OUTPATIENT)
Dept: OCCUPATIONAL THERAPY | Age: 46
Setting detail: THERAPIES SERIES
Discharge: HOME | End: 2021-08-30
Payer: COMMERCIAL

## 2021-08-30 NOTE — SIGNIFICANT EVENT
[x] [facility]  [address]    [phone]  F: [phone]     Occupational Therapy Cancel/No Show note    Date: 2021  Patient: Rex Banegas  : 1975  MRN: [id number]    Cancels/No Shows to date:     For today's appointment patient:    [x]  Cancelled    [] Rescheduled appointment    [] No-show     Reason given by patient:    []  Patient ill    []  Conflicting appointment    [x] No transportation      [] Conflict with work    [] No reason given    [] Weather related    [] COVID-19    [] Other:    Comments:        [x] Next appointment was confirmed    Electronically signed by: EDMUND Keane/GISEL

## 2021-09-01 ENCOUNTER — HOSPITAL ENCOUNTER (OUTPATIENT)
Dept: OCCUPATIONAL THERAPY | Age: 46
Setting detail: THERAPIES SERIES
Discharge: HOME | End: 2021-09-01
Payer: COMMERCIAL

## 2021-09-02 ENCOUNTER — HOSPITAL ENCOUNTER (OUTPATIENT)
Dept: OCCUPATIONAL THERAPY | Age: 46
Setting detail: THERAPIES SERIES
Discharge: HOME | End: 2021-09-02
Payer: COMMERCIAL

## 2021-09-02 PROCEDURE — 97110 THERAPEUTIC EXERCISES: CPT

## 2021-09-02 PROCEDURE — 97140 MANUAL THERAPY 1/> REGIONS: CPT

## 2021-09-02 PROCEDURE — 97035 APP MDLTY 1+ULTRASOUND EA 15: CPT

## 2021-09-02 NOTE — FLOWSHEET NOTE
[x] John Martin       Occupational Therapy            1st floor       [address], [place]         Phone: [phone]       Fax: [phone] [] [facility]  [address], [place]  Phone: [phone]  Fax: [phone]      Occupational Therapy Daily Treatment Note    Date:  2021  Patient Name:  Ayana Batch    :  1975  MRN: [id number]  Referring Provider:  Ingrid Early MD  Insurance: Other [employer] 60/60 visit per year hard max no Preauth required  Medical Diagnosis: S62.616A Closed displaced fracture of proximal phalanx of R little finger, initial encounter   Rehab Codes: pain in hand M79.646,, pain in wrist M25.53,, stiffness in hand M25.64,, stiffness in wrist M25.63,, numbness R20.2,, malaise R53.0, , lack of coordination R27.8,, fine motor skills loss R29.818,, muscle weakness generalized M62.81,, adherent scar L90.5, or edema localized R60.0,  Onset Date: May [address] 2021              Next Dr. Rodriguez Plant: 8-  Visit# / total visits: ; Progress note for Medicare patient completed at visit 8-10    Cancels/No Shows: 0/1    Subjective:    Pain:  [x] Yes  [] No Location: \"the whole hand\" Pain Rating: (0-10 scale) 39/10 \"sucks\"  Pain altered Tx:  [x] No  [] Yes  Action:  Pt Comments:  \"My pain is a 44 today, its awful\"     Objective:  Modalities: Modality Flow Sheet:  START STOP Tx Modality     Electrical Stim:     21  Ultrasound: _.8__ W/cm2 x __8_ mins  Duty factor: _x_100%  __50%  __33% __20%  Head size:    2_ cm  MHz: __1mHz  _x_3mHz  Location: dorsal side 5th MC     Hot Pack:     Cold Pack:     Precautions: NA  Exercises:  EXERCISE    REPS/     TIME  WEIGHT/    LEVEL COMMENTS   US   See above   PROM 32 mins  MCP, PIP, DIP   Towel Crunches 10  completed   Foam block yellow soft HEP   Theraputty   Strength  Finger flexion  Finger extension  Radial Dev  Ulnar Dev  Palmar Pinch  Lateral Pinch  Thumb ext  Three jaw miguel  Finger thumb ext       Not completed     Wooden pegs with velcro 1 series  completed Used thumb, ring and small digit to pull up completed   Pinch pins with foam blocks 1/2 series Yellow ( 1 pound)  NOT completed    Active blocked finger exercises 10   completed   Flexbar   strength  Wrist Flexion/Ext  Wrist pronation  Wrist supination  Wrist ulnar deviation  Wrist radial dev  Shoulder Adb  Shoulder Add     Not completed   Other:   Pt tolerated US to dorsal side of R hand 5th MC bone/scar. Pt tolerated PROM of R digits of the 4th and 5th for promotion of increased I with adls. Pt reports having seen Dr Kunal Luis and has 4 additional weeks of therapy. No new referral processed at this time, however,  note states \"Continue therapy an additional 4 weeks, remain of work thru 10/04/2021\". Treatment Charges: Mins Units   []  Modalities:      [x]  Ultrasound 8 1   []  Ther Exercise     [x]  Manual Therapy 45 3   []  Ther Activities     []  Orthotic fit/train     []  Orthotic recheck     []  Other     Total Treatment time 53      Assessment: [] Progressing toward goals. [x] No change. [] Other    Assessment:  Patient would benefit from skilled occupational therapy services in order to: Improve and Promote  functional /grasp, ROM, Strength, Activity tolerance and Complaint of pain in order to Ensure safe return to work     Patient Goals: to get back to work    Pt. Education:  [x] Yes  [] No  [x] Reviewed Prior HEP/Ed  Method of Education: [x] Verbal  [x] Demo  [] Written  Re:  Comprehension of Education:  [] Verbalizes understanding. [] Demonstrates understanding. [] Needs review. [x] Demonstrates/verbalizes HEP/Ed previously given. Short Term Goals: (  12 Treatments)  1. Decrease Pain: to 7/10 with simple ROM for ADLS  2. Increase AROM (degrees) (extension/flexion)  a. R Small MCP to 0/75 ONGOING +5/30  b. R  Small PIP to 0/80 ONGOING -14/27   c. R Small DIP to 0/40  ONGOING 0/13  3.  Increase strength (pounds);  a. R UE  strength to 20 pounds for increased function with ADLs ONGOING, Improved 12 pounds  b. R Lateral pinch to 14 for increased I with self care tasks ONGOING, Improved 13 pounds  c. R two point pinch to 10 for increased I with home care tasks ONGOING Improved 6 pounds  4. R three jaw miguel to 15 for increased I with all work tasks ONGOING 10 pounds  5. Increase function:UE Functional Index Score 25 or more points to promote increased functional abilities ONGOING Improved 8/80  6. Scar will be soft and pliable with minimal tethering. ONGOING  7. Decrease Edema: R P1 to 5.0 mm and R P2 4.2 mm ONGOING  8. Demo knowledge of fall prevention in 3 sessions. MET  9. Patient to be independent with home exercise program as demonstrated by performance with correct form without cues. MET     Long Term Goals: (  24 Treatments)   9. Increase strength (pounds);  a. R UE  strength to 45 pounds for increased function with ADLs  b. R Lateral pinch to 16 for increased I with self care tasks  c. R two point pinch to 13 for increased I with home care tasks  d. R three jaw miguel to 17 for increased I with all work tasks  10. UEFI score of 50 or more for increased function  11. Scar will be soft and supple with no scar adhesions     Plan: [] Continue current frequency toward short and long term goals.   [x] Specific Instructions for subsequent treatments: continue to add in additional AROM and strengthening for future exercises   [] Other:       Time In:0700 Time Out: 0800       Electronically signed by:  EDMUND Luis/GISEL

## 2021-09-03 ENCOUNTER — HOSPITAL ENCOUNTER (OUTPATIENT)
Dept: OCCUPATIONAL THERAPY | Age: 46
Setting detail: THERAPIES SERIES
Discharge: HOME | End: 2021-09-03
Payer: COMMERCIAL

## 2021-09-03 PROCEDURE — 97140 MANUAL THERAPY 1/> REGIONS: CPT

## 2021-09-03 PROCEDURE — 97035 APP MDLTY 1+ULTRASOUND EA 15: CPT

## 2021-09-03 NOTE — FLOWSHEET NOTE
increased I with self care tasks ONGOING, Improved 13 pounds  c. R two point pinch to 10 for increased I with home care tasks ONGOING Improved 6 pounds  4. R three jaw miguel to 15 for increased I with all work tasks ONGOING 10 pounds  5. Increase function:UE Functional Index Score 25 or more points to promote increased functional abilities ONGOING Improved 8/80  6. Scar will be soft and pliable with minimal tethering. ONGOING  7. Decrease Edema: R P1 to 5.0 mm and R P2 4.2 mm ONGOING  8. Demo knowledge of fall prevention in 3 sessions. MET  9. Patient to be independent with home exercise program as demonstrated by performance with correct form without cues. MET     Long Term Goals: (  24 Treatments)   9. Increase strength (pounds);  a. R UE  strength to 45 pounds for increased function with ADLs  b. R Lateral pinch to 16 for increased I with self care tasks  c. R two point pinch to 13 for increased I with home care tasks  d. R three jaw miguel to 17 for increased I with all work tasks  10. UEFI score of 50 or more for increased function  11. Scar will be soft and supple with no scar adhesions     Plan: [] Continue current frequency toward short and long term goals.   [x] Specific Instructions for subsequent treatments: continue to add in additional AROM and strengthening for future exercises   [] Other:       Time In:0700 Time Out: 0800       Electronically signed by:  EDMUND Bhardwaj/GISEL

## 2021-09-08 ENCOUNTER — HOSPITAL ENCOUNTER (OUTPATIENT)
Dept: OCCUPATIONAL THERAPY | Age: 46
Setting detail: THERAPIES SERIES
Discharge: HOME | End: 2021-09-08
Payer: COMMERCIAL

## 2021-09-08 PROCEDURE — 97035 APP MDLTY 1+ULTRASOUND EA 15: CPT

## 2021-09-08 PROCEDURE — 97140 MANUAL THERAPY 1/> REGIONS: CPT

## 2021-09-08 NOTE — FLOWSHEET NOTE
[x] John Martin       Occupational Therapy            1st floor       [address], [place]         Phone: [phone]       Fax: [phone] [] [facility]  [address], [place]  Phone: [phone]  Fax: [phone]      Occupational Therapy Daily Treatment Note    Date:  2021  Patient Name:  Bridger Jaffe    :  1975  MRN: [id number]  Referring Provider:  Janel Waite MD  Insurance: Other [employer] 60/60 visit per year hard max no Preauth required  Medical Diagnosis: S62.616A Closed displaced fracture of proximal phalanx of R little finger, initial encounter   Rehab Codes: pain in hand M79.646,, pain in wrist M25.53,, stiffness in hand M25.64,, stiffness in wrist M25.63,, numbness R20.2,, malaise R53.0, , lack of coordination R27.8,, fine motor skills loss R29.818,, muscle weakness generalized M62.81,, adherent scar L90.5, or edema localized R60.0,  Onset Date: May [address] 2021              Next Dr. Lynch Ree: 8-  Visit# / total visits: ; Progress note for Medicare patient completed at visit 8-10    Cancels/No Shows: 0/1    Subjective:    Pain:  [x] Yes  [] No Location: \"the whole hand\" Pain Rating: (0-10 scale) 10  /10   Pain altered Tx:  [x] No  [] Yes  Action:  Pt Comments:  \"It is always a  10\"     Objective:  Modalities: Modality Flow Sheet:  START STOP Tx Modality     Electrical Stim:     21  Ultrasound: _.8__ W/cm2 x __8_ mins  Duty factor: _x_100%  __50%  __33% __20%  Head size:    2_ cm  MHz: __1mHz  _x_3mHz  Location: dorsal side 5th MC     Hot Pack:     Cold Pack:     Precautions: NA  Exercises:  EXERCISE    REPS/     TIME  WEIGHT/    LEVEL COMMENTS   US   See above   PROM 32 mins  MCP, PIP, DIP   Towel Crunches 10  completed   Foam block yellow soft HEP   Theraputty   Strength  Finger flexion  Finger extension  Radial Dev  Ulnar Dev  Palmar Pinch  Lateral Pinch  Thumb ext  Three jaw miguel  Finger thumb ext       Not completed     Wooden pegs with velcro 1 series  NOT completed Used thumb, ring and small digit to pull up completed   Pinch pins with foam blocks 1/2 series Yellow ( 1 pound)  NOT completed    Active blocked finger exercises 10   NOT completed   Flexbar   strength  Wrist Flexion/Ext  Wrist pronation  Wrist supination  Wrist ulnar deviation  Wrist radial dev  Shoulder Adb  Shoulder Add     Not completed   Other:   Pt tolerated US to dorsal side of R hand 5th MC bone/scar. Pt tolerated PROM of R digits of the 4th and 5th for promotion of increased I with adls. Pt reports seeing doctor for a second opinion on 9-10-21. Treatment Charges: Mins Units   []  Modalities:      [x]  Ultrasound 8 1   []  Ther Exercise     [x]  Manual Therapy 45 3   []  Ther Activities     []  Orthotic fit/train     []  Orthotic recheck     []  Other     Total Treatment time 53      Assessment: [] Progressing toward goals. [x] No change. [] Other    Assessment:  Patient would benefit from skilled occupational therapy services in order to: Improve and Promote  functional /grasp, ROM, Strength, Activity tolerance and Complaint of pain in order to Ensure safe return to work>      Patient Goals: to get back to work    Pt. Education:  [x] Yes  [] No  [x] Reviewed Prior HEP/Ed  Method of Education: [x] Verbal  [x] Demo  [] Written  Re:  Comprehension of Education:  [] Verbalizes understanding. [] Demonstrates understanding. [] Needs review. [x] Demonstrates/verbalizes HEP/Ed previously given. Short Term Goals: (  12 Treatments)  1. Decrease Pain: to 7/10 with simple ROM for ADLS  2. Increase AROM (degrees) (extension/flexion)  a. R Small MCP to 0/75 ONGOING +5/30  b. R  Small PIP to 0/80 ONGOING -14/27   c. R Small DIP to 0/40  ONGOING 0/13  3.  Increase strength (pounds);  a. R UE  strength to 20 pounds for increased function with ADLs ONGOING, Improved 12 pounds  b. R Lateral pinch to 14 for increased I with

## 2021-09-09 ENCOUNTER — HOSPITAL ENCOUNTER (OUTPATIENT)
Dept: OCCUPATIONAL THERAPY | Age: 46
Setting detail: THERAPIES SERIES
Discharge: HOME | End: 2021-09-09
Payer: COMMERCIAL

## 2021-09-09 PROCEDURE — 97140 MANUAL THERAPY 1/> REGIONS: CPT

## 2021-09-09 PROCEDURE — 97035 APP MDLTY 1+ULTRASOUND EA 15: CPT

## 2021-09-09 NOTE — FLOWSHEET NOTE
[x] John Martin       Occupational Therapy            1st floor       [address], [address]         Phone: [phone]       Fax: [phone] [] [facility]  [address] [facility], [address]  Phone: [phone]  Fax: [phone]      Occupational Therapy Daily Treatment Note    Date:  2021  Patient Name:  Shaggy Christopher    :  1975  MRN: [id number]  Referring Provider:  Billie George MD  Insurance: Other [employer] 60/60 visit per year hard max no Preauth required  Medical Diagnosis: S62.616A Closed displaced fracture of proximal phalanx of R little finger, initial encounter   Rehab Codes: pain in hand M79.646,, pain in wrist M25.53,, stiffness in hand M25.64,, stiffness in wrist M25.63,, numbness R20.2,, malaise R53.0, , lack of coordination R27.8,, fine motor skills loss R29.818,, muscle weakness generalized M62.81,, adherent scar L90.5, or edema localized R60.0,  Onset Date: May [address] 2021              Next Dr. Frank Castle: 8-  Visit# / total visits: ; Progress note for Medicare patient completed at visit 8-10    Cancels/No Shows: 0/1    Subjective:    Pain:  [x] Yes  [] No Location: \"the whole hand\" Pain Rating: (0-10 scale) 10  /10   Pain altered Tx:  [x] No  [] Yes  Action:  Pt Comments: \"It is absolutely terrible. \"     Objective:  Modalities: Modality Flow Sheet:  START STOP Tx Modality     Electrical Stim:     21  Ultrasound: _.8__ W/cm2 x __8_ mins  Duty factor: _x_100%  __50%  __33% __20%  Head size:    2_ cm  MHz: __1mHz  _x_3mHz  Location: dorsal side 5th MC     Hot Pack:     Cold Pack:     Precautions: NA  Exercises:  EXERCISE    REPS/     TIME  WEIGHT/    LEVEL COMMENTS   US   See above   PROM 32 mins  MCP, PIP, DIP   Towel Crunches 10  completed   Foam block yellow soft HEP   Theraputty   Strength  Finger flexion  Finger extension  Radial Dev  Ulnar Dev  Palmar Pinch  Lateral Pinch  Thumb ext  Three jaw miguel  Finger thumb ext       Not completed     Wooden pegs with velcro 1 series  NOT completed Used thumb, ring and small digit to pull up completed   Pinch pins with foam blocks 1/2 series Yellow ( 1 pound)  NOT completed    Active blocked finger exercises 10   NOT completed   Flexbar   strength  Wrist Flexion/Ext  Wrist pronation  Wrist supination  Wrist ulnar deviation  Wrist radial dev  Shoulder Adb  Shoulder Add     Not completed   Other:   Pt tolerated US to dorsal side of R hand 5th MC bone/scar. Pt tolerated PROM of R digits of the 4th and 5th for promotion of increased I with adls. Pt reports seeing doctor for a second opinion on 9-10-21. Treatment Charges: Mins Units   []  Modalities:      [x]  Ultrasound 8 1   []  Ther Exercise     [x]  Manual Therapy 45 3   []  Ther Activities     []  Orthotic fit/train     []  Orthotic recheck     []  Other     Total Treatment time 53      Assessment: [] Progressing toward goals. [x] No change. [] Other    Assessment:  Patient would benefit from skilled occupational therapy services in order to: Improve and Promote  functional /grasp, ROM, Strength, Activity tolerance and Complaint of pain in order to Ensure safe return to work>      Patient Goals: to get back to work    Pt. Education:  [x] Yes  [] No  [x] Reviewed Prior HEP/Ed  Method of Education: [x] Verbal  [x] Demo  [] Written  Re:  Comprehension of Education:  [] Verbalizes understanding. [] Demonstrates understanding. [] Needs review. [x] Demonstrates/verbalizes HEP/Ed previously given. Short Term Goals: (  12 Treatments)  1. Decrease Pain: to 7/10 with simple ROM for ADLS  2. Increase AROM (degrees) (extension/flexion)  a. R Small MCP to 0/75 ONGOING +5/30  b. R  Small PIP to 0/80 ONGOING -14/27   c. R Small DIP to 0/40  ONGOING 0/13  3.  Increase strength (pounds);  a. R UE  strength to 20 pounds for increased function with ADLs ONGOING, Improved 12 pounds  b. R Lateral pinch to 14 for increased I

## 2021-09-10 ENCOUNTER — HOSPITAL ENCOUNTER (OUTPATIENT)
Dept: OCCUPATIONAL THERAPY | Age: 46
Setting detail: THERAPIES SERIES
Discharge: HOME | End: 2021-09-10
Payer: COMMERCIAL

## 2021-09-10 PROCEDURE — 97110 THERAPEUTIC EXERCISES: CPT

## 2021-09-10 PROCEDURE — 97035 APP MDLTY 1+ULTRASOUND EA 15: CPT

## 2021-09-10 NOTE — FLOWSHEET NOTE
thumb ext        completed     Wooden pegs with velcro 1 series  NOT completed Used thumb, ring and small digit to pull up completed   Pinch pins with foam blocks 1 series Yellow ( 1 pound)  completed    Active blocked finger exercises 10   NOT completed   Flexbar   strength  Wrist Flexion/Ext  Wrist pronation  Wrist supination  Wrist ulnar deviation  Wrist radial dev  Shoulder Adb  Shoulder Add 10 Yellow 6 pounds completed   Other:   Pt tolerated US to dorsal side of R hand 5th MC bone/scar. Pt tolerated PROM of R digits of the 4th and 5th for promotion of increased I with adls. Pt reports seeing doctor for a second opinion on 9-10-21. Issued coral putty for increased strength and promotion of ROM at home with HEP. Demo W/V/D. Treatment Charges: Mins Units   []  Modalities:      [x]  Ultrasound 8 1   [x]  Ther Exercise 30 1   [x]  Manual Therapy 15    []  Ther Activities     []  Orthotic fit/train     []  Orthotic recheck     []  Other     Total Treatment time 53      Assessment: [] Progressing toward goals. [x] No change. [x] Other CO TREATMENT  Assessment:  Patient would benefit from skilled occupational therapy services in order to: Improve and Promote  functional /grasp, ROM, Strength, Activity tolerance and Complaint of pain in order to Ensure safe return to work>      Patient Goals: to get back to work    Pt. Education:  [x] Yes  [] No  [x] Reviewed Prior HEP/Ed  Method of Education: [x] Verbal  [x] Demo  [] Written  Re:  Comprehension of Education:  [] Verbalizes understanding. [] Demonstrates understanding. [] Needs review. [x] Demonstrates/verbalizes HEP/Ed previously given. Short Term Goals: (  12 Treatments)  1. Decrease Pain: to 7/10 with simple ROM for ADLS  2. Increase AROM (degrees) (extension/flexion)  a. R Small MCP to 0/75 ONGOING +5/30  b. R  Small PIP to 0/80 ONGOING -14/27   c. R Small DIP to 0/40  ONGOING 0/13  3.  Increase strength (pounds);  a. R UE  strength to 20

## 2021-09-13 ENCOUNTER — HOSPITAL ENCOUNTER (OUTPATIENT)
Dept: OCCUPATIONAL THERAPY | Age: 46
Setting detail: THERAPIES SERIES
Discharge: HOME | End: 2021-09-13
Payer: COMMERCIAL

## 2021-09-13 PROCEDURE — 97035 APP MDLTY 1+ULTRASOUND EA 15: CPT

## 2021-09-13 PROCEDURE — 97110 THERAPEUTIC EXERCISES: CPT

## 2021-09-13 NOTE — FLOWSHEET NOTE
[x] John Martin       Occupational Therapy            1st floor       [address], [place]         Phone: [phone]       Fax: [phone] [] [facility]  [address] [place] [address]  Phone: [phone]  Fax: [phone]      Occupational Therapy Daily Treatment Note    Date:  2021  Patient Name:  Tyrone Alonso    :  1975  MRN: [id number]  Referring Provider:  Eliud Becerril MD  Insurance: Other [employer] 60/60 visit per year hard max no Preauth required  Medical Diagnosis: S62.616A Closed displaced fracture of proximal phalanx of R little finger, initial encounter   Rehab Codes: pain in hand M79.646,, pain in wrist M25.53,, stiffness in hand M25.64,, stiffness in wrist M25.63,, numbness R20.2,, malaise R53.0, , lack of coordination R27.8,, fine motor skills loss R29.818,, muscle weakness generalized M62.81,, adherent scar L90.5, or edema localized R60.0,  Onset Date: May [address] 2021              Next Dr. Shiloh Gunter: 8-  Visit# / total visits: ; Progress note for Medicare patient completed at visit 8-10    Cancels/No Shows: 0/1    Subjective:    Pain:  [x] Yes  [] No Location: \"the whole hand\" Pain Rating: (0-10 scale) 10  /10   Pain altered Tx:  [x] No  [] Yes  Action:  Pt Comments: \"It is absolutely terrible. \"     Objective:  Modalities: Modality Flow Sheet:  START STOP Tx Modality     Electrical Stim:     21  Ultrasound: _.8__ W/cm2 x __8_ mins  Duty factor: _x_100%  __50%  __33% __20%  Head size:    2_ cm  MHz: __1mHz  _x_3mHz  Location: dorsal side 5th MC     Hot Pack:     Cold Pack:     Precautions: NA  Exercises:  EXERCISE    REPS/     TIME  WEIGHT/    LEVEL COMMENTS   US   See above   PROM 32 mins  MCP, PIP, DIP   Towel Crunches 10  completed   Foam block yellow soft HEP   Theraputty   Strength  Finger flexion  Finger extension  Radial Dev  Ulnar Dev  Palmar Pinch  Lateral Pinch  Thumb ext  Three jaw miguel  Finger thumb ext        completed     Wooden pegs with velcro 1 series  NOT completed Used thumb, ring and small digit to pull up completed   Pinch pins with foam blocks 1 series Yellow ( 1 pound)  completed    Active blocked finger exercises 10   NOT completed   Flexbar   strength  Wrist Flexion/Ext  Wrist pronation  Wrist supination  Wrist ulnar deviation  Wrist radial dev  Shoulder Adb  Shoulder Add 10 Yellow 6 pounds   completed   Other:   Pt tolerated US to dorsal side of R hand 5th MC bone/scar. Pt tolerated PROM of R digits of the 4th and 5th for promotion of increased I with adls. Pt reports had seen a doctor for a second opinion on 9-10-21. Pt reports extreme frustration as the medical records were not sent to the doctor for the second opinion. Pt reports discouragement regarding the second opinion as doctor unable to view records. Treatment Charges: Mins Units   []  Modalities:      [x]  Ultrasound 8 1   [x]  Ther Exercise 30 3   [x]  Manual Therapy 16    []  Ther Activities     []  Orthotic fit/train     []  Orthotic recheck     []  Other     Total Treatment time 54      Assessment: [] Progressing toward goals. [x] No change. [] Other   Assessment:  Patient would benefit from skilled occupational therapy services in order to: Improve and Promote  functional /grasp, ROM, Strength, Activity tolerance and Complaint of pain in order to Ensure safe return to work>      Patient Goals: to get back to work    Pt. Education:  [x] Yes  [] No  [x] Reviewed Prior HEP/Ed  Method of Education: [x] Verbal  [x] Demo  [] Written  Re:  Comprehension of Education:  [] Verbalizes understanding. [] Demonstrates understanding. [] Needs review. [x] Demonstrates/verbalizes HEP/Ed previously given. Short Term Goals: (  12 Treatments)  1. Decrease Pain: to 7/10 with simple ROM for ADLS  2.  Increase AROM (degrees) (extension/flexion)  a. R Small MCP to 0/75 ONGOING +5/30  b. R  Small PIP to 0/80 ONGOING -14/27   c. R Small DIP to 0/40  ONGOING 0/13  3. Increase strength (pounds);  a. R UE  strength to 20 pounds for increased function with ADLs ONGOING, Improved 12 pounds  b. R Lateral pinch to 14 for increased I with self care tasks ONGOING, Improved 13 pounds  c. R two point pinch to 10 for increased I with home care tasks ONGOING Improved 6 pounds  4. R three jaw miguel to 15 for increased I with all work tasks ONGOING 10 pounds  5. Increase function:UE Functional Index Score 25 or more points to promote increased functional abilities ONGOING Improved 8/80  6. Scar will be soft and pliable with minimal tethering. ONGOING  7. Decrease Edema: R P1 to 5.0 mm and R P2 4.2 mm ONGOING  8. Demo knowledge of fall prevention in 3 sessions. MET  9. Patient to be independent with home exercise program as demonstrated by performance with correct form without cues. MET     Long Term Goals: (  24 Treatments)   9. Increase strength (pounds);  a. R UE  strength to 45 pounds for increased function with ADLs  b. R Lateral pinch to 16 for increased I with self care tasks  c. R two point pinch to 13 for increased I with home care tasks  d. R three jaw miguel to 17 for increased I with all work tasks  10. UEFI score of 50 or more for increased function  11. Scar will be soft and supple with no scar adhesions     Plan: [] Continue current frequency toward short and long term goals.   [x] Specific Instructions for subsequent treatments: continue to add in additional AROM and strengthening for future exercises   [] Other:       Time In:0700 Time Out: 0800       Electronically signed by:  EDMUND Cabello/GISEL

## 2021-09-13 NOTE — PROGRESS NOTES
[x] [facility]  [address]  [phone]  F: [phone] [] [facility]  [address] [place] [address]  Phone: [phone]  Fax: [phone]        Occupational Therapy Progress Note    Date: 2021      Patient: Lorri Hardin  : 1975  MRN: [id number]    Referring Telma Jensen MD  [address] 60/60 visit per year hard max no Preauth required  Medical Diagnosis: S62.616A Closed displaced fracture of proximal phalanx of R little finger, initial encounter   Rehab Codes: pain in hand M79.646,, pain in wrist M25.53,, stiffness in hand M25.64,, stiffness in wrist M25.63,, numbness R20.2,, malaise R53.0, , lack of coordination R27.8,, fine motor skills loss R29.818,, muscle weakness generalized M62.81,, adherent scar L90.5, or edema localized R60.0,  Onset Date: [address] 2021              Next Dr. Micheline Kidd  Visit# / total visits: ; Progress note for Medicare patient due at visit 8-10                     Cancels/No Shows: 0/1      Subjective:  Pain:  [x] Yes  [] No  Location:  N/A Pain Rating: (0-10 scale)9 /10 Pain altered Tx:  [x] No  [] Yes  Action:  Comments: \"I am so frustrated\"     Objective:  STRENGTH (Measured in pounds)  9-13-21  pounds RIGHT LEFT    24 inc 12 47   Lateral pinch 13 same 14   2 point pinch 9 inc 3 11   3 jaw pinch 10 same 16   The affected extremity is 49% weaker than the unaffected extremity. (affected score/unaffected score, take the total and subtract from 100)     R MCP              19.6 mm inc .2  L MCP                         18.6 mm     R Small P1     5.4 mm  ? .1          L Small P1 4.6 mm  ? .3  R small P2      4.3 mm  ?  .1              L Small P2 4.4     DIGITS   Right Extension/Flexion  degrees LITTLE   MCP +5\20   PIP -20\40   DIP -5\13            Current Functional Level:   (dec ) functionally impaired as measured with the Upper Extremity Functional Index Survey. 0-80 scale, with 80 = no Deficits  (The UEFI model does not provide any specific cut off points that could classify the upper limb disability degree, however, a minimal detectable change of 9 points is provided. This means that for improvement or deterioration to be considered, between two subsequent evaluations, the scores must differ by at least 9 points.)    Assessment:  Patient would benefit from skilled occupational therapy services in order to: Improve and Promote  functional /grasp, ROM, Strength, Activity tolerance and Complaint of pain in order to Ensure safe return to work     Short Term Goals: (  12 Treatments)  1. Decrease Pain: to 7/10 with simple ROM for ADLS NOT MET  2. Increase AROM (degrees) (extension/flexion)  a. R Small MCP to 0/75 ONGOING +5/30  ONGOING  b. R  Small PIP to 0/80 ONGOING -14/27  ONGOING/MET  c. R Small DIP to 0/40  ONGOING 0/13     ONGOING/MET  3. Increase strength (pounds);  a. R UE  strength to 20 pounds for increased function with ADLs MET 24 pounds  b. R Lateral pinch to 14 for increased I with self care tasks ONGOING, Improved 13 pounds  c. R two point pinch to 10 for increased I with home care tasks ONGOING Improved 9 pounds  4. R three jaw miguel to 15 for increased I with all work tasks ONGOING 10 pounds  5. Increase function:UE Functional Index Score 25 or more points to promote increased functional abilities ONGOING  7/80  6. Scar will be soft and pliable with minimal tethering. ONGOING  7. Decrease Edema: R P1 to 5.0 mm and R P2 4.2 mm ONGOING  8. Demo knowledge of fall prevention in 3 sessions. MET  9. Patient to be independent with home exercise program as demonstrated by performance with correct form without cues. MET     Long Term Goals: (  24 Treatments)   9.  Increase strength (pounds);  a. R UE  strength to 45 pounds for increased function with ADLs  b. R Lateral pinch to 16 for increased I with self care tasks  c. R two point pinch to 13 for increased I with home care tasks  d. R three jaw miguel to 17 for increased I with all work tasks  10. UEFI score of 50 or more for increased function  11. Scar will be soft and supple with no scar adhesions     Patient Goals: to get back to work    Treatment Plan:   Therapeutic Ex 16600, Therapeutic Act 72805, Massage [id number], Manual 33707, HEP, Cold/Hot Pack, Ultrasound [id number] and Iontophoresis (4mg/mL dexamethasone sodium phosphate 40-120mA min) 78795    Patient Status: (requested frequency/duration)     [] Continue per initial/current plan of care 2-3 times per week for  remaining visits. clarification. [x] Additional visits necessary. Plan for additional 12 visits to promote strength and ROM for return to work. Electronically signed by YAMILETH Zavala on 9/13/2021 at 7:12 AM      If you have any questions or concerns, please don't hesitate to call. Thank you for your referral.    Physician Signature:________________________________Date:__________________  By signing above or cosigning this note, I have reviewed this plan of care and certify a need for medically necessary rehabilitation services.      *PLEASE SIGN ABOVE AND FAX BACK ALL PAGES*

## 2021-09-15 ENCOUNTER — HOSPITAL ENCOUNTER (OUTPATIENT)
Dept: OCCUPATIONAL THERAPY | Age: 46
Setting detail: THERAPIES SERIES
Discharge: HOME | End: 2021-09-15
Payer: COMMERCIAL

## 2021-09-15 PROCEDURE — 97035 APP MDLTY 1+ULTRASOUND EA 15: CPT

## 2021-09-15 PROCEDURE — 97140 MANUAL THERAPY 1/> REGIONS: CPT

## 2021-09-15 NOTE — FLOWSHEET NOTE
[x] [facility]       Occupational Therapy            1st floor       [address] [place], [place]         Phone: [phone]       Fax: [phone] [] [facility]  [address] [place] [address]  Phone: [phone]  Fax: [phone]      Occupational Therapy Daily Treatment Note    Date:  9/15/2021  Patient Name:  Ace Hobson    :  1975  MRN: [id number]  Referring Provider:  Fernanda Contreras MD  Insurance: Other [employer] 60/60 visit per year hard max no Preauth required  Medical Diagnosis: S62.616A Closed displaced fracture of proximal phalanx of R little finger, initial encounter   Rehab Codes: pain in hand M79.646,, pain in wrist M25.53,, stiffness in hand M25.64,, stiffness in wrist M25.63,, numbness R20.2,, malaise R53.0, , lack of coordination R27.8,, fine motor skills loss R29.818,, muscle weakness generalized M62.81,, adherent scar L90.5, or edema localized R60.0,  Onset Date: May [address] 2021              Next Dr. Myers Bharath: 8-  Visit# / total visits: ; Progress note for Medicare patient completed at visit 8-10    Cancels/No Shows: 0/1    Subjective:    Pain:  [x] Yes  [] No Location:  Pain Rating: (0-10 scale) 10/10   Pain altered Tx:  [x] No  [] Yes  Action:  Pt Comments:  \"It is absolutely terrible and it does not get better\"     Objective:  Modalities: Modality Flow Sheet:  START STOP Tx Modality     Electrical Stim:     21  Ultrasound: _.8__ W/cm2 x __8_ mins  Duty factor: _x_100%  __50%  __33% __20%  Head size:    2_ cm  MHz: __1mHz  _x_3mHz  Location: dorsal side 5th MC     Hot Pack:     Cold Pack:     Precautions: NA  Exercises:  EXERCISE    REPS/     TIME  WEIGHT/    LEVEL COMMENTS   US   See above   PROM    MCP, PIP, DIP   Towel Crunches 10  NOT completed   Foam block yellow soft HEP   Theraputty   Strength  Finger flexion  Finger extension  Radial Dev  Ulnar Dev  Palmar Pinch  Lateral Pinch  Thumb ext  Three jaw miguel  Finger thumb ext        NOT completed     Wooden pegs with velcro 1 series  NOT completed Used thumb, ring and small digit to pull up completed   Pinch pins with foam blocks 1 series Yellow ( 1 pound) completed added finger platter   Active blocked finger exercises 10   NOT completed   Flexbar   strength  Wrist Flexion/Ext  Wrist pronation  Wrist supination  Wrist ulnar deviation  Wrist radial dev  Shoulder Adb  Shoulder Add 10 Red 10  pounds   completed   Other:   Pt tolerated US to dorsal side of R hand 5th MC bone/scar. Pt tolerated PROM of R digits of the 4th and 5th for promotion of increased I with adls. Pt continues to have extrinsic and intrinsic tightness of the small digit with noted discomfort during PROM. Treatment Charges: Mins Units   []  Modalities:      [x]  Ultrasound 8 1   [x]  Ther Exercise 10    [x]  Manual Therapy 35 3   []  Ther Activities     []  Orthotic fit/train     []  Orthotic recheck     []  Other     Total Treatment time 53      Assessment: [] Progressing toward goals. [x] No change. [] Other   Assessment:  Patient would benefit from skilled occupational therapy services in order to: Improve and Promote  functional /grasp, ROM, Strength, Activity tolerance and Complaint of pain in order to Ensure safe return to work     Patient Goals: to get back to work    Pt. Education:  [x] Yes  [] No  [x] Reviewed Prior HEP/Ed  Method of Education: [x] Verbal  [x] Demo  [] Written  Re:  Comprehension of Education:  [] Verbalizes understanding. [] Demonstrates understanding. [] Needs review. [x] Demonstrates/verbalizes HEP/Ed previously given. Short Term Goals: (  12 Treatments)  1. Decrease Pain: to 7/10 with simple ROM for ADLS  2. Increase AROM (degrees) (extension/flexion)  a. R Small MCP to 0/75 ONGOING +5/30  b. R  Small PIP to 0/80 ONGOING -14/27   c. R Small DIP to 0/40  ONGOING 0/13  3.  Increase strength (pounds);  a. R UE  strength to 20 pounds for increased function with ADLs ONGOING, Improved 12 pounds  b. R Lateral pinch to 14 for increased I with self care tasks ONGOING, Improved 13 pounds  c. R two point pinch to 10 for increased I with home care tasks ONGOING Improved 6 pounds  4. R three jaw miguel to 15 for increased I with all work tasks ONGOING 10 pounds  5. Increase function:UE Functional Index Score 25 or more points to promote increased functional abilities ONGOING Improved 8/80  6. Scar will be soft and pliable with minimal tethering. ONGOING  7. Decrease Edema: R P1 to 5.0 mm and R P2 4.2 mm ONGOING  8. Demo knowledge of fall prevention in 3 sessions. MET  9. Patient to be independent with home exercise program as demonstrated by performance with correct form without cues. MET     Long Term Goals: (  24 Treatments)   9. Increase strength (pounds);  a. R UE  strength to 45 pounds for increased function with ADLs  b. R Lateral pinch to 16 for increased I with self care tasks  c. R two point pinch to 13 for increased I with home care tasks  d. R three jaw miguel to 17 for increased I with all work tasks  10. UEFI score of 50 or more for increased function  11. Scar will be soft and supple with no scar adhesions     Plan: [] Continue current frequency toward short and long term goals.   [x] Specific Instructions for subsequent treatments: continue to add in additional AROM and strengthening for future exercises   [] Other:       Time In:0700 Time Out: 0800       Electronically signed by:  Sharyle Antes, OTR/GISEL

## 2021-09-17 ENCOUNTER — HOSPITAL ENCOUNTER (OUTPATIENT)
Dept: OCCUPATIONAL THERAPY | Age: 46
Setting detail: THERAPIES SERIES
Discharge: HOME | End: 2021-09-17
Payer: COMMERCIAL

## 2021-09-17 NOTE — SIGNIFICANT EVENT
[x] [facility]  [address]    [phone]  F: [phone]     Occupational Therapy Cancel/No Show note    Date: 2021  Patient: Quan Stockton  : 1975  MRN: [id number]    Cancels/No Shows to date:     For today's appointment patient:    [x]  Cancelled    [] Rescheduled appointment    [] No-show     Reason given by patient:    []  Patient ill    []  Conflicting appointment    [x] No transportation      [] Conflict with work    [] No reason given    [] Weather related    [] COVID-19    [] Other:    Comments:        [x] Next appointment was confirmed    Electronically signed by: EDMUND Flores/L

## 2021-09-20 ENCOUNTER — HOSPITAL ENCOUNTER (OUTPATIENT)
Dept: OCCUPATIONAL THERAPY | Age: 46
Setting detail: THERAPIES SERIES
Discharge: HOME | End: 2021-09-20
Payer: COMMERCIAL

## 2021-09-20 PROCEDURE — 97035 APP MDLTY 1+ULTRASOUND EA 15: CPT

## 2021-09-20 PROCEDURE — 97140 MANUAL THERAPY 1/> REGIONS: CPT

## 2021-09-20 NOTE — FLOWSHEET NOTE
[x] [facility]       Occupational Therapy            1st floor       [address], [place]         Phone: [phone]       Fax: [phone] [] [facility]  [address] [place] [address]  Phone: [phone]  Fax: [phone]      Occupational Therapy Daily Treatment Note    Date:  2021  Patient Name:  Yamileth Robertson    :  1975  MRN: [id number]  Referring Provider:  Holly Muñoz MD  Insurance: Other [employer] 60/60 visit per year hard max no Preauth required  Medical Diagnosis: S62.616A Closed displaced fracture of proximal phalanx of R little finger, initial encounter   Rehab Codes: pain in hand M79.646,, pain in wrist M25.53,, stiffness in hand M25.64,, stiffness in wrist M25.63,, numbness R20.2,, malaise R53.0, , lack of coordination R27.8,, fine motor skills loss R29.818,, muscle weakness generalized M62.81,, adherent scar L90.5, or edema localized R60.0,  Onset Date: May [address] 2021              Next Dr. Walton Prime: 8-  Visit# / total visits: ; Progress note for Medicare patient completed at visit 8-10    Cancels/No Shows: 0/1    Subjective:    Pain:  [x] Yes  [] No Location:  Pain Rating: (0-10 scale) 10/10   Pain altered Tx:  [x] No  [] Yes  Action:  Pt Comments:  \"My hand did not move much over the weekend\"     Objective:  Modalities: Modality Flow Sheet:  START STOP Tx Modality     Electrical Stim:     21  Ultrasound: _.8__ W/cm2 x __8_ mins  Duty factor: _x_100%  __50%  __33% __20%  Head size:    2_ cm  MHz: __1mHz  _x_3mHz  Location: dorsal side 5th MC     Hot Pack:     Cold Pack:     Precautions: NA  Exercises:  EXERCISE    REPS/     TIME  WEIGHT/    LEVEL COMMENTS   US   See above   PROM    MCP, PIP, DIP   Towel Crunches 10  NOT completed   Foam block yellow soft HEP   Theraputty   Strength  Finger flexion  Finger extension  Radial Dev  Ulnar Dev  Palmar Pinch  Lateral Pinch  Thumb ext  Three jaw miguel  Finger with ADLs ONGOING, Improved 12 pounds  b. R Lateral pinch to 14 for increased I with self care tasks ONGOING, Improved 13 pounds  c. R two point pinch to 10 for increased I with home care tasks ONGOING Improved 6 pounds  4. R three jaw miguel to 15 for increased I with all work tasks ONGOING 10 pounds  5. Increase function:UE Functional Index Score 25 or more points to promote increased functional abilities ONGOING Improved 8/80  6. Scar will be soft and pliable with minimal tethering. ONGOING  7. Decrease Edema: R P1 to 5.0 mm and R P2 4.2 mm ONGOING  8. Demo knowledge of fall prevention in 3 sessions. MET  9. Patient to be independent with home exercise program as demonstrated by performance with correct form without cues. MET     Long Term Goals: (  24 Treatments)   9. Increase strength (pounds);  a. R UE  strength to 45 pounds for increased function with ADLs  b. R Lateral pinch to 16 for increased I with self care tasks  c. R two point pinch to 13 for increased I with home care tasks  d. R three jaw miguel to 17 for increased I with all work tasks  10. UEFI score of 50 or more for increased function  11. Scar will be soft and supple with no scar adhesions     Plan: [] Continue current frequency toward short and long term goals.   [x] Specific Instructions for subsequent treatments: continue to add in additional AROM and strengthening for future exercises   [] Other:       Time [id number] Time Out: 0800       Electronically signed by:  EDMUND Keane/GISEL

## 2021-09-22 ENCOUNTER — HOSPITAL ENCOUNTER (OUTPATIENT)
Dept: OCCUPATIONAL THERAPY | Age: 46
Setting detail: THERAPIES SERIES
Discharge: HOME | End: 2021-09-22
Payer: COMMERCIAL

## 2021-09-22 PROCEDURE — 97035 APP MDLTY 1+ULTRASOUND EA 15: CPT

## 2021-09-22 PROCEDURE — 97140 MANUAL THERAPY 1/> REGIONS: CPT

## 2021-09-22 NOTE — FLOWSHEET NOTE
[x] [facility]       Occupational Therapy            1st floor       [address] [place], [place]         Phone: [phone]       Fax: [phone] [] [facility]  [address], [place]  Phone: [phone]  Fax: [phone]      Occupational Therapy Daily Treatment Note    Date:  2021  Patient Name:  Yamileth Robertson    :  1975  MRN: [id number]  Referring Provider:  Holly Muñoz MD  Insurance: Other [employer] 60/60 visit per year hard max no Preauth required  Medical Diagnosis: S62.616A Closed displaced fracture of proximal phalanx of R little finger, initial encounter   Rehab Codes: pain in hand M79.646,, pain in wrist M25.53,, stiffness in hand M25.64,, stiffness in wrist M25.63,, numbness R20.2,, malaise R53.0, , lack of coordination R27.8,, fine motor skills loss R29.818,, muscle weakness generalized M62.81,, adherent scar L90.5, or edema localized R60.0,  Onset Date: May [address] 2021              Next Dr. Walton Prime: 8-  Visit# / total visits: ; Progress note for Medicare patient completed at visit 8-10    Cancels/No Shows: 0/1    Subjective:    Pain:  [x] Yes  [] No Location:  Pain Rating: (0-10 scale) 10/10   Pain altered Tx:  [x] No  [] Yes  Action:  Pt Comments: \"I stretched my small finger yesterday\"     Objective:  Modalities: Modality Flow Sheet:  START STOP Tx Modality     Electrical Stim:     21  Ultrasound: _.8__ W/cm2 x __8_ mins  Duty factor: _x_100%  __50%  __33% __20%  Head size:    2_ cm  MHz: __1mHz  _x_3mHz  Location: dorsal side 5th MC     Hot Pack:     Cold Pack:     Precautions: NA  Exercises:  EXERCISE    REPS/     TIME  WEIGHT/    LEVEL COMMENTS   US   See above   PROM    MCP, PIP, DIP   Towel Crunches 10  NOT completed   Foam block yellow soft HEP   Theraputty   Strength  Finger flexion  Finger extension  Radial Dev  Ulnar Dev  Palmar Pinch  Lateral Pinch  Thumb ext  Three jaw miguel  Finger thumb ext        NOT completed     Wooden pegs with velcro 1 series  NOT completed Used thumb, ring and small digit to pull up completed   Pinch pins with foam blocks 1 series Yellow ( 1 pound) Not completed added finger platter   Active blocked finger exercises 10   NOT completed   Flexbar   strength  Wrist Flexion/Ext  Wrist pronation  Wrist supination  Wrist ulnar deviation  Wrist radial dev  Shoulder Adb  Shoulder Add 10 Red 10  pounds   completed   Other:   Pt tolerated US to dorsal side of R hand 5th MC bone/scar. Pt tolerated PROM of R digits of the hand for promotion of increased I with adls. Pt continues to have extrinsic and intrinsic tightness of the small digit with noted discomfort during PROM. Increased pain reported this date with MCP flexion PROM. Treatment Charges: Mins Units   []  Modalities:      [x]  Ultrasound 8 1   []  Ther Exercise     [x]  Manual Therapy 35 2   []  Ther Activities     []  Orthotic fit/train     []  Orthotic recheck     []  Other     Total Treatment time 43      Assessment: [] Progressing toward goals. [x] No change. [] Other   Assessment:  Patient would benefit from skilled occupational therapy services in order to: Improve and Promote  functional /grasp, ROM, Strength, Activity tolerance and Complaint of pain in order to Ensure safe return to work     Patient Goals: to get back to work    Pt. Education:  [x] Yes  [] No  [x] Reviewed Prior HEP/Ed  Method of Education: [x] Verbal  [x] Demo  [] Written  Re:  Comprehension of Education:  [] Verbalizes understanding. [] Demonstrates understanding. [] Needs review. [x] Demonstrates/verbalizes HEP/Ed previously given. Short Term Goals: (  12 Treatments)  1. Decrease Pain: to 7/10 with simple ROM for ADLS  2. Increase AROM (degrees) (extension/flexion)  a. R Small MCP to 0/75 ONGOING +5/30  b. R  Small PIP to 0/80 ONGOING -14/27   c. R Small DIP to 0/40  ONGOING 0/13  3.  Increase strength (pounds);  a. R UE  strength to 20 pounds for increased function with ADLs ONGOING, Improved 12 pounds  b. R Lateral pinch to 14 for increased I with self care tasks ONGOING, Improved 13 pounds  c. R two point pinch to 10 for increased I with home care tasks ONGOING Improved 6 pounds  4. R three jaw miguel to 15 for increased I with all work tasks ONGOING 10 pounds  5. Increase function:UE Functional Index Score 25 or more points to promote increased functional abilities ONGOING Improved 8/80  6. Scar will be soft and pliable with minimal tethering. ONGOING  7. Decrease Edema: R P1 to 5.0 mm and R P2 4.2 mm ONGOING  8. Demo knowledge of fall prevention in 3 sessions. MET  9. Patient to be independent with home exercise program as demonstrated by performance with correct form without cues. MET     Long Term Goals: (  24 Treatments)   9. Increase strength (pounds);  a. R UE  strength to 45 pounds for increased function with ADLs  b. R Lateral pinch to 16 for increased I with self care tasks  c. R two point pinch to 13 for increased I with home care tasks  d. R three jaw miguel to 17 for increased I with all work tasks  10. UEFI score of 50 or more for increased function  11. Scar will be soft and supple with no scar adhesions     Plan: [] Continue current frequency toward short and long term goals.   [x] Specific Instructions for subsequent treatments: continue to add in additional AROM and strengthening for future exercises   [] Other:       Time [id number]:[id number] Time Out: 0800       Electronically signed by:  EDMUND Cabello/GISEL

## 2021-09-27 ENCOUNTER — HOSPITAL ENCOUNTER (OUTPATIENT)
Dept: OCCUPATIONAL THERAPY | Age: 46
Setting detail: THERAPIES SERIES
End: 2021-09-27
Payer: COMMERCIAL

## 2021-09-27 PROBLEM — G47.00 INSOMNIA: Status: ACTIVE | Noted: 2020-02-06

## 2021-09-27 PROBLEM — K21.9 GASTROESOPHAGEAL REFLUX DISEASE: Status: ACTIVE | Noted: 2021-09-27

## 2021-09-27 PROBLEM — E66.9 OBESITY WITH BODY MASS INDEX 30 OR GREATER: Status: ACTIVE | Noted: 2020-02-06

## 2021-09-27 PROBLEM — R07.9 CHEST PAIN: Status: ACTIVE | Noted: 2021-09-27

## 2021-09-27 PROBLEM — S62.309A FRACTURE OF METACARPAL BONE: Status: ACTIVE | Noted: 2021-09-10

## 2021-09-27 PROBLEM — E87.6 HYPOKALEMIA: Status: ACTIVE | Noted: 2021-09-27

## 2021-09-27 PROBLEM — F41.9 ANXIETY: Status: ACTIVE | Noted: 2020-02-06

## 2021-09-27 PROBLEM — N60.19 FIBROCYSTIC DISEASE OF BREAST: Status: ACTIVE | Noted: 2020-02-06

## 2021-09-29 ENCOUNTER — APPOINTMENT (OUTPATIENT)
Dept: OCCUPATIONAL THERAPY | Age: 46
End: 2021-09-29
Payer: COMMERCIAL

## 2021-09-30 ENCOUNTER — HOSPITAL ENCOUNTER (OUTPATIENT)
Dept: OCCUPATIONAL THERAPY | Age: 46
Setting detail: THERAPIES SERIES
Discharge: HOME | End: 2021-09-30
Payer: COMMERCIAL

## 2021-09-30 PROCEDURE — 97035 APP MDLTY 1+ULTRASOUND EA 15: CPT

## 2021-09-30 PROCEDURE — 97140 MANUAL THERAPY 1/> REGIONS: CPT

## 2021-09-30 NOTE — FLOWSHEET NOTE
NOT completed Used thumb, ring and small digit to pull up completed   Pinch pins with foam blocks 1 series Yellow ( 1 pound) Not completed added finger platter   Active blocked finger exercises 10   NOT completed   Flexbar   strength  Wrist Flexion/Ext  Wrist pronation  Wrist supination  Wrist ulnar deviation  Wrist radial dev  Shoulder Adb  Shoulder Add 10 Red 10  pounds   completed   Other:  Pt reports having tried to stretch the digit by self while therapist was off ill. Pt reports being upset unable to get into therapy. Scheduled for following week at 7 per pt preference. Pt reports having gone to see doctor and is to see therapy through month of October. Therapist called doctor to request additional script for further treatments. Will follow up with office for visits. Treatment Charges: Mins Units   []  Modalities:      [x]  Ultrasound 8 1   []  Ther Exercise     [x]  Manual Therapy 51 3   []  Ther Activities     []  Orthotic fit/train     []  Orthotic recheck     []  Other     Total Treatment time 59      Assessment: [] Progressing toward goals. [x] No change. [x] Other Needs additional visits for therapy through the month of October. Awaiting script. Assessment:  Patient would benefit from skilled occupational therapy services in order to: Improve and Promote  functional /grasp, ROM, Strength, Activity tolerance and Complaint of pain in order to Ensure safe return to work     Patient Goals: to get back to work    Pt. Education:  [x] Yes  [] No  [x] Reviewed Prior HEP/Ed  Method of Education: [x] Verbal  [x] Demo  [] Written  Re:  Comprehension of Education:  [] Verbalizes understanding. [] Demonstrates understanding. [] Needs review. [x] Demonstrates/verbalizes HEP/Ed previously given. Short Term Goals: (  12 Treatments)  1. Decrease Pain: to 7/10 with simple ROM for ADLS  2.  Increase AROM (degrees) (extension/flexion)  a. R Small MCP to 0/75 ONGOING +5/30  b. R  Small PIP to 0/80 ONGOING -14/27   c. R Small DIP to 0/40  ONGOING 0/13  3. Increase strength (pounds);  a. R UE  strength to 20 pounds for increased function with ADLs ONGOING, Improved 12 pounds  b. R Lateral pinch to 14 for increased I with self care tasks ONGOING, Improved 13 pounds  c. R two point pinch to 10 for increased I with home care tasks ONGOING Improved 6 pounds  4. R three jaw miguel to 15 for increased I with all work tasks ONGOING 10 pounds  5. Increase function:UE Functional Index Score 25 or more points to promote increased functional abilities ONGOING Improved 8/80  6. Scar will be soft and pliable with minimal tethering. ONGOING  7. Decrease Edema: R P1 to 5.0 mm and R P2 4.2 mm ONGOING  8. Demo knowledge of fall prevention in 3 sessions. MET  9. Patient to be independent with home exercise program as demonstrated by performance with correct form without cues. MET     Long Term Goals: (  24 Treatments)   9. Increase strength (pounds);  a. R UE  strength to 45 pounds for increased function with ADLs  b. R Lateral pinch to 16 for increased I with self care tasks  c. R two point pinch to 13 for increased I with home care tasks  d. R three jaw miguel to 17 for increased I with all work tasks  10. UEFI score of 50 or more for increased function  11. Scar will be soft and supple with no scar adhesions     Plan: [] Continue current frequency toward short and long term goals.   [x] Specific Instructions for subsequent treatments: waiting for script [] Other:       Time In:0700 Time Out: 0800       Electronically signed by:  EDMUND Gonzalez/GISEL

## 2021-10-01 ENCOUNTER — HOSPITAL ENCOUNTER (OUTPATIENT)
Dept: OCCUPATIONAL THERAPY | Age: 46
Setting detail: THERAPIES SERIES
End: 2021-10-01
Payer: COMMERCIAL

## 2021-10-04 ENCOUNTER — HOSPITAL ENCOUNTER (OUTPATIENT)
Dept: OCCUPATIONAL THERAPY | Age: 46
Setting detail: THERAPIES SERIES
Discharge: HOME | End: 2021-10-04
Payer: COMMERCIAL

## 2021-10-04 PROCEDURE — 97140 MANUAL THERAPY 1/> REGIONS: CPT

## 2021-10-04 PROCEDURE — 97035 APP MDLTY 1+ULTRASOUND EA 15: CPT

## 2021-10-04 NOTE — FLOWSHEET NOTE
[x] John Martin       Occupational Therapy            1st floor       [address], [place]         Phone: [phone]       Fax: [phone] [] [facility]  [address] [place] [address]  Phone: [phone]  Fax: [phone]      Occupational Therapy Daily Treatment Note    Date:  10/4/2021  Patient Name:  Sobeida Steel    :  1975  MRN: [id number]  Referring Provider:  Addis Kirby MD  Insurance: Other [employer] 60/60 visit per year hard max no Preauth required  Medical Diagnosis: S62.616A Closed displaced fracture of proximal phalanx of R little finger, initial encounter   Rehab Codes: pain in hand M79.646,, pain in wrist M25.53,, stiffness in hand M25.64,, stiffness in wrist M25.63,, numbness R20.2,, malaise R53.0, , lack of coordination R27.8,, fine motor skills loss R29.818,, muscle weakness generalized M62.81,, adherent scar L90.5, or edema localized R60.0,  Onset Date: May [address] 2021              Next Dr. Stephens Runnin-  Visit# / total visits: 25/36; New script 21  Cancels/No Shows: 0/1    Subjective:    Pain:  [x] Yes  [] No Location:  Pain Rating: (0-10 scale) 10/10   Pain altered Tx:  [x] No  [] Yes  Action:  Pt Comments:  \"My hand really hurts today\"     Objective:  Modalities: Modality Flow Sheet:  START STOP Tx Modality     Electrical Stim:     21  Ultrasound: _.8__ W/cm2 x __8_ mins  Duty factor: _x_100%  __50%  __33% __20%  Head size:    2_ cm  MHz: __1mHz  _x_3mHz  Location: dorsal side 5th MC     Hot Pack:     Cold Pack:     Precautions: NA  Exercises:  EXERCISE    REPS/     TIME  WEIGHT/    LEVEL COMMENTS   US   See above   PROM    MCP, PIP, DIP   Towel Crunches 10  NOT completed   Foam block yellow soft HEP   Theraputty   Strength  Finger flexion  Finger extension  Radial Dev  Ulnar Dev  Palmar Pinch  Lateral Pinch  Thumb ext  Three jaw miguel  Finger thumb ext        NOT completed     Wooden pegs with velcro 1 series  NOT completed Used thumb, ring and small digit to pull up completed   Pinch pins with foam blocks 1 series Yellow ( 1 pound) Not completed finger platter   Active blocked finger exercises 10   NOT completed   Flexbar   strength  Wrist Flexion/Ext  Wrist pronation  Wrist supination  Wrist ulnar deviation  Wrist radial dev  Shoulder Adb  Shoulder Add 10 Red 10  pounds   completed   Other:  Pt reports having tried to stretch the digit by over weekend and reports intense pain this date as a result. Additional visits provided by doctor for therapy at 3 times a week for 4 weeks. Script in chart. Treatment Charges: Mins Units   []  Modalities:      [x]  Ultrasound 8 1   []  Ther Exercise     [x]  Manual Therapy 52 3   []  Ther Activities     []  Orthotic fit/train     []  Orthotic recheck     []  Other     Total Treatment time 60      Assessment: [] Progressing toward goals. [x] No change. [] Other   Assessment:  Patient would benefit from skilled occupational therapy services in order to: Improve and Promote  functional /grasp, ROM, Strength, Activity tolerance and Complaint of pain in order to Ensure safe return to work     Patient Goals: to get back to work    Pt. Education:  [x] Yes  [] No  [x] Reviewed Prior HEP/Ed  Method of Education: [x] Verbal  [x] Demo  [] Written  Re:  Comprehension of Education:  [] Verbalizes understanding. [] Demonstrates understanding. [] Needs review. [x] Demonstrates/verbalizes HEP/Ed previously given. Short Term Goals: (  12 Treatments)  1. Decrease Pain: to 7/10 with simple ROM for ADLS  2. Increase AROM (degrees) (extension/flexion)  a. R Small MCP to 0/75 ONGOING +5/30  b. R  Small PIP to 0/80 ONGOING -14/27   c. R Small DIP to 0/40  ONGOING 0/13  3.  Increase strength (pounds);  a. R UE  strength to 20 pounds for increased function with ADLs ONGOING, Improved 12 pounds  b. R Lateral pinch to 14 for increased I with self care tasks ONGOING, Improved 13 pounds  c. R two point pinch to 10 for increased I with home care tasks ONGOING Improved 6 pounds  4. R three jaw miguel to 15 for increased I with all work tasks ONGOING 10 pounds  5. Increase function:UE Functional Index Score 25 or more points to promote increased functional abilities ONGOING Improved 8/80  6. Scar will be soft and pliable with minimal tethering. ONGOING  7. Decrease Edema: R P1 to 5.0 mm and R P2 4.2 mm ONGOING  8. Demo knowledge of fall prevention in 3 sessions. MET  9. Patient to be independent with home exercise program as demonstrated by performance with correct form without cues. MET     Long Term Goals: (  24 Treatments)   9. Increase strength (pounds);  a. R UE  strength to 45 pounds for increased function with ADLs  b. R Lateral pinch to 16 for increased I with self care tasks  c. R two point pinch to 13 for increased I with home care tasks  d. R three jaw miguel to 17 for increased I with all work tasks  10. UEFI score of 50 or more for increased function  11. Scar will be soft and supple with no scar adhesions     Plan: [] Continue current frequency toward short and long term goals.   [x] Specific Instructions for subsequent treatments:   [] Other:       Time In:0700 Time Out: 0800       Electronically signed by:  EDMUND See/GISEL

## 2021-10-06 ENCOUNTER — HOSPITAL ENCOUNTER (OUTPATIENT)
Dept: OCCUPATIONAL THERAPY | Age: 46
Setting detail: THERAPIES SERIES
Discharge: HOME | End: 2021-10-06
Payer: COMMERCIAL

## 2021-10-06 PROCEDURE — 97140 MANUAL THERAPY 1/> REGIONS: CPT

## 2021-10-06 NOTE — FLOWSHEET NOTE
[x] John Mario       Occupational Therapy            1st floor       [address] [place], [place]         Phone: [phone]       Fax: [phone] [] [facility]  [address], [place]  Phone: [phone]  Fax: [phone]      Occupational Therapy Daily Treatment Note    Date:  10/6/2021  Patient Name:  Sobeida Steel    :  1975  MRN: [id number]  Referring Provider:  Addis Kirby MD  Insurance: Other [employer] 60/60 visit per year hard max no Preauth required  Medical Diagnosis: S62.616A Closed displaced fracture of proximal phalanx of R little finger, initial encounter   Rehab Codes: pain in hand M79.646,, pain in wrist M25.53,, stiffness in hand M25.64,, stiffness in wrist M25.63,, numbness R20.2,, malaise R53.0, , lack of coordination R27.8,, fine motor skills loss R29.818,, muscle weakness generalized M62.81,, adherent scar L90.5, or edema localized R60.0,  Onset Date: May [address] 2021              Next Dr. Stephens Runnin-  Visit# / total visits: 26/36; New script 21  Cancels/No Shows: 0/1    Subjective:    Pain:  [x] Yes  [] No Location:  Pain Rating: (0-10 scale) 10/10   Pain altered Tx:  [x] No  [] Yes  Action:  Pt Comments: \"Sorry I was late, the trains kept me from getting here.  \"     Objective:  Modalities: Modality Flow Sheet:  START STOP Tx Modality     Electrical Stim:     21  Ultrasound: _.8__ W/cm2 x __8_ mins  Duty factor: _x_100%  __50%  __33% __20%  Head size:    2_ cm  MHz: __1mHz  _x_3mHz  Location: dorsal side 5th MC     Hot Pack:     Cold Pack:     Precautions: NA  Exercises:  EXERCISE    REPS/     TIME  WEIGHT/    LEVEL COMMENTS   US   See above   PROM    MCP, PIP, DIP   Towel Crunches 10  NOT completed   Foam block yellow soft HEP   Theraputty   Strength  Finger flexion  Finger extension  Radial Dev  Ulnar Dev  Palmar Pinch  Lateral Pinch  Thumb ext  Three jaw miguel  Finger thumb ext        NOT completed pounds  b. R Lateral pinch to 14 for increased I with self care tasks ONGOING, Improved 13 pounds  c. R two point pinch to 10 for increased I with home care tasks ONGOING Improved 6 pounds  4. R three jaw miguel to 15 for increased I with all work tasks ONGOING 10 pounds  5. Increase function:UE Functional Index Score 25 or more points to promote increased functional abilities ONGOING Improved 8/80  6. Scar will be soft and pliable with minimal tethering. ONGOING  7. Decrease Edema: R P1 to 5.0 mm and R P2 4.2 mm ONGOING  8. Demo knowledge of fall prevention in 3 sessions. MET  9. Patient to be independent with home exercise program as demonstrated by performance with correct form without cues. MET     Long Term Goals: (  24 Treatments)   9. Increase strength (pounds);  a. R UE  strength to 45 pounds for increased function with ADLs  b. R Lateral pinch to 16 for increased I with self care tasks  c. R two point pinch to 13 for increased I with home care tasks  d. R three jaw miguel to 17 for increased I with all work tasks  10. UEFI score of 50 or more for increased function  11. Scar will be soft and supple with no scar adhesions     Plan: [] Continue current frequency toward short and long term goals.   [x] Specific Instructions for subsequent treatments:   [] Other:       Time In:0720 Time Out: 0800       Electronically signed by:  EDMUND Lee/GISEL

## 2021-10-08 ENCOUNTER — HOSPITAL ENCOUNTER (OUTPATIENT)
Dept: OCCUPATIONAL THERAPY | Age: 46
Setting detail: THERAPIES SERIES
Discharge: HOME | End: 2021-10-08
Payer: COMMERCIAL

## 2021-10-11 ENCOUNTER — HOSPITAL ENCOUNTER (OUTPATIENT)
Dept: OCCUPATIONAL THERAPY | Age: 46
Setting detail: THERAPIES SERIES
Discharge: HOME | End: 2021-10-11
Payer: COMMERCIAL

## 2021-10-11 PROCEDURE — 97140 MANUAL THERAPY 1/> REGIONS: CPT

## 2021-10-11 NOTE — FLOWSHEET NOTE
[x] John Martin       Occupational Therapy            1st floor       [address], [place]         Phone: [phone]       Fax: [phone] [] [facility]  [address] [place] [address]  Phone: [phone]  Fax: [phone]      Occupational Therapy Daily Treatment Note    Date:  10/11/2021  Patient Name:  José Luis Clemente    :  1975  MRN: [id number]  Referring Provider:  Joey Angeles MD  Insurance: Other [employer] 60/60 visit per year hard max no Preauth required  Medical Diagnosis: S62.616A Closed displaced fracture of proximal phalanx of R little finger, initial encounter   Rehab Codes: pain in hand M79.646,, pain in wrist M25.53,, stiffness in hand M25.64,, stiffness in wrist M25.63,, numbness R20.2,, malaise R53.0, , lack of coordination R27.8,, fine motor skills loss R29.818,, muscle weakness generalized M62.81,, adherent scar L90.5, or edema localized R60.0,  Onset Date: May [address] 2021              Next Dr. Martinez Shirts: 8-  Visit# / total visits: 27/36; New script 21  Cancels/No Shows: 0/1    Subjective:    Pain:  [x] Yes  [] No Location:  Pain Rating: (0-10 scale) 10/10   Pain altered Tx:  [x] No  [] Yes  Action:  Pt Comments: \"I am so upset with the doctor, he is messing with my livelyhood \"     Objective:  Modalities: Modality Flow Sheet:  START STOP Tx Modality     Electrical Stim:     21  Ultrasound: _.8__ W/cm2 x __8_ mins  Duty factor: _x_100%  __50%  __33% __20%  Head size:    2_ cm  MHz: __1mHz  _x_3mHz  Location: dorsal side 5th MC     Hot Pack:     Cold Pack:     Precautions: NA  Exercises:  EXERCISE    REPS/     TIME  WEIGHT/    LEVEL COMMENTS   US   See above   PROM    MCP, PIP, DIP   Towel Crunches 10  NOT completed   Foam block yellow soft HEP   Theraputty   Strength  Finger flexion  Finger extension  Radial Dev  Ulnar Dev  Palmar Pinch  Lateral Pinch  Thumb ext  Three jaw miguel  Finger thumb ext        NOT completed     Wooden pegs with velcro 1 series  NOT completed Used thumb, ring and small digit to pull up completed   Pinch pins with foam blocks 1 series Yellow ( 1 pound) Not completed finger platter   Active blocked finger exercises 10   NOT completed   Flexbar   strength  Wrist Flexion/Ext  Wrist pronation  Wrist supination  Wrist ulnar deviation  Wrist radial dev  Shoulder Adb  Shoulder Add 10 Red 10  pounds   Not completed   Other:  Pt reports hand being stiff from missing Friday appointment. Pt has noted intrinsics and extrinsics of all digits on the R hand this date. Pt tolerated PROM to all digits at all joints with noted pain and discomfort. By end of session pt small digit was ranged to 90 degrees flexion of MCP, 90 degrees flexion of PIP and 70 degrees flexion of DIP (after prolonged stretch). Treatment Charges: Mins Units   []  Modalities:      [x]  Ultrasound 8    []  Ther Exercise     [x]  Manual Therapy 50 4   []  Ther Activities     []  Orthotic fit/train     []  Orthotic recheck     []  Other     Total Treatment time 58      Assessment: [] Progressing toward goals. [x] No change. [] Other   Assessment:  Patient would benefit from skilled occupational therapy services in order to: Improve and Promote  functional /grasp, ROM, Strength, Activity tolerance and Complaint of pain in order to Ensure safe return to work     Patient Goals: to get back to work    Pt. Education:  [x] Yes  [] No  [x] Reviewed Prior HEP/Ed  Method of Education: [x] Verbal  [x] Demo  [] Written  Re:  Comprehension of Education:  [] Verbalizes understanding. [] Demonstrates understanding. [] Needs review. [x] Demonstrates/verbalizes HEP/Ed previously given. Short Term Goals: (  12 Treatments)  1. Decrease Pain: to 7/10 with simple ROM for ADLS  2.  Increase AROM (degrees) (extension/flexion)  a. R Small MCP to 0/75 ONGOING +5/30  b. R  Small PIP to 0/80 ONGOING -14/27   c. R Small DIP to 0/40  ONGOING 0/13  3. Increase strength (pounds);  a. R UE  strength to 20 pounds for increased function with ADLs ONGOING, Improved 12 pounds  b. R Lateral pinch to 14 for increased I with self care tasks ONGOING, Improved 13 pounds  c. R two point pinch to 10 for increased I with home care tasks ONGOING Improved 6 pounds  4. R three jaw miguel to 15 for increased I with all work tasks ONGOING 10 pounds  5. Increase function:UE Functional Index Score 25 or more points to promote increased functional abilities ONGOING Improved 8/80  6. Scar will be soft and pliable with minimal tethering. ONGOING  7. Decrease Edema: R P1 to 5.0 mm and R P2 4.2 mm ONGOING  8. Demo knowledge of fall prevention in 3 sessions. MET  9. Patient to be independent with home exercise program as demonstrated by performance with correct form without cues. MET     Long Term Goals: (  24 Treatments)   9. Increase strength (pounds);  a. R UE  strength to 45 pounds for increased function with ADLs  b. R Lateral pinch to 16 for increased I with self care tasks  c. R two point pinch to 13 for increased I with home care tasks  d. R three jaw miguel to 17 for increased I with all work tasks  10. UEFI score of 50 or more for increased function  11. Scar will be soft and supple with no scar adhesions     Plan: [] Continue current frequency toward short and long term goals.   [x] Specific Instructions for subsequent treatments:   [] Other:       Time In:0700 Time Out: 0800       Electronically signed by:  EDMUND Martinez/GISEL

## 2021-10-15 ENCOUNTER — HOSPITAL ENCOUNTER (OUTPATIENT)
Dept: OCCUPATIONAL THERAPY | Age: 46
Setting detail: THERAPIES SERIES
Discharge: HOME | End: 2021-10-15
Payer: COMMERCIAL

## 2021-10-15 PROCEDURE — 97140 MANUAL THERAPY 1/> REGIONS: CPT

## 2021-10-15 NOTE — FLOWSHEET NOTE
[x] John Martin       Occupational Therapy            1st floor       [address], [place]         Phone: [phone]       Fax: [phone] [] [facility]  [address] [place] [address]  Phone: [phone]  Fax: [phone]      Occupational Therapy Daily Treatment Note    Date:  10/15/2021  Patient Name:  Elva Garber    :  1975  MRN: [id number]  Referring Provider:  Tripp Bowen MD  Insurance: Other [employer] 60/60 visit per year hard max no Preauth required  Medical Diagnosis: S62.616A Closed displaced fracture of proximal phalanx of R little finger, initial encounter   Rehab Codes: pain in hand M79.646,, pain in wrist M25.53,, stiffness in hand M25.64,, stiffness in wrist M25.63,, numbness R20.2,, malaise R53.0, , lack of coordination R27.8,, fine motor skills loss R29.818,, muscle weakness generalized M62.81,, adherent scar L90.5, or edema localized R60.0,  Onset Date: May [address] 2021              Next Dr. Crowell Surendra: 8-  Visit# / total visits: ; New script 21  Cancels/No Shows:     Subjective:    Pain:  [x] Yes  [] No Location:  Pain Rating: (0-10 scale) 10/10   Pain altered Tx:  [x] No  [] Yes  Action:  Pt Comments:  \"It hurts like crazy \"     Objective:  Modalities: Modality Flow Sheet:  START STOP Tx Modality     Electrical Stim:     21  Ultrasound: _.8__ W/cm2 x __8_ mins  Duty factor: _x_100%  __50%  __33% __20%  Head size:    2_ cm  MHz: __1mHz  _x_3mHz  Location: dorsal side 5th MC     Hot Pack:     Cold Pack:     Precautions: NA  Exercises:  EXERCISE    REPS/     TIME  WEIGHT/    LEVEL COMMENTS   US   See above   PROM    MCP, PIP, DIP   Towel Crunches 10  NOT completed   Foam block yellow soft HEP   Theraputty   Strength  Finger flexion  Finger extension  Radial Dev  Ulnar Dev  Palmar Pinch  Lateral Pinch  Thumb ext  Three jaw miguel  Finger thumb ext        NOT completed     Wooden pegs with velcro 1 series NOT completed Used thumb, ring and small digit to pull up completed   Pinch pins with foam blocks 1 series Yellow ( 1 pound) completed   Active blocked finger exercises 10   NOT completed   Flexbar   strength  Wrist Flexion/Ext  Wrist pronation  Wrist supination  Wrist ulnar deviation  Wrist radial dev  Shoulder Adb  Shoulder Add 10 Red 10  pounds   Not completed   Other:  Pt reports hand being stiff from missing Thursday appointment. Pt has noted tight intrinsics and extrinsics of all digits on the R hand this date. Pt tolerated PROM to all digits at all joints with noted pain and discomfort. By end of session pt small digit was ranged to 90 degrees flexion of MCP, 90 degrees flexion of PIP and 70 degrees flexion of DIP (after prolonged stretch). Treatment Charges: Mins Units   []  Modalities:      [x]  Ultrasound 8    []  Ther Exercise     [x]  Manual Therapy 50 2   []  Ther Activities     []  Orthotic fit/train     []  Orthotic recheck     []  Other     Total Treatment time 58      Assessment: [] Progressing toward goals. [x] No change. [] Other   Assessment:  Patient would benefit from skilled occupational therapy services in order to: Improve and Promote  functional /grasp, ROM, Strength, Activity tolerance and Complaint of pain in order to Ensure safe return to work     Patient Goals: to get back to work    Pt. Education:  [x] Yes  [] No  [x] Reviewed Prior HEP/Ed  Method of Education: [x] Verbal  [x] Demo  [] Written  Re:  Comprehension of Education:  [] Verbalizes understanding. [] Demonstrates understanding. [] Needs review. [x] Demonstrates/verbalizes HEP/Ed previously given. Short Term Goals: (  12 Treatments)  1. Decrease Pain: to 7/10 with simple ROM for ADLS  2. Increase AROM (degrees) (extension/flexion)  a. R Small MCP to 0/75 ONGOING +5/30  b. R  Small PIP to 0/80 ONGOING -14/27   c. R Small DIP to 0/40  ONGOING 0/13  3.  Increase strength (pounds);  a. R UE  strength to 20 pounds for increased function with ADLs ONGOING, Improved 12 pounds  b. R Lateral pinch to 14 for increased I with self care tasks ONGOING, Improved 13 pounds  c. R two point pinch to 10 for increased I with home care tasks ONGOING Improved 6 pounds  4. R three jaw miguel to 15 for increased I with all work tasks ONGOING 10 pounds  5. Increase function:UE Functional Index Score 25 or more points to promote increased functional abilities ONGOING Improved 8/80  6. Scar will be soft and pliable with minimal tethering. ONGOING  7. Decrease Edema: R P1 to 5.0 mm and R P2 4.2 mm ONGOING  8. Demo knowledge of fall prevention in 3 sessions. MET  9. Patient to be independent with home exercise program as demonstrated by performance with correct form without cues. MET     Long Term Goals: (  24 Treatments)   9. Increase strength (pounds);  a. R UE  strength to 45 pounds for increased function with ADLs  b. R Lateral pinch to 16 for increased I with self care tasks  c. R two point pinch to 13 for increased I with home care tasks  d. R three jaw miguel to 17 for increased I with all work tasks  10. UEFI score of 50 or more for increased function  11. Scar will be soft and supple with no scar adhesions     Plan: [] Continue current frequency toward short and long term goals.   [x] Specific Instructions for subsequent treatments:   [] Other: co treat       Time In:0700 Time Out: 0800       Electronically signed by:  EDMUND Martinez/GISEL

## 2021-10-18 ENCOUNTER — HOSPITAL ENCOUNTER (OUTPATIENT)
Dept: OCCUPATIONAL THERAPY | Age: 46
Setting detail: THERAPIES SERIES
Discharge: HOME | End: 2021-10-18
Payer: COMMERCIAL

## 2021-10-18 PROCEDURE — 97110 THERAPEUTIC EXERCISES: CPT

## 2021-10-18 PROCEDURE — 97140 MANUAL THERAPY 1/> REGIONS: CPT

## 2021-10-18 NOTE — FLOWSHEET NOTE
[x] John Martin       Occupational Therapy            1st floor       [address], [place]         Phone: [phone]       Fax: [phone] [] [facility]  [address] [place] [address]  Phone: [phone]  Fax: [phone]      Occupational Therapy Daily Treatment Note    Date:  10/18/2021  Patient Name:  Yamilka Eagle    :  1975  MRN: [id number]  Referring Provider:  Ranulfo Connelly MD  Insurance: Other [employer] 60/60 visit per year hard max no Preauth required  Medical Diagnosis: S62.616A Closed displaced fracture of proximal phalanx of R little finger, initial encounter   Rehab Codes: pain in hand M79.646,, pain in wrist M25.53,, stiffness in hand M25.64,, stiffness in wrist M25.63,, numbness R20.2,, malaise R53.0, , lack of coordination R27.8,, fine motor skills loss R29.818,, muscle weakness generalized M62.81,, adherent scar L90.5, or edema localized R60.0,  Onset Date: May [address] 2021              Next Dr. Bey Glance: 8-  Visit# / total visits: ; New script 21  Cancels/No Shows:     Subjective:    Pain:  [x] Yes  [] No Location:  Pain Rating: (0-10 scale) 10/10 (900%)  Pain altered Tx:  [x] No  [] Yes  Action:  Pt Comments:  \"It is still the same, the pain is getting old \"     Objective:  Modalities: Modality Flow Sheet:  START STOP Tx Modality     Electrical Stim:     21  Ultrasound: _.8__ W/cm2 x __8_ mins  Duty factor: _x_100%  __50%  __33% __20%  Head size:    2_ cm  MHz: __1mHz  _x_3mHz  Location: dorsal side 5th MC     Hot Pack:     Cold Pack:     Precautions: NA  Exercises:  EXERCISE    REPS/     TIME  WEIGHT/    LEVEL COMMENTS   US   See above   PROM    MCP, PIP, DIP   Towel Crunches 10  NOT completed   Foam block yellow soft HEP   Theraputty   Strength  Finger flexion  Finger extension  Radial Dev  Ulnar Dev  Palmar Pinch  Lateral Pinch  Thumb ext  Three jaw miguel  Finger thumb ext        NOT completed Wooden pegs with velcro 1 series  NOT completed Used thumb, ring and small digit to pull up completed   Pinch pins with foam blocks 1 series Yellow ( 1 pound) completed   Active blocked finger exercises 10   NOT completed   Flexbar   strength  Wrist Flexion/Ext  Wrist pronation  Wrist supination  Wrist ulnar deviation  Wrist radial dev  Shoulder Adb  Shoulder Add 10 Red 10  pounds   Not completed   Other:  Pt arrived 10 mins late due to being caught by a train. Pt continues to demo intrinsic and extrinsic tightness of the small digit this date. Treatment Charges: Mins Units   []  Modalities:      [x]  Ultrasound 8    []  Ther Exercise     [x]  Manual Therapy 40 3   []  Ther Activities     []  Orthotic fit/train     []  Orthotic recheck     []  Other     Total Treatment time 48      Assessment: [] Progressing toward goals. [x] No change. [] Other   Assessment:  Patient would benefit from skilled occupational therapy services in order to: Improve and Promote  functional /grasp, ROM, Strength, Activity tolerance and Complaint of pain in order to Ensure safe return to work     Patient Goals: to get back to work    Pt. Education:  [x] Yes  [] No  [x] Reviewed Prior HEP/Ed  Method of Education: [x] Verbal  [x] Demo  [] Written  Re:  Comprehension of Education:  [] Verbalizes understanding. [] Demonstrates understanding. [] Needs review. [x] Demonstrates/verbalizes HEP/Ed previously given. Short Term Goals: (  12 Treatments)  1. Decrease Pain: to 7/10 with simple ROM for ADLS  2. Increase AROM (degrees) (extension/flexion)  a. R Small MCP to 0/75 ONGOING +5/30  b. R  Small PIP to 0/80 ONGOING -14/27   c. R Small DIP to 0/40  ONGOING 0/13  3.  Increase strength (pounds);  a. R UE  strength to 20 pounds for increased function with ADLs ONGOING, Improved 12 pounds  b. R Lateral pinch to 14 for increased I with self care tasks ONGOING, Improved 13 pounds  c. R two point pinch to 10 for increased I with home care tasks ONGOING Improved 6 pounds  4. R three jaw miguel to 15 for increased I with all work tasks ONGOING 10 pounds  5. Increase function:UE Functional Index Score 25 or more points to promote increased functional abilities ONGOING Improved 8/80  6. Scar will be soft and pliable with minimal tethering. ONGOING  7. Decrease Edema: R P1 to 5.0 mm and R P2 4.2 mm ONGOING  8. Demo knowledge of fall prevention in 3 sessions. MET  9. Patient to be independent with home exercise program as demonstrated by performance with correct form without cues. MET     Long Term Goals: (  24 Treatments)   9. Increase strength (pounds);  a. R UE  strength to 45 pounds for increased function with ADLs  b. R Lateral pinch to 16 for increased I with self care tasks  c. R two point pinch to 13 for increased I with home care tasks  d. R three jaw miguel to 17 for increased I with all work tasks  10. UEFI score of 50 or more for increased function  11. Scar will be soft and supple with no scar adhesions     Plan: [] Continue current frequency toward short and long term goals.   [x] Specific Instructions for subsequent treatments:   [] Other: co treat       Time In:0710 Time Out: 0800       Electronically signed by:  EDMUND Martinez/GISEL

## 2021-10-20 ENCOUNTER — HOSPITAL ENCOUNTER (OUTPATIENT)
Dept: OCCUPATIONAL THERAPY | Age: 46
Setting detail: THERAPIES SERIES
Discharge: HOME | End: 2021-10-20
Payer: COMMERCIAL

## 2021-10-20 PROCEDURE — 97110 THERAPEUTIC EXERCISES: CPT

## 2021-10-20 NOTE — FLOWSHEET NOTE
[x] John Martin       Occupational Therapy            1st floor       [address] [place], [place]         Phone: [phone]       Fax: [phone] [] [facility]  [address] [place] [address]  Phone: [phone]  Fax: [phone]      Occupational Therapy Daily Treatment Note    Date:  10/20/2021  Patient Name:  Alethea Harmon    :  1975  MRN: [id number]  Referring Provider:  Stefanie Ribeiro MD  Insurance: Other [employer] 60/60 visit per year hard max no Preauth required  Medical Diagnosis: S62.616A Closed displaced fracture of proximal phalanx of R little finger, initial encounter   Rehab Codes: pain in hand M79.646,, pain in wrist M25.53,, stiffness in hand M25.64,, stiffness in wrist M25.63,, numbness R20.2,, malaise R53.0, , lack of coordination R27.8,, fine motor skills loss R29.818,, muscle weakness generalized M62.81,, adherent scar L90.5, or edema localized R60.0,  Onset Date: May [address] 2021              Next Dr. Jesus Mary: 8-  Visit# / total visits: ; New script 21  Cancels/No Shows: /    Subjective:    Pain:  [x] Yes  [] No Location:  Pain Rating: (0-10 scale) 10/10 (900%)  Pain altered Tx:  [x] No  [] Yes  Action:  Pt Comments:  \"It is a bad day for me, the anniversary of my sister\"     Objective:  Modalities: Modality Flow Sheet:  START STOP Tx Modality     Electrical Stim:     21  Ultrasound: _.8__ W/cm2 x __8_ mins  Duty factor: _x_100%  __50%  __33% __20%  Head size:    2_ cm  MHz: __1mHz  _x_3mHz  Location: dorsal side 5th MC     Hot Pack:     Cold Pack:     Precautions: NA  Exercises:  EXERCISE    REPS/     TIME  WEIGHT/    LEVEL COMMENTS   US   See above   PROM    MCP, PIP, DIP   Towel Crunches 10  NOT completed   Foam block red 2 pounds HEP   Theraputty   Strength  Finger flexion  Finger extension  Radial Dev  Ulnar Dev  Palmar Pinch  Lateral Pinch  Thumb ext  Three jaw miguel  Finger thumb ext       Not completed     Wooden pegs with velcro 1 series   completed Used thumb, ring and small digit to pull up completed   Pinch pins with foam blocks 1 series red( 2 pound) Completed increased resistance this date   Active blocked finger exercises 10   NOT completed   Flexbar   strength  Wrist Flexion/Ext  Wrist pronation  Wrist supination  Wrist ulnar deviation  Wrist radial dev  Shoulder Adb  Shoulder Add 10 Red 10  pounds   completed   Other:  Pt arrived 4 mins late due to being caught by a train. Pt continues to demo intrinsic and extrinsic tightness of the small digit this date. Treatment Charges: Mins Units   []  Modalities:      [x]  Ultrasound 8    [x]  Ther Exercise 30 3   [x]  Manual Therapy 15    []  Ther Activities     []  Orthotic fit/train     []  Orthotic recheck     []  Other     Total Treatment time 53      Assessment: [] Progressing toward goals. [x] No change. [] Other   Assessment:  Patient would benefit from skilled occupational therapy services in order to: Improve and Promote  functional /grasp, ROM, Strength, Activity tolerance and Complaint of pain in order to Ensure safe return to work     Patient Goals: to get back to work    Pt. Education:  [x] Yes  [] No  [x] Reviewed Prior HEP/Ed  Method of Education: [x] Verbal  [x] Demo  [] Written  Re:  Comprehension of Education:  [] Verbalizes understanding. [] Demonstrates understanding. [] Needs review. [x] Demonstrates/verbalizes HEP/Ed previously given. Short Term Goals: (  12 Treatments)  1. Decrease Pain: to 7/10 with simple ROM for ADLS  2. Increase AROM (degrees) (extension/flexion)  a. R Small MCP to 0/75 ONGOING +5/30  b. R  Small PIP to 0/80 ONGOING -14/27   c. R Small DIP to 0/40  ONGOING 0/13  3.  Increase strength (pounds);  a. R UE  strength to 20 pounds for increased function with ADLs ONGOING, Improved 12 pounds  b. R Lateral pinch to 14 for increased I with self care tasks ONGOING, Improved 13 pounds  c. R two point pinch to 10 for increased I with home care tasks ONGOING Improved 6 pounds  4. R three jaw miguel to 15 for increased I with all work tasks ONGOING 10 pounds  5. Increase function:UE Functional Index Score 25 or more points to promote increased functional abilities ONGOING Improved 8/80  6. Scar will be soft and pliable with minimal tethering. ONGOING  7. Decrease Edema: R P1 to 5.0 mm and R P2 4.2 mm ONGOING  8. Demo knowledge of fall prevention in 3 sessions. MET  9. Patient to be independent with home exercise program as demonstrated by performance with correct form without cues. MET     Long Term Goals: (  24 Treatments)   9. Increase strength (pounds);  a. R UE  strength to 45 pounds for increased function with ADLs  b. R Lateral pinch to 16 for increased I with self care tasks  c. R two point pinch to 13 for increased I with home care tasks  d. R three jaw miguel to 17 for increased I with all work tasks  10. UEFI score of 50 or more for increased function  11. Scar will be soft and supple with no scar adhesions     Plan: [] Continue current frequency toward short and long term goals.   [x] Specific Instructions for subsequent treatments:   [] Other: co treat       Time In:0704 Time Out: 0800       Electronically signed by:  EDMUND Calvert/GISEL

## 2021-10-22 ENCOUNTER — HOSPITAL ENCOUNTER (OUTPATIENT)
Dept: OCCUPATIONAL THERAPY | Age: 46
Setting detail: THERAPIES SERIES
Discharge: HOME | End: 2021-10-22
Payer: COMMERCIAL

## 2021-10-22 PROCEDURE — 97140 MANUAL THERAPY 1/> REGIONS: CPT

## 2021-10-22 PROCEDURE — 97110 THERAPEUTIC EXERCISES: CPT

## 2021-10-22 NOTE — FLOWSHEET NOTE
[x] [address]       Occupational Therapy            1st floor       [address], [place]         Phone: [phone]       Fax: [phone] [] [facility]  [address] [place] [address]  Phone: [phone]  Fax: [phone]      Occupational Therapy Daily Treatment Note    Date:  10/22/2021  Patient Name:  Elva Garber    :  1975  MRN: [id number]  Referring Provider:  Tripp Bowen MD  Insurance: Other [employer] 60/60 visit per year hard max no Preauth required  Medical Diagnosis: S62.616A Closed displaced fracture of proximal phalanx of R little finger, initial encounter   Rehab Codes: pain in hand M79.646,, pain in wrist M25.53,, stiffness in hand M25.64,, stiffness in wrist M25.63,, numbness R20.2,, malaise R53.0, , lack of coordination R27.8,, fine motor skills loss R29.818,, muscle weakness generalized M62.81,, adherent scar L90.5, or edema localized R60.0,  Onset Date: May [address] 2021              Next Dr. Crowell Surendra: 8-  Visit# / total visits: ; New script 21  Cancels/No Shows:     Subjective:    Pain:  [x] Yes  [] No Location:  Pain Rating: (0-10 scale) 9/10   Pain altered Tx:  [x] No  [] Yes  Action:  Pt Comments: \"I am hoping for an easy day today\"     Objective:  Modalities: Modality Flow Sheet:  START STOP Tx Modality     Electrical Stim:     21  Ultrasound: _.8__ W/cm2 x __8_ mins  Duty factor: _x_100%  __50%  __33% __20%  Head size:    2_ cm  MHz: __1mHz  _x_3mHz  Location: dorsal side 5th MC     Hot Pack:     Cold Pack:     Precautions: NA  Exercises:  EXERCISE    REPS/     TIME  WEIGHT/    LEVEL COMMENTS   US   See above   PROM    MCP, PIP, DIP   Towel Crunches 10  NOT completed   Foam block red 2 pounds HEP   Theraputty   Strength  Finger flexion  Finger extension  Radial Dev  Ulnar Dev  Palmar Pinch  Lateral Pinch  Thumb ext  Three jaw miguel  Finger thumb ext       Not completed     Wooden pegs with velcro 1 series   completed Used thumb, ring and small digit to pull up completed   Pinch pins with foam blocks 1 series red( 2 pound) Completed increased resistance this date   Active blocked finger exercises 10   NOT completed   Flexbar   strength  Wrist Flexion/Ext  Wrist pronation  Wrist supination  Wrist ulnar deviation  Wrist radial dev  Shoulder Adb  Shoulder Add 10 Red 10  pounds   Not completed   Other:   Pt continues to demo intrinsic and extrinsic tightness of the small digit this date. Pt asking about getting a second opinion for potential treatment. Treatment Charges: Mins Units   []  Modalities:      [x]  Ultrasound 8    [x]  Ther Exercise 10 1   [x]  Manual Therapy 41 3   []  Ther Activities     []  Orthotic fit/train     []  Orthotic recheck     []  Other     Total Treatment time 59      Assessment: [] Progressing toward goals. [x] No change. [] Other   Assessment:  Patient would benefit from skilled occupational therapy services in order to: Improve and Promote  functional /grasp, ROM, Strength, Activity tolerance and Complaint of pain in order to Ensure safe return to work     Patient Goals: to get back to work    Pt. Education:  [x] Yes  [] No  [x] Reviewed Prior HEP/Ed  Method of Education: [x] Verbal  [x] Demo  [] Written  Re:  Comprehension of Education:  [] Verbalizes understanding. [] Demonstrates understanding. [] Needs review. [x] Demonstrates/verbalizes HEP/Ed previously given. Short Term Goals: (  12 Treatments)  1. Decrease Pain: to 7/10 with simple ROM for ADLS  2. Increase AROM (degrees) (extension/flexion)  a. R Small MCP to 0/75 ONGOING +5/30  b. R  Small PIP to 0/80 ONGOING -14/27   c. R Small DIP to 0/40  ONGOING 0/13  3.  Increase strength (pounds);  a. R UE  strength to 20 pounds for increased function with ADLs ONGOING, Improved 12 pounds  b. R Lateral pinch to 14 for increased I with self care tasks ONGOING, Improved 13 pounds  c. R two point pinch to 10 for increased I with home care tasks ONGOING Improved 6 pounds  4. R three jaw miguel to 15 for increased I with all work tasks ONGOING 10 pounds  5. Increase function:UE Functional Index Score 25 or more points to promote increased functional abilities ONGOING Improved 8/80  6. Scar will be soft and pliable with minimal tethering. ONGOING  7. Decrease Edema: R P1 to 5.0 mm and R P2 4.2 mm ONGOING  8. Demo knowledge of fall prevention in 3 sessions. MET  9. Patient to be independent with home exercise program as demonstrated by performance with correct form without cues. MET     Long Term Goals: (  24 Treatments)   9. Increase strength (pounds);  a. R UE  strength to 45 pounds for increased function with ADLs  b. R Lateral pinch to 16 for increased I with self care tasks  c. R two point pinch to 13 for increased I with home care tasks  d. R three jaw miguel to 17 for increased I with all work tasks  10. UEFI score of 50 or more for increased function  11. Scar will be soft and supple with no scar adhesions     Plan: [] Continue current frequency toward short and long term goals.   [x] Specific Instructions for subsequent treatments:   [] Other: co treat       Time In:0700 Time Out: 0800       Electronically signed by:  EDMUND Martinez/GISEL

## 2021-10-25 ENCOUNTER — HOSPITAL ENCOUNTER (OUTPATIENT)
Dept: OCCUPATIONAL THERAPY | Age: 46
Setting detail: THERAPIES SERIES
Discharge: HOME | End: 2021-10-25
Payer: COMMERCIAL

## 2021-10-25 PROCEDURE — 97140 MANUAL THERAPY 1/> REGIONS: CPT

## 2021-10-25 PROCEDURE — 97110 THERAPEUTIC EXERCISES: CPT

## 2021-10-25 NOTE — FLOWSHEET NOTE
[x] John Martin       Occupational Therapy            1st floor       [address] [place], [place]         Phone: [phone]       Fax: [phone] [] [facility]  [address] [place] [address]  Phone: [phone]  Fax: [phone]      Occupational Therapy Daily Treatment Note    Date:  10/25/2021  Patient Name:  Krystal Burr    :  1975  MRN: [id number]  Referring Provider:  Omer Heimlich MD  Insurance: Other [employer] 60/60 visit per year hard max no Preauth required  Medical Diagnosis: S62.616A Closed displaced fracture of proximal phalanx of R little finger, initial encounter   Rehab Codes: pain in hand M79.646,, pain in wrist M25.53,, stiffness in hand M25.64,, stiffness in wrist M25.63,, numbness R20.2,, malaise R53.0, , lack of coordination R27.8,, fine motor skills loss R29.818,, muscle weakness generalized M62.81,, adherent scar L90.5, or edema localized R60.0,  Onset Date: May [address] 2021              Next Dr. Renetta Smith: 8-  Visit# / total visits: 32/36; New script 21  Cancels/No Shows: 4/    Subjective:    Pain:  [x] Yes  [] No Location:  Pain Rating: (0-10 scale) 10/10   Pain altered Tx:  [x] No  [] Yes  Action:  Pt Comments:  \"It has been a Monday so far\"     Objective:  Modalities: Modality Flow Sheet:  START STOP Tx Modality     Electrical Stim:     21  Ultrasound: _.8__ W/cm2 x __8_ mins  Duty factor: _x_100%  __50%  __33% __20%  Head size:    2_ cm  MHz: __1mHz  _x_3mHz  Location: dorsal side 5th MC     Hot Pack:     Cold Pack:     Precautions: NA  Exercises:  EXERCISE    REPS/     TIME  WEIGHT/    LEVEL COMMENTS   US   See above   PROM    MCP, PIP, DIP   Towel Crunches 10  NOT completed   Foam block red 2 pounds HEP   Theraputty   Strength  Finger flexion  Finger extension  Radial Dev  Ulnar Dev  Palmar Pinch  Lateral Pinch  Thumb ext  Three jaw miguel  Finger thumb ext       Not completed     Wooden pegs with velcro 1 series   completed Used thumb, ring and small digit to pull up completed   Pinch pins with foam blocks 1 series red( 2 pound) Completed increased resistance this date   Active blocked finger exercises 10   NOT completed   Flexbar   strength  Wrist Flexion/Ext  Wrist pronation  Wrist supination  Wrist ulnar deviation  Wrist radial dev  Shoulder Adb  Shoulder Add 10 Red 10  pounds   completed   Other:   Pt continues to demo intrinsic and extrinsic tightness of the small digit this date. Noted malrotation of the small digit. Pt asking about getting a second opinion for potential treatment. 15 degree angulation radially of the small digit on the R hand with scissoring at the P2 of the R Ring finger. Treatment Charges: Mins Units   []  Modalities:      [x]  Ultrasound 8    [x]  Ther Exercise 10 1   [x]  Manual Therapy 40 3   []  Ther Activities     []  Orthotic fit/train     []  Orthotic recheck     []  Other     Total Treatment time 58      Assessment: [] Progressing toward goals. [x] No change. [] Other   Assessment:  Patient would benefit from skilled occupational therapy services in order to: Improve and Promote  functional /grasp, ROM, Strength, Activity tolerance and Complaint of pain in order to Ensure safe return to work     Patient Goals: to get back to work    Pt. Education:  [x] Yes  [] No  [x] Reviewed Prior HEP/Ed  Method of Education: [x] Verbal  [x] Demo  [] Written  Re:  Comprehension of Education:  [] Verbalizes understanding. [] Demonstrates understanding. [] Needs review. [x] Demonstrates/verbalizes HEP/Ed previously given. Short Term Goals: (  12 Treatments)  1. Decrease Pain: to 7/10 with simple ROM for ADLS  2. Increase AROM (degrees) (extension/flexion)  a. R Small MCP to 0/75 ONGOING +5/30  b. R  Small PIP to 0/80 ONGOING -14/27   c. R Small DIP to 0/40  ONGOING 0/13  3.  Increase strength (pounds);  a. R UE  strength to 20 pounds for increased function with ADLs ONGOING, Improved 12 pounds  b. R Lateral pinch to 14 for increased I with self care tasks ONGOING, Improved 13 pounds  c. R two point pinch to 10 for increased I with home care tasks ONGOING Improved 6 pounds  4. R three jaw miguel to 15 for increased I with all work tasks ONGOING 10 pounds  5. Increase function:UE Functional Index Score 25 or more points to promote increased functional abilities ONGOING Improved 8/80  6. Scar will be soft and pliable with minimal tethering. ONGOING  7. Decrease Edema: R P1 to 5.0 mm and R P2 4.2 mm ONGOING  8. Demo knowledge of fall prevention in 3 sessions. MET  9. Patient to be independent with home exercise program as demonstrated by performance with correct form without cues. MET     Long Term Goals: (  24 Treatments)   9. Increase strength (pounds);  a. R UE  strength to 45 pounds for increased function with ADLs  b. R Lateral pinch to 16 for increased I with self care tasks  c. R two point pinch to 13 for increased I with home care tasks  d. R three jaw miguel to 17 for increased I with all work tasks  10. UEFI score of 50 or more for increased function  11. Scar will be soft and supple with no scar adhesions     Plan: [] Continue current frequency toward short and long term goals.   [x] Specific Instructions for subsequent treatments:   [] Other: co treat       Time In:0700 Time Out: 0800       Electronically signed by:  EDMUND Robertson/GISEL

## 2021-10-27 PROBLEM — M24.649: Status: ACTIVE | Noted: 2021-10-27

## 2021-10-31 ENCOUNTER — ANESTHESIA EVENT (OUTPATIENT)
Dept: OPERATING ROOM | Age: 46
End: 2021-10-31

## 2021-11-01 ENCOUNTER — HOSPITAL ENCOUNTER (OUTPATIENT)
Age: 46
Setting detail: OUTPATIENT SURGERY
Discharge: HOME | End: 2021-11-01
Attending: PLASTIC SURGERY | Admitting: PLASTIC SURGERY

## 2021-11-01 ENCOUNTER — ANESTHESIA (OUTPATIENT)
Dept: OPERATING ROOM | Age: 46
End: 2021-11-01

## 2021-11-01 VITALS — SYSTOLIC BLOOD PRESSURE: 109 MMHG | TEMPERATURE: 94.2 F | OXYGEN SATURATION: 100 % | DIASTOLIC BLOOD PRESSURE: 78 MMHG

## 2021-11-01 VITALS
WEIGHT: 231.48 LBS | TEMPERATURE: 96.8 F | SYSTOLIC BLOOD PRESSURE: 119 MMHG | DIASTOLIC BLOOD PRESSURE: 74 MMHG | HEART RATE: 81 BPM | OXYGEN SATURATION: 94 % | BODY MASS INDEX: 38.57 KG/M2 | HEIGHT: 65 IN | RESPIRATION RATE: 24 BRPM

## 2021-11-01 DIAGNOSIS — G89.18 POSTOPERATIVE PAIN: ICD-10-CM

## 2021-11-01 DIAGNOSIS — M24.649: Primary | ICD-10-CM

## 2021-11-01 LAB — HCG, PREGNANCY URINE (POC): NEGATIVE

## 2021-11-01 PROCEDURE — 81025 URINE PREGNANCY TEST: CPT

## 2021-11-01 PROCEDURE — 2500000003 HC RX 250 WO HCPCS: Performed by: NURSE ANESTHETIST, CERTIFIED REGISTERED

## 2021-11-01 PROCEDURE — 7100000001 HC PACU RECOVERY - ADDTL 15 MIN: Performed by: PLASTIC SURGERY

## 2021-11-01 PROCEDURE — 6370000000 HC RX 637 (ALT 250 FOR IP): Performed by: ANESTHESIOLOGY

## 2021-11-01 PROCEDURE — 6360000002 HC RX W HCPCS: Performed by: ANESTHESIOLOGY

## 2021-11-01 PROCEDURE — 7100000000 HC PACU RECOVERY - FIRST 15 MIN: Performed by: PLASTIC SURGERY

## 2021-11-01 PROCEDURE — 3600000012 HC SURGERY LEVEL 2 ADDTL 15MIN: Performed by: PLASTIC SURGERY

## 2021-11-01 PROCEDURE — 6360000002 HC RX W HCPCS: Performed by: NURSE ANESTHETIST, CERTIFIED REGISTERED

## 2021-11-01 PROCEDURE — 7100000011 HC PHASE II RECOVERY - ADDTL 15 MIN: Performed by: PLASTIC SURGERY

## 2021-11-01 PROCEDURE — 2580000003 HC RX 258: Performed by: NURSE ANESTHETIST, CERTIFIED REGISTERED

## 2021-11-01 PROCEDURE — 3700000000 HC ANESTHESIA ATTENDED CARE: Performed by: PLASTIC SURGERY

## 2021-11-01 PROCEDURE — 7100000010 HC PHASE II RECOVERY - FIRST 15 MIN: Performed by: PLASTIC SURGERY

## 2021-11-01 PROCEDURE — 3600000002 HC SURGERY LEVEL 2 BASE: Performed by: PLASTIC SURGERY

## 2021-11-01 PROCEDURE — 2580000003 HC RX 258: Performed by: ANESTHESIOLOGY

## 2021-11-01 PROCEDURE — 3700000001 HC ADD 15 MINUTES (ANESTHESIA): Performed by: PLASTIC SURGERY

## 2021-11-01 RX ORDER — SODIUM CHLORIDE, SODIUM LACTATE, POTASSIUM CHLORIDE, CALCIUM CHLORIDE 600; 310; 30; 20 MG/100ML; MG/100ML; MG/100ML; MG/100ML
INJECTION, SOLUTION INTRAVENOUS CONTINUOUS
Status: DISCONTINUED | OUTPATIENT
Start: 2021-11-01 | End: 2021-11-01 | Stop reason: HOSPADM

## 2021-11-01 RX ORDER — LIDOCAINE HYDROCHLORIDE 10 MG/ML
INJECTION, SOLUTION EPIDURAL; INFILTRATION; INTRACAUDAL; PERINEURAL PRN
Status: DISCONTINUED | OUTPATIENT
Start: 2021-11-01 | End: 2021-11-01 | Stop reason: SDUPTHER

## 2021-11-01 RX ORDER — FENTANYL CITRATE 50 UG/ML
50 INJECTION, SOLUTION INTRAMUSCULAR; INTRAVENOUS EVERY 5 MIN PRN
Status: DISCONTINUED | OUTPATIENT
Start: 2021-11-01 | End: 2021-11-01 | Stop reason: HOSPADM

## 2021-11-01 RX ORDER — ONDANSETRON 2 MG/ML
INJECTION INTRAMUSCULAR; INTRAVENOUS PRN
Status: DISCONTINUED | OUTPATIENT
Start: 2021-11-01 | End: 2021-11-01 | Stop reason: SDUPTHER

## 2021-11-01 RX ORDER — MIDAZOLAM HYDROCHLORIDE 2 MG/2ML
2 INJECTION, SOLUTION INTRAMUSCULAR; INTRAVENOUS ONCE
Status: DISCONTINUED | OUTPATIENT
Start: 2021-11-01 | End: 2021-11-01 | Stop reason: HOSPADM

## 2021-11-01 RX ORDER — HYDROCODONE BITARTRATE AND ACETAMINOPHEN 5; 325 MG/1; MG/1
2 TABLET ORAL ONCE
Status: COMPLETED | OUTPATIENT
Start: 2021-11-01 | End: 2021-11-01

## 2021-11-01 RX ORDER — FENTANYL CITRATE 50 UG/ML
INJECTION, SOLUTION INTRAMUSCULAR; INTRAVENOUS PRN
Status: DISCONTINUED | OUTPATIENT
Start: 2021-11-01 | End: 2021-11-01 | Stop reason: SDUPTHER

## 2021-11-01 RX ORDER — SODIUM CHLORIDE 9 MG/ML
INJECTION, SOLUTION INTRAVENOUS CONTINUOUS PRN
Status: DISCONTINUED | OUTPATIENT
Start: 2021-11-01 | End: 2021-11-01 | Stop reason: SDUPTHER

## 2021-11-01 RX ORDER — FENTANYL CITRATE 50 UG/ML
25 INJECTION, SOLUTION INTRAMUSCULAR; INTRAVENOUS EVERY 5 MIN PRN
Status: DISCONTINUED | OUTPATIENT
Start: 2021-11-01 | End: 2021-11-01 | Stop reason: HOSPADM

## 2021-11-01 RX ORDER — MIDAZOLAM HYDROCHLORIDE 2 MG/2ML
1 INJECTION, SOLUTION INTRAMUSCULAR; INTRAVENOUS EVERY 10 MIN PRN
Status: DISCONTINUED | OUTPATIENT
Start: 2021-11-01 | End: 2021-11-01 | Stop reason: HOSPADM

## 2021-11-01 RX ORDER — HYDROCODONE BITARTRATE AND ACETAMINOPHEN 5; 325 MG/1; MG/1
1 TABLET ORAL EVERY 6 HOURS PRN
Qty: 12 TABLET | Refills: 0 | Status: SHIPPED | OUTPATIENT
Start: 2021-11-01 | End: 2021-11-01 | Stop reason: HOSPADM

## 2021-11-01 RX ORDER — DEXAMETHASONE SODIUM PHOSPHATE 10 MG/ML
INJECTION INTRAMUSCULAR; INTRAVENOUS PRN
Status: DISCONTINUED | OUTPATIENT
Start: 2021-11-01 | End: 2021-11-01 | Stop reason: SDUPTHER

## 2021-11-01 RX ORDER — PROPOFOL 10 MG/ML
INJECTION, EMULSION INTRAVENOUS PRN
Status: DISCONTINUED | OUTPATIENT
Start: 2021-11-01 | End: 2021-11-01 | Stop reason: SDUPTHER

## 2021-11-01 RX ORDER — HYDROCODONE BITARTRATE AND ACETAMINOPHEN 5; 325 MG/1; MG/1
1 TABLET ORAL EVERY 4 HOURS PRN
Qty: 42 TABLET | Refills: 0 | Status: SHIPPED | OUTPATIENT
Start: 2021-11-01 | End: 2021-11-08

## 2021-11-01 RX ORDER — KETOROLAC TROMETHAMINE 30 MG/ML
15 INJECTION, SOLUTION INTRAMUSCULAR; INTRAVENOUS ONCE
Status: COMPLETED | OUTPATIENT
Start: 2021-11-01 | End: 2021-11-01

## 2021-11-01 RX ADMIN — FENTANYL CITRATE 50 MCG: 50 INJECTION, SOLUTION INTRAMUSCULAR; INTRAVENOUS at 09:50

## 2021-11-01 RX ADMIN — FENTANYL CITRATE 50 MCG: 50 INJECTION, SOLUTION INTRAMUSCULAR; INTRAVENOUS at 09:29

## 2021-11-01 RX ADMIN — SODIUM CHLORIDE: 9 INJECTION, SOLUTION INTRAVENOUS at 09:26

## 2021-11-01 RX ADMIN — FENTANYL CITRATE 25 MCG: 50 INJECTION, SOLUTION INTRAMUSCULAR; INTRAVENOUS at 09:26

## 2021-11-01 RX ADMIN — LIDOCAINE HYDROCHLORIDE 50 MG: 10 INJECTION, SOLUTION EPIDURAL; INFILTRATION; INTRACAUDAL; PERINEURAL at 09:26

## 2021-11-01 RX ADMIN — FENTANYL CITRATE 50 MCG: 50 INJECTION, SOLUTION INTRAMUSCULAR; INTRAVENOUS at 09:40

## 2021-11-01 RX ADMIN — KETOROLAC TROMETHAMINE 15 MG: 30 INJECTION, SOLUTION INTRAMUSCULAR; INTRAVENOUS at 10:05

## 2021-11-01 RX ADMIN — PROPOFOL 200 MG: 10 INJECTION, EMULSION INTRAVENOUS at 09:26

## 2021-11-01 RX ADMIN — ONDANSETRON 4 MG: 2 INJECTION, SOLUTION INTRAMUSCULAR; INTRAVENOUS at 09:26

## 2021-11-01 RX ADMIN — FENTANYL CITRATE 25 MCG: 50 INJECTION, SOLUTION INTRAMUSCULAR; INTRAVENOUS at 09:28

## 2021-11-01 RX ADMIN — HYDROCODONE BITARTRATE AND ACETAMINOPHEN 2 TABLET: 5; 325 TABLET ORAL at 10:05

## 2021-11-01 RX ADMIN — SODIUM CHLORIDE, POTASSIUM CHLORIDE, SODIUM LACTATE AND CALCIUM CHLORIDE: 600; 310; 30; 20 INJECTION, SOLUTION INTRAVENOUS at 07:45

## 2021-11-01 RX ADMIN — DEXAMETHASONE SODIUM PHOSPHATE 10 MG: 10 INJECTION INTRAMUSCULAR; INTRAVENOUS at 09:26

## 2021-11-01 ASSESSMENT — PAIN SCALES - GENERAL
PAINLEVEL_OUTOF10: 10
PAINLEVEL_OUTOF10: 10
PAINLEVEL_OUTOF10: 9
PAINLEVEL_OUTOF10: 9

## 2021-11-01 ASSESSMENT — PULMONARY FUNCTION TESTS
PIF_VALUE: 11
PIF_VALUE: 0
PIF_VALUE: 2
PIF_VALUE: 0
PIF_VALUE: 4
PIF_VALUE: 2
PIF_VALUE: 3
PIF_VALUE: 3
PIF_VALUE: 17
PIF_VALUE: 2
PIF_VALUE: 18
PIF_VALUE: 1
PIF_VALUE: 0
PIF_VALUE: 5
PIF_VALUE: 1

## 2021-11-01 ASSESSMENT — PAIN DESCRIPTION - LOCATION: LOCATION: FINGER (COMMENT WHICH ONE)

## 2021-11-01 ASSESSMENT — PAIN - FUNCTIONAL ASSESSMENT: PAIN_FUNCTIONAL_ASSESSMENT: 0-10

## 2021-11-01 ASSESSMENT — LIFESTYLE VARIABLES: SMOKING_STATUS: 1

## 2021-11-01 ASSESSMENT — PAIN DESCRIPTION - PAIN TYPE: TYPE: SURGICAL PAIN

## 2021-11-01 NOTE — H&P
History and Physical    Pt Name: Sahra Monroy  MRN: [id number]  YOB: 1975  Date of evaluation: 11/1/2021  Primary Care Physician: No primary care provider on file. SUBJECTIVE:   History of Chief Complaint:    Sahra Monroy is a 48 y.o. female who is scheduled today for [facility]. Pt reports history of right small finger fracture with surgical repair approximately 6 months ago, but reports stiffness and pain since. She reports her pain as 10/10 \"all the time\" and reports she is right hand dominant. Allergies  is allergic to sulfa antibiotics, sulfasalazine, naproxen, and shellfish-derived products. Medications  Prior to Admission medications    Medication Sig Start Date End Date Taking? Authorizing Provider   cephALEXin (KEFLEX) 250 MG capsule Take one PO QID til gone. Start one day prior to surgery / procedure. 10/27/21  Yes [employer] Jennifer MD   metoclopramide (REGLAN) 10 MG tablet Take 1 tablet by mouth once for 1 dose Take morning of surgery with SMALL SIP of water. 10/27/21 11/1/21 Yes [employer] Jennifer MD   famotidine (PEPCID) 20 MG tablet Take 1 tablet by mouth once for 1 dose Take morning of surgery with minimal sip of water. 10/27/21 11/1/21 Yes [employer] Jennifer MD   clonazePAM (KLONOPIN) 1 MG tablet Take 1 mg by mouth 2 times daily as needed.     Yes Historical Provider, MD   ibuprofen (ADVIL;MOTRIN) 800 MG tablet Take 1 tablet by mouth 3 times daily (with meals) 9/9/21   [employer] Jennifer MD   acetaminophen (TYLENOL) 325 MG tablet Take 1 tablet by mouth every 6 hours as needed for Pain 5/24/21   Huong Pichardo,    aluminum & magnesium hydroxide-simethicone (MAALOX ADVANCED MAX ST) 069-369-85 MG/5ML SUSP Take 30 mLs by mouth 3 times daily as needed (heartburn) 4/26/20   Lance Mercer MD     Past Medical History    has a past medical history of Abnormal uterine bleeding, Anxiety, Bipolar 1 disorder ([facility] [address] Blood clot in vein, Fibroid, Finger pain, right, Fracture, finger, Panic attacks, Snores, and Under care of team.  Past Surgical History   has a past surgical history that includes Tubal ligation (); hysteroscopy (N/A, 2019); Hysterectomy (2021); Finger Closed Reduction (Right, 2021); and Finger surgery (Right, 2021). Social History   reports that she has been smoking cigarettes. She started smoking about 37 years ago. She has been smoking about 0.50 packs per day. She has never used smokeless tobacco.   reports no history of alcohol use. reports no history of drug use. Marital Status   Children 3  Occupation not since May 2021  Family History  Family Status   Relation Name Status    Mother  Alive    Father      Sister  Alive    MGM      MGF      PGM      PGF      Son 2 Alive    Benito  Alive   Jaswinder Ortega Sister  Alive     family history includes Brain Cancer in her father; Diabetes in her mother; High Blood Pressure in her mother; [place] in her father; Mult Sclerosis in her maternal grandmother; No Known Problems in her maternal grandfather, paternal grandfather, and paternal grandmother; Other in her sister.     Review of Systems:  CONSTITUTIONAL:   negative for fevers, chills, fatigue and malaise    EYES:   negative for double vision, blurred vision and photophobia    HEENT:   negative for tinnitus, epistaxis and sore throat     RESPIRATORY:   negative for shortness of breath, wheezing  +chronic dry cough   CARDIOVASCULAR:   negative for chest pain, palpitations, syncope, edema     GASTROINTESTINAL:   negative for nausea, vomiting     GENITOURINARY:   negative for incontinence     MUSCULOSKELETAL:   negative for neck or back pain +right pinky stiffness and pain   NEUROLOGICAL:   Negative for weakness and tingling  negative for headaches and dizziness     PSYCHIATRIC:   negative for anxiety       OBJECTIVE:   VITALS:  height is 5' 5\" (1.651 m) and weight is 231 lb 7.7 oz (105 kg). Her temperature is 97.3 °F (36.3 °C). Her pulse is 90. Her respiration is 16 and oxygen saturation is 95%. CONSTITUTIONAL:alert & oriented x 3, no acute distress. Calm, pleasant. SKIN:  Warm and dry, no rashes on exposed areas of skin   HEAD:  Normocephalic, atraumatic   EYES: PERRL. EOMs intact. EARS:  Equal bilaterally, no edema or thickening, skin is intact without lumps or lesions. No discharge. Hearing grossly WNL. NOSE:  Nares patent. No rhinorrhea   MOUTH/THROAT:  Mucous membranes moist, tongue is pink and large, uvula midline, teeth appear to be intact  NECK:supple, full ROM  LUNGS: Respirations even and non-labored. Clear to auscultation bilaterally, no wheezes, rales, or rhonchi. Somewhat diminished overall. CARDIOVASCULAR: Regular rate and rhythm, no murmurs/rubs/gallops   ABDOMEN: soft, non-tender, non-distended, bowel sounds active x 4, rotund  EXTREMITIES: No edema bilateral lower extremities. No varicosities bilateral lower extremities. NEUROLOGIC: CN II-XII are grossly intact. Gait not assessed.    IMPRESSIONS:   RIGHT SMALL FINGER FRACTURE  PLANS:   MANIPULATION UNDER ANESTHESIA SMALL FINGER - Right    TOR VILLA CNP   Electronically signed 11/1/2021 at 7:45 AM

## 2021-11-01 NOTE — BRIEF OP NOTE
Brief Postoperative Note      Patient: Catrina Nicole  YOB: 1975  MRN: [id number]    Date of Procedure: 11/1/2021    Pre-Op Diagnosis: RIGHT SMALL FINGER FRACTURE, DECREASED ROM    Post-Op Diagnosis: Same       Procedure(s):  MANIPULATION UNDER ANESTHESIA SMALL FINGER    Surgeon(s):  Rani White MD    Assistant:  * No surgical staff found *    Anesthesia: General    Estimated Blood Loss (mL): Minimal    Complications: None    Specimens:   * No specimens in log *    Implants:  * No implants in log *      Drains: * No LDAs found *    Findings:     Electronically signed by Rani White MD on 11/1/2021 at 9:34 AM

## 2021-11-01 NOTE — ANESTHESIA POSTPROCEDURE EVALUATION
Department of Anesthesiology  Postprocedure Note    Patient: Sasha Morales  MRN: [id number]  YOB: 1975  Date of evaluation: 11/1/2021  Time:  11:32 AM     Procedure Summary     Date: 11/01/21 Room / Location: [facility] / [facility]    Anesthesia Start: 0920 Anesthesia Stop: 3366    Procedure: MANIPULATION UNDER ANESTHESIA SMALL FINGER (Right ) Diagnosis: (RIGHT SMALL FINGER FRACTURE)    Surgeons: Dre St MD Responsible Provider: Adria Smith MD    Anesthesia Type: general ASA Status: 2          Anesthesia Type: general    Tanner Phase I: Tanner Score: 10    Tanner Phase II: Tanner Score: 10    Last vitals: Reviewed and per EMR flowsheets.        Anesthesia Post Evaluation    Patient location during evaluation: PACU  Patient participation: complete - patient participated  Level of consciousness: awake and alert  Pain score: 2  Airway patency: patent  Nausea & Vomiting: no nausea and no vomiting  Complications: no  Cardiovascular status: hemodynamically stable  Respiratory status: acceptable  Hydration status: euvolemic

## 2021-11-01 NOTE — ANESTHESIA PRE PROCEDURE
Department of Anesthesiology  Preprocedure Note       Name:  Krystal Burr   Age:  48 y.o.  :  1975                                          MRN:  [id number]         Date:  2021      Surgeon: Jun Ware):  Nitish Ortiz MD    Procedure: Procedure(s):  MANIPULATION UNDER ANESTHESIA SMALL FINGER    Medications prior to admission:   Prior to Admission medications    Medication Sig Start Date End Date Taking? Authorizing Provider   clonazePAM (KLONOPIN) 1 MG tablet Take 1 mg by mouth 2 times daily as needed. Yes Historical Provider, MD   cephALEXin (KEFLEX) 250 MG capsule Take one PO QID til gone. Start one day prior to surgery / procedure. 10/27/21   Nitish Ortiz MD   metoclopramide (REGLAN) 10 MG tablet Take 1 tablet by mouth once for 1 dose Take morning of surgery with SMALL SIP of water. 10/27/21 10/27/21  Nitish Ortiz MD   famotidine (PEPCID) 20 MG tablet Take 1 tablet by mouth once for 1 dose Take morning of surgery with minimal sip of water. 10/27/21 10/27/21  Nitish Ortiz MD   ibuprofen (ADVIL;MOTRIN) 800 MG tablet Take 1 tablet by mouth 3 times daily (with meals) 21   Nitish Ortiz MD   acetaminophen (TYLENOL) 325 MG tablet Take 1 tablet by mouth every 6 hours as needed for Pain 21   Noe Licona DO   aluminum & magnesium hydroxide-simethicone (MAALOX ADVANCED MAX ST) 400-400-40 MG/5ML SUSP Take 30 mLs by mouth 3 times daily as needed (heartburn) 20   Dana Chopra MD       Current medications:    Current Facility-Administered Medications   Medication Dose Route Frequency Provider Last Rate Last Admin    lactated ringers infusion   IntraVENous Continuous Chey Castro MD        midazolam PF (VERSED) injection 1 mg  1 mg IntraVENous Q10 Min PRN Chey Castro MD           Allergies:     Allergies   Allergen Reactions    Sulfa Antibiotics Hives    Sulfasalazine Hives    Naproxen Other (See Comments)     headache    Shellfish-Derived Products Diarrhea and Nausea And Vomiting       Problem List:    Patient Active Problem List   Diagnosis Code    Fibroid D21.9    Abnormal uterine bleeding (AUB) N93.9    Hx of tubal ligation (1994) Z98.51    Panic attacks F41.0    Tobacco abuse Z72.0    Hysteroscopy, D&C 5/17/19 Z98.890    Abnormal mammogram of left breast R92.8    Non-compliant patient Z91.19    Closed displaced fracture of proximal phalanx of right little finger S62.616A    Obesity with body mass index 30 or greater E66.9    Insomnia G47.00    Hypokalemia E87.6    Gastroesophageal reflux disease K21.9    Fracture of metacarpal bone S62.309A    Fibrocystic disease of breast N60.19    Chest pain R07.9    Anxiety F41.9    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  Blood clot in vein 2000    right calf    Fibroid 05/2018    Finger pain, right     Fracture, finger     right small finger    Panic attacks     Under care of team     No PCP       Past Surgical History:        Procedure Laterality Date    FINGER CLOSED REDUCTION Right 06/01/2021    CLOSED REDUCTION PERCUTANEOUS  PINNING, C-ARM (Right    FINGER SURGERY Right 6/1/2021    CLOSED REDUCTION PERCUTANEOUS  PINNING , RIGHT LITTLE FINGER , SPLINT C-ARM performed by Edna Blank MD at [address] [place] [address] Drive  01/07/2021    [facility]    HYSTEROSCOPY N/A 05/17/2019    HYSTEROSCOPY WITH A D AND C performed by Chela Price DO at [facility]       Social History:    Social History     Tobacco Use    Smoking status: Current Every Day Smoker     Packs/day: 0.50     Types: Cigarettes     Start date: 1984    Smokeless tobacco: Never Used   Substance Use Topics    Alcohol use:  No                                Ready to quit: Not Answered  Counseling given: Not Answered      Vital Signs (Current):   Vitals:    10/27/21 1117 Height: 5' 3\" (1.6 m)                                              BP Readings from Last 3 Encounters:   10/26/21 (!) 135/97   09/28/21 (!) 125/101   08/25/21 (!) 143/102       NPO Status: Time of last liquid consumption: 2200                        Time of last solid consumption: 0830                        Date of last liquid consumption: 10/31/21                        Date of last solid food consumption: 10/31/21    BMI:   Wt Readings from Last 3 Encounters:   10/26/21 220 lb (99.8 kg)   09/28/21 220 lb (99.8 kg)   08/31/21 220 lb (99.8 kg)     Body mass index is 38.97 kg/m². CBC:   Lab Results   Component Value Date    WBC 6.8 05/16/2020    RBC 4.42 05/16/2020    HGB 13.2 05/16/2020    HCT 38.9 05/16/2020    MCV 88.0 05/16/2020    RDW 16.1 05/16/2020     05/16/2020       CMP:   Lab Results   Component Value Date     05/16/2020    K 3.9 05/16/2020     05/16/2020    CO2 23 05/16/2020    BUN 15 05/16/2020    CREATININE 0.72 05/16/2020    GFRAA >60 05/16/2020    LABGLOM >60 05/16/2020    GLUCOSE 112 05/16/2020    PROT 7.1 05/16/2020    CALCIUM 9.1 05/16/2020    BILITOT 0.18 05/16/2020    ALKPHOS 56 05/16/2020    AST 19 05/16/2020    ALT 26 05/16/2020       POC Tests: No results for input(s): POCGLU, POCNA, POCK, POCCL, POCBUN, POCHEMO, POCHCT in the last 72 hours.     Coags:   Lab Results   Component Value Date    PROTIME 12.3 05/16/2020    INR 0.9 05/16/2020    APTT 33.6 05/16/2020       HCG (If Applicable):   Lab Results   Component Value Date    PREGTESTUR NEGATIVE 07/23/2018    HCG NEGATIVE 06/01/2021        ABGs: No results found for: PHART, PO2ART, ANL9JXW, UWJ6RNV, BEART, T1THCLLG     Type & Screen (If Applicable):  No results found for: LABABO, LABRH    Drug/Infectious Status (If Applicable):  No results found for: HIV, HEPCAB    COVID-19 Screening (If Applicable):   Lab Results   Component Value Date    COVID19 Not Detected 05/28/2021           Anesthesia Evaluation    Airway: Mallampati: II        Dental: normal exam         Pulmonary: breath sounds clear to auscultation  (+) current smoker                           Cardiovascular:Negative CV ROS            Rhythm: regular  Rate: normal                    Neuro/Psych:   (+) psychiatric history:depression/anxiety             GI/Hepatic/Renal:   (+) GERD:,           Endo/Other: Negative Endo/Other ROS                    Abdominal:   (+) obese,           Vascular:   + DVT, . Other Findings:             Anesthesia Plan      general     ASA 2       Induction: intravenous. Anesthetic plan and risks discussed with patient. Plan discussed with CRNA.                   Zhao Vega MD   11/1/2021

## 2021-11-02 NOTE — OP NOTE
[facility]                                OPERATIVE REPORT    PATIENT NAME: Karley Gregg                    :        1975  MED REC NO:   [id number]                             ROOM:  ACCOUNT NO:   [de-identified]                           ADMIT DATE: 2021  PROVIDER:     Loyd Guzman    DATE OF PROCEDURE:  2021    PREOPERATIVE DIAGNOSIS:  Stiffness, ankylosis of the joints of the right  small finger post CRPP of a proximal phalanx fracture. POSTOPERATIVE DIAGNOSIS:  Stiffness, ankylosis of the joints of the right  small finger post CRPP of a proximal phalanx fracture. PROCEDURE: Manipulation right small finger joints under anesthesia. SURGEON:  Dr. Loyd Guzman    INDICATIONS:  The patient has undergone several weeks of occupational  therapy with minimal improvement in her range of motion. It is felt  that there is significant scar tissue limiting the motion. She was  brought to the operating room at this time for manipulation under  anesthesia to put her through range of motion to free up the tissues. The procedure, the risks, benefits were discussed with her. All  questions answered to her satisfaction. Consent was signed. NARRATIVE SUMMARY:  The patient was brought to the operative suite,  placed under general aesthetic in supine position. Following this, all  the digits of the right hand were mobilized to full range of motion,  especially working on the small finger. Initial resistance was felt to  slowly release feeling the scar give and open up. I was able to put all  five digits through full range of motion upon completion. The patient  tolerated the procedure well. Blood loss are none. Complications none. The patient was transferred to Recovery in stable condition.         Rachell Velasquez    D: 2021 10:03:43       T: 2021 10:06:09 [email or web address]  Job#: [id number]     Doc#: [id number]    CC:

## 2022-02-17 ENCOUNTER — HOSPITAL ENCOUNTER (EMERGENCY)
Age: 47
Discharge: HOME | End: 2022-02-17
Attending: EMERGENCY MEDICINE
Payer: COMMERCIAL

## 2022-02-17 VITALS
WEIGHT: 190 LBS | OXYGEN SATURATION: 99 % | RESPIRATION RATE: 18 BRPM | SYSTOLIC BLOOD PRESSURE: 127 MMHG | BODY MASS INDEX: 30.53 KG/M2 | TEMPERATURE: 97.9 F | DIASTOLIC BLOOD PRESSURE: 87 MMHG | HEIGHT: 66 IN | HEART RATE: 93 BPM

## 2022-02-17 DIAGNOSIS — M62.830 SPASM OF MUSCLE OF LOWER BACK: Primary | ICD-10-CM

## 2022-02-17 PROCEDURE — 99283 EMERGENCY DEPT VISIT LOW MDM: CPT

## 2022-02-17 PROCEDURE — 96372 THER/PROPH/DIAG INJ SC/IM: CPT

## 2022-02-17 PROCEDURE — 6360000002 HC RX W HCPCS: Performed by: NURSE PRACTITIONER

## 2022-02-17 RX ORDER — IBUPROFEN 800 MG/1
800 TABLET ORAL EVERY 8 HOURS PRN
Qty: 20 TABLET | Refills: 0 | Status: SHIPPED | OUTPATIENT
Start: 2022-02-17

## 2022-02-17 RX ORDER — KETOROLAC TROMETHAMINE 30 MG/ML
30 INJECTION, SOLUTION INTRAMUSCULAR; INTRAVENOUS ONCE
Status: COMPLETED | OUTPATIENT
Start: 2022-02-17 | End: 2022-02-17

## 2022-02-17 RX ORDER — CYCLOBENZAPRINE HCL 10 MG
10 TABLET ORAL 3 TIMES DAILY PRN
Qty: 15 TABLET | Refills: 0 | Status: SHIPPED | OUTPATIENT
Start: 2022-02-17 | End: 2022-02-22

## 2022-02-17 RX ADMIN — KETOROLAC TROMETHAMINE 30 MG: 30 INJECTION, SOLUTION INTRAMUSCULAR at 18:00

## 2022-02-17 ASSESSMENT — ENCOUNTER SYMPTOMS: BACK PAIN: 1

## 2022-02-17 ASSESSMENT — PAIN SCALES - GENERAL
PAINLEVEL_OUTOF10: 10
PAINLEVEL_OUTOF10: 10

## 2022-02-17 ASSESSMENT — PAIN - FUNCTIONAL ASSESSMENT: PAIN_FUNCTIONAL_ASSESSMENT: 0-10

## 2022-02-17 ASSESSMENT — PAIN DESCRIPTION - PAIN TYPE: TYPE: ACUTE PAIN

## 2022-02-17 ASSESSMENT — PAIN DESCRIPTION - LOCATION: LOCATION: BACK;BUTTOCKS

## 2022-02-17 ASSESSMENT — PAIN DESCRIPTION - ORIENTATION: ORIENTATION: LOWER

## 2022-02-17 NOTE — ED PROVIDER NOTES
[address] [place] [address] ED  EMERGENCY DEPARTMENT ENCOUNTER      Pt Name: Jeison Moseley  MRN: [id number]  [place] 1975  Date of evaluation: 2/17/2022  Provider: TOR Means CNP    CHIEF COMPLAINT       Chief Complaint   Patient presents with    Back Pain         HISTORY OFPRESENT ILLNESS  (Location/Symptom, Timing/Onset, Context/Setting, Quality, Duration, Modifying Factors, Severity.)   Jeison Moseley is a 48 y.o. female who presents to the emergency department by private auto for evaluation of right lower back pain after she was walking her porch yesterday and she slipped causing right leg to slide out in front of her but she caught her self did not fall. States she felt a pull in her right lower back area. Pain is a 9 on a 10 scale is a tightness in her back. Pain does not radiate her legs. No loss of bowel or bladder control. Nursing Notes were reviewed. PASTMEDICAL HISTORY     Past Medical History:   Diagnosis Date    Abnormal uterine bleeding 05/2018    Anxiety     Bipolar 1 disorder (HCC)     No Rx.      Blood clot in vein 2000    right calf    Fibroid 05/2018    Finger pain, right     Fracture, finger     right small finger    Panic attacks     Snores     Under care of team     No PCP         SURGICAL HISTORY       Past Surgical History:   Procedure Laterality Date    FINGER CLOSED REDUCTION Right 06/01/2021    CLOSED REDUCTION PERCUTANEOUS  PINNING, C-ARM (Right    FINGER SURGERY Right 6/1/2021    CLOSED REDUCTION PERCUTANEOUS  PINNING , RIGHT LITTLE FINGER , SPLINT C-ARM performed by Dylan Newman MD at [address] [place] [address] Right 11/01/2021    MANIPULATION UNDER ANESTHESIA SMALL FINGER (Right )    HY[facility]  [facility]07[facility]    [facility]    HYSTEROSCOPY N/A 05/17/2019    HYSTEROSCOPY WITH A D AND C performed by Gato Mccarthy DO at [facility]     Previous Medications    ACETAMINOPHEN (TYLENOL) 325 MG TABLET    Take 1 tablet by mouth every 6 hours as needed for Pain    ALUMINUM & MAGNESIUM HYDROXIDE-SIMETHICONE (MAALOX ADVANCED MAX ST) 400-400-40 MG/5ML SUSP    Take 30 mLs by mouth 3 times daily as needed (heartburn)    CLONAZEPAM (KLONOPIN) 1 MG TABLET    Take 1 mg by mouth 2 times daily as needed. ALLERGIES     Sulfa antibiotics, Sulfasalazine, Naproxen, and Shellfish-derived products    FAMILY HISTORY       Family History   Problem Relation Age of Onset    High Blood Pressure Mother     Diabetes Mother    Fred Goldberg Father         primary   Diggs Brain Cancer Father     Other Sister         DDD of the back    Mult Sclerosis Maternal Grandmother     No Known Problems Maternal Grandfather     No Known Problems Paternal Grandmother     No Known Problems Paternal Grandfather           SOCIAL HISTORY       Social History     Socioeconomic History    Marital status:      Spouse name: None    Number of children: 3    Years of education: None    Highest education level: None   Occupational History    None   Tobacco Use    Smoking status: Current Every Day Smoker     Packs/day: 0.50     Types: Cigarettes     Start date: 1984    Smokeless tobacco: Never Used   Vaping Use    Vaping Use: Never used   Substance and Sexual Activity    Alcohol use: No    Drug use: No    Sexual activity: None   Other Topics Concern    None   Social History Narrative    None     Social Determinants of Health     Financial Resource Strain:     Difficulty of Paying Living Expenses: Not on file   Food Insecurity:     Worried About Running Out of Food in the Last Year: Not on file    Maris of Food in the Last Year: Not on file   Transportation Needs:     Lack of Transportation (Medical): Not on file    Lack of Transportation (Non-Medical):  Not on file   Physical Activity:     Days of Exercise per Week: Not on file    Minutes of Exercise per Session: Not on file   Stress:     Feeling of Stress : Not on file   Social Connections:     Frequency of Communication with Friends and Family: Not on file    Frequency of Social Gatherings with Friends and Family: Not on file    Attends Yazdanism Services: Not on file    Active Member of Clubs or Organizations: Not on file    Attends Club or Organization Meetings: Not on file    Marital Status: Not on file   Intimate Partner Violence:     Fear of Current or Ex-Partner: Not on file    Emotionally Abused: Not on file    Physically Abused: Not on file    Sexually Abused: Not on file   Housing Stability:     Unable to Pay for Housing in the Last Year: Not on file    Number of [place] in the Last Year: Not on file    Unstable Housing in the Last Year: Not on file         REVIEW OF SYSTEMS    (2-9 systems for level 4, 10 or more for level 5)     Review of Systems   Constitutional: Positive for activity change. Musculoskeletal: Positive for back pain. Neurological: Negative for numbness. All other systems reviewed and are negative. Except as noted above the remainder of the review of systems was reviewed and negative. PHYSICAL EXAM    (up to 7 for level 4, 8 or more for level 5)     ED Triage Vitals [02/17/22 1724]   BP Temp Temp Source Pulse Resp SpO2 Height Weight   127/87 97.9 °F (36.6 °C) Oral 93 18 99 % 5' 6\" (1.676 m) 190 lb (86.2 kg)       Physical Exam  Constitutional:       Appearance: Normal appearance. She is well-developed, well-groomed and overweight. HENT:      Head: Normocephalic. Right Ear: External ear normal.      Left Ear: External ear normal.      Nose: Nose normal.      Mouth/Throat:      Mouth: Mucous membranes are moist.   Eyes:      Extraocular Movements: Extraocular movements intact. Conjunctiva/sclera: Conjunctivae normal.      Pupils: Pupils are equal, round, and reactive to light. Pulmonary:      Effort: Pulmonary effort is normal. No respiratory distress. Musculoskeletal:      Cervical back: Normal range of motion. Lumbar back: Spasms and tenderness present. No swelling, edema, deformity, signs of trauma or lacerations. Decreased range of motion. Back:       Comments: Examination of the lumbar area shows tenderness and spasm to the right lumbar area. Decreased range of motion. No midline lumbar tenderness. No swelling, erythema, rash or ecchymosis. Skin:     General: Skin is warm. Capillary Refill: Capillary refill takes less than 2 seconds. Findings: No erythema or rash. Neurological:      Mental Status: She is alert and oriented to person, place, and time. Psychiatric:         Mood and Affect: Mood normal.         Behavior: Behavior normal.         Thought Content: Thought content normal.         Judgment: Judgment normal.           DIAGNOSTIC RESULTS     EKG:All EKG's are interpreted by the Emergency Department Physician who either signs or Co-signs this chart in the absence of a cardiologist.        RADIOLOGY:   Non-plain film images such as CT, Ultrasound and MRI are read by theradiologist. Plain radiographic images are visualized and preliminarily interpreted by the emergency physician with the below findings:        Interpretation per the Radiologist below, if available at the time of this note:    No orders to display         EDBEDSIDE ULTRASOUND:   Performed by Aleena Herr - none    LABS:  Labs Reviewed - No data to display    All other labs were within normal range or not returned as of this dictation. EMERGENCY DEPARTMENT COURSE andDIFFERENTIAL DIAGNOSIS/MDM:   Patient evaluated in conjunction attending physician. Examination shows back spasms of the right lumbar area. No midline lumbar tenderness. No rash, erythema or ecchymosis. Patient denies loss of bowel or bladder control. No saddle paresthesia. Neurological deficits. Imaging not indicated. She is given Toradol in the ED and prescriptions written for Flexeril Motrin. Provided with [facility] same-day physician for follow-up.

## 2022-02-20 ENCOUNTER — HOSPITAL ENCOUNTER (EMERGENCY)
Age: 47
Discharge: HOME | End: 2022-02-20
Attending: EMERGENCY MEDICINE
Payer: COMMERCIAL

## 2022-02-20 ENCOUNTER — APPOINTMENT (OUTPATIENT)
Dept: GENERAL RADIOLOGY | Age: 47
End: 2022-02-20
Payer: COMMERCIAL

## 2022-02-20 VITALS
WEIGHT: 190 LBS | SYSTOLIC BLOOD PRESSURE: 135 MMHG | HEIGHT: 65 IN | RESPIRATION RATE: 20 BRPM | TEMPERATURE: 98.3 F | BODY MASS INDEX: 31.65 KG/M2 | OXYGEN SATURATION: 99 % | HEART RATE: 95 BPM | DIASTOLIC BLOOD PRESSURE: 88 MMHG

## 2022-02-20 DIAGNOSIS — M54.31 SCIATICA OF RIGHT SIDE: Primary | ICD-10-CM

## 2022-02-20 PROCEDURE — 99284 EMERGENCY DEPT VISIT MOD MDM: CPT

## 2022-02-20 PROCEDURE — 6360000002 HC RX W HCPCS: Performed by: PHYSICIAN ASSISTANT

## 2022-02-20 PROCEDURE — 72100 X-RAY EXAM L-S SPINE 2/3 VWS: CPT

## 2022-02-20 PROCEDURE — 96372 THER/PROPH/DIAG INJ SC/IM: CPT

## 2022-02-20 PROCEDURE — 6370000000 HC RX 637 (ALT 250 FOR IP): Performed by: PHYSICIAN ASSISTANT

## 2022-02-20 RX ORDER — HYDROCODONE BITARTRATE AND ACETAMINOPHEN 5; 325 MG/1; MG/1
1-2 TABLET ORAL EVERY 8 HOURS PRN
Qty: 12 TABLET | Refills: 0 | Status: SHIPPED | OUTPATIENT
Start: 2022-02-20 | End: 2022-02-23

## 2022-02-20 RX ORDER — ORPHENADRINE CITRATE 30 MG/ML
60 INJECTION INTRAMUSCULAR; INTRAVENOUS ONCE
Status: COMPLETED | OUTPATIENT
Start: 2022-02-20 | End: 2022-02-20

## 2022-02-20 RX ORDER — METHOCARBAMOL 750 MG/1
750 TABLET, FILM COATED ORAL 4 TIMES DAILY
Qty: 40 TABLET | Refills: 0 | Status: SHIPPED | OUTPATIENT
Start: 2022-02-20 | End: 2022-03-02

## 2022-02-20 RX ORDER — PREDNISONE 10 MG/1
TABLET ORAL
Qty: 30 TABLET | Refills: 0 | Status: SHIPPED | OUTPATIENT
Start: 2022-02-20 | End: 2022-10-14

## 2022-02-20 RX ORDER — ONDANSETRON 4 MG/1
4 TABLET, ORALLY DISINTEGRATING ORAL ONCE
Status: COMPLETED | OUTPATIENT
Start: 2022-02-20 | End: 2022-02-20

## 2022-02-20 RX ORDER — MORPHINE SULFATE 4 MG/ML
4 INJECTION, SOLUTION INTRAMUSCULAR; INTRAVENOUS ONCE
Status: COMPLETED | OUTPATIENT
Start: 2022-02-20 | End: 2022-02-20

## 2022-02-20 RX ADMIN — ONDANSETRON 4 MG: 4 TABLET, ORALLY DISINTEGRATING ORAL at 13:44

## 2022-02-20 RX ADMIN — MORPHINE SULFATE 4 MG: 4 INJECTION, SOLUTION INTRAMUSCULAR; INTRAVENOUS at 13:44

## 2022-02-20 RX ADMIN — ORPHENADRINE CITRATE 60 MG: 30 INJECTION INTRAMUSCULAR; INTRAVENOUS at 13:44

## 2022-02-20 ASSESSMENT — PAIN DESCRIPTION - LOCATION: LOCATION: BACK

## 2022-02-20 ASSESSMENT — PAIN SCALES - GENERAL
PAINLEVEL_OUTOF10: 10
PAINLEVEL_OUTOF10: 10
PAINLEVEL_OUTOF10: 8

## 2022-02-20 ASSESSMENT — PAIN DESCRIPTION - ORIENTATION: ORIENTATION: LOWER

## 2022-02-20 NOTE — ED TRIAGE NOTES
Arrives ambulatory with c/o lower back pain since slip on the 16th. States,\"Been taking the meds and resting, but it's not getting any better, and now it's starting to go into my right groin. \"  Denies saddle numbness or loss of control of bowel or bladder.

## 2022-02-20 NOTE — Clinical Note
Eugene Reyes was seen and treated in our emergency department on 2/20/2022. She may return to work on 02/23/2022. If you have any questions or concerns, please don't hesitate to call.       Tanya Ferreira MD

## 2022-02-20 NOTE — ED PROVIDER NOTES
eMERGENCY dEPARTMENT eNCOUnter   Independent Attestation     Pt Name: Nakul Yin  MRN: [id number]  [place] 1975  Date of evaluation: 2/20/22     Nakul Yin is a 48 y.o. female with CC: Back Pain        This visit was performed by both a physician and an APC. I performed all aspects of the MDM as documented. The care is provided during an unprecedented national emergency due to the novel coronavirus, COVID 19.     Acrhie Yates MD  Attending Emergency Physician            Archie Yates MD  02/20/22 3554

## 2022-02-20 NOTE — ED PROVIDER NOTES
eMERGENCY dEPARTMENT eNCOUnter   Independent Attestation     Pt Name: Jeison Moseley  MRN: [id number]  [place] 1975  Date of evaluation: 2/20/22     Jeison Moseley is a 48 y.o. female with CC: Back Pain        This visit was performed by both a physician and an APC. I performed all aspects of the MDM as documented. The care is provided during an unprecedented national emergency due to the novel coronavirus, COVID 19.     Meliza Matos MD  Attending Emergency Physician            Meliza Matos MD  03/28/44 6426

## 2022-02-20 NOTE — ED PROVIDER NOTES
[address] [place] [address] ED  [facility]      Pt Name: Abe Naylor  MRN: [id number]  [place] 1975  Date ofevaluation: 2/20/2022  Provider: Elly Marti PA-C    CHIEF COMPLAINT       Chief Complaint   Patient presents with    Back Pain         HISTORY OF PRESENT ILLNESS  (Location/Symptom, Timing/Onset, Context/Setting, Quality, Duration, Modifying Factors, Severity.)   bAe Naylor is a 48 y.o. female who presents to the emergency department with right sided back pain. Seen in ER a few days ago with same issue. Reports minimal relief with Flexeril on current medication regimen. Patient concerned about missing work. Pain described as radiating down the right side of her leg. Pain worse with movement and relieved with rest.  No urinary or bowel incontinence. No other complaints. Nursing Notes were reviewed. ALLERGIES     Sulfa antibiotics, Sulfasalazine, Naproxen, and Shellfish-derived products    CURRENT MEDICATIONS       Discharge Medication List as of 2/20/2022  2:51 PM      CONTINUE these medications which have NOT CHANGED    Details   cyclobenzaprine (FLEXERIL) 10 MG tablet Take 1 tablet by mouth 3 times daily as needed for Muscle spasms, Disp-15 tablet, R-0Print      ibuprofen (ADVIL;MOTRIN) 800 MG tablet Take 1 tablet by mouth every 8 hours as needed for Pain, Disp-20 tablet, R-0Print      clonazePAM (KLONOPIN) 1 MG tablet Take 1 mg by mouth 2 times daily as needed. Historical Med      acetaminophen (TYLENOL) 325 MG tablet Take 1 tablet by mouth every 6 hours as needed for Pain, Disp-30 tablet, R-0Print      aluminum & magnesium hydroxide-simethicone (MAALOX ADVANCED MAX ST) 400-400-40 MG/5ML SUSP Take 30 mLs by mouth 3 times daily as needed (heartburn), Disp-355 mL, R-0Print             PAST MEDICAL HISTORY         Diagnosis Date    Abnormal uterine bleeding 05/2018    Anxiety     Bipolar 1 disorder (HCC)     No Rx.      Blood clot in vein 2000    right calf  Fibroid 2018    Finger pain, right     Fracture, finger     right small finger    Panic attacks     Snores     Under care of team     No PCP       SURGICAL HISTORY           Procedure Laterality Date    FINGER CLOSED REDUCTION Right 2021    CLOSED REDUCTION PERCUTANEOUS  PINNING, C-ARM (Right    FINGER SURGERY Right 2021    CLOSED REDUCTION PERCUTANEOUS  PINNING , RIGHT LITTLE FINGER , SPLINT C-ARM performed by Becky Vivar MD at [address] Right 2021    MANIPULATION UNDER ANESTHESIA SMALL FINGER (Right )    [facility]Y  2021    [facility]    HYSTEROSCOPY N/A 2019    HYSTEROSCOPY WITH A D AND C performed by Shweta Marcos DO at [address] HISTORY           Problem Relation Age of Onset    High Blood Pressure Mother     Diabetes Mother    Brain Stagger Father         primary    Brain Cancer Father     Other Sister         DDD of the back    Mult Sclerosis Maternal Grandmother     No Known Problems Maternal Grandfather     No Known Problems Paternal Grandmother     No Known Problems Paternal Grandfather      Family Status   Relation Name Status    Mother  Alive    Father      Sister  Alive    MGM      MGF      PGM      PGF      Son 2 Alive    Benito  Alive    Sister  Alive        SOCIAL HISTORY      reports that she has been smoking cigarettes. She started smoking about 38 years ago. She has been smoking about 0.50 packs per day. She has never used smokeless tobacco. She reports that she does not drink alcohol and does not use drugs. REVIEW OFSYSTEMS    (2-9 systems for level 4, 10 or more for level 5)   Review of Systems    Except as noted above the remainder of the review of systems was reviewed and negative.      PHYSICAL EXAM    (up to 7 for level 4, 8 or more for level 5)     ED Triage Vitals [22 1220]   BP Temp Temp Source Pulse Resp SpO2 Height Weight 135/88 98.3 °F (36.8 °C) Oral 95 20 99 % 5' 5\" (1.651 m) 190 lb (86.2 kg)      Physical Exam  Constitutional:       Appearance: She is well-developed. HENT:      Head: Normocephalic and atraumatic. Cardiovascular:      Rate and Rhythm: Normal rate and regular rhythm. Pulmonary:      Effort: Pulmonary effort is normal.      Breath sounds: Normal breath sounds. Abdominal:      Palpations: Abdomen is soft. Musculoskeletal:         General: Normal range of motion. Cervical back: Normal range of motion and neck supple. Back:    Skin:     General: Skin is warm. Findings: No rash. Neurological:      Mental Status: She is alert and oriented to person, place, and time. Psychiatric:         Behavior: Behavior normal.                 DIAGNOSTIC RESULTS     EKG: All EKG's are interpreted by the Emergency Department Physician who either signs or Co-signs this chart in the absence of a cardiologist.        RADIOLOGY:   Non-plain film images such as CT, Ultrasound and MRI are read by the radiologist. Plain radiographic images arevisualized and preliminarily interpreted by the emergency physician with the below findings:        Interpretation per the Radiologist below, if available at thetime of this note:          ED BEDSIDE ULTRASOUND:   Performed by ED Physician - none    LABS:  Labs Reviewed - No data to display    All other labs were within normal range or not returned as of this dictation. EMERGENCY DEPARTMENT COURSE and DIFFERENTIAL DIAGNOSIS/MDM:   Vitals:    Vitals:    02/20/22 1220   BP: 135/88   Pulse: 95   Resp: 20   Temp: 98.3 °F (36.8 °C)   TempSrc: Oral   SpO2: 99%   Weight: 190 lb (86.2 kg)   Height: 5' 5\" (1.651 m)      work up is negative. Feels better. Will DC home. CONSULTS:  None    PROCEDURES:  Procedures        FINAL IMPRESSION      1.  Sciatica of right side          DISPOSITION/PLAN   DISPOSITION Decision To Discharge 02/20/2022 02:49:28 PM      PATIENTREFERRED TO:   No follow-up provider specified. DISCHARGE MEDICATIONS:     Discharge Medication List as of 2/20/2022  2:51 PM      START taking these medications    Details   predniSONE (DELTASONE) 10 MG tablet Take 5 tablets for 2 days  Take 4 tablets for 2 days  Take 3 tablets for 2 days  Take 2 tablets for 2 days  Take 1 tablets for 2 days, Disp-30 tablet, R-0Print      methocarbamol (ROBAXIN-750) 750 MG tablet Take 1 tablet by mouth 4 times daily for 10 days, Disp-40 tablet, R-0Print      HYDROcodone-acetaminophen (NORCO) 5-325 MG per tablet Take 1-2 tablets by mouth every 8 hours as needed for Pain for up to 3 days. , Disp-12 tablet, R-0Print                 (Please note that portions of this note were completed with a voice recognition program.  Efforts were made to edit thedictations but occasionally words are mis-transcribed.)    INDU Kenny PA-C  02/20/22 1808

## 2022-10-14 ENCOUNTER — HOSPITAL ENCOUNTER (EMERGENCY)
Age: 47
Discharge: HOME | End: 2022-10-14
Attending: EMERGENCY MEDICINE
Payer: COMMERCIAL

## 2022-10-14 VITALS
OXYGEN SATURATION: 94 % | WEIGHT: 250 LBS | DIASTOLIC BLOOD PRESSURE: 70 MMHG | TEMPERATURE: 97.7 F | HEART RATE: 96 BPM | HEIGHT: 65 IN | BODY MASS INDEX: 41.65 KG/M2 | RESPIRATION RATE: 19 BRPM | SYSTOLIC BLOOD PRESSURE: 94 MMHG

## 2022-10-14 DIAGNOSIS — M54.50 ACUTE EXACERBATION OF CHRONIC LOW BACK PAIN: Primary | ICD-10-CM

## 2022-10-14 DIAGNOSIS — G89.29 ACUTE EXACERBATION OF CHRONIC LOW BACK PAIN: Primary | ICD-10-CM

## 2022-10-14 PROCEDURE — 6370000000 HC RX 637 (ALT 250 FOR IP): Performed by: PHYSICIAN ASSISTANT

## 2022-10-14 PROCEDURE — 6360000002 HC RX W HCPCS: Performed by: PHYSICIAN ASSISTANT

## 2022-10-14 PROCEDURE — 99284 EMERGENCY DEPT VISIT MOD MDM: CPT

## 2022-10-14 PROCEDURE — 96372 THER/PROPH/DIAG INJ SC/IM: CPT

## 2022-10-14 RX ORDER — METAXALONE 800 MG/1
800 TABLET ORAL ONCE
Status: COMPLETED | OUTPATIENT
Start: 2022-10-14 | End: 2022-10-14

## 2022-10-14 RX ORDER — HYDROCODONE BITARTRATE AND ACETAMINOPHEN 5; 325 MG/1; MG/1
1-2 TABLET ORAL EVERY 8 HOURS PRN
Qty: 15 TABLET | Refills: 0 | Status: SHIPPED | OUTPATIENT
Start: 2022-10-14 | End: 2022-10-19

## 2022-10-14 RX ORDER — METHOCARBAMOL 750 MG/1
750 TABLET, FILM COATED ORAL 4 TIMES DAILY
Qty: 40 TABLET | Refills: 0 | Status: SHIPPED | OUTPATIENT
Start: 2022-10-14 | End: 2022-10-24

## 2022-10-14 RX ORDER — KETOROLAC TROMETHAMINE 30 MG/ML
60 INJECTION, SOLUTION INTRAMUSCULAR; INTRAVENOUS ONCE
Status: COMPLETED | OUTPATIENT
Start: 2022-10-14 | End: 2022-10-14

## 2022-10-14 RX ORDER — DEXAMETHASONE SODIUM PHOSPHATE 10 MG/ML
8 INJECTION, SOLUTION INTRAMUSCULAR; INTRAVENOUS ONCE
Status: COMPLETED | OUTPATIENT
Start: 2022-10-14 | End: 2022-10-14

## 2022-10-14 RX ORDER — PREDNISONE 10 MG/1
TABLET ORAL
Qty: 30 TABLET | Refills: 0 | Status: SHIPPED | OUTPATIENT
Start: 2022-10-14

## 2022-10-14 RX ADMIN — METAXALONE 800 MG: 800 TABLET ORAL at 18:25

## 2022-10-14 RX ADMIN — DEXAMETHASONE SODIUM PHOSPHATE 8 MG: 10 INJECTION, SOLUTION INTRAMUSCULAR; INTRAVENOUS at 18:25

## 2022-10-14 RX ADMIN — KETOROLAC TROMETHAMINE 60 MG: 30 INJECTION, SOLUTION INTRAMUSCULAR at 18:25

## 2022-10-14 ASSESSMENT — PAIN SCALES - GENERAL: PAINLEVEL_OUTOF10: 10

## 2022-10-14 ASSESSMENT — PAIN - FUNCTIONAL ASSESSMENT: PAIN_FUNCTIONAL_ASSESSMENT: 0-10

## 2022-10-14 ASSESSMENT — PAIN DESCRIPTION - DESCRIPTORS: DESCRIPTORS: STABBING;TINGLING

## 2022-10-14 NOTE — LETTER
[facility]  [address] [id number]  Phone: [phone]               October 14, 2022    Patient: Angelica Rock   YOB: 1975   Date of Visit: 10/14/2022       To Whom It May Concern:    Bibi Spangler was seen and treated in our emergency department on 10/14/2022. She may return to work on 10/15/2022.       Sincerely,       Mabel Garcia RN         Signature:__________________________________

## 2022-10-14 NOTE — ED PROVIDER NOTES
eMERGENCY dEPARTMENT eNCOUnter   Independent Attestation     Pt Name: Kylah Up  MRN: [id number]  [place] 1975  Date of evaluation: 10/14/22     Kylah Up is a 50 y.o. female with CC: Back Pain (Lower back pain, wraps around bilat hips, radiates into both legs. Reports numbness/tingling in bilat feet. \"Feels like water running down my spine. \"/No issues going to the bathroom, reports some pressure. Pt took motrin to help with pain, reports last dose at 1330; no relief. Pt had back injury few months ago, was seen here, had a pain management appt. Pt was supposed to get MRI but has not been able to schedule yet. Limping and slow gait, however, able to ambulate without assistance and NAD noted.)        This visit was performed by both a physician and an APC. I performed all aspects of the MDM as documented. The care is provided during an unprecedented national emergency due to the novel coronavirus, COVID 19.     Juvenal Rodgers MD  Attending Emergency Physician            Juvenal Rodgers MD  94/93/25 0816

## 2022-10-14 NOTE — ED PROVIDER NOTES
[facility] ED  eMERGENCY dEPARTMENTeNCOUnter      Pt Name: Lavell Posada  MRN: [id number]  [place] 1975  Date ofevaluation: 10/14/2022  Provider: Lisha Cordova, [address] [place] [address]       Chief Complaint   Patient presents with    Back Pain     Lower back pain, wraps around bilat hips, radiates into both legs. Reports numbness/tingling in bilat feet. \"Feels like water running down my spine. \"  No issues going to the bathroom, reports some pressure. Pt took motrin to help with pain, reports last dose at 1330; no relief. Pt had back injury few months ago, was seen here, had a pain management appt. Pt was supposed to get MRI but has not been able to schedule yet. Limping and slow gait, however, able to ambulate without assistance and NAD noted. HISTORY OF PRESENT ILLNESS  (Location/Symptom, Timing/Onset, Context/Setting, Quality, Duration, Modifying Factors, Severity.)   Lavell Posada is a 50 y.o. female who presents to the emergency department with back pain that radiates down both legs. This is chronic. No falls or injuries  no urinary or bowel incontinence. Pian worse with movement and relieved with rest.        Nursing Notes were reviewed. ALLERGIES     Sulfa antibiotics, Sulfasalazine, Naproxen, and Shellfish-derived products    CURRENT MEDICATIONS       Previous Medications    ACETAMINOPHEN (TYLENOL) 325 MG TABLET    Take 1 tablet by mouth every 6 hours as needed for Pain    ALUMINUM & MAGNESIUM HYDROXIDE-SIMETHICONE (MAALOX ADVANCED MAX ST) 400-400-40 MG/5ML SUSP    Take 30 mLs by mouth 3 times daily as needed (heartburn)    CLONAZEPAM (KLONOPIN) 1 MG TABLET    Take 1 mg by mouth 2 times daily as needed. IBUPROFEN (ADVIL;MOTRIN) 800 MG TABLET    Take 1 tablet by mouth every 8 hours as needed for Pain       PAST MEDICAL HISTORY         Diagnosis Date    Abnormal uterine bleeding 05/2018    Anxiety     Bipolar 1 disorder (HCC)     No Rx.      Blood clot in vein 2000 right calf    Fibroid 2018    Finger pain, right     Fracture, finger     right small finger    Panic attacks     Snores     Under care of team     No PCP       SURGICAL HISTORY           Procedure Laterality Date    FINGER CLOSED REDUCTION Right 2021    CLOSED REDUCTION PERCUTANEOUS  PINNING, C-ARM (Right    FINGER SURGERY Right 2021    CLOSED REDUCTION PERCUTANEOUS  PINNING , RIGHT LITTLE FINGER , SPLINT C-ARM performed by Dong Silva MD at [facility] Right 2021    MANIPULATION UNDER ANESTHESIA SMALL FINGER (Right )    HYSTERECTOMY (CERVIX STATUS UNKNOWN)  2021    [facility]    HYSTEROSCOPY N/A 2019    HYSTEROSCOPY WITH A D AND C performed by Sil Caldwell DO at [facility]         HISTORY           Problem Relation Age of Onset    High Blood Pressure Mother     Diabetes Mother     Lung Cancer Father         primary    Brain Cancer Father     Other Sister         DDD of the back    Mult Sclerosis Maternal Grandmother     No Known Problems Maternal Grandfather     No Known Problems Paternal Grandmother     No Known Problems Paternal Grandfather      Family Status   Relation Name Status    Mother  Alive    Father      Sister  Alive    MGM      MGF      PGM      PGF      Son 2 Alive    Benito  Alive    Sister  Alive        SOCIAL HISTORY      reports that she has been smoking cigarettes. She started smoking about 38 years ago. She has been smoking an average of .5 packs per day. She has never used smokeless tobacco. She reports that she does not drink alcohol and does not use drugs. REVIEW OFSYSTEMS    (2-9 systems for level 4, 10 or more for level 5)   Review of Systems    Except as noted above the remainder of the review of systems was reviewed and negative.      PHYSICAL EXAM    (up to 7 for level 4, 8 or more for level 5)     ED Triage Vitals   BP Temp Temp Source Heart Rate Resp SpO2 Height Weight   10/14/22 [id number] 10/14/22 1517 10/14/22 1517 10/14/22 1514 10/14/22 1514 10/14/22 1514 10/14/22 1514 10/14/22 1514   94/70 97.7 °F (36.5 °C) Oral 96 19 94 % 5' 5\" (1.651 m) 250 lb (113.4 kg)     Physical Exam  Constitutional:       Appearance: She is well-developed. HENT:      Head: Normocephalic and atraumatic. Cardiovascular:      Rate and Rhythm: Normal rate and regular rhythm. Pulmonary:      Effort: Pulmonary effort is normal.      Breath sounds: Normal breath sounds. Abdominal:      Palpations: Abdomen is soft. Musculoskeletal:         General: Normal range of motion. Cervical back: Normal range of motion and neck supple. Back:       Comments: Moving bilateral lower extremities with good strength    Skin:     General: Skin is warm. Findings: No rash. Neurological:      Mental Status: She is alert and oriented to person, place, and time. Psychiatric:         Behavior: Behavior normal.               DIAGNOSTIC RESULTS     EKG: All EKG's are interpreted by the Emergency Department Physician who either signs or Co-signs this chart in the absence of a cardiologist.        RADIOLOGY:   Non-plain film images such as CT, Ultrasound and MRI are read by the radiologist. Plain radiographic images arevisualized and preliminarily interpreted by the emergency physician with the below findings:        Interpretation per the Radiologist below, if available at thetime of this note:          ED BEDSIDE ULTRASOUND:   Performed by ED Physician - none    LABS:  Labs Reviewed - No data to display    All other labs were within normal range or not returned as of this dictation. EMERGENCY DEPARTMENT COURSE and DIFFERENTIAL DIAGNOSIS/MDM:   Vitals:    Vitals:    10/14/22 1514 10/14/22 1517   BP: 94/70    Pulse: 96    Resp: 19    Temp:  97.7 °F (36.5 °C)   TempSrc:  Oral   SpO2: 94%    Weight: 250 lb (113.4 kg)    Height: 5' 5\" (1.651 m)      HEARTSCORE:not indicated. Drove herself here.   Given non narcotic meds here.    Imaging not indicated. CONSULTS:  None    PROCEDURES:  Procedures        FINAL IMPRESSION      1. Acute exacerbation of chronic low back pain          DISPOSITION/PLAN   DISPOSITION Decision To Discharge 10/14/2022 06:10:31 PM      PATIENTREFERRED TO:   No follow-up provider specified. DISCHARGE MEDICATIONS:     New Prescriptions    HYDROCODONE-ACETAMINOPHEN (NORCO) 5-325 MG PER TABLET    Take 1-2 tablets by mouth every 8 hours as needed for Pain for up to 5 days.     METHOCARBAMOL (ROBAXIN-750) 750 MG TABLET    Take 1 tablet by mouth 4 times daily for 10 days    PREDNISONE (DELTASONE) 10 MG TABLET    Take 5 tablets for 2 days  Take 4 tablets for 2 days  Take 3 tablets for 2 days  Take 2 tablets for 2 days  Take 1 tablets for 2 days           (Please note that portions of this note were completed with a voice recognition program.  Efforts were made to edit thedictations but occasionally words are mis-transcribed.)    INDU Sahni PA-C  10/14/22 2851

## 2023-01-14 ENCOUNTER — HOSPITAL ENCOUNTER (EMERGENCY)
Age: 48
Discharge: HOME | End: 2023-01-14
Attending: EMERGENCY MEDICINE
Payer: COMMERCIAL

## 2023-01-14 ENCOUNTER — APPOINTMENT (OUTPATIENT)
Dept: GENERAL RADIOLOGY | Age: 48
End: 2023-01-14
Payer: COMMERCIAL

## 2023-01-14 VITALS
TEMPERATURE: 98.6 F | WEIGHT: 245 LBS | SYSTOLIC BLOOD PRESSURE: 142 MMHG | HEIGHT: 65 IN | BODY MASS INDEX: 40.82 KG/M2 | RESPIRATION RATE: 18 BRPM | HEART RATE: 100 BPM | DIASTOLIC BLOOD PRESSURE: 90 MMHG | OXYGEN SATURATION: 96 %

## 2023-01-14 DIAGNOSIS — M23.92 INTERNAL DERANGEMENT OF LEFT KNEE: Primary | ICD-10-CM

## 2023-01-14 PROCEDURE — 73562 X-RAY EXAM OF KNEE 3: CPT

## 2023-01-14 PROCEDURE — 99283 EMERGENCY DEPT VISIT LOW MDM: CPT

## 2023-01-14 RX ORDER — HYDROCODONE BITARTRATE AND ACETAMINOPHEN 5; 325 MG/1; MG/1
1-2 TABLET ORAL EVERY 8 HOURS PRN
Qty: 12 TABLET | Refills: 0 | Status: SHIPPED | OUTPATIENT
Start: 2023-01-14 | End: 2023-01-17

## 2023-01-14 ASSESSMENT — PAIN SCALES - GENERAL: PAINLEVEL_OUTOF10: 10

## 2023-01-14 ASSESSMENT — PAIN - FUNCTIONAL ASSESSMENT: PAIN_FUNCTIONAL_ASSESSMENT: 0-10

## 2023-01-14 NOTE — ED PROVIDER NOTES
[address] [place] [address] ED  eMERGENCY dEPARTMENTeNCOUnter      Pt Name: Nathan Nicolas  MRN: [id number]  [place] 1975  Date ofevaluation: 1/14/2023  Provider: Tonya Landaverde PA-C    CHIEF COMPLAINT       Chief Complaint   Patient presents with    Knee Pain     Pt states yesterday her Lt knee started to hurt and left work. HISTORY OF PRESENT ILLNESS  (Location/Symptom, Timing/Onset, Context/Setting, Quality, Duration, Modifying Factors, Severity.)   Nathan Nicolas is a 50 y.o. female who presents to the emergency department with left knee pain. States that the knee has been gradually hurting over the last week or so. 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 Pain worse with movement relieved with rest.  Reports lots of left knee as well. Nursing Notes were reviewed. ALLERGIES     Sulfa antibiotics, Sulfasalazine, Naproxen, and Shellfish-derived products    CURRENT MEDICATIONS       Previous Medications    CLONAZEPAM (KLONOPIN) 1 MG TABLET    Take 1 mg by mouth 2 times daily as needed. IBUPROFEN (ADVIL;MOTRIN) 800 MG TABLET    Take 1 tablet by mouth every 8 hours as needed for Pain       PAST MEDICAL HISTORY         Diagnosis Date    Abnormal uterine bleeding 05/2018    Anxiety     Bipolar 1 disorder (HCC)     No Rx.      Blood clot in vein 2000    right calf    Fibroid 05/2018    Finger pain, right     Fracture, finger     right small finger    Panic attacks     Snores     Under care of team     No PCP       SURGICAL HISTORY           Procedure Laterality Date    FINGER CLOSED REDUCTION Right 06/01/2021    CLOSED REDUCTION PERCUTANEOUS  PINNING, C-ARM (Right    FINGER SURGERY Right 6/1/2021    CLOSED REDUCTION PERCUTANEOUS  PINNING , RIGHT LITTLE FINGER , SPLINT C-ARM performed by Alaina Fernandez MD at [address] [place] [address] Right 11/01/2021    MANIPULATION UNDER ANESTHESIA SMALL FINGER (Right )    HYSTERECTOMY (CERVIX STATUS UNKNOWN)  01/07/2021    [facility] HYSTEROSCOPY N/A 2019    HYSTEROSCOPY WITH A D AND C performed by Raya Tobar DO at [address]           Problem Relation Age of Onset    High Blood Pressure Mother     Diabetes Mother     Lung Cancer Father         primary    Brain Cancer Father     Other Sister         DDD of the back    Mult Sclerosis Maternal Grandmother     No Known Problems Maternal Grandfather     No Known Problems Paternal Grandmother     No Known Problems Paternal Grandfather      Family Status   Relation Name Status    Mother  Alive    Father      Sister  Alive    MGM      MGF      PGM      PGF      Son 2 Alive    Benito  Alive    Sister  Alive        SOCIAL HISTORY      reports that she has been smoking cigarettes. She started smoking about 39 years ago. She has been smoking an average of .5 packs per day. She has never used smokeless tobacco. She reports that she does not drink alcohol and does not use drugs. REVIEW OFSYSTEMS    (2-9 systems for level 4, 10 or more for level 5)   Review of Systems    Except as noted above the remainder of the review of systems was reviewed and negative. PHYSICAL EXAM    (up to 7 for level 4, 8 or more for level 5)     ED Triage Vitals [23 1001]   BP Temp Temp Source Heart Rate Resp SpO2 Height Weight   (!) 142/90 98.6 °F (37 °C) Oral 100 18 96 % 5' 5\" (1.651 m) 245 lb (111.1 kg)     Physical Exam  Constitutional:       Appearance: She is well-developed. HENT:      Head: Normocephalic and atraumatic. Cardiovascular:      Rate and Rhythm: Normal rate and regular rhythm. Pulmonary:      Effort: Pulmonary effort is normal.      Breath sounds: Normal breath sounds. Abdominal:      Palpations: Abdomen is soft. Musculoskeletal:         General: Normal range of motion. Cervical back: Normal range of motion and neck supple. Left knee: Swelling present. Normal range of motion. Tenderness present. Comments: No calf tenderness bilat. Negative homans sign   Skin:     General: Skin is warm. Findings: No rash. Neurological:      Mental Status: She is alert and oriented to person, place, and time. Psychiatric:         Behavior: Behavior normal.               DIAGNOSTIC RESULTS     EKG: All EKG's are interpreted by the Emergency Department Physician who either signs or Co-signs this chart in the absence of a cardiologist.        RADIOLOGY:   Non-plain film images such as CT, Ultrasound and MRI are read by the radiologist. Plain radiographic images arevisualized and preliminarily interpreted by the emergency physician with the below findings:        Interpretation per the Radiologist below, if available at thetime of this note:          ED BEDSIDE ULTRASOUND:   Performed by ED Physician - none    LABS:  Labs Reviewed - No data to display    All other labs were within normal range or not returned as of this dictation. EMERGENCY DEPARTMENT COURSE and DIFFERENTIAL DIAGNOSIS/MDM:   Vitals:    Vitals:    01/14/23 1001   BP: (!) 142/90   Pulse: 100   Resp: 18   Temp: 98.6 °F (37 °C)   TempSrc: Oral   SpO2: 96%   Weight: 245 lb (111.1 kg)   Height: 5' 5\" (1.651 m)     HEARTSCORE:n/a    Knee pain has worsened over the last 24 hours patient works long hours on concrete floors. No signs of warmth or redness. Full range of motion. Doubt septic arthritis or septic joint. No redness or warmth or fevers. Labs and arthrocentesis not indicated. CBC not indicated. X-ray shows some swelling. Suspect possible ligament damage. Instructed patient follow-up outpatient and given referral to orthopedic doctor. Given Ace wrap and discharged home. Left knee ace wrap well placed by nursing staff. Post application examination by me reveals that it is appropriately placed and the left upper extremity is neuro/vasc intact.       Evaluation and treatment course in the ED, and plan of care upon discharge was discussed in length with the patient. Patient had no further questions prior to being discharged and was instructed to return to the ED for new or worsening symptoms. The patient was involved in his/her plan of care. The testing that was ordered was discussed with the patient. Any medications that may have been ordered were discussed with the patient. I have reviewed the patient's previous medical records using the electronic health record that we have available. Labs and imaging were reviewed. Care was provided during an unprecedented national emergency due to the novel coronavirus, Covid-19. CONSULTS:  None    PROCEDURES:  Procedures        FINAL IMPRESSION      1. Internal derangement of left knee          DISPOSITION/PLAN   DISPOSITION Decision To Discharge 01/14/2023 10:59:34 AM      PATIENTREFERRED TO:   Nick Lane MD  [address] [place] [address] [phone]  [phone]    In 3 days      DISCHARGE MEDICATIONS:     New Prescriptions    HYDROCODONE-ACETAMINOPHEN (NORCO) 5-325 MG PER TABLET    Take 1-2 tablets by mouth every 8 hours as needed for Pain for up to 3 days.  Max Daily Amount: 6 tablets           (Please note that portions of this note were completed with a voice recognition program.  Efforts were made to edit thedictations but occasionally words are mis-transcribed.)    INDU Booker PA-C  01/14/23 1111

## 2023-01-14 NOTE — DISCHARGE INSTRUCTIONS
Take meds as prescribed. Follow up with doctor  in 3 -4 days.   Return to ER immediately if symptoms worsen or persist.  Do not drive, operate machinery, climb or engage in any dangerous activity while taking narcotics or norco.

## 2023-01-14 NOTE — ED PROVIDER NOTES
eMERGENCY dEPARTMENT eNCOUnter   Independent Attestation     Pt Name: Luisana Travis  MRN: [id number]  [place] 1975  Date of evaluation: 1/14/23     Luisana Travis is a 50 y.o. female with CC: Knee Pain (Pt states yesterday her Lt knee started to hurt and left work. )        This visit was performed by both a physician and an APC. I performed all aspects of the MDM as documented.       The care is provided during an unprecedented national emergency due to the novel coronavirus, Darcie Rivas MD  Attending Emergency Physician            Trudi Klein MD  01/14/23 1024

## 2023-01-18 DIAGNOSIS — M25.562 LEFT KNEE PAIN, UNSPECIFIED CHRONICITY: Primary | ICD-10-CM

## 2023-03-20 VITALS
SYSTOLIC BLOOD PRESSURE: 144 MMHG | TEMPERATURE: 98.1 F | RESPIRATION RATE: 18 BRPM | OXYGEN SATURATION: 94 % | HEART RATE: 106 BPM | DIASTOLIC BLOOD PRESSURE: 94 MMHG

## 2023-03-20 PROCEDURE — 99282 EMERGENCY DEPT VISIT SF MDM: CPT

## 2023-03-20 RX ORDER — CYCLOBENZAPRINE HCL 10 MG
10 TABLET ORAL 3 TIMES DAILY PRN
COMMUNITY
Start: 2023-02-28

## 2023-03-20 RX ORDER — GABAPENTIN 800 MG/1
800 TABLET ORAL 3 TIMES DAILY
COMMUNITY
Start: 2023-02-06

## 2023-03-20 ASSESSMENT — PAIN SCALES - GENERAL: PAINLEVEL_OUTOF10: 9

## 2023-03-20 ASSESSMENT — PAIN DESCRIPTION - LOCATION: LOCATION: BACK

## 2023-03-20 ASSESSMENT — PAIN DESCRIPTION - ORIENTATION: ORIENTATION: LOWER

## 2023-03-20 ASSESSMENT — PAIN - FUNCTIONAL ASSESSMENT: PAIN_FUNCTIONAL_ASSESSMENT: 0-10

## 2023-03-21 ENCOUNTER — HOSPITAL ENCOUNTER (EMERGENCY)
Age: 48
Discharge: HOME | End: 2023-03-21
Attending: EMERGENCY MEDICINE
Payer: COMMERCIAL

## 2023-03-21 DIAGNOSIS — M54.16 LUMBAR RADICULOPATHY: Primary | ICD-10-CM

## 2023-03-21 NOTE — ED PROVIDER NOTES
Rhythm: Normal rate and regular rhythm. Pulmonary:      Effort: Pulmonary effort is normal. No respiratory distress. Breath sounds: Normal breath sounds. Musculoskeletal:         General: Normal range of motion. Skin:     General: Skin is warm and dry. Neurological:      Mental Status: She is alert and oriented to person, place, and time. MEDICAL DECISION MAKING / ED COURSE:   Summary of Patient Presentation:        1)  Number and Complexity of Problems  Problem List This Visit: Back pain with shooting pain down her legs ongoing for months    Patient has documented history in EMR of chronic pain, lumbar radiculopathy      Diagnoses Considered but Do Not Suspect: Patient has chronic pain shooting down her legs. Symptoms are consistent with neuropathy. Patient has documented history of chronic pain. No recent procedures or interventions. Patient is neurologically intact. She has warm extremities and equal distal pulses    I do not have suspicion for cauda equina or other nerve impingement infectious process such as epidural abscess or epidural hematoma    Pertinent Comorbid Conditions: Lumbar radiculopathy    3)  Treatment and Disposition    77-year-old female presenting to the emergency room with continued issues with chronic pain shooting down her legs. She has documented history in EMR of chronic pain. She has been to see pain management has an office visit from 12/12/2022 they have concerns about starting her uncontrolled narcotic pain medications due to medical history and psych medications. She is not a good candidate to be started on controlled substances from the history. I do not feel would benefit the patient to start her on narcotics short-term given the chronicity of her pain. Patient expressing frustration with lack of ability to get into someone who can help her. We discussed alternative PCP. Patient offered spine surgery follow-up.       \"ED Course\" Notes From Epic

## 2023-07-04 NOTE — Clinical Note
Roberto Marie was seen and treated in our emergency department on 2/17/2022. She may return to work on 02/20/2022. If you have any questions or concerns, please don't hesitate to call.       Radha Zabala, APRN - CNP Head atraumatic, normal cephalic shape.

## 2024-02-03 ENCOUNTER — APPOINTMENT (OUTPATIENT)
Dept: GENERAL RADIOLOGY | Age: 49
End: 2024-02-03
Payer: COMMERCIAL

## 2024-02-03 ENCOUNTER — HOSPITAL ENCOUNTER (EMERGENCY)
Age: 49
Discharge: HOME | End: 2024-02-03
Attending: EMERGENCY MEDICINE
Payer: COMMERCIAL

## 2024-02-03 VITALS
HEIGHT: 66 IN | DIASTOLIC BLOOD PRESSURE: 100 MMHG | SYSTOLIC BLOOD PRESSURE: 154 MMHG | TEMPERATURE: 97.6 F | WEIGHT: 280 LBS | HEART RATE: 86 BPM | RESPIRATION RATE: 20 BRPM | OXYGEN SATURATION: 92 % | BODY MASS INDEX: 45 KG/M2

## 2024-02-03 DIAGNOSIS — S93.601A SPRAIN OF RIGHT FOOT, INITIAL ENCOUNTER: Primary | ICD-10-CM

## 2024-02-03 PROCEDURE — 99284 EMERGENCY DEPT VISIT MOD MDM: CPT

## 2024-02-03 PROCEDURE — 96372 THER/PROPH/DIAG INJ SC/IM: CPT

## 2024-02-03 PROCEDURE — 73630 X-RAY EXAM OF FOOT: CPT

## 2024-02-03 PROCEDURE — 6360000002 HC RX W HCPCS: Performed by: NURSE PRACTITIONER

## 2024-02-03 RX ORDER — KETOROLAC TROMETHAMINE 30 MG/ML
30 INJECTION, SOLUTION INTRAMUSCULAR; INTRAVENOUS ONCE
Status: COMPLETED | OUTPATIENT
Start: 2024-02-03 | End: 2024-02-03

## 2024-02-03 RX ORDER — IBUPROFEN 800 MG/1
800 TABLET ORAL EVERY 8 HOURS PRN
Qty: 20 TABLET | Refills: 0 | Status: SHIPPED | OUTPATIENT
Start: 2024-02-03

## 2024-02-03 RX ADMIN — KETOROLAC TROMETHAMINE 30 MG: 30 INJECTION INTRAMUSCULAR; INTRAVENOUS at 13:51

## 2024-02-03 ASSESSMENT — PAIN - FUNCTIONAL ASSESSMENT: PAIN_FUNCTIONAL_ASSESSMENT: 0-10

## 2024-02-03 ASSESSMENT — PAIN DESCRIPTION - DESCRIPTORS: DESCRIPTORS: ACHING

## 2024-02-03 ASSESSMENT — PAIN DESCRIPTION - ORIENTATION: ORIENTATION: RIGHT

## 2024-02-03 ASSESSMENT — PAIN DESCRIPTION - LOCATION: LOCATION: ANKLE

## 2024-02-03 ASSESSMENT — PAIN SCALES - GENERAL: PAINLEVEL_OUTOF10: 10

## 2024-02-03 ASSESSMENT — ENCOUNTER SYMPTOMS: COLOR CHANGE: 0

## 2024-02-03 NOTE — ED PROVIDER NOTES
[facility]  [facility] ED  eMERGENCY dEPARTMENT eNCOUnter      Pt Name: Gaurang Fiore  MRN: [id number]  Birthdate 1975  Date of evaluation: 2/3/2024  Provider: TOR Gatica CNP    CHIEF COMPLAINT       Chief Complaint   Patient presents with    Ankle Pain         HISTORY OF PRESENT ILLNESS  (Location/Symptom, Timing/Onset, Context/Setting, Quality, Duration, Modifying Factors, Severity.)   Gaurang Fiore is a 48 y.o. female who presents to the emergency department for evaluation of right foot pain that started while she was walking at work yesterday.  Denies twisting her right ankle or foot.  Pain radiates from her foot to her ankle at times.  Worse with ambulating.      Nursing Notes were reviewed.    ALLERGIES     Sulfa antibiotics, Sulfasalazine, Naproxen, and Shellfish-derived products    CURRENT MEDICATIONS       Previous Medications    CLONAZEPAM (KLONOPIN) 1 MG TABLET    Take 1 mg by mouth 2 times daily as needed.     CYCLOBENZAPRINE (FLEXERIL) 10 MG TABLET    Take 10 mg by mouth 3 times daily as needed    GABAPENTIN (NEURONTIN) 800 MG TABLET    Take 800 mg by mouth 3 times daily.       PAST MEDICAL HISTORY         Diagnosis Date    Abnormal uterine bleeding 05/2018    Anxiety     Bipolar 1 disorder (HCC)     No Rx.     Blood clot in vein 2000    right calf    Fibroid 05/2018    Finger pain, right     Fracture, finger     right small finger    Panic attacks     Snores     Under care of team     No PCP       SURGICAL HISTORY           Procedure Laterality Date    FINGER CLOSED REDUCTION Right 06/01/2021    CLOSED REDUCTION PERCUTANEOUS  PINNING, C-ARM (Right    FINGER SURGERY Right 6/1/2021    CLOSED REDUCTION PERCUTANEOUS  PINNING , RIGHT LITTLE FINGER , SPLINT C-ARM performed by Rafaela Paiz MD at [facility] OR    HAND SURGERY Right 11/01/2021    MANIPULATION UNDER ANESTHESIA SMALL FINGER (Right )    HYSTERECTOMY (CERVIX STATUS UNKNOWN)  01/07/2021    [facility]    HYSTEROSCOPY N/A 05/17/2019     evidence of acute fracture.  There is normal alignment of the tarsometatarsal joints.  No acute joint abnormality.  No focal osseous lesion. No focal soft tissue abnormality.     No acute osseous abnormality.         LABS:  Labs Reviewed - No data to display    All other labs were within normal range or not returned as of this dictation.    EMERGENCY DEPARTMENT COURSE and DIFFERENTIAL DIAGNOSIS/MDM:   Vitals:    Vitals:    02/03/24 1325   BP: (!) 154/100   Pulse: 86   Resp: 20   Temp: 97.6 °F (36.4 °C)   SpO2: 92%   Weight: 127 kg (280 lb)   Height: 1.676 m (5' 6\")         MEDICATIONS GIVEN IN THE ED:  Medications   ketorolac (TORADOL) injection 30 mg (30 mg IntraMUSCular Given 2/3/24 1351)       CLINICAL DECISION MAKING:  The patient presented alert with a nontoxic appearance and was seen in conjunction with Dr. Wang.     DDx include foot sprain, contusion      I will order   Orders Placed This Encounter   Procedures    XR FOOT RIGHT (MIN 3 VIEWS)     Standing Status:   Standing     Number of Occurrences:   1     Order Specific Question:   Reason for exam:     Answer:   Pain x1 day, denies specific injury    Apply ace wrap     Right foot     Standing Status:   Standing     Number of Occurrences:   1    [employer] Post Op Shoe, Unisex - Right; MD (M7.5-9/F8.5-10)     Standing Status:   Standing     Number of Occurrences:   1     Order Specific Question:   Equipment:     Answer:   Post Op Shoe, Unisex - Right     Order Specific Question:   Size     Answer:   MD (M7.5-9/F8.5-10)       The patient was involved in his/her plan of care through shared decision making. The testing that was ordered was discussed with the patient. Any medications that may have been ordered were discussed with the patient.       I have reviewed the patient's previous medical records using the electronic health record that we have available.      imaging were reviewed.  Imaging was reviewed and reported by the

## 2024-02-03 NOTE — ED PROVIDER NOTES
eMERGENCY dEPARTMENT eNCOUnter   Independent Attestation     Pt Name: Gaurang Fiore  MRN: [id number]  Birthdate 1975  Date of evaluation: 2/3/24     Gaurang Fiore is a 48 y.o. female with CC: Ankle Pain      Based on the medical record the care appears appropriate.  I was personally available for consultation in the Emergency Department.    Piyush Wang MD  Attending Emergency Physician                  Piyush Wang MD  02/03/24 [id number]

## 2024-02-03 NOTE — DISCHARGE INSTRUCTIONS
Take medications as prescribed.  Keep foot elevated while at rest.  Follow-up with podiatrist provided.  Return to emergency department for worsening or new symptoms.

## 2024-02-21 NOTE — DISCHARGE INSTRUCTIONS
[facility] -Phone: [phone] Fax: [phone]   Visit  Discharge Instructions / Physician Orders    DATE: 2/26/2024     Home Care:      SUPPLIES ORDERED THRU:      Wound Location:      Cleanse with:      Dressing Orders:      Frequency:      Additional Orders:    Your next appointment with Wound Care Center is- follow up with Dr. Quiros     (Please note your next appointment above and if you are unable to keep, kindly give a 24 hour notice. Thank you.)  If more than 15 min late we cannot guarantee you will be seen due to clinician schedule  Per Policy, Excessive cancellation will call for dismissal from program.     If you experience any of the following, please call the Wound Care Center during business hours:  [phone]  Your Phone call may be forwarded to [facility] during business hours that [place] is closed.     * Increase in Pain  * Temperature over 101  * Increase in drainage from your wound  * Drainage with a foul odor  * Bleeding  * Increase in swelling  * Need for compression bandage changes due to slippage, breakthrough drainage.     If you need medical attention outside of the business hours of the Wound Care Centers please contact your PCP or go to the nearest emergency room.     The information contained in the After Visit Summary has been reviewed with me, the patient and/or responsible adult, by my health care provider(s). I had the opportunity to ask questions regarding this information. I have elected to receive;      []After Visit Summary  [x]Comprehensive Discharge Instruction      Patient signature______________________________________Date:________  Electronically signed by Que Lazcano RN on 2/26/2024 at 8:47 AM  Electronically signed by Mellissa Montoya MD on 2/26/2024 at 8:44 AM

## 2024-02-26 ENCOUNTER — HOSPITAL ENCOUNTER (OUTPATIENT)
Dept: WOUND CARE | Age: 49
Discharge: HOME | End: 2024-02-26
Payer: COMMERCIAL

## 2024-02-26 VITALS
WEIGHT: 280 LBS | TEMPERATURE: 96.7 F | SYSTOLIC BLOOD PRESSURE: 150 MMHG | RESPIRATION RATE: 16 BRPM | DIASTOLIC BLOOD PRESSURE: 96 MMHG | HEART RATE: 98 BPM | HEIGHT: 66 IN | BODY MASS INDEX: 45 KG/M2

## 2024-02-26 PROBLEM — M25.571 ACUTE RIGHT ANKLE PAIN: Status: ACTIVE | Noted: 2024-02-26

## 2024-02-26 PROCEDURE — 99211 OFF/OP EST MAY X REQ PHY/QHP: CPT

## 2024-02-26 PROCEDURE — 99203 OFFICE O/P NEW LOW 30 MIN: CPT | Performed by: PLASTIC SURGERY

## 2024-02-26 ASSESSMENT — PAIN DESCRIPTION - ORIENTATION: ORIENTATION: RIGHT

## 2024-02-26 ASSESSMENT — PAIN DESCRIPTION - PAIN TYPE: TYPE: ACUTE PAIN

## 2024-02-26 ASSESSMENT — PAIN - FUNCTIONAL ASSESSMENT: PAIN_FUNCTIONAL_ASSESSMENT: PREVENTS OR INTERFERES SOME ACTIVE ACTIVITIES AND ADLS

## 2024-02-26 ASSESSMENT — PAIN DESCRIPTION - LOCATION: LOCATION: FOOT

## 2024-02-26 ASSESSMENT — PAIN SCALES - GENERAL: PAINLEVEL_OUTOF10: 10

## 2024-02-26 ASSESSMENT — PAIN DESCRIPTION - DESCRIPTORS: DESCRIPTORS: ACHING

## 2024-02-26 ASSESSMENT — PAIN DESCRIPTION - ONSET: ONSET: ON-GOING

## 2024-02-26 NOTE — PLAN OF CARE
Problem: Pain  Goal: Verbalizes/displays adequate comfort level or baseline comfort level  Outcome: Progressing     Problem: Falls - Risk of:  Goal: Will remain free from falls  Description: Will remain free from falls  Outcome: Progressing

## 2024-02-26 NOTE — PROGRESS NOTES
has hypertension.  Patient is seeing primary care.  Patient is to follow-up with Dr. Rivera.  Patient is to follow-up with us as needed.       Electronically signed by:  Mellissa Montoya MD 2/26/2024

## 2024-03-25 LAB
ALBUMIN: 3.8 G/DL
ALK PHOSPHATASE: 62 U/L
ALT SERPL-CCNC: 29 U/L
AST SERPL-CCNC: 22 U/L
BASOPHILS ABSOLUTE: 0.03 X10^3UL
BASOPHILS RELATIVE PERCENT: 0.5 %
BILIRUB SERPL-MCNC: 0.6 MG/DL
BUN BLDV-MCNC: 12 MG/DL
C-REACTIVE PROTEIN: 0.55 MG/L
CALCIUM IONIZED: 5 MG/DL
CALCIUM SERPL-MCNC: 9.3 MG/DL
CO2: 29 MMOL/L
CREAT SERPL-MCNC: 0.58 MG/DL
EOSINOPHILS RELATIVE PERCENT: 5.6 %
ERYTHROCYTE SEDIMENTATION RATE: 5 MM/HR
ERYTHROCYTE [DISTWIDTH] IN BLOOD BY AUTOMATED COUNT: 50.4 FL
GFR AFRICAN AMERICAN: 133.1 ML/M1.7
GFR NON-AFRICAN AMERICAN: 110 ML/M1.7
GLUCOSE: 96 MG/DL
HCT VFR BLD CALC: 47.1 %
HEMOGLOBIN: 14.8 G/DL
IMMATURE GRANULOCYTES %: 0.8 %
IMMATURE GRANULOCYTES ABSOLUTE: 0.05 X10^3UL
LYMPHOCYTES ABSOLUTE: 1.92 X10^3UL
LYMPHOCYTES RELATIVE PERCENT: 32.3 %
MCH RBC QN AUTO: 30.5 PG
MCHC RBC AUTO-ENTMCNC: 31.4 G/DL
MONOCYTES ABSOLUTE: 0.48 X10^3UL
MONOCYTES RELATIVE PERCENT: 8.1 %
NEUTROPHILS ABSOLUTE: 3.13 X10^3UL
PLATELETS: 183 X10^3UL
POTASSIUM SERPL-SCNC: 4.3 MMOL/L
PREALBUMIN: 26.6 MG/DL
PTH INTACT: 51.2 PG/ML
RBC: 4.85 X10^6UL
SEGMENTED NEUTROPHILS RELATIVE PERCENT: 52.7 %
SODIUM BLD-SCNC: 143 MMOL/L
TOTAL PROTEIN: 6.9 G/DL
WBC: 5.94 X10^3UL

## 2024-03-26 ENCOUNTER — HOSPITAL ENCOUNTER (EMERGENCY)
Age: 49
Discharge: HOME | End: 2024-03-27
Attending: EMERGENCY MEDICINE
Payer: COMMERCIAL

## 2024-03-26 ENCOUNTER — APPOINTMENT (OUTPATIENT)
Dept: GENERAL RADIOLOGY | Age: 49
End: 2024-03-26
Payer: COMMERCIAL

## 2024-03-26 DIAGNOSIS — R07.9 CHEST PAIN, UNSPECIFIED TYPE: Primary | ICD-10-CM

## 2024-03-26 LAB
ANION GAP SERPL CALCULATED.3IONS-SCNC: 10 MMOL/L (ref 9–17)
BASOPHILS # BLD: 0.05 K/UL (ref 0–0.2)
BASOPHILS NFR BLD: 1 % (ref 0–2)
BUN SERPL-MCNC: 17 MG/DL (ref 6–20)
BUN/CREAT SERPL: 21 (ref 9–20)
CALCIUM SERPL-MCNC: 9.4 MG/DL (ref 8.6–10.4)
CHLORIDE SERPL-SCNC: 104 MMOL/L (ref 98–107)
CO2 SERPL-SCNC: 27 MMOL/L (ref 20–31)
CREAT SERPL-MCNC: 0.8 MG/DL (ref 0.5–0.9)
D DIMER PPP FEU-MCNC: 0.34 UG/ML FEU (ref 0–0.59)
EOSINOPHIL # BLD: 0.45 K/UL (ref 0–0.44)
EOSINOPHILS RELATIVE PERCENT: 6 % (ref 1–4)
ERYTHROCYTE [DISTWIDTH] IN BLOOD BY AUTOMATED COUNT: 14 % (ref 11.8–14.4)
GFR SERPL CREATININE-BSD FRML MDRD: 90 ML/MIN/1.73M2
GLUCOSE SERPL-MCNC: 126 MG/DL (ref 70–99)
HCT VFR BLD AUTO: 46.7 % (ref 36.3–47.1)
HGB BLD-MCNC: 15.1 G/DL (ref 11.9–15.1)
IMM GRANULOCYTES # BLD AUTO: 0.04 K/UL (ref 0–0.3)
IMM GRANULOCYTES NFR BLD: 1 %
LYMPHOCYTES NFR BLD: 2.48 K/UL (ref 1.1–3.7)
LYMPHOCYTES RELATIVE PERCENT: 32 % (ref 24–43)
MCH RBC QN AUTO: 30.5 PG (ref 25.2–33.5)
MCHC RBC AUTO-ENTMCNC: 32.3 G/DL (ref 28.4–34.8)
MCV RBC AUTO: 94.3 FL (ref 82.6–102.9)
MONOCYTES NFR BLD: 0.56 K/UL (ref 0.1–1.2)
MONOCYTES NFR BLD: 7 % (ref 3–12)
NEUTROPHILS NFR BLD: 53 % (ref 36–65)
NEUTS SEG NFR BLD: 4.23 K/UL (ref 1.5–8.1)
NRBC BLD-RTO: 0 PER 100 WBC
PLATELET # BLD AUTO: 191 K/UL (ref 138–453)
PMV BLD AUTO: 10.5 FL (ref 8.1–13.5)
POTASSIUM SERPL-SCNC: 3.9 MMOL/L (ref 3.7–5.3)
RBC # BLD AUTO: 4.95 M/UL (ref 3.95–5.11)
SODIUM SERPL-SCNC: 141 MMOL/L (ref 135–144)
TROPONIN I SERPL HS-MCNC: 7 NG/L (ref 0–14)
WBC OTHER # BLD: 7.8 K/UL (ref 3.5–11.3)

## 2024-03-26 PROCEDURE — 85379 FIBRIN DEGRADATION QUANT: CPT

## 2024-03-26 PROCEDURE — 99285 EMERGENCY DEPT VISIT HI MDM: CPT

## 2024-03-26 PROCEDURE — 80048 BASIC METABOLIC PNL TOTAL CA: CPT

## 2024-03-26 PROCEDURE — 85025 COMPLETE CBC W/AUTO DIFF WBC: CPT

## 2024-03-26 PROCEDURE — 93005 ELECTROCARDIOGRAM TRACING: CPT | Performed by: EMERGENCY MEDICINE

## 2024-03-26 PROCEDURE — 71045 X-RAY EXAM CHEST 1 VIEW: CPT

## 2024-03-26 PROCEDURE — 84484 ASSAY OF TROPONIN QUANT: CPT

## 2024-03-26 PROCEDURE — 36415 COLL VENOUS BLD VENIPUNCTURE: CPT

## 2024-03-26 ASSESSMENT — PAIN DESCRIPTION - ORIENTATION: ORIENTATION: MID

## 2024-03-26 ASSESSMENT — LIFESTYLE VARIABLES
HOW MANY STANDARD DRINKS CONTAINING ALCOHOL DO YOU HAVE ON A TYPICAL DAY: PATIENT DOES NOT DRINK
HOW OFTEN DO YOU HAVE A DRINK CONTAINING ALCOHOL: NEVER

## 2024-03-26 ASSESSMENT — PAIN DESCRIPTION - LOCATION: LOCATION: CHEST

## 2024-03-26 ASSESSMENT — PAIN SCALES - GENERAL: PAINLEVEL_OUTOF10: 3

## 2024-03-26 ASSESSMENT — PAIN DESCRIPTION - DESCRIPTORS: DESCRIPTORS: PRESSURE

## 2024-03-26 ASSESSMENT — PAIN - FUNCTIONAL ASSESSMENT: PAIN_FUNCTIONAL_ASSESSMENT: 0-10

## 2024-03-27 VITALS
HEART RATE: 90 BPM | WEIGHT: 293 LBS | RESPIRATION RATE: 15 BRPM | DIASTOLIC BLOOD PRESSURE: 91 MMHG | TEMPERATURE: 98.1 F | BODY MASS INDEX: 50.02 KG/M2 | SYSTOLIC BLOOD PRESSURE: 133 MMHG | HEIGHT: 64 IN | OXYGEN SATURATION: 91 %

## 2024-03-27 PROCEDURE — 6370000000 HC RX 637 (ALT 250 FOR IP): Performed by: EMERGENCY MEDICINE

## 2024-03-27 RX ORDER — ASPIRIN 81 MG/1
324 TABLET, CHEWABLE ORAL ONCE
Status: COMPLETED | OUTPATIENT
Start: 2024-03-27 | End: 2024-03-27

## 2024-03-27 RX ADMIN — ASPIRIN 81 MG CHEWABLE TABLET 324 MG: 81 TABLET CHEWABLE at 00:40

## 2024-03-27 ASSESSMENT — ENCOUNTER SYMPTOMS: CHEST TIGHTNESS: 1

## 2024-03-27 NOTE — ED PROVIDER NOTES
EMERGENCY DEPARTMENT ENCOUNTER    Pt Name: Gaurang Fiore  MRN: [id number]  Birthdate 1975  Date of evaluation: 3/27/24  CHIEF COMPLAINT       Chief Complaint   Patient presents with    Chest Pain     Pt reports mid, nonradiating chest pain x 2 hours.  Pt reports associated nausea.  Pt denies cardiac history      HISTORY OF PRESENT ILLNESS   48-year-old female presents emergency room for chest heaviness.  Patient has been experiencing some intermittent heaviness to her chest for about 3 hours on and off.  Symptoms are intermittent.  Patient has never had symptoms like this before.  Patient has some chronic shortness of breath and chronic cough.  She does not have pain with breathing.  She reports it is a tightness over the chest.  Patient was not doing any physical activity when this occurred.  No recent illnesses.  No cough.  Tightness radiates to the back.             REVIEW OF SYSTEMS     Review of Systems   Respiratory:  Positive for chest tightness.      PASTMEDICAL HISTORY     Past Medical History:   Diagnosis Date    Abnormal uterine bleeding 05/2018    Anxiety     Bipolar 1 disorder (HCC)     No Rx.     Blood clot in vein 2000    right calf    Fibroid 05/2018    Finger pain, right     Fracture, finger     right small finger    Panic attacks     Snores     Under care of team     No PCP     Past Problem List  Patient Active Problem List   Diagnosis Code    Fibroid D21.9    Abnormal uterine bleeding (AUB) N93.9    Hx of tubal ligation (1994) Z98.51    Panic attacks F41.0    Tobacco abuse Z72.0    Hysteroscopy, D&C 5/17/19 Z98.890    Abnormal mammogram of left breast R92.8    Non-compliant patient Z91.199    Closed displaced fracture of proximal phalanx of right little finger S62.616A    Obesity with body mass index 30 or greater E66.9    Insomnia G47.00    Hypokalemia E87.6    Gastroesophageal reflux disease K21.9    Fracture of metacarpal bone S62.309A    Fibrocystic disease of breast N60.19    Chest pain

## 2024-03-27 NOTE — DISCHARGE INSTRUCTIONS
We had discussed keeping you for stress test and monitoring.  You may return at any time if symptoms worsen or you have concerns.  It is highly recommended to at least follow-up with your primary care doctor regarding the chest pain.

## 2024-03-27 NOTE — ED NOTES
Message left on voice mail to have pt call back to ED.  Pt left home medications need to know if she wants them back.

## 2024-03-28 ENCOUNTER — APPOINTMENT (OUTPATIENT)
Dept: CT IMAGING | Age: 49
DRG: 291 | End: 2024-03-28
Payer: COMMERCIAL

## 2024-03-28 ENCOUNTER — HOSPITAL ENCOUNTER (INPATIENT)
Age: 49
LOS: 7 days | Discharge: HOME | DRG: 291 | End: 2024-04-05
Attending: STUDENT IN AN ORGANIZED HEALTH CARE EDUCATION/TRAINING PROGRAM | Admitting: INTERNAL MEDICINE
Payer: COMMERCIAL

## 2024-03-28 ENCOUNTER — APPOINTMENT (OUTPATIENT)
Dept: GENERAL RADIOLOGY | Age: 49
DRG: 291 | End: 2024-03-28
Payer: COMMERCIAL

## 2024-03-28 DIAGNOSIS — R06.01 ORTHOPNEA: ICD-10-CM

## 2024-03-28 DIAGNOSIS — R07.89 OTHER CHEST PAIN: Primary | ICD-10-CM

## 2024-03-28 DIAGNOSIS — R06.09 DYSPNEA ON EXERTION: ICD-10-CM

## 2024-03-28 DIAGNOSIS — R09.89 SUSPECTED CHF (CONGESTIVE HEART FAILURE): ICD-10-CM

## 2024-03-28 DIAGNOSIS — R29.818 SUSPECTED SLEEP APNEA: ICD-10-CM

## 2024-03-28 LAB
ANION GAP SERPL CALCULATED.3IONS-SCNC: 9 MMOL/L (ref 9–17)
BASOPHILS # BLD: 0.04 K/UL (ref 0–0.2)
BASOPHILS NFR BLD: 1 % (ref 0–2)
BNP SERPL-MCNC: 113 PG/ML
BUN SERPL-MCNC: 14 MG/DL (ref 6–20)
BUN/CREAT SERPL: 20 (ref 9–20)
CALCIUM SERPL-MCNC: 9.2 MG/DL (ref 8.6–10.4)
CHLORIDE SERPL-SCNC: 103 MMOL/L (ref 98–107)
CO2 SERPL-SCNC: 30 MMOL/L (ref 20–31)
CREAT SERPL-MCNC: 0.7 MG/DL (ref 0.5–0.9)
EKG ATRIAL RATE: 101 BPM
EKG P AXIS: 59 DEGREES
EKG P-R INTERVAL: 150 MS
EKG Q-T INTERVAL: 358 MS
EKG QRS DURATION: 88 MS
EKG QTC CALCULATION (BAZETT): 464 MS
EKG R AXIS: 3 DEGREES
EKG T AXIS: 59 DEGREES
EKG VENTRICULAR RATE: 101 BPM
EOSINOPHIL # BLD: 0.43 K/UL (ref 0–0.44)
EOSINOPHILS RELATIVE PERCENT: 6 % (ref 1–4)
ERYTHROCYTE [DISTWIDTH] IN BLOOD BY AUTOMATED COUNT: 14.1 % (ref 11.8–14.4)
GFR SERPL CREATININE-BSD FRML MDRD: >90 ML/MIN/1.73M2
GLUCOSE SERPL-MCNC: 95 MG/DL (ref 70–99)
HCT VFR BLD AUTO: 46.4 % (ref 36.3–47.1)
HGB BLD-MCNC: 14.9 G/DL (ref 11.9–15.1)
IMM GRANULOCYTES # BLD AUTO: 0.05 K/UL (ref 0–0.3)
IMM GRANULOCYTES NFR BLD: 1 %
LYMPHOCYTES NFR BLD: 2.74 K/UL (ref 1.1–3.7)
LYMPHOCYTES RELATIVE PERCENT: 38 % (ref 24–43)
MCH RBC QN AUTO: 30.6 PG (ref 25.2–33.5)
MCHC RBC AUTO-ENTMCNC: 32.1 G/DL (ref 28.4–34.8)
MCV RBC AUTO: 95.3 FL (ref 82.6–102.9)
MONOCYTES NFR BLD: 0.57 K/UL (ref 0.1–1.2)
MONOCYTES NFR BLD: 8 % (ref 3–12)
NEUTROPHILS NFR BLD: 46 % (ref 36–65)
NEUTS SEG NFR BLD: 3.36 K/UL (ref 1.5–8.1)
NRBC BLD-RTO: 0 PER 100 WBC
PLATELET # BLD AUTO: 196 K/UL (ref 138–453)
PMV BLD AUTO: 10.8 FL (ref 8.1–13.5)
POTASSIUM SERPL-SCNC: 3.9 MMOL/L (ref 3.7–5.3)
RBC # BLD AUTO: 4.87 M/UL (ref 3.95–5.11)
SODIUM SERPL-SCNC: 142 MMOL/L (ref 135–144)
TROPONIN I SERPL HS-MCNC: 8 NG/L (ref 0–14)
WBC OTHER # BLD: 7.2 K/UL (ref 3.5–11.3)

## 2024-03-28 PROCEDURE — 6360000004 HC RX CONTRAST MEDICATION: Performed by: STUDENT IN AN ORGANIZED HEALTH CARE EDUCATION/TRAINING PROGRAM

## 2024-03-28 PROCEDURE — 2580000003 HC RX 258: Performed by: STUDENT IN AN ORGANIZED HEALTH CARE EDUCATION/TRAINING PROGRAM

## 2024-03-28 PROCEDURE — 71260 CT THORAX DX C+: CPT

## 2024-03-28 PROCEDURE — 99285 EMERGENCY DEPT VISIT HI MDM: CPT

## 2024-03-28 PROCEDURE — 96374 THER/PROPH/DIAG INJ IV PUSH: CPT

## 2024-03-28 PROCEDURE — 71045 X-RAY EXAM CHEST 1 VIEW: CPT

## 2024-03-28 PROCEDURE — 83880 ASSAY OF NATRIURETIC PEPTIDE: CPT

## 2024-03-28 PROCEDURE — 6360000002 HC RX W HCPCS: Performed by: STUDENT IN AN ORGANIZED HEALTH CARE EDUCATION/TRAINING PROGRAM

## 2024-03-28 PROCEDURE — 84484 ASSAY OF TROPONIN QUANT: CPT

## 2024-03-28 PROCEDURE — 80048 BASIC METABOLIC PNL TOTAL CA: CPT

## 2024-03-28 PROCEDURE — 93005 ELECTROCARDIOGRAM TRACING: CPT | Performed by: STUDENT IN AN ORGANIZED HEALTH CARE EDUCATION/TRAINING PROGRAM

## 2024-03-28 PROCEDURE — 85025 COMPLETE CBC W/AUTO DIFF WBC: CPT

## 2024-03-28 RX ORDER — SODIUM CHLORIDE 0.9 % (FLUSH) 0.9 %
10 SYRINGE (ML) INJECTION PRN
Status: DISCONTINUED | OUTPATIENT
Start: 2024-03-28 | End: 2024-04-05 | Stop reason: HOSPADM

## 2024-03-28 RX ORDER — FUROSEMIDE 10 MG/ML
20 INJECTION INTRAMUSCULAR; INTRAVENOUS ONCE
Status: COMPLETED | OUTPATIENT
Start: 2024-03-28 | End: 2024-03-28

## 2024-03-28 RX ORDER — 0.9 % SODIUM CHLORIDE 0.9 %
80 INTRAVENOUS SOLUTION INTRAVENOUS ONCE
Status: COMPLETED | OUTPATIENT
Start: 2024-03-28 | End: 2024-03-28

## 2024-03-28 RX ADMIN — IOPAMIDOL 75 ML: 755 INJECTION, SOLUTION INTRAVENOUS at 23:44

## 2024-03-28 RX ADMIN — SODIUM CHLORIDE, PRESERVATIVE FREE 10 ML: 5 INJECTION INTRAVENOUS at 23:44

## 2024-03-28 RX ADMIN — FUROSEMIDE 20 MG: 10 INJECTION, SOLUTION INTRAMUSCULAR; INTRAVENOUS at 23:56

## 2024-03-28 RX ADMIN — SODIUM CHLORIDE 80 ML: 0.9 INJECTION, SOLUTION INTRAVENOUS at 23:44

## 2024-03-28 ASSESSMENT — PAIN SCALES - GENERAL: PAINLEVEL_OUTOF10: 7

## 2024-03-28 ASSESSMENT — PAIN DESCRIPTION - FREQUENCY: FREQUENCY: CONTINUOUS

## 2024-03-28 ASSESSMENT — PAIN DESCRIPTION - PAIN TYPE: TYPE: ACUTE PAIN

## 2024-03-28 ASSESSMENT — PAIN DESCRIPTION - ORIENTATION: ORIENTATION: MID

## 2024-03-28 ASSESSMENT — PAIN DESCRIPTION - DESCRIPTORS: DESCRIPTORS: PRESSURE

## 2024-03-28 ASSESSMENT — PAIN - FUNCTIONAL ASSESSMENT: PAIN_FUNCTIONAL_ASSESSMENT: 0-10

## 2024-03-28 ASSESSMENT — PAIN DESCRIPTION - LOCATION: LOCATION: CHEST

## 2024-03-29 ENCOUNTER — APPOINTMENT (OUTPATIENT)
Age: 49
DRG: 291 | End: 2024-03-29
Payer: COMMERCIAL

## 2024-03-29 ENCOUNTER — APPOINTMENT (OUTPATIENT)
Dept: GENERAL RADIOLOGY | Age: 49
DRG: 291 | End: 2024-03-29
Payer: COMMERCIAL

## 2024-03-29 PROBLEM — Z87.891 FORMER SMOKER: Status: ACTIVE | Noted: 2024-03-29

## 2024-03-29 PROBLEM — R06.01 ORTHOPNEA: Status: ACTIVE | Noted: 2024-03-29

## 2024-03-29 PROBLEM — R06.09 DYSPNEA ON EXERTION: Status: ACTIVE | Noted: 2024-03-29

## 2024-03-29 PROBLEM — E78.5 HYPERLIPIDEMIA: Status: ACTIVE | Noted: 2024-03-29

## 2024-03-29 PROBLEM — R09.89 SUSPECTED CHF (CONGESTIVE HEART FAILURE): Status: ACTIVE | Noted: 2024-03-29

## 2024-03-29 PROBLEM — G62.9 NEUROPATHY: Status: ACTIVE | Noted: 2024-03-29

## 2024-03-29 PROBLEM — I10 ESSENTIAL HYPERTENSION: Status: ACTIVE | Noted: 2024-03-29

## 2024-03-29 PROBLEM — I20.0 UNSTABLE ANGINA (HCC): Status: ACTIVE | Noted: 2024-03-29

## 2024-03-29 PROBLEM — M62.838 MUSCLE SPASM: Status: ACTIVE | Noted: 2024-03-29

## 2024-03-29 PROBLEM — J90 SMALL PLEURAL EFFUSION: Status: ACTIVE | Noted: 2024-03-29

## 2024-03-29 LAB
ANION GAP SERPL CALCULATED.3IONS-SCNC: 11 MMOL/L (ref 9–17)
ANTI-XA UNFRAC HEPARIN: 0.38 IU/L (ref 0.3–0.7)
B PARAP IS1001 DNA NPH QL NAA+NON-PROBE: NOT DETECTED
B PERT DNA SPEC QL NAA+PROBE: NOT DETECTED
BASOPHILS # BLD: 0.04 K/UL (ref 0–0.2)
BASOPHILS NFR BLD: 1 % (ref 0–2)
BUN SERPL-MCNC: 13 MG/DL (ref 6–20)
BUN/CREAT SERPL: 22 (ref 9–20)
C PNEUM DNA NPH QL NAA+NON-PROBE: NOT DETECTED
CALCIUM SERPL-MCNC: 9.2 MG/DL (ref 8.6–10.4)
CHLORIDE SERPL-SCNC: 101 MMOL/L (ref 98–107)
CO2 SERPL-SCNC: 28 MMOL/L (ref 20–31)
CREAT SERPL-MCNC: 0.6 MG/DL (ref 0.5–0.9)
ECHO AO ROOT DIAM: 3.1 CM
ECHO AO ROOT INDEX: 1.38 CM/M2
ECHO AV MEAN GRADIENT: 4 MMHG
ECHO AV MEAN VELOCITY: 0.8 M/S
ECHO AV PEAK GRADIENT: 10 MMHG
ECHO AV PEAK VELOCITY: 1.6 M/S
ECHO AV VELOCITY RATIO: 0.88
ECHO AV VTI: 24.5 CM
ECHO BSA: 2.39 M2
ECHO LA AREA 2C: 15.3 CM2
ECHO LA AREA 4C: 16 CM2
ECHO LA DIAMETER INDEX: 1.96 CM/M2
ECHO LA DIAMETER: 4.4 CM
ECHO LA MAJOR AXIS: 5.4 CM
ECHO LA MINOR AXIS: 5 CM
ECHO LA TO AORTIC ROOT RATIO: 1.42
ECHO LA VOL BP: 39 ML (ref 22–52)
ECHO LA VOL MOD A2C: 38 ML (ref 22–52)
ECHO LA VOL MOD A4C: 38 ML (ref 22–52)
ECHO LA VOL/BSA BIPLANE: 17 ML/M2 (ref 16–34)
ECHO LA VOLUME INDEX MOD A2C: 17 ML/M2 (ref 16–34)
ECHO LA VOLUME INDEX MOD A4C: 17 ML/M2 (ref 16–34)
ECHO LV E' LATERAL VELOCITY: 9 CM/S
ECHO LV E' SEPTAL VELOCITY: 6 CM/S
ECHO LV FRACTIONAL SHORTENING: 33 % (ref 28–44)
ECHO LV INTERNAL DIMENSION DIASTOLE INDEX: 1.6 CM/M2
ECHO LV INTERNAL DIMENSION DIASTOLIC: 3.6 CM (ref 3.9–5.3)
ECHO LV INTERNAL DIMENSION SYSTOLIC INDEX: 1.07 CM/M2
ECHO LV INTERNAL DIMENSION SYSTOLIC: 2.4 CM
ECHO LV IVSD: 1.3 CM (ref 0.6–0.9)
ECHO LV MASS 2D: 150.6 G (ref 67–162)
ECHO LV MASS INDEX 2D: 66.9 G/M2 (ref 43–95)
ECHO LV POSTERIOR WALL DIASTOLIC: 1.2 CM (ref 0.6–0.9)
ECHO LV RELATIVE WALL THICKNESS RATIO: 0.67
ECHO LVOT PEAK GRADIENT: 8 MMHG
ECHO LVOT PEAK VELOCITY: 1.4 M/S
ECHO MV A VELOCITY: 1.02 M/S
ECHO MV E DECELERATION TIME (DT): 285 MS
ECHO MV E VELOCITY: 0.71 M/S
ECHO MV E/A RATIO: 0.7
ECHO MV E/E' LATERAL: 7.89
ECHO MV E/E' RATIO (AVERAGED): 9.86
EKG ATRIAL RATE: 79 BPM
EKG ATRIAL RATE: 82 BPM
EKG P AXIS: 61 DEGREES
EKG P AXIS: 63 DEGREES
EKG P-R INTERVAL: 168 MS
EKG P-R INTERVAL: 168 MS
EKG Q-T INTERVAL: 388 MS
EKG Q-T INTERVAL: 408 MS
EKG QRS DURATION: 82 MS
EKG QRS DURATION: 86 MS
EKG QTC CALCULATION (BAZETT): 453 MS
EKG QTC CALCULATION (BAZETT): 467 MS
EKG R AXIS: 1 DEGREES
EKG R AXIS: 7 DEGREES
EKG T AXIS: 30 DEGREES
EKG T AXIS: 43 DEGREES
EKG VENTRICULAR RATE: 79 BPM
EKG VENTRICULAR RATE: 82 BPM
EOSINOPHIL # BLD: 0.39 K/UL (ref 0–0.44)
EOSINOPHILS RELATIVE PERCENT: 7 % (ref 1–4)
ERYTHROCYTE [DISTWIDTH] IN BLOOD BY AUTOMATED COUNT: 14.3 % (ref 11.8–14.4)
EST. AVERAGE GLUCOSE BLD GHB EST-MCNC: 120 MG/DL
FLUAV RNA NPH QL NAA+NON-PROBE: NOT DETECTED
FLUBV RNA NPH QL NAA+NON-PROBE: NOT DETECTED
GFR SERPL CREATININE-BSD FRML MDRD: >90 ML/MIN/1.73M2
GLUCOSE SERPL-MCNC: 100 MG/DL (ref 70–99)
HADV DNA NPH QL NAA+NON-PROBE: NOT DETECTED
HBA1C MFR BLD: 5.8 % (ref 4–6)
HCG SERPL QL: NEGATIVE
HCOV 229E RNA NPH QL NAA+NON-PROBE: NOT DETECTED
HCOV HKU1 RNA NPH QL NAA+NON-PROBE: NOT DETECTED
HCOV NL63 RNA NPH QL NAA+NON-PROBE: NOT DETECTED
HCOV OC43 RNA NPH QL NAA+NON-PROBE: NOT DETECTED
HCT VFR BLD AUTO: 46.5 % (ref 36.3–47.1)
HGB BLD-MCNC: 14.4 G/DL (ref 11.9–15.1)
HMPV RNA NPH QL NAA+NON-PROBE: NOT DETECTED
HPIV1 RNA NPH QL NAA+NON-PROBE: NOT DETECTED
HPIV2 RNA NPH QL NAA+NON-PROBE: NOT DETECTED
HPIV3 RNA NPH QL NAA+NON-PROBE: NOT DETECTED
HPIV4 RNA NPH QL NAA+NON-PROBE: NOT DETECTED
IMM GRANULOCYTES # BLD AUTO: 0.03 K/UL (ref 0–0.3)
IMM GRANULOCYTES NFR BLD: 1 %
INR PPP: 1.1
LYMPHOCYTES NFR BLD: 2.22 K/UL (ref 1.1–3.7)
LYMPHOCYTES RELATIVE PERCENT: 37 % (ref 24–43)
M PNEUMO DNA NPH QL NAA+NON-PROBE: NOT DETECTED
MAGNESIUM SERPL-MCNC: 1.8 MG/DL (ref 1.6–2.6)
MAGNESIUM SERPL-MCNC: 1.9 MG/DL (ref 1.6–2.6)
MCH RBC QN AUTO: 29.8 PG (ref 25.2–33.5)
MCHC RBC AUTO-ENTMCNC: 31 G/DL (ref 28.4–34.8)
MCV RBC AUTO: 96.1 FL (ref 82.6–102.9)
MONOCYTES NFR BLD: 0.53 K/UL (ref 0.1–1.2)
MONOCYTES NFR BLD: 9 % (ref 3–12)
NEUTROPHILS NFR BLD: 45 % (ref 36–65)
NEUTS SEG NFR BLD: 2.81 K/UL (ref 1.5–8.1)
NRBC BLD-RTO: 0 PER 100 WBC
PARTIAL THROMBOPLASTIN TIME: 102.7 SEC (ref 23.9–33.8)
PLATELET # BLD AUTO: 201 K/UL (ref 138–453)
PMV BLD AUTO: 11.5 FL (ref 8.1–13.5)
POTASSIUM SERPL-SCNC: 3.7 MMOL/L (ref 3.7–5.3)
POTASSIUM SERPL-SCNC: 3.8 MMOL/L (ref 3.7–5.3)
PROTHROMBIN TIME: 14.2 SEC (ref 11.5–14.2)
RBC # BLD AUTO: 4.84 M/UL (ref 3.95–5.11)
RSV RNA NPH QL NAA+NON-PROBE: NOT DETECTED
RV+EV RNA NPH QL NAA+NON-PROBE: NOT DETECTED
SARS-COV-2 RNA NPH QL NAA+NON-PROBE: NOT DETECTED
SODIUM SERPL-SCNC: 140 MMOL/L (ref 135–144)
SPECIMEN DESCRIPTION: NORMAL
TROPONIN I SERPL HS-MCNC: 10 NG/L (ref 0–14)
TROPONIN I SERPL HS-MCNC: 7 NG/L (ref 0–14)
TROPONIN I SERPL HS-MCNC: 8 NG/L (ref 0–14)
WBC OTHER # BLD: 6 K/UL (ref 3.5–11.3)

## 2024-03-29 PROCEDURE — 85610 PROTHROMBIN TIME: CPT

## 2024-03-29 PROCEDURE — 6370000000 HC RX 637 (ALT 250 FOR IP): Performed by: INTERNAL MEDICINE

## 2024-03-29 PROCEDURE — 80048 BASIC METABOLIC PNL TOTAL CA: CPT

## 2024-03-29 PROCEDURE — 0202U NFCT DS 22 TRGT SARS-COV-2: CPT

## 2024-03-29 PROCEDURE — 85520 HEPARIN ASSAY: CPT

## 2024-03-29 PROCEDURE — 5A09457 ASSISTANCE WITH RESPIRATORY VENTILATION, 24-96 CONSECUTIVE HOURS, CONTINUOUS POSITIVE AIRWAY PRESSURE: ICD-10-PCS | Performed by: INTERNAL MEDICINE

## 2024-03-29 PROCEDURE — 6370000000 HC RX 637 (ALT 250 FOR IP): Performed by: NURSE PRACTITIONER

## 2024-03-29 PROCEDURE — 36415 COLL VENOUS BLD VENIPUNCTURE: CPT

## 2024-03-29 PROCEDURE — 94762 N-INVAS EAR/PLS OXIMTRY CONT: CPT

## 2024-03-29 PROCEDURE — 94660 CPAP INITIATION&MGMT: CPT

## 2024-03-29 PROCEDURE — 83036 HEMOGLOBIN GLYCOSYLATED A1C: CPT

## 2024-03-29 PROCEDURE — 71045 X-RAY EXAM CHEST 1 VIEW: CPT

## 2024-03-29 PROCEDURE — 85730 THROMBOPLASTIN TIME PARTIAL: CPT

## 2024-03-29 PROCEDURE — 1200000000 HC SEMI PRIVATE

## 2024-03-29 PROCEDURE — 84132 ASSAY OF SERUM POTASSIUM: CPT

## 2024-03-29 PROCEDURE — 85025 COMPLETE CBC W/AUTO DIFF WBC: CPT

## 2024-03-29 PROCEDURE — 6360000002 HC RX W HCPCS: Performed by: STUDENT IN AN ORGANIZED HEALTH CARE EDUCATION/TRAINING PROGRAM

## 2024-03-29 PROCEDURE — APPSS30 APP SPLIT SHARED TIME 16-30 MINUTES

## 2024-03-29 PROCEDURE — 93306 TTE W/DOPPLER COMPLETE: CPT

## 2024-03-29 PROCEDURE — 84484 ASSAY OF TROPONIN QUANT: CPT

## 2024-03-29 PROCEDURE — 2700000000 HC OXYGEN THERAPY PER DAY

## 2024-03-29 PROCEDURE — 99223 1ST HOSP IP/OBS HIGH 75: CPT | Performed by: INTERNAL MEDICINE

## 2024-03-29 PROCEDURE — 94761 N-INVAS EAR/PLS OXIMETRY MLT: CPT

## 2024-03-29 PROCEDURE — 84703 CHORIONIC GONADOTROPIN ASSAY: CPT

## 2024-03-29 PROCEDURE — 93005 ELECTROCARDIOGRAM TRACING: CPT | Performed by: INTERNAL MEDICINE

## 2024-03-29 PROCEDURE — 2580000003 HC RX 258

## 2024-03-29 PROCEDURE — 93005 ELECTROCARDIOGRAM TRACING: CPT | Performed by: NURSE PRACTITIONER

## 2024-03-29 PROCEDURE — 6360000002 HC RX W HCPCS: Performed by: INTERNAL MEDICINE

## 2024-03-29 PROCEDURE — 83735 ASSAY OF MAGNESIUM: CPT

## 2024-03-29 PROCEDURE — 6360000002 HC RX W HCPCS

## 2024-03-29 PROCEDURE — 6370000000 HC RX 637 (ALT 250 FOR IP)

## 2024-03-29 RX ORDER — GABAPENTIN 400 MG/1
1200 CAPSULE ORAL 3 TIMES DAILY
Status: DISCONTINUED | OUTPATIENT
Start: 2024-03-29 | End: 2024-04-05 | Stop reason: HOSPADM

## 2024-03-29 RX ORDER — POTASSIUM CHLORIDE 20 MEQ/1
40 TABLET, EXTENDED RELEASE ORAL PRN
Status: DISCONTINUED | OUTPATIENT
Start: 2024-03-29 | End: 2024-04-05 | Stop reason: HOSPADM

## 2024-03-29 RX ORDER — AMLODIPINE BESYLATE 5 MG/1
5 TABLET ORAL DAILY
COMMUNITY

## 2024-03-29 RX ORDER — KETOROLAC TROMETHAMINE 15 MG/ML
15 INJECTION, SOLUTION INTRAMUSCULAR; INTRAVENOUS ONCE
Status: COMPLETED | OUTPATIENT
Start: 2024-03-29 | End: 2024-03-29

## 2024-03-29 RX ORDER — HEPARIN SODIUM 1000 [USP'U]/ML
4000 INJECTION, SOLUTION INTRAVENOUS; SUBCUTANEOUS ONCE
Status: COMPLETED | OUTPATIENT
Start: 2024-03-29 | End: 2024-03-29

## 2024-03-29 RX ORDER — ATORVASTATIN CALCIUM 20 MG/1
20 TABLET, FILM COATED ORAL DAILY
Status: DISCONTINUED | OUTPATIENT
Start: 2024-03-29 | End: 2024-03-29

## 2024-03-29 RX ORDER — ATORVASTATIN CALCIUM 20 MG/1
20 TABLET, FILM COATED ORAL DAILY
COMMUNITY

## 2024-03-29 RX ORDER — CARVEDILOL 3.12 MG/1
3.12 TABLET ORAL 2 TIMES DAILY WITH MEALS
Status: DISCONTINUED | OUTPATIENT
Start: 2024-03-29 | End: 2024-03-29

## 2024-03-29 RX ORDER — ACETAMINOPHEN 325 MG/1
650 TABLET ORAL EVERY 6 HOURS PRN
Status: DISCONTINUED | OUTPATIENT
Start: 2024-03-29 | End: 2024-04-02

## 2024-03-29 RX ORDER — BUPROPION HYDROCHLORIDE 150 MG/1
150 TABLET ORAL EVERY MORNING
Status: DISCONTINUED | OUTPATIENT
Start: 2024-03-29 | End: 2024-03-29

## 2024-03-29 RX ORDER — BUPROPION HYDROCHLORIDE 150 MG/1
150 TABLET ORAL EVERY MORNING
Status: ON HOLD | COMMUNITY
End: 2024-03-30 | Stop reason: HOSPADM

## 2024-03-29 RX ORDER — MELOXICAM 15 MG/1
15 TABLET ORAL DAILY
Status: ON HOLD | COMMUNITY
End: 2024-03-30 | Stop reason: HOSPADM

## 2024-03-29 RX ORDER — HEPARIN SODIUM 10000 [USP'U]/100ML
5-30 INJECTION, SOLUTION INTRAVENOUS CONTINUOUS
Status: DISCONTINUED | OUTPATIENT
Start: 2024-03-29 | End: 2024-03-29

## 2024-03-29 RX ORDER — HEPARIN SODIUM 1000 [USP'U]/ML
4000 INJECTION, SOLUTION INTRAVENOUS; SUBCUTANEOUS PRN
Status: DISCONTINUED | OUTPATIENT
Start: 2024-03-29 | End: 2024-03-29

## 2024-03-29 RX ORDER — MAGNESIUM SULFATE IN WATER 40 MG/ML
2000 INJECTION, SOLUTION INTRAVENOUS PRN
Status: DISCONTINUED | OUTPATIENT
Start: 2024-03-29 | End: 2024-04-05 | Stop reason: HOSPADM

## 2024-03-29 RX ORDER — FUROSEMIDE 10 MG/ML
20 INJECTION INTRAMUSCULAR; INTRAVENOUS 2 TIMES DAILY
Status: DISCONTINUED | OUTPATIENT
Start: 2024-03-29 | End: 2024-03-29

## 2024-03-29 RX ORDER — ONDANSETRON 4 MG/1
4 TABLET, ORALLY DISINTEGRATING ORAL EVERY 8 HOURS PRN
Status: DISCONTINUED | OUTPATIENT
Start: 2024-03-29 | End: 2024-04-05 | Stop reason: HOSPADM

## 2024-03-29 RX ORDER — ENOXAPARIN SODIUM 100 MG/ML
30 INJECTION SUBCUTANEOUS 2 TIMES DAILY
Status: DISCONTINUED | OUTPATIENT
Start: 2024-03-29 | End: 2024-03-29 | Stop reason: SDUPTHER

## 2024-03-29 RX ORDER — NITROGLYCERIN 0.4 MG/1
0.4 TABLET SUBLINGUAL EVERY 5 MIN PRN
Status: DISCONTINUED | OUTPATIENT
Start: 2024-03-29 | End: 2024-03-29

## 2024-03-29 RX ORDER — CLOPIDOGREL 300 MG/1
300 TABLET, FILM COATED ORAL ONCE
Status: COMPLETED | OUTPATIENT
Start: 2024-03-29 | End: 2024-03-29

## 2024-03-29 RX ORDER — SODIUM CHLORIDE 9 MG/ML
INJECTION, SOLUTION INTRAVENOUS PRN
Status: DISCONTINUED | OUTPATIENT
Start: 2024-03-29 | End: 2024-04-05 | Stop reason: HOSPADM

## 2024-03-29 RX ORDER — CLOPIDOGREL BISULFATE 75 MG/1
75 TABLET ORAL DAILY
Status: DISCONTINUED | OUTPATIENT
Start: 2024-03-29 | End: 2024-03-29

## 2024-03-29 RX ORDER — LORAZEPAM 2 MG/ML
0.5 INJECTION INTRAMUSCULAR EVERY 6 HOURS PRN
Status: DISCONTINUED | OUTPATIENT
Start: 2024-03-29 | End: 2024-04-05 | Stop reason: HOSPADM

## 2024-03-29 RX ORDER — POTASSIUM CHLORIDE 7.45 MG/ML
10 INJECTION INTRAVENOUS PRN
Status: DISCONTINUED | OUTPATIENT
Start: 2024-03-29 | End: 2024-04-05 | Stop reason: HOSPADM

## 2024-03-29 RX ORDER — CLONAZEPAM 0.5 MG/1
1 TABLET ORAL 2 TIMES DAILY PRN
Status: DISCONTINUED | OUTPATIENT
Start: 2024-03-29 | End: 2024-04-05 | Stop reason: HOSPADM

## 2024-03-29 RX ORDER — CYCLOBENZAPRINE HCL 10 MG
10 TABLET ORAL 3 TIMES DAILY PRN
Status: DISCONTINUED | OUTPATIENT
Start: 2024-03-29 | End: 2024-04-05 | Stop reason: HOSPADM

## 2024-03-29 RX ORDER — SODIUM CHLORIDE 0.9 % (FLUSH) 0.9 %
5-40 SYRINGE (ML) INJECTION PRN
Status: DISCONTINUED | OUTPATIENT
Start: 2024-03-29 | End: 2024-04-05 | Stop reason: HOSPADM

## 2024-03-29 RX ORDER — ATORVASTATIN CALCIUM 40 MG/1
40 TABLET, FILM COATED ORAL DAILY
Status: DISCONTINUED | OUTPATIENT
Start: 2024-03-29 | End: 2024-04-05 | Stop reason: HOSPADM

## 2024-03-29 RX ORDER — SODIUM CHLORIDE 0.9 % (FLUSH) 0.9 %
5-40 SYRINGE (ML) INJECTION EVERY 12 HOURS SCHEDULED
Status: DISCONTINUED | OUTPATIENT
Start: 2024-03-29 | End: 2024-04-05 | Stop reason: HOSPADM

## 2024-03-29 RX ORDER — ONDANSETRON 2 MG/ML
4 INJECTION INTRAMUSCULAR; INTRAVENOUS EVERY 6 HOURS PRN
Status: DISCONTINUED | OUTPATIENT
Start: 2024-03-29 | End: 2024-04-05 | Stop reason: HOSPADM

## 2024-03-29 RX ORDER — MORPHINE SULFATE 2 MG/ML
2 INJECTION, SOLUTION INTRAMUSCULAR; INTRAVENOUS EVERY 4 HOURS PRN
Status: DISCONTINUED | OUTPATIENT
Start: 2024-03-29 | End: 2024-03-29

## 2024-03-29 RX ORDER — POLYETHYLENE GLYCOL 3350 17 G/17G
17 POWDER, FOR SOLUTION ORAL DAILY PRN
Status: DISCONTINUED | OUTPATIENT
Start: 2024-03-29 | End: 2024-04-05 | Stop reason: HOSPADM

## 2024-03-29 RX ORDER — GABAPENTIN 800 MG/1
800 TABLET ORAL 3 TIMES DAILY
Status: DISCONTINUED | OUTPATIENT
Start: 2024-03-29 | End: 2024-03-29

## 2024-03-29 RX ORDER — AMLODIPINE BESYLATE 5 MG/1
5 TABLET ORAL DAILY
Status: DISCONTINUED | OUTPATIENT
Start: 2024-03-29 | End: 2024-03-29

## 2024-03-29 RX ORDER — HEPARIN SODIUM 1000 [USP'U]/ML
2000 INJECTION, SOLUTION INTRAVENOUS; SUBCUTANEOUS PRN
Status: DISCONTINUED | OUTPATIENT
Start: 2024-03-29 | End: 2024-03-29

## 2024-03-29 RX ORDER — ACETAMINOPHEN 650 MG/1
650 SUPPOSITORY RECTAL EVERY 6 HOURS PRN
Status: DISCONTINUED | OUTPATIENT
Start: 2024-03-29 | End: 2024-04-02

## 2024-03-29 RX ORDER — NITROGLYCERIN 0.4 MG/1
0.4 TABLET SUBLINGUAL EVERY 5 MIN PRN
Status: DISCONTINUED | OUTPATIENT
Start: 2024-03-29 | End: 2024-04-05 | Stop reason: HOSPADM

## 2024-03-29 RX ADMIN — FUROSEMIDE 20 MG: 10 INJECTION, SOLUTION INTRAMUSCULAR; INTRAVENOUS at 17:19

## 2024-03-29 RX ADMIN — CARVEDILOL 3.12 MG: 3.12 TABLET, FILM COATED ORAL at 07:59

## 2024-03-29 RX ADMIN — HEPARIN SODIUM AND DEXTROSE 7 UNITS/KG/HR: 10000; 5 INJECTION INTRAVENOUS at 08:51

## 2024-03-29 RX ADMIN — SODIUM CHLORIDE, PRESERVATIVE FREE 10 ML: 5 INJECTION INTRAVENOUS at 20:42

## 2024-03-29 RX ADMIN — SODIUM CHLORIDE, PRESERVATIVE FREE 10 ML: 5 INJECTION INTRAVENOUS at 07:54

## 2024-03-29 RX ADMIN — HEPARIN SODIUM 4000 UNITS: 1000 INJECTION INTRAVENOUS; SUBCUTANEOUS at 08:45

## 2024-03-29 RX ADMIN — GABAPENTIN 1200 MG: 400 CAPSULE ORAL at 08:55

## 2024-03-29 RX ADMIN — Medication 0.4 MG: at 08:35

## 2024-03-29 RX ADMIN — MORPHINE SULFATE 2 MG: 2 INJECTION, SOLUTION INTRAMUSCULAR; INTRAVENOUS at 17:29

## 2024-03-29 RX ADMIN — Medication 0.4 MG: at 23:33

## 2024-03-29 RX ADMIN — ATORVASTATIN CALCIUM 40 MG: 40 TABLET, FILM COATED ORAL at 09:27

## 2024-03-29 RX ADMIN — FUROSEMIDE 20 MG: 10 INJECTION, SOLUTION INTRAMUSCULAR; INTRAVENOUS at 08:56

## 2024-03-29 RX ADMIN — GABAPENTIN 1200 MG: 400 CAPSULE ORAL at 20:40

## 2024-03-29 RX ADMIN — GABAPENTIN 1200 MG: 400 CAPSULE ORAL at 15:10

## 2024-03-29 RX ADMIN — ACETAMINOPHEN 650 MG: 325 TABLET ORAL at 05:37

## 2024-03-29 RX ADMIN — Medication 0.4 MG: at 10:44

## 2024-03-29 RX ADMIN — SODIUM CHLORIDE: 9 INJECTION, SOLUTION INTRAVENOUS at 08:43

## 2024-03-29 RX ADMIN — CARVEDILOL 3.12 MG: 3.12 TABLET, FILM COATED ORAL at 17:19

## 2024-03-29 RX ADMIN — KETOROLAC TROMETHAMINE 15 MG: 15 INJECTION, SOLUTION INTRAMUSCULAR; INTRAVENOUS at 01:26

## 2024-03-29 RX ADMIN — CLOPIDOGREL BISULFATE 300 MG: 300 TABLET, FILM COATED ORAL at 08:55

## 2024-03-29 RX ADMIN — MORPHINE SULFATE 2 MG: 2 INJECTION, SOLUTION INTRAMUSCULAR; INTRAVENOUS at 10:44

## 2024-03-29 ASSESSMENT — PAIN DESCRIPTION - DESCRIPTORS
DESCRIPTORS: PRESSURE
DESCRIPTORS: ACHING
DESCRIPTORS: SHARP;PRESSURE;STABBING

## 2024-03-29 ASSESSMENT — PAIN SCALES - GENERAL
PAINLEVEL_OUTOF10: 0
PAINLEVEL_OUTOF10: 10
PAINLEVEL_OUTOF10: 0
PAINLEVEL_OUTOF10: 3
PAINLEVEL_OUTOF10: 8
PAINLEVEL_OUTOF10: 10
PAINLEVEL_OUTOF10: 0
PAINLEVEL_OUTOF10: 7
PAINLEVEL_OUTOF10: 10
PAINLEVEL_OUTOF10: 0
PAINLEVEL_OUTOF10: 8
PAINLEVEL_OUTOF10: 0

## 2024-03-29 ASSESSMENT — PAIN DESCRIPTION - FREQUENCY: FREQUENCY: CONTINUOUS

## 2024-03-29 ASSESSMENT — PAIN DESCRIPTION - LOCATION
LOCATION: CHEST
LOCATION: CHEST
LOCATION: CHEST;HEAD
LOCATION: HEAD;CHEST;BACK
LOCATION: HEAD
LOCATION: CHEST
LOCATION: CHEST;HEAD

## 2024-03-29 ASSESSMENT — PAIN DESCRIPTION - ORIENTATION
ORIENTATION: RIGHT;LEFT;MID
ORIENTATION: RIGHT;LEFT
ORIENTATION: RIGHT;LEFT

## 2024-03-29 ASSESSMENT — PAIN - FUNCTIONAL ASSESSMENT
PAIN_FUNCTIONAL_ASSESSMENT: ACTIVITIES ARE NOT PREVENTED

## 2024-03-29 ASSESSMENT — PAIN DESCRIPTION - PAIN TYPE: TYPE: ACUTE PAIN

## 2024-03-29 ASSESSMENT — PAIN DESCRIPTION - ONSET
ONSET: AWAKENED FROM SLEEP
ONSET: AWAKENED FROM SLEEP

## 2024-03-29 NOTE — ED PROVIDER NOTES
[facility] EMERGENCY DEPARTMENT ENCOUNTER      Pt Name: Gaurang Fiore  MRN: [id number]  Birthdate 1975  Date of evaluation: 3/29/24    CHIEF COMPLAINT       Chief Complaint   Patient presents with    Chest Pain     Mid sternal, was seen here earlier this week for same, was seen by Dr. Goldberger who wanted to admit patient, but patient could not due to family situation. Pt has follow up appointment with PCP tomorrow.     Shortness of Breath     With exertion with chest pain.        HISTORY OF PRESENT ILLNESS   Gaurang Fiore is a 48 y.o. female who presents with shortness of breath, chest pressure, orthopnea.  States she has no known cardiac problems.  Developed some chest pain pressure about 2 days ago was seen in the ER shortly afterwards and brief workup was negative reportedly had to leave due to family situation.  States chest pain is still present but her shortness of breath and general sense of unwell has significantly worsened prompting her return.  She states she has walked into her job the same way for multiple years but only recently does not feel like she has \"walked 3 miles.\"  Has been sleeping upright in a recliner due to this sensation of \"feeling like I going to die\" if she attempted to lay flat.    PASTMEDICAL HISTORY     Past Medical History:   Diagnosis Date    Abnormal uterine bleeding 05/2018    Anxiety     Bipolar 1 disorder (HCC)     No Rx.     Blood clot in vein 2000    right calf    Fibroid 05/2018    Finger pain, right     Fracture, finger     right small finger    Panic attacks     Snores     Under care of team     No PCP     Past Problem List  Patient Active Problem List   Diagnosis Code    Fibroid D21.9    Abnormal uterine bleeding (AUB) N93.9    Hx of tubal ligation (1994) Z98.51    Panic attacks F41.0    Tobacco abuse Z72.0    Hysteroscopy, D&C 5/17/19 Z98.890    Abnormal mammogram of left breast R92.8    Non-compliant patient Z91.199    Closed displaced fracture of proximal

## 2024-03-29 NOTE — H&P
and appearance on physical exam, would recommend sleep study for possible sleep apnea.     Patient is admitted as inpatient status because of co-morbidities listed above, severity of signs and symptoms as outlined, requirement for current medical therapies and most importantly because of direct risk to patient if care not provided in a hospital setting.  Expected length of stay > 48 hours. Patient is admitted in the Med/Surge    Medical Decision Making: Medium    On this date 3/29/2024 I have spent 25 minutes reviewing previous notes, test results and face to face with the patient discussing the diagnosis and importance of compliance with the treatment plan as well as documenting on the day of the visit. At least 50% of the time documented was spent with the patient to provide counseling and/or coordination of care.     AJ Trent  3/29/2024  1:31 AM    Copy sent to Cristian Kang DO

## 2024-03-29 NOTE — CARE COORDINATION
Case Management Assessment  Initial Evaluation    Date/Time of Evaluation: 3/29/2024 1:22 PM  Assessment Completed by: MYA RUIZ RN    If patient is discharged prior to next notation, then this note serves as note for discharge by case management.    Patient Name: Gaurang Fiore                   YOB: 1975  Diagnosis: Orthopnea [R06.01]  Dyspnea on exertion [R06.09]  Suspected CHF (congestive heart failure) [R09.89]                   Date / Time: 3/28/2024 10:18 PM    Patient Admission Status: Inpatient   Readmission Risk (Low < 19, Mod (19-27), High > 27): Readmission Risk Score: 9.9    Current PCP: Cristina Bernal, DO  PCP verified by CM? Yes    Chart Reviewed: Yes      History Provided by: Patient  Patient Orientation: Alert and Oriented, Person, Place, Situation, Self    Patient Cognition: Alert    Hospitalization in the last 30 days (Readmission):  No    If yes, Readmission Assessment in  Navigator will be completed.    Advance Directives:      Code Status: Full Code   Patient's Primary Decision Maker is: Legal Next of Kin      Discharge Planning:    Patient lives with: Parent Type of Home: House  Primary Care Giver: Self  Patient Support Systems include: Parent   Current Financial resources: None  Current community resources: None  Current services prior to admission: None            Current DME:              Type of Home Care services:  None    ADLS  Prior functional level: Independent in ADLs/IADLs  Current functional level: Independent in ADLs/IADLs    PT AM-PAC:   /24  OT AM-PAC:   /24    Family can provide assistance at DC: Yes  Would you like Case Management to discuss the discharge plan with any other family members/significant others, and if so, who? Yes (mother)  Plans to Return to Present Housing: Yes  Other Identified Issues/Barriers to RETURNING to current housing: medical condition  Potential Assistance needed at discharge: N/A            Potential DME:    Patient expects

## 2024-03-30 ENCOUNTER — APPOINTMENT (OUTPATIENT)
Age: 49
DRG: 291 | End: 2024-03-30
Attending: INTERNAL MEDICINE
Payer: COMMERCIAL

## 2024-03-30 ENCOUNTER — APPOINTMENT (OUTPATIENT)
Dept: NUCLEAR MEDICINE | Age: 49
DRG: 291 | End: 2024-03-30
Attending: INTERNAL MEDICINE
Payer: COMMERCIAL

## 2024-03-30 ENCOUNTER — APPOINTMENT (OUTPATIENT)
Age: 49
DRG: 291 | End: 2024-03-30
Payer: COMMERCIAL

## 2024-03-30 PROBLEM — R07.89 ATYPICAL CHEST PAIN: Status: ACTIVE | Noted: 2021-09-27

## 2024-03-30 LAB
ALLEN TEST: POSITIVE
ANION GAP SERPL CALCULATED.3IONS-SCNC: 6 MMOL/L (ref 9–17)
BASOPHILS # BLD: 0.04 K/UL (ref 0–0.2)
BASOPHILS NFR BLD: 1 % (ref 0–2)
BUN SERPL-MCNC: 16 MG/DL (ref 6–20)
BUN/CREAT SERPL: 27 (ref 9–20)
CALCIUM SERPL-MCNC: 8.8 MG/DL (ref 8.6–10.4)
CHLORIDE SERPL-SCNC: 102 MMOL/L (ref 98–107)
CHOLEST SERPL-MCNC: 146 MG/DL (ref 0–199)
CHOLESTEROL/HDL RATIO: 4
CO2 BLD CALC-SCNC: 31 MMOL/L (ref 22–30)
CO2 SERPL-SCNC: 34 MMOL/L (ref 20–31)
CREAT SERPL-MCNC: 0.6 MG/DL (ref 0.5–0.9)
EKG ATRIAL RATE: 62 BPM
EKG P AXIS: 60 DEGREES
EKG P-R INTERVAL: 172 MS
EKG Q-T INTERVAL: 430 MS
EKG QRS DURATION: 94 MS
EKG QTC CALCULATION (BAZETT): 436 MS
EKG T AXIS: 30 DEGREES
EKG VENTRICULAR RATE: 62 BPM
EOSINOPHIL # BLD: 0.41 K/UL (ref 0–0.44)
EOSINOPHILS RELATIVE PERCENT: 6 % (ref 1–4)
ERYTHROCYTE [DISTWIDTH] IN BLOOD BY AUTOMATED COUNT: 14.2 % (ref 11.8–14.4)
FIO2: 36
GFR SERPL CREATININE-BSD FRML MDRD: >90 ML/MIN/1.73M2
GLUCOSE SERPL-MCNC: 105 MG/DL (ref 70–99)
HCT VFR BLD AUTO: 46.4 % (ref 36.3–47.1)
HDLC SERPL-MCNC: 42 MG/DL
HGB BLD-MCNC: 14.5 G/DL (ref 11.9–15.1)
IMM GRANULOCYTES # BLD AUTO: 0.03 K/UL (ref 0–0.3)
IMM GRANULOCYTES NFR BLD: 0 %
LDLC SERPL CALC-MCNC: 78 MG/DL (ref 0–100)
LYMPHOCYTES NFR BLD: 2.21 K/UL (ref 1.1–3.7)
LYMPHOCYTES RELATIVE PERCENT: 33 % (ref 24–43)
MAGNESIUM SERPL-MCNC: 2.1 MG/DL (ref 1.6–2.6)
MCH RBC QN AUTO: 30.7 PG (ref 25.2–33.5)
MCHC RBC AUTO-ENTMCNC: 31.3 G/DL (ref 28.4–34.8)
MCV RBC AUTO: 98.1 FL (ref 82.6–102.9)
MONOCYTES NFR BLD: 0.59 K/UL (ref 0.1–1.2)
MONOCYTES NFR BLD: 9 % (ref 3–12)
NEUTROPHILS NFR BLD: 51 % (ref 36–65)
NEUTS SEG NFR BLD: 3.42 K/UL (ref 1.5–8.1)
NRBC BLD-RTO: 0 PER 100 WBC
O2 DELIVERY DEVICE: ABNORMAL
PLATELET # BLD AUTO: 174 K/UL (ref 138–453)
PMV BLD AUTO: 10.8 FL (ref 8.1–13.5)
POC HCO3: 31.7 MMOL/L (ref 21–28)
POC O2 SATURATION: 97.4 % (ref 94–98)
POC PCO2: 56.2 MM HG (ref 35–48)
POC PH: 7.36 (ref 7.35–7.45)
POC PO2: 101 MM HG (ref 83–108)
POSITIVE BASE EXCESS, ART: 4.4 MMOL/L (ref 0–3)
POTASSIUM SERPL-SCNC: 4.2 MMOL/L (ref 3.7–5.3)
RBC # BLD AUTO: 4.73 M/UL (ref 3.95–5.11)
SAMPLE SITE: ABNORMAL
SODIUM SERPL-SCNC: 142 MMOL/L (ref 135–144)
TRIGL SERPL-MCNC: 132 MG/DL
VLDLC SERPL CALC-MCNC: 26 MG/DL
WBC OTHER # BLD: 6.7 K/UL (ref 3.5–11.3)

## 2024-03-30 PROCEDURE — 94660 CPAP INITIATION&MGMT: CPT

## 2024-03-30 PROCEDURE — 85025 COMPLETE CBC W/AUTO DIFF WBC: CPT

## 2024-03-30 PROCEDURE — 36415 COLL VENOUS BLD VENIPUNCTURE: CPT

## 2024-03-30 PROCEDURE — 82374 ASSAY BLOOD CARBON DIOXIDE: CPT

## 2024-03-30 PROCEDURE — 93017 CV STRESS TEST TRACING ONLY: CPT

## 2024-03-30 PROCEDURE — 6360000002 HC RX W HCPCS: Performed by: INTERNAL MEDICINE

## 2024-03-30 PROCEDURE — 82803 BLOOD GASES ANY COMBINATION: CPT

## 2024-03-30 PROCEDURE — 94761 N-INVAS EAR/PLS OXIMETRY MLT: CPT

## 2024-03-30 PROCEDURE — 6370000000 HC RX 637 (ALT 250 FOR IP)

## 2024-03-30 PROCEDURE — 2580000003 HC RX 258

## 2024-03-30 PROCEDURE — 2700000000 HC OXYGEN THERAPY PER DAY

## 2024-03-30 PROCEDURE — 6370000000 HC RX 637 (ALT 250 FOR IP): Performed by: INTERNAL MEDICINE

## 2024-03-30 PROCEDURE — 99239 HOSP IP/OBS DSCHRG MGMT >30: CPT | Performed by: INTERNAL MEDICINE

## 2024-03-30 PROCEDURE — 83735 ASSAY OF MAGNESIUM: CPT

## 2024-03-30 PROCEDURE — 80061 LIPID PANEL: CPT

## 2024-03-30 PROCEDURE — 1200000000 HC SEMI PRIVATE

## 2024-03-30 PROCEDURE — A9500 TC99M SESTAMIBI: HCPCS | Performed by: INTERNAL MEDICINE

## 2024-03-30 PROCEDURE — 80048 BASIC METABOLIC PNL TOTAL CA: CPT

## 2024-03-30 PROCEDURE — 94640 AIRWAY INHALATION TREATMENT: CPT

## 2024-03-30 PROCEDURE — 78452 HT MUSCLE IMAGE SPECT MULT: CPT

## 2024-03-30 PROCEDURE — 2580000003 HC RX 258: Performed by: STUDENT IN AN ORGANIZED HEALTH CARE EDUCATION/TRAINING PROGRAM

## 2024-03-30 PROCEDURE — 6370000000 HC RX 637 (ALT 250 FOR IP): Performed by: NURSE PRACTITIONER

## 2024-03-30 PROCEDURE — 3430000000 HC RX DIAGNOSTIC RADIOPHARMACEUTICAL: Performed by: INTERNAL MEDICINE

## 2024-03-30 PROCEDURE — 36600 WITHDRAWAL OF ARTERIAL BLOOD: CPT

## 2024-03-30 RX ORDER — ALBUTEROL SULFATE 90 UG/1
2 AEROSOL, METERED RESPIRATORY (INHALATION) PRN
Status: DISCONTINUED | OUTPATIENT
Start: 2024-03-30 | End: 2024-03-30

## 2024-03-30 RX ORDER — FLUTICASONE PROPIONATE 50 MCG
2 SPRAY, SUSPENSION (ML) NASAL DAILY
Status: DISCONTINUED | OUTPATIENT
Start: 2024-03-30 | End: 2024-04-05 | Stop reason: HOSPADM

## 2024-03-30 RX ORDER — IPRATROPIUM BROMIDE AND ALBUTEROL SULFATE 2.5; .5 MG/3ML; MG/3ML
1 SOLUTION RESPIRATORY (INHALATION)
Status: DISCONTINUED | OUTPATIENT
Start: 2024-03-30 | End: 2024-04-01

## 2024-03-30 RX ORDER — REGADENOSON 0.08 MG/ML
0.4 INJECTION, SOLUTION INTRAVENOUS
Status: COMPLETED | OUTPATIENT
Start: 2024-03-30 | End: 2024-03-30

## 2024-03-30 RX ORDER — TETRAKIS(2-METHOXYISOBUTYLISOCYANIDE)COPPER(I) TETRAFLUOROBORATE 1 MG/ML
30 INJECTION, POWDER, LYOPHILIZED, FOR SOLUTION INTRAVENOUS
Status: COMPLETED | OUTPATIENT
Start: 2024-03-30 | End: 2024-03-30

## 2024-03-30 RX ORDER — IBUPROFEN 200 MG
600 TABLET ORAL ONCE
Status: COMPLETED | OUTPATIENT
Start: 2024-03-30 | End: 2024-03-30

## 2024-03-30 RX ORDER — ATROPINE SULFATE 0.1 MG/ML
0.5 INJECTION INTRAVENOUS EVERY 5 MIN PRN
Status: ACTIVE | OUTPATIENT
Start: 2024-03-30 | End: 2024-03-30

## 2024-03-30 RX ORDER — ALBUTEROL SULFATE 2.5 MG/3ML
2.5 SOLUTION RESPIRATORY (INHALATION) EVERY 4 HOURS PRN
Status: ACTIVE | OUTPATIENT
Start: 2024-03-30 | End: 2024-04-04

## 2024-03-30 RX ORDER — SODIUM CHLORIDE 9 MG/ML
500 INJECTION, SOLUTION INTRAVENOUS CONTINUOUS PRN
Status: ACTIVE | OUTPATIENT
Start: 2024-03-30 | End: 2024-03-30

## 2024-03-30 RX ORDER — NICOTINE 21 MG/24HR
1 PATCH, TRANSDERMAL 24 HOURS TRANSDERMAL DAILY
Status: DISCONTINUED | OUTPATIENT
Start: 2024-03-30 | End: 2024-04-05 | Stop reason: HOSPADM

## 2024-03-30 RX ORDER — TETRAKIS(2-METHOXYISOBUTYLISOCYANIDE)COPPER(I) TETRAFLUOROBORATE 1 MG/ML
10 INJECTION, POWDER, LYOPHILIZED, FOR SOLUTION INTRAVENOUS
Status: COMPLETED | OUTPATIENT
Start: 2024-03-30 | End: 2024-03-30

## 2024-03-30 RX ORDER — SODIUM CHLORIDE 0.9 % (FLUSH) 0.9 %
5-40 SYRINGE (ML) INJECTION PRN
Status: ACTIVE | OUTPATIENT
Start: 2024-03-30 | End: 2024-03-30

## 2024-03-30 RX ORDER — METOPROLOL TARTRATE 1 MG/ML
5 INJECTION, SOLUTION INTRAVENOUS EVERY 5 MIN PRN
Status: ACTIVE | OUTPATIENT
Start: 2024-03-30 | End: 2024-03-30

## 2024-03-30 RX ORDER — NITROGLYCERIN 0.4 MG/1
0.4 TABLET SUBLINGUAL EVERY 5 MIN PRN
Status: ACTIVE | OUTPATIENT
Start: 2024-03-30 | End: 2024-03-30

## 2024-03-30 RX ADMIN — FLUTICASONE PROPIONATE 2 SPRAY: 50 SPRAY, METERED NASAL at 18:01

## 2024-03-30 RX ADMIN — GABAPENTIN 1200 MG: 400 CAPSULE ORAL at 14:44

## 2024-03-30 RX ADMIN — ATORVASTATIN CALCIUM 40 MG: 40 TABLET, FILM COATED ORAL at 10:11

## 2024-03-30 RX ADMIN — CLONAZEPAM 1 MG: 0.5 TABLET ORAL at 16:00

## 2024-03-30 RX ADMIN — SODIUM CHLORIDE, PRESERVATIVE FREE 10 ML: 5 INJECTION INTRAVENOUS at 08:41

## 2024-03-30 RX ADMIN — ACETAMINOPHEN 650 MG: 325 TABLET ORAL at 10:11

## 2024-03-30 RX ADMIN — Medication 10 MILLICURIE: at 07:00

## 2024-03-30 RX ADMIN — GABAPENTIN 1200 MG: 400 CAPSULE ORAL at 21:14

## 2024-03-30 RX ADMIN — REGADENOSON 0.4 MG: 0.08 INJECTION, SOLUTION INTRAVENOUS at 08:41

## 2024-03-30 RX ADMIN — IPRATROPIUM BROMIDE AND ALBUTEROL SULFATE 1 DOSE: 2.5; .5 SOLUTION RESPIRATORY (INHALATION) at 23:37

## 2024-03-30 RX ADMIN — Medication 30 MILLICURIE: at 08:40

## 2024-03-30 RX ADMIN — GABAPENTIN 1200 MG: 400 CAPSULE ORAL at 10:11

## 2024-03-30 RX ADMIN — IBUPROFEN 600 MG: 200 TABLET, FILM COATED ORAL at 15:08

## 2024-03-30 RX ADMIN — SODIUM CHLORIDE, PRESERVATIVE FREE 10 ML: 5 INJECTION INTRAVENOUS at 14:46

## 2024-03-30 RX ADMIN — IPRATROPIUM BROMIDE AND ALBUTEROL SULFATE 1 DOSE: 2.5; .5 SOLUTION RESPIRATORY (INHALATION) at 19:17

## 2024-03-30 ASSESSMENT — PAIN DESCRIPTION - ORIENTATION
ORIENTATION: MID
ORIENTATION: MID

## 2024-03-30 ASSESSMENT — PAIN DESCRIPTION - DESCRIPTORS
DESCRIPTORS: ACHING
DESCRIPTORS: ACHING

## 2024-03-30 ASSESSMENT — PAIN SCALES - GENERAL
PAINLEVEL_OUTOF10: 5
PAINLEVEL_OUTOF10: 4
PAINLEVEL_OUTOF10: 10
PAINLEVEL_OUTOF10: 10

## 2024-03-30 ASSESSMENT — PAIN DESCRIPTION - LOCATION
LOCATION: HEAD
LOCATION: HEAD

## 2024-03-30 NOTE — FLOWSHEET NOTE
03/30/24 1206   Treatment Team Notification   Reason for Communication Review case   Name of Team Member Notified Dr Herr   Treatment Team Role Attending Provider   Method of Communication Secure Message   Response Waiting for response     Notified of \"[facility] called and said she had a normal exam, but they wont have a final result until there is a nuc med reader.\"

## 2024-03-30 NOTE — DISCHARGE INSTRUCTIONS
-Make an appoint with your primary care physician within 1 week of discharge for evaluation of your symptoms  -Follow-up with pulmonology for polysomnography and for workup for sleep apnea

## 2024-03-30 NOTE — CARE COORDINATION
DC Planning    Spoke with RT, Attending and Brigette newman from [facility]. Pt required Bipap with 02 last settings up to 24/10 @ 30% and was up to 6l-pt did have chest pain last night as well with significant desaturations. Pt may qualify for an NIV. Will need ABG. Pulm has been consulted.     Discussed with pt-agrees to stay overnight. Pt may qualify for NIV. Will get pulm input. Pt does not have an established pulmonologist.. Pt does smoke currently. Pt has been off her psych meds for 3 months. She is adamant she return to work on Tuesday even though she needs 4l 24/7 she relays she will just take if off while she is at work. Discussed possible FMLA Disabilty thru employment pt is still adamant about returning to work on Tuesday-she works [employer] on 3rd shift.     Pt is also interested in establishing care with another pcp-discussed options.     Discussed even if she qualifies for NIV will need to send documentation and order on Monday to see if approved and can  take another 24-48 if qualifies.     Faxed RT notes from overnight study, 02 testing, progress note from attending, face sheet to [facility].

## 2024-03-31 LAB
ANION GAP SERPL CALCULATED.3IONS-SCNC: 7 MMOL/L (ref 9–17)
BASOPHILS # BLD: 0.03 K/UL (ref 0–0.2)
BASOPHILS NFR BLD: 1 % (ref 0–2)
BUN SERPL-MCNC: 12 MG/DL (ref 6–20)
BUN/CREAT SERPL: 24 (ref 9–20)
CALCIUM SERPL-MCNC: 8.9 MG/DL (ref 8.6–10.4)
CHLORIDE SERPL-SCNC: 102 MMOL/L (ref 98–107)
CO2 SERPL-SCNC: 30 MMOL/L (ref 20–31)
CREAT SERPL-MCNC: 0.5 MG/DL (ref 0.5–0.9)
EOSINOPHIL # BLD: 0.36 K/UL (ref 0–0.44)
EOSINOPHILS RELATIVE PERCENT: 6 % (ref 1–4)
ERYTHROCYTE [DISTWIDTH] IN BLOOD BY AUTOMATED COUNT: 14.1 % (ref 11.8–14.4)
GFR SERPL CREATININE-BSD FRML MDRD: >90 ML/MIN/1.73M2
GLUCOSE BLD-MCNC: 120 MG/DL (ref 65–105)
GLUCOSE SERPL-MCNC: 136 MG/DL (ref 70–99)
HCT VFR BLD AUTO: 44.9 % (ref 36.3–47.1)
HGB BLD-MCNC: 13.8 G/DL (ref 11.9–15.1)
IMM GRANULOCYTES # BLD AUTO: 0.03 K/UL (ref 0–0.3)
IMM GRANULOCYTES NFR BLD: 1 %
LYMPHOCYTES NFR BLD: 2.15 K/UL (ref 1.1–3.7)
LYMPHOCYTES RELATIVE PERCENT: 34 % (ref 24–43)
MAGNESIUM SERPL-MCNC: 2 MG/DL (ref 1.6–2.6)
MCH RBC QN AUTO: 30.3 PG (ref 25.2–33.5)
MCHC RBC AUTO-ENTMCNC: 30.7 G/DL (ref 28.4–34.8)
MCV RBC AUTO: 98.5 FL (ref 82.6–102.9)
MONOCYTES NFR BLD: 0.56 K/UL (ref 0.1–1.2)
MONOCYTES NFR BLD: 9 % (ref 3–12)
NEUTROPHILS NFR BLD: 49 % (ref 36–65)
NEUTS SEG NFR BLD: 3.15 K/UL (ref 1.5–8.1)
NRBC BLD-RTO: 0 PER 100 WBC
PLATELET # BLD AUTO: 183 K/UL (ref 138–453)
PMV BLD AUTO: 10.9 FL (ref 8.1–13.5)
POTASSIUM SERPL-SCNC: 4 MMOL/L (ref 3.7–5.3)
RBC # BLD AUTO: 4.56 M/UL (ref 3.95–5.11)
SODIUM SERPL-SCNC: 139 MMOL/L (ref 135–144)
WBC OTHER # BLD: 6.3 K/UL (ref 3.5–11.3)

## 2024-03-31 PROCEDURE — 36415 COLL VENOUS BLD VENIPUNCTURE: CPT

## 2024-03-31 PROCEDURE — 1200000000 HC SEMI PRIVATE

## 2024-03-31 PROCEDURE — 6370000000 HC RX 637 (ALT 250 FOR IP): Performed by: INTERNAL MEDICINE

## 2024-03-31 PROCEDURE — 2580000003 HC RX 258

## 2024-03-31 PROCEDURE — 6360000002 HC RX W HCPCS: Performed by: INTERNAL MEDICINE

## 2024-03-31 PROCEDURE — 99232 SBSQ HOSP IP/OBS MODERATE 35: CPT | Performed by: INTERNAL MEDICINE

## 2024-03-31 PROCEDURE — 83735 ASSAY OF MAGNESIUM: CPT

## 2024-03-31 PROCEDURE — 6370000000 HC RX 637 (ALT 250 FOR IP)

## 2024-03-31 PROCEDURE — 80048 BASIC METABOLIC PNL TOTAL CA: CPT

## 2024-03-31 PROCEDURE — 94660 CPAP INITIATION&MGMT: CPT

## 2024-03-31 PROCEDURE — 2700000000 HC OXYGEN THERAPY PER DAY

## 2024-03-31 PROCEDURE — 82947 ASSAY GLUCOSE BLOOD QUANT: CPT

## 2024-03-31 PROCEDURE — 85025 COMPLETE CBC W/AUTO DIFF WBC: CPT

## 2024-03-31 PROCEDURE — 94640 AIRWAY INHALATION TREATMENT: CPT

## 2024-03-31 PROCEDURE — 6370000000 HC RX 637 (ALT 250 FOR IP): Performed by: NURSE PRACTITIONER

## 2024-03-31 PROCEDURE — 94761 N-INVAS EAR/PLS OXIMETRY MLT: CPT

## 2024-03-31 RX ADMIN — SODIUM CHLORIDE, PRESERVATIVE FREE 10 ML: 5 INJECTION INTRAVENOUS at 09:29

## 2024-03-31 RX ADMIN — ACETAMINOPHEN 650 MG: 325 TABLET ORAL at 09:29

## 2024-03-31 RX ADMIN — GABAPENTIN 1200 MG: 400 CAPSULE ORAL at 09:29

## 2024-03-31 RX ADMIN — IPRATROPIUM BROMIDE AND ALBUTEROL SULFATE 1 DOSE: 2.5; .5 SOLUTION RESPIRATORY (INHALATION) at 22:51

## 2024-03-31 RX ADMIN — FLUTICASONE PROPIONATE 2 SPRAY: 50 SPRAY, METERED NASAL at 09:30

## 2024-03-31 RX ADMIN — SODIUM CHLORIDE, PRESERVATIVE FREE 10 ML: 5 INJECTION INTRAVENOUS at 00:55

## 2024-03-31 RX ADMIN — SODIUM CHLORIDE, PRESERVATIVE FREE 10 ML: 5 INJECTION INTRAVENOUS at 21:32

## 2024-03-31 RX ADMIN — IPRATROPIUM BROMIDE AND ALBUTEROL SULFATE 1 DOSE: 2.5; .5 SOLUTION RESPIRATORY (INHALATION) at 03:35

## 2024-03-31 RX ADMIN — IPRATROPIUM BROMIDE AND ALBUTEROL SULFATE 1 DOSE: 2.5; .5 SOLUTION RESPIRATORY (INHALATION) at 19:18

## 2024-03-31 RX ADMIN — ATORVASTATIN CALCIUM 40 MG: 40 TABLET, FILM COATED ORAL at 09:29

## 2024-03-31 RX ADMIN — LORAZEPAM 0.5 MG: 2 INJECTION INTRAMUSCULAR; INTRAVENOUS at 00:54

## 2024-03-31 RX ADMIN — GABAPENTIN 1200 MG: 400 CAPSULE ORAL at 21:28

## 2024-03-31 RX ADMIN — GABAPENTIN 1200 MG: 400 CAPSULE ORAL at 14:13

## 2024-03-31 RX ADMIN — CLONAZEPAM 1 MG: 0.5 TABLET ORAL at 00:54

## 2024-03-31 RX ADMIN — IPRATROPIUM BROMIDE AND ALBUTEROL SULFATE 1 DOSE: 2.5; .5 SOLUTION RESPIRATORY (INHALATION) at 11:35

## 2024-03-31 RX ADMIN — ACETAMINOPHEN 650 MG: 325 TABLET ORAL at 00:53

## 2024-03-31 RX ADMIN — IPRATROPIUM BROMIDE AND ALBUTEROL SULFATE 1 DOSE: 2.5; .5 SOLUTION RESPIRATORY (INHALATION) at 15:34

## 2024-03-31 RX ADMIN — IPRATROPIUM BROMIDE AND ALBUTEROL SULFATE 1 DOSE: 2.5; .5 SOLUTION RESPIRATORY (INHALATION) at 07:49

## 2024-03-31 ASSESSMENT — PAIN SCALES - GENERAL
PAINLEVEL_OUTOF10: 3
PAINLEVEL_OUTOF10: 0

## 2024-03-31 ASSESSMENT — PAIN DESCRIPTION - ONSET: ONSET: GRADUAL

## 2024-03-31 ASSESSMENT — PAIN DESCRIPTION - LOCATION: LOCATION: HEAD

## 2024-03-31 ASSESSMENT — PAIN DESCRIPTION - DESCRIPTORS: DESCRIPTORS: ACHING

## 2024-03-31 ASSESSMENT — PAIN DESCRIPTION - ORIENTATION: ORIENTATION: MID

## 2024-03-31 ASSESSMENT — PAIN DESCRIPTION - FREQUENCY: FREQUENCY: INTERMITTENT

## 2024-03-31 ASSESSMENT — PAIN DESCRIPTION - PAIN TYPE: TYPE: ACUTE PAIN

## 2024-03-31 NOTE — CONSULTS
08/05/22                            Chelle Blount  [address] [place] [address]    To Whom It May Concern:    This is to certify Chelle Blount was evaluated with LELE Scott on 08/05/22 and is excused from work on 8/6/2022.          LELE Scott  [facility] [place]  [address] [address]  Phone: [phone]  Fax: [phone]    
        Reason for Consult:  \" Congestive heart failure\"    Requesting Physician: Jayda Herr DO    CHIEF COMPLAINT: I was having chest pain    History Obtained From:  patient    HISTORY OF PRESENT ILLNESS:      The patient is a 48 y.o. female with significant past medical history of hypertension, tobacco abuse, morbid obesity who presents with episodic chest pain.  She said that the first episode occurred on Tuesday, 3/26/2024.  She was at work and developed chest pain.  Came to the emergency department and was told she could be admitted for stress testing, however had an issue with her grandson and went home.    She reports multiple episodes since then.  She will have some chest pressure and then some sharp pain in her back.  Can happen predominantly at rest and sometimes with exertion.  Says she will sometimes wake up at night in a panic like she cannot breathe.    Patient has a factory job.  Says before this we can perform heavy lifting and heavy exertion with out shortness of breath, chest pain, palpitations, presyncope, syncope.  She says she can go up multiple flights of stairs    She continues to smoke.  Does not drink alcohol or use illicit substances.    Received nitroglycerin x 2.  Also started on IV Lasix.  At this time patient is sleeping comfortably in bed.  Arousable.  Says she has had a couple intermittent short lasting episodes since admission.    Past Medical History:    Past Medical History:   Diagnosis Date    Abnormal uterine bleeding 05/2018    Anxiety     Bipolar 1 disorder (HCC)     No Rx.     Blood clot in vein 2000    right calf    Fibroid 05/2018    Finger pain, right     Fracture, finger     right small finger    Panic attacks     Snores     Under care of team     No PCP     Past Surgical History:    Past Surgical History:   Procedure Laterality Date    FINGER CLOSED REDUCTION Right 06/01/2021    CLOSED REDUCTION PERCUTANEOUS  PINNING, C-ARM (Right    FINGER SURGERY Right 
incontinence  Heme Denies bruising or bleeding easily  Endocrine Denies any history of diabetes or thyroid disease  Neuro Denies any focal motor or sensory deficits  Psychiatric Denies depression, suicidal ideation, positive anxiety  Skin Denies rashes, itching, open sores    Vitals     height is 1.626 m (5' 4\") and weight is 127 kg (280 lb). Her temporal temperature is 97.4 °F (36.3 °C). Her blood pressure is 140/81 (abnormal) and her pulse is 82. Her respiration is 16 and oxygen saturation is 94%.   Body mass index is 48.06 kg/m².  I/O    No intake or output data in the 24 hours ending 03/31/24 0708  No intake/output data recorded.   Patient Vitals for the past 96 hrs (Last 3 readings):   Weight   03/31/24 0421 127 kg (280 lb)   03/30/24 0400 127.2 kg (280 lb 6.4 oz)   03/29/24 1105 126.6 kg (279 lb)     Exam   General Appearance Awake, alert, oriented, in no acute distress  HEENT - Head is normocephalic, atraumatic. Pupil reactive to light  Neck - Supple, trachea midline and straight  Lungs - Diminished air exchange, no crackles or wheezing  Cardiovascular - Heart sounds are normal.  Regular rhythm normal rate without murmur, gallop or rub.  Abdomen - Soft, nontender, nondistended, no masses or organomegaly  Neurologic - CN II-XII are grossly intact. There are no focal motor or sensory deficits  Skin - No bruising or bleeding  Extremities - No cyanosis, clubbing or edema    Labs  - Old records and notes have been reviewed in [facility]   CBC     Lab Results   Component Value Date/Time    WBC 6.3 03/31/2024 06:40 AM    RBC 4.56 03/31/2024 06:40 AM    RBC 4.85 03/25/2024 09:07 AM    HGB 13.8 03/31/2024 06:40 AM    HCT 44.9 03/31/2024 06:40 AM     03/31/2024 06:40 AM    MCV 98.5 03/31/2024 06:40 AM    MCH 30.3 03/31/2024 06:40 AM    MCHC 30.7 03/31/2024 06:40 AM    RDW 14.1 03/31/2024 06:40 AM    SEGSPCT 52.7 03/25/2024 09:07 AM    LYMPHOPCT 34 03/31/2024 06:40 AM    MONOPCT 9 03/31/2024 06:40 AM    EOSPCT 5.6

## 2024-03-31 NOTE — CARE COORDINATION
Pulmonology consult faxed to [facility]. [facility] NIV order form placed in chart for physician to complete.

## 2024-04-01 ENCOUNTER — APPOINTMENT (OUTPATIENT)
Dept: GENERAL RADIOLOGY | Age: 49
DRG: 291 | End: 2024-04-01
Payer: COMMERCIAL

## 2024-04-01 LAB
ANION GAP SERPL CALCULATED.3IONS-SCNC: 5 MMOL/L (ref 9–17)
BUN SERPL-MCNC: 7 MG/DL (ref 6–20)
BUN/CREAT SERPL: 12 (ref 9–20)
CALCIUM SERPL-MCNC: 8.7 MG/DL (ref 8.6–10.4)
CHLORIDE SERPL-SCNC: 104 MMOL/L (ref 98–107)
CO2 SERPL-SCNC: 31 MMOL/L (ref 20–31)
CREAT SERPL-MCNC: 0.6 MG/DL (ref 0.5–0.9)
ECHO BSA: 2.39 M2
ERYTHROCYTE [SEDIMENTATION RATE] IN BLOOD BY PHOTOMETRIC METHOD: 1 MM/HR (ref 0–30)
GFR SERPL CREATININE-BSD FRML MDRD: >90 ML/MIN/1.73M2
GLUCOSE SERPL-MCNC: 150 MG/DL (ref 70–99)
MAGNESIUM SERPL-MCNC: 1.9 MG/DL (ref 1.6–2.6)
NUC STRESS EJECTION FRACTION: 67 %
POTASSIUM SERPL-SCNC: 3.9 MMOL/L (ref 3.7–5.3)
SODIUM SERPL-SCNC: 140 MMOL/L (ref 135–144)
STRESS BASELINE HR: 73 BPM
STRESS BASELINE ST DEPRESSION: 0 MM
STRESS ESTIMATED WORKLOAD: 1 METS
STRESS EXERCISE DUR MIN: 1 MIN
STRESS EXERCISE DUR SEC: 0 SEC
STRESS PEAK DIAS BP: 65 MMHG
STRESS PEAK SYS BP: 108 MMHG
STRESS PERCENT HR ACHIEVED: 58 %
STRESS POST PEAK HR: 98 BPM
STRESS RATE PRESSURE PRODUCT: NORMAL BPM*MMHG
STRESS ST DEPRESSION: 0 MM
STRESS STAGE 1 BP: NORMAL MMHG
STRESS STAGE 1 DURATION: NORMAL MIN:SEC
STRESS STAGE 1 HR: 88 BPM
STRESS STAGE RECOVERY 1 BP: NORMAL MMHG
STRESS STAGE RECOVERY 1 DURATION: NORMAL MIN:SEC
STRESS STAGE RECOVERY 1 HR: 92 BPM
STRESS TARGET HR: 170 BPM
TID: 1.25

## 2024-04-01 PROCEDURE — 71045 X-RAY EXAM CHEST 1 VIEW: CPT

## 2024-04-01 PROCEDURE — 1200000000 HC SEMI PRIVATE

## 2024-04-01 PROCEDURE — 2580000003 HC RX 258

## 2024-04-01 PROCEDURE — 80048 BASIC METABOLIC PNL TOTAL CA: CPT

## 2024-04-01 PROCEDURE — 6370000000 HC RX 637 (ALT 250 FOR IP): Performed by: INTERNAL MEDICINE

## 2024-04-01 PROCEDURE — 94640 AIRWAY INHALATION TREATMENT: CPT

## 2024-04-01 PROCEDURE — 6360000002 HC RX W HCPCS: Performed by: INTERNAL MEDICINE

## 2024-04-01 PROCEDURE — 2700000000 HC OXYGEN THERAPY PER DAY

## 2024-04-01 PROCEDURE — 6370000000 HC RX 637 (ALT 250 FOR IP): Performed by: NURSE PRACTITIONER

## 2024-04-01 PROCEDURE — 94761 N-INVAS EAR/PLS OXIMETRY MLT: CPT

## 2024-04-01 PROCEDURE — 2580000003 HC RX 258: Performed by: INTERNAL MEDICINE

## 2024-04-01 PROCEDURE — 87040 BLOOD CULTURE FOR BACTERIA: CPT

## 2024-04-01 PROCEDURE — 99232 SBSQ HOSP IP/OBS MODERATE 35: CPT | Performed by: INTERNAL MEDICINE

## 2024-04-01 PROCEDURE — 36415 COLL VENOUS BLD VENIPUNCTURE: CPT

## 2024-04-01 PROCEDURE — 94660 CPAP INITIATION&MGMT: CPT

## 2024-04-01 PROCEDURE — 85652 RBC SED RATE AUTOMATED: CPT

## 2024-04-01 PROCEDURE — 83735 ASSAY OF MAGNESIUM: CPT

## 2024-04-01 PROCEDURE — 6370000000 HC RX 637 (ALT 250 FOR IP)

## 2024-04-01 RX ORDER — AMLODIPINE BESYLATE 5 MG/1
5 TABLET ORAL DAILY
Status: DISCONTINUED | OUTPATIENT
Start: 2024-04-01 | End: 2024-04-05 | Stop reason: HOSPADM

## 2024-04-01 RX ORDER — TRAMADOL HYDROCHLORIDE 50 MG/1
50 TABLET ORAL ONCE
Status: COMPLETED | OUTPATIENT
Start: 2024-04-01 | End: 2024-04-01

## 2024-04-01 RX ORDER — PANTOPRAZOLE SODIUM 40 MG/1
40 TABLET, DELAYED RELEASE ORAL
Status: DISCONTINUED | OUTPATIENT
Start: 2024-04-02 | End: 2024-04-05 | Stop reason: HOSPADM

## 2024-04-01 RX ORDER — PREDNISONE 20 MG/1
40 TABLET ORAL DAILY
Status: CANCELLED | OUTPATIENT
Start: 2024-04-01 | End: 2024-04-06

## 2024-04-01 RX ORDER — FUROSEMIDE 10 MG/ML
40 INJECTION INTRAMUSCULAR; INTRAVENOUS DAILY
Status: DISCONTINUED | OUTPATIENT
Start: 2024-04-01 | End: 2024-04-02

## 2024-04-01 RX ORDER — IPRATROPIUM BROMIDE AND ALBUTEROL SULFATE 2.5; .5 MG/3ML; MG/3ML
1 SOLUTION RESPIRATORY (INHALATION)
Status: DISCONTINUED | OUTPATIENT
Start: 2024-04-01 | End: 2024-04-05 | Stop reason: HOSPADM

## 2024-04-01 RX ORDER — AZITHROMYCIN 250 MG/1
500 TABLET, FILM COATED ORAL DAILY
Status: CANCELLED | OUTPATIENT
Start: 2024-04-01

## 2024-04-01 RX ADMIN — POLYETHYLENE GLYCOL 3350 17 G: 17 POWDER, FOR SOLUTION ORAL at 22:58

## 2024-04-01 RX ADMIN — WATER 40 MG: 1 INJECTION INTRAMUSCULAR; INTRAVENOUS; SUBCUTANEOUS at 13:43

## 2024-04-01 RX ADMIN — FUROSEMIDE 40 MG: 10 INJECTION, SOLUTION INTRAMUSCULAR; INTRAVENOUS at 13:42

## 2024-04-01 RX ADMIN — WATER 40 MG: 1 INJECTION INTRAMUSCULAR; INTRAVENOUS; SUBCUTANEOUS at 20:36

## 2024-04-01 RX ADMIN — AZITHROMYCIN MONOHYDRATE 500 MG: 500 INJECTION, POWDER, LYOPHILIZED, FOR SOLUTION INTRAVENOUS at 14:13

## 2024-04-01 RX ADMIN — GABAPENTIN 1200 MG: 400 CAPSULE ORAL at 08:31

## 2024-04-01 RX ADMIN — GABAPENTIN 1200 MG: 400 CAPSULE ORAL at 13:42

## 2024-04-01 RX ADMIN — WATER 1000 MG: 1 INJECTION INTRAMUSCULAR; INTRAVENOUS; SUBCUTANEOUS at 13:43

## 2024-04-01 RX ADMIN — SODIUM CHLORIDE, PRESERVATIVE FREE 10 ML: 5 INJECTION INTRAVENOUS at 20:57

## 2024-04-01 RX ADMIN — GABAPENTIN 1200 MG: 400 CAPSULE ORAL at 20:36

## 2024-04-01 RX ADMIN — AMLODIPINE BESYLATE 5 MG: 5 TABLET ORAL at 10:51

## 2024-04-01 RX ADMIN — CLONAZEPAM 1 MG: 0.5 TABLET ORAL at 08:45

## 2024-04-01 RX ADMIN — FLUTICASONE PROPIONATE 2 SPRAY: 50 SPRAY, METERED NASAL at 08:31

## 2024-04-01 RX ADMIN — IPRATROPIUM BROMIDE AND ALBUTEROL SULFATE 1 DOSE: 2.5; .5 SOLUTION RESPIRATORY (INHALATION) at 07:43

## 2024-04-01 RX ADMIN — ATORVASTATIN CALCIUM 40 MG: 40 TABLET, FILM COATED ORAL at 08:31

## 2024-04-01 RX ADMIN — TRAMADOL HYDROCHLORIDE 50 MG: 50 TABLET, COATED ORAL at 20:57

## 2024-04-01 RX ADMIN — SODIUM CHLORIDE, PRESERVATIVE FREE 10 ML: 5 INJECTION INTRAVENOUS at 08:34

## 2024-04-01 RX ADMIN — IPRATROPIUM BROMIDE AND ALBUTEROL SULFATE 1 DOSE: 2.5; .5 SOLUTION RESPIRATORY (INHALATION) at 19:39

## 2024-04-01 RX ADMIN — SODIUM CHLORIDE: 9 INJECTION, SOLUTION INTRAVENOUS at 14:12

## 2024-04-01 RX ADMIN — IPRATROPIUM BROMIDE AND ALBUTEROL SULFATE 1 DOSE: 2.5; .5 SOLUTION RESPIRATORY (INHALATION) at 11:17

## 2024-04-01 RX ADMIN — IPRATROPIUM BROMIDE AND ALBUTEROL SULFATE 1 DOSE: 2.5; .5 SOLUTION RESPIRATORY (INHALATION) at 14:55

## 2024-04-01 RX ADMIN — LORAZEPAM 0.5 MG: 2 INJECTION INTRAMUSCULAR; INTRAVENOUS at 00:17

## 2024-04-01 RX ADMIN — ACETAMINOPHEN 650 MG: 325 TABLET ORAL at 08:44

## 2024-04-01 ASSESSMENT — PAIN SCALES - GENERAL
PAINLEVEL_OUTOF10: 0
PAINLEVEL_OUTOF10: 0
PAINLEVEL_OUTOF10: 3
PAINLEVEL_OUTOF10: 10
PAINLEVEL_OUTOF10: 0
PAINLEVEL_OUTOF10: 10
PAINLEVEL_OUTOF10: 8

## 2024-04-01 ASSESSMENT — PAIN DESCRIPTION - ORIENTATION
ORIENTATION: MID
ORIENTATION: RIGHT;LEFT
ORIENTATION: RIGHT;LEFT

## 2024-04-01 ASSESSMENT — PAIN DESCRIPTION - ONSET: ONSET: ON-GOING

## 2024-04-01 ASSESSMENT — PAIN - FUNCTIONAL ASSESSMENT
PAIN_FUNCTIONAL_ASSESSMENT: ACTIVITIES ARE NOT PREVENTED
PAIN_FUNCTIONAL_ASSESSMENT: ACTIVITIES ARE NOT PREVENTED

## 2024-04-01 ASSESSMENT — PAIN DESCRIPTION - LOCATION
LOCATION: HEAD

## 2024-04-01 ASSESSMENT — PAIN DESCRIPTION - DESCRIPTORS
DESCRIPTORS: SQUEEZING
DESCRIPTORS: SQUEEZING
DESCRIPTORS: ACHING

## 2024-04-01 ASSESSMENT — PAIN DESCRIPTION - PAIN TYPE: TYPE: ACUTE PAIN

## 2024-04-01 ASSESSMENT — PAIN DESCRIPTION - FREQUENCY: FREQUENCY: CONTINUOUS

## 2024-04-01 NOTE — CARE COORDINATION
Discharge Planning    Spoke with patient about possible need for home oxygen and testing requirements.  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  She states she can not lose her job because of this and she will go to work without oxygen.  She would be agreeable to using oxygen at home prn.  Pulse ox 95 to 91 with 3L.  Will need home oxygen testing.  She has been using BIPAP at .  She is agreeable to sleep study but wants it done in a facility as she works night shift and sleeps during the day and there are grandkids in the home during the day.

## 2024-04-02 ENCOUNTER — APPOINTMENT (OUTPATIENT)
Dept: GENERAL RADIOLOGY | Age: 49
DRG: 291 | End: 2024-04-02
Payer: COMMERCIAL

## 2024-04-02 LAB
ANION GAP SERPL CALCULATED.3IONS-SCNC: 9 MMOL/L (ref 9–17)
BUN SERPL-MCNC: 11 MG/DL (ref 6–20)
BUN/CREAT SERPL: 16 (ref 9–20)
CALCIUM SERPL-MCNC: 9.5 MG/DL (ref 8.6–10.4)
CHLORIDE SERPL-SCNC: 100 MMOL/L (ref 98–107)
CO2 SERPL-SCNC: 30 MMOL/L (ref 20–31)
CREAT SERPL-MCNC: 0.7 MG/DL (ref 0.5–0.9)
GFR SERPL CREATININE-BSD FRML MDRD: >90 ML/MIN/1.73M2
GLUCOSE SERPL-MCNC: 190 MG/DL (ref 70–99)
MAGNESIUM SERPL-MCNC: 1.8 MG/DL (ref 1.6–2.6)
POTASSIUM SERPL-SCNC: 4.8 MMOL/L (ref 3.7–5.3)
PROCALCITONIN SERPL-MCNC: <0.02 NG/ML (ref 0–0.09)
RHEUMATOID FACT SER NEPH-ACNC: <10 IU/ML (ref 0–13)
SODIUM SERPL-SCNC: 139 MMOL/L (ref 135–144)

## 2024-04-02 PROCEDURE — 6360000002 HC RX W HCPCS: Performed by: INTERNAL MEDICINE

## 2024-04-02 PROCEDURE — 86225 DNA ANTIBODY NATIVE: CPT

## 2024-04-02 PROCEDURE — 84145 PROCALCITONIN (PCT): CPT

## 2024-04-02 PROCEDURE — 99232 SBSQ HOSP IP/OBS MODERATE 35: CPT | Performed by: FAMILY MEDICINE

## 2024-04-02 PROCEDURE — 6370000000 HC RX 637 (ALT 250 FOR IP): Performed by: INTERNAL MEDICINE

## 2024-04-02 PROCEDURE — 6370000000 HC RX 637 (ALT 250 FOR IP)

## 2024-04-02 PROCEDURE — 2700000000 HC OXYGEN THERAPY PER DAY

## 2024-04-02 PROCEDURE — 1200000000 HC SEMI PRIVATE

## 2024-04-02 PROCEDURE — 83735 ASSAY OF MAGNESIUM: CPT

## 2024-04-02 PROCEDURE — 2580000003 HC RX 258

## 2024-04-02 PROCEDURE — 6370000000 HC RX 637 (ALT 250 FOR IP): Performed by: NURSE PRACTITIONER

## 2024-04-02 PROCEDURE — 86038 ANTINUCLEAR ANTIBODIES: CPT

## 2024-04-02 PROCEDURE — 94640 AIRWAY INHALATION TREATMENT: CPT

## 2024-04-02 PROCEDURE — 94660 CPAP INITIATION&MGMT: CPT

## 2024-04-02 PROCEDURE — 36415 COLL VENOUS BLD VENIPUNCTURE: CPT

## 2024-04-02 PROCEDURE — 94761 N-INVAS EAR/PLS OXIMETRY MLT: CPT

## 2024-04-02 PROCEDURE — 2580000003 HC RX 258: Performed by: INTERNAL MEDICINE

## 2024-04-02 PROCEDURE — 80048 BASIC METABOLIC PNL TOTAL CA: CPT

## 2024-04-02 PROCEDURE — 94762 N-INVAS EAR/PLS OXIMTRY CONT: CPT

## 2024-04-02 PROCEDURE — 86431 RHEUMATOID FACTOR QUANT: CPT

## 2024-04-02 PROCEDURE — 71045 X-RAY EXAM CHEST 1 VIEW: CPT

## 2024-04-02 RX ORDER — AZITHROMYCIN 500 MG/1
500 TABLET, FILM COATED ORAL DAILY
Qty: 3 TABLET | Refills: 0 | Status: SHIPPED | OUTPATIENT
Start: 2024-04-02 | End: 2024-04-05 | Stop reason: HOSPADM

## 2024-04-02 RX ORDER — IBUPROFEN 800 MG/1
800 TABLET ORAL EVERY 8 HOURS PRN
Status: ON HOLD | COMMUNITY
End: 2024-04-02 | Stop reason: HOSPADM

## 2024-04-02 RX ORDER — FUROSEMIDE 10 MG/ML
40 INJECTION INTRAMUSCULAR; INTRAVENOUS 2 TIMES DAILY
Status: DISCONTINUED | OUTPATIENT
Start: 2024-04-02 | End: 2024-04-02

## 2024-04-02 RX ORDER — METFORMIN HYDROCHLORIDE 500 MG/1
500 TABLET, EXTENDED RELEASE ORAL
Qty: 30 TABLET | Refills: 0 | Status: SHIPPED | OUTPATIENT
Start: 2024-04-02

## 2024-04-02 RX ORDER — BERBERINE CHLOR/SEAWEED/CHROM 500-250 MG
1 CAPSULE ORAL 3 TIMES DAILY
COMMUNITY

## 2024-04-02 RX ORDER — PREDNISONE 20 MG/1
40 TABLET ORAL DAILY
Status: DISCONTINUED | OUTPATIENT
Start: 2024-04-02 | End: 2024-04-02

## 2024-04-02 RX ORDER — FUROSEMIDE 20 MG/1
20 TABLET ORAL DAILY
Qty: 60 TABLET | Refills: 3 | Status: SHIPPED | OUTPATIENT
Start: 2024-04-02

## 2024-04-02 RX ORDER — KETOROLAC TROMETHAMINE 10 MG/1
10 TABLET, FILM COATED ORAL EVERY 6 HOURS PRN
Status: ON HOLD | COMMUNITY
End: 2024-04-02 | Stop reason: HOSPADM

## 2024-04-02 RX ORDER — ACETAMINOPHEN 325 MG/1
650 TABLET ORAL EVERY 6 HOURS PRN
Status: DISCONTINUED | OUTPATIENT
Start: 2024-04-02 | End: 2024-04-05 | Stop reason: HOSPADM

## 2024-04-02 RX ORDER — PREDNISONE 20 MG/1
20 TABLET ORAL 2 TIMES DAILY
Qty: 10 TABLET | Refills: 0 | Status: SHIPPED | OUTPATIENT
Start: 2024-04-02 | End: 2024-04-07

## 2024-04-02 RX ORDER — LIDOCAINE 50 MG/G
1 PATCH TOPICAL DAILY
COMMUNITY

## 2024-04-02 RX ORDER — FLUTICASONE PROPIONATE 50 MCG
2 SPRAY, SUSPENSION (ML) NASAL DAILY
Qty: 16 G | Refills: 3 | Status: SHIPPED | OUTPATIENT
Start: 2024-04-03

## 2024-04-02 RX ORDER — TRAMADOL HYDROCHLORIDE 50 MG/1
50 TABLET ORAL ONCE
Status: COMPLETED | OUTPATIENT
Start: 2024-04-02 | End: 2024-04-02

## 2024-04-02 RX ORDER — FUROSEMIDE 10 MG/ML
40 INJECTION INTRAMUSCULAR; INTRAVENOUS 3 TIMES DAILY
Status: DISCONTINUED | OUTPATIENT
Start: 2024-04-02 | End: 2024-04-04

## 2024-04-02 RX ORDER — OMEPRAZOLE 20 MG/1
20 CAPSULE, DELAYED RELEASE ORAL DAILY
COMMUNITY

## 2024-04-02 RX ORDER — ALBUTEROL SULFATE 90 UG/1
2 AEROSOL, METERED RESPIRATORY (INHALATION) 4 TIMES DAILY PRN
Qty: 18 G | Refills: 0 | Status: SHIPPED | OUTPATIENT
Start: 2024-04-02

## 2024-04-02 RX ORDER — ALBUTEROL SULFATE 90 UG/1
2 AEROSOL, METERED RESPIRATORY (INHALATION) EVERY 6 HOURS PRN
COMMUNITY

## 2024-04-02 RX ORDER — PANTOPRAZOLE SODIUM 40 MG/1
40 TABLET, DELAYED RELEASE ORAL
Qty: 30 TABLET | Refills: 0 | Status: SHIPPED | OUTPATIENT
Start: 2024-04-03 | End: 2024-04-02 | Stop reason: HOSPADM

## 2024-04-02 RX ORDER — ACETAMINOPHEN 650 MG/1
650 SUPPOSITORY RECTAL EVERY 6 HOURS PRN
Status: DISCONTINUED | OUTPATIENT
Start: 2024-04-02 | End: 2024-04-05 | Stop reason: HOSPADM

## 2024-04-02 RX ORDER — DEXTROAMPHETAMINE SACCHARATE, AMPHETAMINE ASPARTATE, DEXTROAMPHETAMINE SULFATE AND AMPHETAMINE SULFATE 5; 5; 5; 5 MG/1; MG/1; MG/1; MG/1
20 TABLET ORAL 2 TIMES DAILY
Status: ON HOLD | COMMUNITY
End: 2024-04-02

## 2024-04-02 RX ADMIN — FUROSEMIDE 40 MG: 10 INJECTION, SOLUTION INTRAMUSCULAR; INTRAVENOUS at 09:19

## 2024-04-02 RX ADMIN — WATER 40 MG: 1 INJECTION INTRAMUSCULAR; INTRAVENOUS; SUBCUTANEOUS at 13:39

## 2024-04-02 RX ADMIN — TRAMADOL HYDROCHLORIDE 50 MG: 50 TABLET, COATED ORAL at 21:27

## 2024-04-02 RX ADMIN — PANTOPRAZOLE SODIUM 40 MG: 40 TABLET, DELAYED RELEASE ORAL at 05:36

## 2024-04-02 RX ADMIN — AMLODIPINE BESYLATE 5 MG: 5 TABLET ORAL at 09:20

## 2024-04-02 RX ADMIN — SODIUM CHLORIDE, PRESERVATIVE FREE 10 ML: 5 INJECTION INTRAVENOUS at 21:00

## 2024-04-02 RX ADMIN — CLONAZEPAM 1 MG: 0.5 TABLET ORAL at 21:36

## 2024-04-02 RX ADMIN — FLUTICASONE PROPIONATE 2 SPRAY: 50 SPRAY, METERED NASAL at 09:21

## 2024-04-02 RX ADMIN — FUROSEMIDE 40 MG: 10 INJECTION, SOLUTION INTRAMUSCULAR; INTRAVENOUS at 13:39

## 2024-04-02 RX ADMIN — LORAZEPAM 0.5 MG: 2 INJECTION INTRAMUSCULAR; INTRAVENOUS at 00:55

## 2024-04-02 RX ADMIN — FUROSEMIDE 40 MG: 10 INJECTION, SOLUTION INTRAMUSCULAR; INTRAVENOUS at 21:27

## 2024-04-02 RX ADMIN — GABAPENTIN 1200 MG: 400 CAPSULE ORAL at 13:39

## 2024-04-02 RX ADMIN — IPRATROPIUM BROMIDE AND ALBUTEROL SULFATE 1 DOSE: 2.5; .5 SOLUTION RESPIRATORY (INHALATION) at 11:33

## 2024-04-02 RX ADMIN — IPRATROPIUM BROMIDE AND ALBUTEROL SULFATE 1 DOSE: 2.5; .5 SOLUTION RESPIRATORY (INHALATION) at 14:54

## 2024-04-02 RX ADMIN — IPRATROPIUM BROMIDE AND ALBUTEROL SULFATE 1 DOSE: 2.5; .5 SOLUTION RESPIRATORY (INHALATION) at 07:36

## 2024-04-02 RX ADMIN — SODIUM CHLORIDE, PRESERVATIVE FREE 10 ML: 5 INJECTION INTRAVENOUS at 09:23

## 2024-04-02 RX ADMIN — ATORVASTATIN CALCIUM 40 MG: 40 TABLET, FILM COATED ORAL at 09:20

## 2024-04-02 RX ADMIN — IPRATROPIUM BROMIDE AND ALBUTEROL SULFATE 1 DOSE: 2.5; .5 SOLUTION RESPIRATORY (INHALATION) at 19:38

## 2024-04-02 RX ADMIN — GABAPENTIN 1200 MG: 400 CAPSULE ORAL at 21:26

## 2024-04-02 RX ADMIN — GABAPENTIN 1200 MG: 400 CAPSULE ORAL at 09:19

## 2024-04-02 RX ADMIN — ACETAMINOPHEN 650 MG: 325 TABLET ORAL at 06:08

## 2024-04-02 RX ADMIN — ACETAMINOPHEN 650 MG: 325 TABLET ORAL at 13:39

## 2024-04-02 RX ADMIN — CLONAZEPAM 1 MG: 0.5 TABLET ORAL at 13:39

## 2024-04-02 RX ADMIN — WATER 40 MG: 1 INJECTION INTRAMUSCULAR; INTRAVENOUS; SUBCUTANEOUS at 05:36

## 2024-04-02 ASSESSMENT — ENCOUNTER SYMPTOMS
WHEEZING: 0
RHINORRHEA: 0
ABDOMINAL PAIN: 0
NAUSEA: 0
CONSTIPATION: 0
BLOOD IN STOOL: 0
COUGH: 0
VOMITING: 0
DIARRHEA: 0
CHEST TIGHTNESS: 0
SHORTNESS OF BREATH: 0

## 2024-04-02 ASSESSMENT — PAIN SCALES - GENERAL
PAINLEVEL_OUTOF10: 3
PAINLEVEL_OUTOF10: 8
PAINLEVEL_OUTOF10: 0
PAINLEVEL_OUTOF10: 9

## 2024-04-02 ASSESSMENT — PAIN DESCRIPTION - DESCRIPTORS
DESCRIPTORS: NAGGING;DISCOMFORT
DESCRIPTORS: SQUEEZING

## 2024-04-02 ASSESSMENT — PAIN - FUNCTIONAL ASSESSMENT: PAIN_FUNCTIONAL_ASSESSMENT: ACTIVITIES ARE NOT PREVENTED

## 2024-04-02 ASSESSMENT — PAIN DESCRIPTION - LOCATION: LOCATION: HEAD

## 2024-04-02 ASSESSMENT — PAIN DESCRIPTION - ORIENTATION: ORIENTATION: RIGHT;LEFT

## 2024-04-02 NOTE — MANAGEMENT PLAN
Discussed with Pulmonary attending .  Plan to continue diuresis for 1 more day and reevaluate oxygenation tomorrow.  Cancel discharge today.  Continue IV Lasix.  Stop IV antibiotics and steroids.

## 2024-04-02 NOTE — CARE COORDINATION
Discharge planning    Patient information and Trilogy form faxed to [facility]. Patient will have to call when she arrives home for delivery.

## 2024-04-03 ENCOUNTER — APPOINTMENT (OUTPATIENT)
Dept: GENERAL RADIOLOGY | Age: 49
DRG: 291 | End: 2024-04-03
Payer: COMMERCIAL

## 2024-04-03 PROBLEM — E66.2 OBESITY HYPOVENTILATION SYNDROME (HCC): Status: ACTIVE | Noted: 2024-04-03

## 2024-04-03 PROBLEM — I50.31 ACUTE HEART FAILURE WITH PRESERVED EJECTION FRACTION (HFPEF) (HCC): Status: ACTIVE | Noted: 2024-04-03

## 2024-04-03 PROBLEM — R73.03 PREDIABETES: Status: ACTIVE | Noted: 2024-04-03

## 2024-04-03 LAB
ANION GAP SERPL CALCULATED.3IONS-SCNC: 9 MMOL/L (ref 9–17)
B PARAP IS1001 DNA NPH QL NAA+NON-PROBE: NOT DETECTED
B PERT DNA SPEC QL NAA+PROBE: NOT DETECTED
BUN SERPL-MCNC: 22 MG/DL (ref 6–20)
BUN/CREAT SERPL: 28 (ref 9–20)
C PNEUM DNA NPH QL NAA+NON-PROBE: NOT DETECTED
CALCIUM SERPL-MCNC: 10 MG/DL (ref 8.6–10.4)
CHLORIDE SERPL-SCNC: 96 MMOL/L (ref 98–107)
CO2 SERPL-SCNC: 34 MMOL/L (ref 20–31)
CREAT SERPL-MCNC: 0.8 MG/DL (ref 0.5–0.9)
ERYTHROCYTE [DISTWIDTH] IN BLOOD BY AUTOMATED COUNT: 13.7 % (ref 11.8–14.4)
FLUAV RNA NPH QL NAA+NON-PROBE: NOT DETECTED
FLUBV RNA NPH QL NAA+NON-PROBE: NOT DETECTED
GFR SERPL CREATININE-BSD FRML MDRD: 90 ML/MIN/1.73M2
GLUCOSE SERPL-MCNC: 314 MG/DL (ref 70–99)
HADV DNA NPH QL NAA+NON-PROBE: NOT DETECTED
HCO3 VENOUS: 34.7 MMOL/L (ref 22–29)
HCOV 229E RNA NPH QL NAA+NON-PROBE: NOT DETECTED
HCOV HKU1 RNA NPH QL NAA+NON-PROBE: NOT DETECTED
HCOV NL63 RNA NPH QL NAA+NON-PROBE: NOT DETECTED
HCOV OC43 RNA NPH QL NAA+NON-PROBE: NOT DETECTED
HCT VFR BLD AUTO: 47 % (ref 36.3–47.1)
HGB BLD-MCNC: 14.8 G/DL (ref 11.9–15.1)
HMPV RNA NPH QL NAA+NON-PROBE: NOT DETECTED
HPIV1 RNA NPH QL NAA+NON-PROBE: NOT DETECTED
HPIV2 RNA NPH QL NAA+NON-PROBE: NOT DETECTED
HPIV3 RNA NPH QL NAA+NON-PROBE: NOT DETECTED
HPIV4 RNA NPH QL NAA+NON-PROBE: NOT DETECTED
M PNEUMO DNA NPH QL NAA+NON-PROBE: NOT DETECTED
MCH RBC QN AUTO: 30.2 PG (ref 25.2–33.5)
MCHC RBC AUTO-ENTMCNC: 31.5 G/DL (ref 28.4–34.8)
MCV RBC AUTO: 95.9 FL (ref 82.6–102.9)
NRBC BLD-RTO: 0 PER 100 WBC
O2 SAT, VEN: 96 % (ref 60–85)
PCO2, VEN: 56.7 MM HG (ref 41–51)
PH VENOUS: 7.39 (ref 7.32–7.43)
PLATELET # BLD AUTO: 235 K/UL (ref 138–453)
PMV BLD AUTO: 12.7 FL (ref 8.1–13.5)
PO2, VEN: 84.8 MM HG (ref 30–50)
POSITIVE BASE EXCESS, VEN: 7.5 MMOL/L (ref 0–3)
POTASSIUM SERPL-SCNC: 4.7 MMOL/L (ref 3.7–5.3)
RBC # BLD AUTO: 4.9 M/UL (ref 3.95–5.11)
RSV RNA NPH QL NAA+NON-PROBE: NOT DETECTED
RV+EV RNA NPH QL NAA+NON-PROBE: NOT DETECTED
SARS-COV-2 RNA NPH QL NAA+NON-PROBE: NOT DETECTED
SODIUM SERPL-SCNC: 139 MMOL/L (ref 135–144)
SPECIMEN DESCRIPTION: NORMAL
WBC OTHER # BLD: 15.1 K/UL (ref 3.5–11.3)

## 2024-04-03 PROCEDURE — 6370000000 HC RX 637 (ALT 250 FOR IP): Performed by: INTERNAL MEDICINE

## 2024-04-03 PROCEDURE — 80048 BASIC METABOLIC PNL TOTAL CA: CPT

## 2024-04-03 PROCEDURE — 36415 COLL VENOUS BLD VENIPUNCTURE: CPT

## 2024-04-03 PROCEDURE — 94762 N-INVAS EAR/PLS OXIMTRY CONT: CPT

## 2024-04-03 PROCEDURE — 6360000002 HC RX W HCPCS: Performed by: INTERNAL MEDICINE

## 2024-04-03 PROCEDURE — 1200000000 HC SEMI PRIVATE

## 2024-04-03 PROCEDURE — 71045 X-RAY EXAM CHEST 1 VIEW: CPT

## 2024-04-03 PROCEDURE — 94761 N-INVAS EAR/PLS OXIMETRY MLT: CPT

## 2024-04-03 PROCEDURE — 99232 SBSQ HOSP IP/OBS MODERATE 35: CPT | Performed by: FAMILY MEDICINE

## 2024-04-03 PROCEDURE — 2580000003 HC RX 258

## 2024-04-03 PROCEDURE — 6370000000 HC RX 637 (ALT 250 FOR IP): Performed by: NURSE PRACTITIONER

## 2024-04-03 PROCEDURE — 85027 COMPLETE CBC AUTOMATED: CPT

## 2024-04-03 PROCEDURE — 82803 BLOOD GASES ANY COMBINATION: CPT

## 2024-04-03 PROCEDURE — 2700000000 HC OXYGEN THERAPY PER DAY

## 2024-04-03 PROCEDURE — 94640 AIRWAY INHALATION TREATMENT: CPT

## 2024-04-03 PROCEDURE — 2580000003 HC RX 258: Performed by: INTERNAL MEDICINE

## 2024-04-03 PROCEDURE — 94660 CPAP INITIATION&MGMT: CPT

## 2024-04-03 PROCEDURE — 0202U NFCT DS 22 TRGT SARS-COV-2: CPT

## 2024-04-03 PROCEDURE — 6370000000 HC RX 637 (ALT 250 FOR IP): Performed by: FAMILY MEDICINE

## 2024-04-03 PROCEDURE — 6370000000 HC RX 637 (ALT 250 FOR IP)

## 2024-04-03 RX ORDER — PREDNISONE 20 MG/1
40 TABLET ORAL DAILY
Status: DISCONTINUED | OUTPATIENT
Start: 2024-04-03 | End: 2024-04-03

## 2024-04-03 RX ORDER — METOPROLOL SUCCINATE 25 MG/1
25 TABLET, EXTENDED RELEASE ORAL DAILY
Status: DISCONTINUED | OUTPATIENT
Start: 2024-04-03 | End: 2024-04-05 | Stop reason: HOSPADM

## 2024-04-03 RX ORDER — BUTALBITAL, ACETAMINOPHEN AND CAFFEINE 50; 325; 40 MG/1; MG/1; MG/1
1 TABLET ORAL EVERY 4 HOURS PRN
Status: DISCONTINUED | OUTPATIENT
Start: 2024-04-03 | End: 2024-04-05 | Stop reason: HOSPADM

## 2024-04-03 RX ORDER — GABAPENTIN 300 MG/1
300 CAPSULE ORAL ONCE
Status: COMPLETED | OUTPATIENT
Start: 2024-04-03 | End: 2024-04-03

## 2024-04-03 RX ORDER — PREDNISONE 20 MG/1
40 TABLET ORAL DAILY
Status: DISCONTINUED | OUTPATIENT
Start: 2024-04-03 | End: 2024-04-05

## 2024-04-03 RX ORDER — DIPHENHYDRAMINE HCL 25 MG
25 TABLET ORAL EVERY 6 HOURS PRN
Status: DISCONTINUED | OUTPATIENT
Start: 2024-04-03 | End: 2024-04-05 | Stop reason: HOSPADM

## 2024-04-03 RX ORDER — METOPROLOL SUCCINATE 25 MG/1
25 TABLET, EXTENDED RELEASE ORAL DAILY
Qty: 30 TABLET | Refills: 3 | Status: SHIPPED | OUTPATIENT
Start: 2024-04-03

## 2024-04-03 RX ORDER — BUDESONIDE AND FORMOTEROL FUMARATE DIHYDRATE 160; 4.5 UG/1; UG/1
2 AEROSOL RESPIRATORY (INHALATION)
Status: DISCONTINUED | OUTPATIENT
Start: 2024-04-03 | End: 2024-04-05 | Stop reason: HOSPADM

## 2024-04-03 RX ORDER — BUTALBITAL, ASPIRIN, AND CAFFEINE 325; 50; 40 MG/1; MG/1; MG/1
1 CAPSULE ORAL EVERY 4 HOURS PRN
Status: DISCONTINUED | OUTPATIENT
Start: 2024-04-03 | End: 2024-04-03 | Stop reason: CLARIF

## 2024-04-03 RX ADMIN — PANTOPRAZOLE SODIUM 40 MG: 40 TABLET, DELAYED RELEASE ORAL at 06:36

## 2024-04-03 RX ADMIN — IPRATROPIUM BROMIDE AND ALBUTEROL SULFATE 1 DOSE: 2.5; .5 SOLUTION RESPIRATORY (INHALATION) at 12:01

## 2024-04-03 RX ADMIN — GABAPENTIN 300 MG: 300 CAPSULE ORAL at 13:55

## 2024-04-03 RX ADMIN — ATORVASTATIN CALCIUM 40 MG: 40 TABLET, FILM COATED ORAL at 09:55

## 2024-04-03 RX ADMIN — AMLODIPINE BESYLATE 5 MG: 5 TABLET ORAL at 09:55

## 2024-04-03 RX ADMIN — ACETAMINOPHEN 650 MG: 325 TABLET ORAL at 22:12

## 2024-04-03 RX ADMIN — SODIUM CHLORIDE, PRESERVATIVE FREE 10 ML: 5 INJECTION INTRAVENOUS at 09:56

## 2024-04-03 RX ADMIN — IPRATROPIUM BROMIDE AND ALBUTEROL SULFATE 1 DOSE: 2.5; .5 SOLUTION RESPIRATORY (INHALATION) at 19:54

## 2024-04-03 RX ADMIN — IPRATROPIUM BROMIDE AND ALBUTEROL SULFATE 1 DOSE: 2.5; .5 SOLUTION RESPIRATORY (INHALATION) at 07:45

## 2024-04-03 RX ADMIN — IPRATROPIUM BROMIDE AND ALBUTEROL SULFATE 1 DOSE: 2.5; .5 SOLUTION RESPIRATORY (INHALATION) at 15:17

## 2024-04-03 RX ADMIN — PREDNISONE 40 MG: 20 TABLET ORAL at 13:55

## 2024-04-03 RX ADMIN — FUROSEMIDE 40 MG: 10 INJECTION, SOLUTION INTRAMUSCULAR; INTRAVENOUS at 13:34

## 2024-04-03 RX ADMIN — BUTALBITAL, ACETAMINOPHEN, AND CAFFEINE 1 TABLET: 50; 325; 40 TABLET ORAL at 20:54

## 2024-04-03 RX ADMIN — DIPHENHYDRAMINE HCL 25 MG: 25 TABLET ORAL at 13:34

## 2024-04-03 RX ADMIN — BUTALBITAL, ACETAMINOPHEN, AND CAFFEINE 1 TABLET: 50; 325; 40 TABLET ORAL at 11:25

## 2024-04-03 RX ADMIN — GABAPENTIN 1200 MG: 400 CAPSULE ORAL at 20:54

## 2024-04-03 RX ADMIN — METFORMIN HYDROCHLORIDE 500 MG: 500 TABLET ORAL at 09:55

## 2024-04-03 RX ADMIN — FUROSEMIDE 40 MG: 10 INJECTION, SOLUTION INTRAMUSCULAR; INTRAVENOUS at 20:54

## 2024-04-03 RX ADMIN — BUDESONIDE AND FORMOTEROL FUMARATE DIHYDRATE 2 PUFF: 160; 4.5 AEROSOL RESPIRATORY (INHALATION) at 19:55

## 2024-04-03 RX ADMIN — FUROSEMIDE 40 MG: 10 INJECTION, SOLUTION INTRAMUSCULAR; INTRAVENOUS at 09:55

## 2024-04-03 RX ADMIN — ACETAMINOPHEN 650 MG: 325 TABLET ORAL at 09:55

## 2024-04-03 RX ADMIN — POLYETHYLENE GLYCOL 3350 17 G: 17 POWDER, FOR SOLUTION ORAL at 11:25

## 2024-04-03 RX ADMIN — GABAPENTIN 1200 MG: 400 CAPSULE ORAL at 09:55

## 2024-04-03 RX ADMIN — WATER 40 MG: 1 INJECTION INTRAMUSCULAR; INTRAVENOUS; SUBCUTANEOUS at 02:08

## 2024-04-03 RX ADMIN — METOPROLOL SUCCINATE 25 MG: 25 TABLET, EXTENDED RELEASE ORAL at 13:34

## 2024-04-03 RX ADMIN — FLUTICASONE PROPIONATE 2 SPRAY: 50 SPRAY, METERED NASAL at 09:59

## 2024-04-03 RX ADMIN — GABAPENTIN 1200 MG: 400 CAPSULE ORAL at 13:34

## 2024-04-03 RX ADMIN — SODIUM CHLORIDE, PRESERVATIVE FREE 10 ML: 5 INJECTION INTRAVENOUS at 20:56

## 2024-04-03 ASSESSMENT — ENCOUNTER SYMPTOMS
CONSTIPATION: 0
RHINORRHEA: 0
SHORTNESS OF BREATH: 0
WHEEZING: 0
VOMITING: 0
NAUSEA: 0
ABDOMINAL PAIN: 0
BLOOD IN STOOL: 0
COUGH: 0
CHEST TIGHTNESS: 0
DIARRHEA: 0

## 2024-04-03 ASSESSMENT — PAIN SCALES - GENERAL
PAINLEVEL_OUTOF10: 10
PAINLEVEL_OUTOF10: 10

## 2024-04-03 ASSESSMENT — PAIN DESCRIPTION - LOCATION
LOCATION: HEAD
LOCATION: HEAD

## 2024-04-03 ASSESSMENT — PAIN DESCRIPTION - ONSET
ONSET: ON-GOING
ONSET: ON-GOING

## 2024-04-03 ASSESSMENT — PAIN DESCRIPTION - FREQUENCY
FREQUENCY: CONTINUOUS
FREQUENCY: CONTINUOUS

## 2024-04-03 ASSESSMENT — PAIN DESCRIPTION - PAIN TYPE
TYPE: CHRONIC PAIN
TYPE: CHRONIC PAIN

## 2024-04-03 ASSESSMENT — PAIN DESCRIPTION - DESCRIPTORS
DESCRIPTORS: ACHING
DESCRIPTORS: ACHING

## 2024-04-03 NOTE — CARE COORDINATION
Discharge planning    Attempting to get patient a Trilogy thru [employer], if she qualifies. Informed Dr. Rodgres that we need supportive documentation and [employer] form filled out that is in pt's chart. Pt. Does not qualify for O2 during the day. RN states that pulm is going to put in additional orders. Patient is independent.

## 2024-04-03 NOTE — CARE COORDINATION
[facility] Emergency Department    [place]  [address] [address]    Phone:  [phone]    Fax:  [facility]   MRN: [id number]    Department:  [facility] Emergency Department   Date of Visit: Discharge planning    Perfect served Dr. Rodgers to inform him that there is an [employer] form in the chart that needs signed and filled out. Also to let him know that we need supportive documentation.    IF THERE IS ANY CHANGE OR WORSENING OF YOUR CONDITION, CALL YOUR PRIMARY CARE PHYSICIAN AT ONCE OR RETURN IMMEDIATELY TO THE EMERGENCY DEPARTMENT.     If you have been prescribed any medication(s), please fill your prescription right away and begin taking t

## 2024-04-04 LAB
ANA SER QL IA: NEGATIVE
ANION GAP SERPL CALCULATED.3IONS-SCNC: 6 MMOL/L (ref 9–17)
BUN SERPL-MCNC: 24 MG/DL (ref 6–20)
BUN/CREAT SERPL: 40 (ref 9–20)
CALCIUM SERPL-MCNC: 9.1 MG/DL (ref 8.6–10.4)
CHLORIDE SERPL-SCNC: 98 MMOL/L (ref 98–107)
CO2 SERPL-SCNC: 37 MMOL/L (ref 20–31)
CREAT SERPL-MCNC: 0.6 MG/DL (ref 0.5–0.9)
DSDNA IGG SER QL IA: 0.8 IU/ML
GFR SERPL CREATININE-BSD FRML MDRD: >90 ML/MIN/1.73M2
GLUCOSE SERPL-MCNC: 133 MG/DL (ref 70–99)
NUCLEAR IGG SER IA-RTO: 0.3 U/ML
POTASSIUM SERPL-SCNC: 3.7 MMOL/L (ref 3.7–5.3)
SODIUM SERPL-SCNC: 141 MMOL/L (ref 135–144)

## 2024-04-04 PROCEDURE — 94660 CPAP INITIATION&MGMT: CPT

## 2024-04-04 PROCEDURE — 6370000000 HC RX 637 (ALT 250 FOR IP): Performed by: INTERNAL MEDICINE

## 2024-04-04 PROCEDURE — 36415 COLL VENOUS BLD VENIPUNCTURE: CPT

## 2024-04-04 PROCEDURE — 2700000000 HC OXYGEN THERAPY PER DAY

## 2024-04-04 PROCEDURE — 80048 BASIC METABOLIC PNL TOTAL CA: CPT

## 2024-04-04 PROCEDURE — 99232 SBSQ HOSP IP/OBS MODERATE 35: CPT | Performed by: STUDENT IN AN ORGANIZED HEALTH CARE EDUCATION/TRAINING PROGRAM

## 2024-04-04 PROCEDURE — 94762 N-INVAS EAR/PLS OXIMTRY CONT: CPT

## 2024-04-04 PROCEDURE — 1200000000 HC SEMI PRIVATE

## 2024-04-04 PROCEDURE — 6370000000 HC RX 637 (ALT 250 FOR IP)

## 2024-04-04 PROCEDURE — 94761 N-INVAS EAR/PLS OXIMETRY MLT: CPT

## 2024-04-04 PROCEDURE — 94640 AIRWAY INHALATION TREATMENT: CPT

## 2024-04-04 PROCEDURE — 2580000003 HC RX 258

## 2024-04-04 PROCEDURE — 6370000000 HC RX 637 (ALT 250 FOR IP): Performed by: FAMILY MEDICINE

## 2024-04-04 PROCEDURE — 6360000002 HC RX W HCPCS: Performed by: INTERNAL MEDICINE

## 2024-04-04 PROCEDURE — 6370000000 HC RX 637 (ALT 250 FOR IP): Performed by: NURSE PRACTITIONER

## 2024-04-04 RX ORDER — DEXTROAMPHETAMINE SACCHARATE, AMPHETAMINE ASPARTATE, DEXTROAMPHETAMINE SULFATE AND AMPHETAMINE SULFATE 5; 5; 5; 5 MG/1; MG/1; MG/1; MG/1
20 TABLET ORAL 2 TIMES DAILY
Status: DISCONTINUED | OUTPATIENT
Start: 2024-04-04 | End: 2024-04-04

## 2024-04-04 RX ORDER — ALBUTEROL SULFATE 90 UG/1
2 AEROSOL, METERED RESPIRATORY (INHALATION) EVERY 6 HOURS PRN
Status: DISCONTINUED | OUTPATIENT
Start: 2024-04-04 | End: 2024-04-05 | Stop reason: HOSPADM

## 2024-04-04 RX ORDER — FUROSEMIDE 10 MG/ML
40 INJECTION INTRAMUSCULAR; INTRAVENOUS DAILY
Status: DISCONTINUED | OUTPATIENT
Start: 2024-04-05 | End: 2024-04-05 | Stop reason: HOSPADM

## 2024-04-04 RX ADMIN — AMLODIPINE BESYLATE 5 MG: 5 TABLET ORAL at 09:21

## 2024-04-04 RX ADMIN — FLUTICASONE PROPIONATE 2 SPRAY: 50 SPRAY, METERED NASAL at 09:24

## 2024-04-04 RX ADMIN — ACETAMINOPHEN 650 MG: 325 TABLET ORAL at 12:22

## 2024-04-04 RX ADMIN — BUTALBITAL, ACETAMINOPHEN, AND CAFFEINE 1 TABLET: 50; 325; 40 TABLET ORAL at 20:52

## 2024-04-04 RX ADMIN — BUDESONIDE AND FORMOTEROL FUMARATE DIHYDRATE 2 PUFF: 160; 4.5 AEROSOL RESPIRATORY (INHALATION) at 20:39

## 2024-04-04 RX ADMIN — SODIUM CHLORIDE, PRESERVATIVE FREE 10 ML: 5 INJECTION INTRAVENOUS at 20:54

## 2024-04-04 RX ADMIN — IPRATROPIUM BROMIDE AND ALBUTEROL SULFATE 1 DOSE: 2.5; .5 SOLUTION RESPIRATORY (INHALATION) at 20:39

## 2024-04-04 RX ADMIN — PANTOPRAZOLE SODIUM 40 MG: 40 TABLET, DELAYED RELEASE ORAL at 09:47

## 2024-04-04 RX ADMIN — GABAPENTIN 1200 MG: 400 CAPSULE ORAL at 20:52

## 2024-04-04 RX ADMIN — BUTALBITAL, ACETAMINOPHEN, AND CAFFEINE 1 TABLET: 50; 325; 40 TABLET ORAL at 09:21

## 2024-04-04 RX ADMIN — BUDESONIDE AND FORMOTEROL FUMARATE DIHYDRATE 2 PUFF: 160; 4.5 AEROSOL RESPIRATORY (INHALATION) at 07:53

## 2024-04-04 RX ADMIN — LORAZEPAM 0.5 MG: 2 INJECTION INTRAMUSCULAR; INTRAVENOUS at 20:53

## 2024-04-04 RX ADMIN — CLONAZEPAM 1 MG: 0.5 TABLET ORAL at 09:47

## 2024-04-04 RX ADMIN — METFORMIN HYDROCHLORIDE 500 MG: 500 TABLET ORAL at 09:21

## 2024-04-04 RX ADMIN — GABAPENTIN 1200 MG: 400 CAPSULE ORAL at 15:42

## 2024-04-04 RX ADMIN — SODIUM CHLORIDE, PRESERVATIVE FREE 10 ML: 5 INJECTION INTRAVENOUS at 09:22

## 2024-04-04 RX ADMIN — ACETAMINOPHEN 650 MG: 325 TABLET ORAL at 20:52

## 2024-04-04 RX ADMIN — FUROSEMIDE 40 MG: 10 INJECTION, SOLUTION INTRAMUSCULAR; INTRAVENOUS at 09:21

## 2024-04-04 RX ADMIN — METOPROLOL SUCCINATE 25 MG: 25 TABLET, EXTENDED RELEASE ORAL at 09:21

## 2024-04-04 RX ADMIN — IPRATROPIUM BROMIDE AND ALBUTEROL SULFATE 1 DOSE: 2.5; .5 SOLUTION RESPIRATORY (INHALATION) at 07:50

## 2024-04-04 RX ADMIN — GABAPENTIN 1200 MG: 400 CAPSULE ORAL at 09:47

## 2024-04-04 RX ADMIN — ATORVASTATIN CALCIUM 40 MG: 40 TABLET, FILM COATED ORAL at 09:21

## 2024-04-04 RX ADMIN — PREDNISONE 40 MG: 20 TABLET ORAL at 09:21

## 2024-04-04 RX ADMIN — IPRATROPIUM BROMIDE AND ALBUTEROL SULFATE 1 DOSE: 2.5; .5 SOLUTION RESPIRATORY (INHALATION) at 11:35

## 2024-04-04 RX ADMIN — DIPHENHYDRAMINE HCL 25 MG: 25 TABLET ORAL at 20:52

## 2024-04-04 RX ADMIN — FUROSEMIDE 40 MG: 10 INJECTION, SOLUTION INTRAMUSCULAR; INTRAVENOUS at 15:42

## 2024-04-04 RX ADMIN — CLONAZEPAM 1 MG: 0.5 TABLET ORAL at 15:44

## 2024-04-04 RX ADMIN — CYCLOBENZAPRINE 10 MG: 10 TABLET, FILM COATED ORAL at 20:52

## 2024-04-04 RX ADMIN — IPRATROPIUM BROMIDE AND ALBUTEROL SULFATE 1 DOSE: 2.5; .5 SOLUTION RESPIRATORY (INHALATION) at 15:53

## 2024-04-04 ASSESSMENT — PAIN DESCRIPTION - PAIN TYPE
TYPE: CHRONIC PAIN

## 2024-04-04 ASSESSMENT — ENCOUNTER SYMPTOMS
CONSTIPATION: 0
BLOOD IN STOOL: 0
COUGH: 0
NAUSEA: 0
ABDOMINAL PAIN: 0
CHEST TIGHTNESS: 0
RHINORRHEA: 0
WHEEZING: 0
DIARRHEA: 0
SHORTNESS OF BREATH: 0
VOMITING: 0

## 2024-04-04 ASSESSMENT — PAIN DESCRIPTION - ORIENTATION
ORIENTATION: MID;ANTERIOR
ORIENTATION: ANTERIOR
ORIENTATION: ANTERIOR
ORIENTATION: MID;ANTERIOR

## 2024-04-04 ASSESSMENT — PAIN DESCRIPTION - LOCATION
LOCATION: HEAD

## 2024-04-04 ASSESSMENT — PAIN - FUNCTIONAL ASSESSMENT
PAIN_FUNCTIONAL_ASSESSMENT: ACTIVITIES ARE NOT PREVENTED

## 2024-04-04 ASSESSMENT — PAIN DESCRIPTION - DESCRIPTORS
DESCRIPTORS: STABBING
DESCRIPTORS: ACHING
DESCRIPTORS: DULL
DESCRIPTORS: STABBING;DULL
DESCRIPTORS: ACHING

## 2024-04-04 ASSESSMENT — PAIN DESCRIPTION - FREQUENCY
FREQUENCY: CONTINUOUS
FREQUENCY: INTERMITTENT

## 2024-04-04 ASSESSMENT — PAIN DESCRIPTION - ONSET
ONSET: PROGRESSIVE
ONSET: ON-GOING

## 2024-04-04 ASSESSMENT — PAIN SCALES - GENERAL
PAINLEVEL_OUTOF10: 10
PAINLEVEL_OUTOF10: 6
PAINLEVEL_OUTOF10: 10

## 2024-04-04 NOTE — CARE COORDINATION
Discharge planning    Spoke with Dr. Rodgers and he wants another nocturnal study completed tonight to see if patient is progressing enough to just go home on oxygen at night only. Patent does not qualify for a trilogy at this time. Patient is independent. Plan is home tomorrow. Will need order for home O2.

## 2024-04-04 NOTE — CARE COORDINATION
Social work: Phone call from [facility], she is able to assist with dc planning needs.   Her phone number is [phone] extension [phone].     No pertinent past medical history

## 2024-04-05 ENCOUNTER — APPOINTMENT (OUTPATIENT)
Dept: GENERAL RADIOLOGY | Age: 49
DRG: 291 | End: 2024-04-05
Payer: COMMERCIAL

## 2024-04-05 VITALS
RESPIRATION RATE: 16 BRPM | HEART RATE: 96 BPM | WEIGHT: 283.9 LBS | OXYGEN SATURATION: 93 % | BODY MASS INDEX: 48.47 KG/M2 | HEIGHT: 64 IN | TEMPERATURE: 97.8 F | SYSTOLIC BLOOD PRESSURE: 124 MMHG | DIASTOLIC BLOOD PRESSURE: 88 MMHG

## 2024-04-05 PROBLEM — R07.89 ATYPICAL CHEST PAIN: Status: RESOLVED | Noted: 2021-09-27 | Resolved: 2024-04-05

## 2024-04-05 LAB
ANION GAP SERPL CALCULATED.3IONS-SCNC: 12 MMOL/L (ref 9–17)
BUN SERPL-MCNC: 23 MG/DL (ref 6–20)
BUN/CREAT SERPL: 33 (ref 9–20)
CALCIUM SERPL-MCNC: 9.1 MG/DL (ref 8.6–10.4)
CHLORIDE SERPL-SCNC: 98 MMOL/L (ref 98–107)
CO2 SERPL-SCNC: 31 MMOL/L (ref 20–31)
CREAT SERPL-MCNC: 0.7 MG/DL (ref 0.5–0.9)
GFR SERPL CREATININE-BSD FRML MDRD: >90 ML/MIN/1.73M2
GLUCOSE SERPL-MCNC: 128 MG/DL (ref 70–99)
HIV 1+2 AB+HIV1 P24 AG SERPL QL IA: NONREACTIVE
POTASSIUM SERPL-SCNC: 3.6 MMOL/L (ref 3.7–5.3)
SODIUM SERPL-SCNC: 141 MMOL/L (ref 135–144)

## 2024-04-05 PROCEDURE — 6370000000 HC RX 637 (ALT 250 FOR IP): Performed by: STUDENT IN AN ORGANIZED HEALTH CARE EDUCATION/TRAINING PROGRAM

## 2024-04-05 PROCEDURE — 36415 COLL VENOUS BLD VENIPUNCTURE: CPT

## 2024-04-05 PROCEDURE — 87389 HIV-1 AG W/HIV-1&-2 AB AG IA: CPT

## 2024-04-05 PROCEDURE — 6370000000 HC RX 637 (ALT 250 FOR IP)

## 2024-04-05 PROCEDURE — 2580000003 HC RX 258

## 2024-04-05 PROCEDURE — 71045 X-RAY EXAM CHEST 1 VIEW: CPT

## 2024-04-05 PROCEDURE — 99239 HOSP IP/OBS DSCHRG MGMT >30: CPT | Performed by: STUDENT IN AN ORGANIZED HEALTH CARE EDUCATION/TRAINING PROGRAM

## 2024-04-05 PROCEDURE — 6360000002 HC RX W HCPCS: Performed by: INTERNAL MEDICINE

## 2024-04-05 PROCEDURE — 94761 N-INVAS EAR/PLS OXIMETRY MLT: CPT

## 2024-04-05 PROCEDURE — 94640 AIRWAY INHALATION TREATMENT: CPT

## 2024-04-05 PROCEDURE — 80048 BASIC METABOLIC PNL TOTAL CA: CPT

## 2024-04-05 PROCEDURE — 6370000000 HC RX 637 (ALT 250 FOR IP): Performed by: NURSE PRACTITIONER

## 2024-04-05 PROCEDURE — 6370000000 HC RX 637 (ALT 250 FOR IP): Performed by: INTERNAL MEDICINE

## 2024-04-05 PROCEDURE — 2700000000 HC OXYGEN THERAPY PER DAY

## 2024-04-05 PROCEDURE — 6370000000 HC RX 637 (ALT 250 FOR IP): Performed by: FAMILY MEDICINE

## 2024-04-05 RX ORDER — OXYCODONE HYDROCHLORIDE 5 MG/1
5 TABLET ORAL EVERY 4 HOURS PRN
Status: DISCONTINUED | OUTPATIENT
Start: 2024-04-05 | End: 2024-04-05 | Stop reason: HOSPADM

## 2024-04-05 RX ORDER — OXYCODONE HYDROCHLORIDE 5 MG/1
10 TABLET ORAL EVERY 4 HOURS PRN
Status: DISCONTINUED | OUTPATIENT
Start: 2024-04-05 | End: 2024-04-05 | Stop reason: HOSPADM

## 2024-04-05 RX ADMIN — BUTALBITAL, ACETAMINOPHEN, AND CAFFEINE 1 TABLET: 50; 325; 40 TABLET ORAL at 08:43

## 2024-04-05 RX ADMIN — FLUTICASONE PROPIONATE 2 SPRAY: 50 SPRAY, METERED NASAL at 08:55

## 2024-04-05 RX ADMIN — FUROSEMIDE 40 MG: 10 INJECTION, SOLUTION INTRAMUSCULAR; INTRAVENOUS at 08:43

## 2024-04-05 RX ADMIN — PANTOPRAZOLE SODIUM 40 MG: 40 TABLET, DELAYED RELEASE ORAL at 06:50

## 2024-04-05 RX ADMIN — IPRATROPIUM BROMIDE AND ALBUTEROL SULFATE 1 DOSE: 2.5; .5 SOLUTION RESPIRATORY (INHALATION) at 11:26

## 2024-04-05 RX ADMIN — GABAPENTIN 1200 MG: 400 CAPSULE ORAL at 08:43

## 2024-04-05 RX ADMIN — BUDESONIDE AND FORMOTEROL FUMARATE DIHYDRATE 2 PUFF: 160; 4.5 AEROSOL RESPIRATORY (INHALATION) at 08:02

## 2024-04-05 RX ADMIN — ATORVASTATIN CALCIUM 40 MG: 40 TABLET, FILM COATED ORAL at 08:43

## 2024-04-05 RX ADMIN — SODIUM CHLORIDE, PRESERVATIVE FREE 10 ML: 5 INJECTION INTRAVENOUS at 08:55

## 2024-04-05 RX ADMIN — METOPROLOL SUCCINATE 25 MG: 25 TABLET, EXTENDED RELEASE ORAL at 08:43

## 2024-04-05 RX ADMIN — OXYCODONE HYDROCHLORIDE 10 MG: 5 TABLET ORAL at 14:41

## 2024-04-05 RX ADMIN — CLONAZEPAM 1 MG: 0.5 TABLET ORAL at 09:08

## 2024-04-05 RX ADMIN — PREDNISONE 40 MG: 20 TABLET ORAL at 08:42

## 2024-04-05 RX ADMIN — AMLODIPINE BESYLATE 5 MG: 5 TABLET ORAL at 08:43

## 2024-04-05 RX ADMIN — METFORMIN HYDROCHLORIDE 500 MG: 500 TABLET ORAL at 08:43

## 2024-04-05 RX ADMIN — ACETAMINOPHEN 650 MG: 325 TABLET ORAL at 08:42

## 2024-04-05 RX ADMIN — OXYCODONE HYDROCHLORIDE 10 MG: 5 TABLET ORAL at 09:54

## 2024-04-05 RX ADMIN — GABAPENTIN 1200 MG: 400 CAPSULE ORAL at 14:23

## 2024-04-05 RX ADMIN — IPRATROPIUM BROMIDE AND ALBUTEROL SULFATE 1 DOSE: 2.5; .5 SOLUTION RESPIRATORY (INHALATION) at 08:00

## 2024-04-05 ASSESSMENT — PAIN DESCRIPTION - ORIENTATION
ORIENTATION: ANTERIOR
ORIENTATION: MID;ANTERIOR

## 2024-04-05 ASSESSMENT — PAIN DESCRIPTION - LOCATION
LOCATION: HEAD

## 2024-04-05 ASSESSMENT — PAIN SCALES - GENERAL
PAINLEVEL_OUTOF10: 10
PAINLEVEL_OUTOF10: 0
PAINLEVEL_OUTOF10: 10
PAINLEVEL_OUTOF10: 10
PAINLEVEL_OUTOF10: 4
PAINLEVEL_OUTOF10: 10
PAINLEVEL_OUTOF10: 5
PAINLEVEL_OUTOF10: 10
PAINLEVEL_OUTOF10: 4

## 2024-04-05 ASSESSMENT — PAIN DESCRIPTION - FREQUENCY
FREQUENCY: CONTINUOUS
FREQUENCY: CONTINUOUS

## 2024-04-05 ASSESSMENT — PAIN DESCRIPTION - ONSET
ONSET: ON-GOING
ONSET: ON-GOING

## 2024-04-05 ASSESSMENT — PAIN DESCRIPTION - PAIN TYPE
TYPE: CHRONIC PAIN
TYPE: CHRONIC PAIN

## 2024-04-05 ASSESSMENT — PAIN DESCRIPTION - DESCRIPTORS
DESCRIPTORS: ACHING;DULL
DESCRIPTORS: DULL
DESCRIPTORS: ACHING;DULL
DESCRIPTORS: ACHING;DULL

## 2024-04-05 NOTE — CARE COORDINATION
DC Planning    Faxed 02 order for hs 02 and face to face  with facesheet to [facility]. Updated pt to call [facility] when she gets home for 02 equipment.     Pt was speaking to Karen newman to find out more information on \"cpap\". Karen Hartley phone number [phone] pt would need to qualify for NIV-Brigette following from [facility]-she will follow along with pt and pulmonologist to see if pt will qualify as OP.

## 2024-04-05 NOTE — CARE COORDINATION
DC Planning    Spoke with Brigette from [facility] informed pt qualifies for overnight 6l 02. They should have a 10l concentrator in stock. Faxed overnight study to [facility].     WILL NEED FACE TO FACE FOR 02 AND 02 ORDER FOR 02.

## 2024-04-05 NOTE — PLAN OF CARE
Problem: Discharge Planning  Goal: Discharge to home or other facility with appropriate resources  3/29/2024 1619 by Elly Peterson RN  Outcome: Progressing  Flowsheets (Taken 3/29/2024 0800)  Discharge to home or other facility with appropriate resources:   Identify barriers to discharge with patient and caregiver   Arrange for needed discharge resources and transportation as appropriate   Identify discharge learning needs (meds, wound care, etc)   Arrange for interpreters to assist at discharge as needed   Refer to discharge planning if patient needs post-hospital services based on physician order or complex needs related to functional status, cognitive ability or social support system  3/29/2024 0459 by Yogi Conklin, RN  Outcome: Progressing     Problem: Pain  Goal: Verbalizes/displays adequate comfort level or baseline comfort level  3/29/2024 1619 by Elly Peterson RN  Outcome: Progressing  3/29/2024 0459 by Yogi Conklin, RN  Outcome: Progressing     Problem: Respiratory - Adult  Goal: Achieves optimal ventilation and oxygenation  3/29/2024 1619 by Elly Peterson, RN  Outcome: Progressing  Flowsheets (Taken 3/29/2024 0800)  Achieves optimal ventilation and oxygenation:   Assess for changes in respiratory status   Assess for changes in mentation and behavior   Position to facilitate oxygenation and minimize respiratory effort   Oxygen supplementation based on oxygen saturation or arterial blood gases   Initiate smoking cessation protocol as indicated   Encourage broncho-pulmonary hygiene including cough, deep breathe, incentive spirometry   Assess the need for suctioning and aspirate as needed   Assess and instruct to report shortness of breath or any respiratory difficulty   Respiratory therapy support as indicated  3/29/2024 0459 by Yogi oCnklin, RN  Outcome: Progressing     Problem: Chronic Conditions and Co-morbidities  Goal: Patient's chronic conditions and co-morbidity 
  Problem: Discharge Planning  Goal: Discharge to home or other facility with appropriate resources  3/29/2024 2150 by Margie Hunt RN  Outcome: Progressing  Flowsheets (Taken 3/29/2024 2000)  Discharge to home or other facility with appropriate resources:   Arrange for needed discharge resources and transportation as appropriate   Identify barriers to discharge with patient and caregiver  3/29/2024 1619 by Elly Peterson, RN  Outcome: Progressing  Flowsheets (Taken 3/29/2024 0800)  Discharge to home or other facility with appropriate resources:   Identify barriers to discharge with patient and caregiver   Arrange for needed discharge resources and transportation as appropriate   Identify discharge learning needs (meds, wound care, etc)   Arrange for interpreters to assist at discharge as needed   Refer to discharge planning if patient needs post-hospital services based on physician order or complex needs related to functional status, cognitive ability or social support system     Problem: Pain  Goal: Verbalizes/displays adequate comfort level or baseline comfort level  3/29/2024 2150 by Margie Hunt RN  Outcome: Progressing  3/29/2024 1619 by Elly Peterson, RN  Outcome: Progressing     Problem: Respiratory - Adult  Goal: Achieves optimal ventilation and oxygenation  3/29/2024 2150 by Margie Hunt RN  Outcome: Progressing  Flowsheets (Taken 3/29/2024 2000)  Achieves optimal ventilation and oxygenation:   Assess for changes in respiratory status   Assess for changes in mentation and behavior  3/29/2024 1619 by Elly Peterson, RN  Outcome: Progressing  Flowsheets (Taken 3/29/2024 0800)  Achieves optimal ventilation and oxygenation:   Assess for changes in respiratory status   Assess for changes in mentation and behavior   Position to facilitate oxygenation and minimize respiratory effort   Oxygen supplementation based on oxygen saturation or arterial blood gases   Initiate smoking cessation protocol as 
  Problem: Discharge Planning  Goal: Discharge to home or other facility with appropriate resources  3/31/2024 1813 by Cody Scott RN  Outcome: Progressing  3/31/2024 0424 by Laly Ward RN  Outcome: Progressing  Flowsheets (Taken 3/30/2024 1915)  Discharge to home or other facility with appropriate resources:   Identify barriers to discharge with patient and caregiver   Arrange for needed discharge resources and transportation as appropriate   Identify discharge learning needs (meds, wound care, etc)   Refer to discharge planning if patient needs post-hospital services based on physician order or complex needs related to functional status, cognitive ability or social support system  Note: Ongoing assessment, DC planner on board.     Problem: Pain  Goal: Verbalizes/displays adequate comfort level or baseline comfort level  3/31/2024 1813 by Cody Scott RN  Outcome: Progressing  3/31/2024 0424 by Laly Ward RN  Outcome: Progressing  Flowsheets (Taken 3/31/2024 0424)  Verbalizes/displays adequate comfort level or baseline comfort level:   Encourage patient to monitor pain and request assistance   Administer analgesics based on type and severity of pain and evaluate response   Consider cultural and social influences on pain and pain management   Assess pain using appropriate pain scale   Implement non-pharmacological measures as appropriate and evaluate response   Notify Licensed Independent Practitioner if interventions unsuccessful or patient reports new pain  Note: Ongoing assessment, chronic pain, prn meds available.     Problem: Respiratory - Adult  Goal: Achieves optimal ventilation and oxygenation  3/31/2024 1813 by Cody Scott RN  Outcome: Progressing  3/31/2024 0752 by Yoselin Sierra RCP  Outcome: Progressing  3/31/2024 0424 by Laly Ward RN  Outcome: Progressing  Flowsheets (Taken 3/30/2024 1915)  Achieves optimal ventilation and oxygenation:   Position to facilitate 
  Problem: Discharge Planning  Goal: Discharge to home or other facility with appropriate resources  4/1/2024 2306 by Lucrecia Mckeon RN  Outcome: Progressing  4/1/2024 1830 by Elizabeth Barragan RN  Outcome: Progressing     Problem: Pain  Goal: Verbalizes/displays adequate comfort level or baseline comfort level  4/1/2024 2306 by Lucrecia Mckeon RN  Outcome: Progressing  4/1/2024 1830 by Elizabeth Barragan RN  Outcome: Progressing     Problem: Respiratory - Adult  Goal: Achieves optimal ventilation and oxygenation  4/1/2024 2306 by Lucrecia Mckeon RN  Outcome: Progressing  4/1/2024 1941 by Wayne Ward RCP  Outcome: Progressing  4/1/2024 1830 by Elizabeth Barragan RN  Outcome: Progressing  Goal: Adequate oxygenation  Description: Adequate oxygenation  4/1/2024 1941 by Wayne Ward RCP  Outcome: Progressing  Goal: Able to breathe comfortably  4/1/2024 1941 by Wayne Ward RCP  Outcome: Progressing  Goal: Patient's breath sounds will be clear and equal  4/1/2024 1941 by Wayne Ward RCP  Outcome: Progressing     Problem: Chronic Conditions and Co-morbidities  Goal: Patient's chronic conditions and co-morbidity symptoms are monitored and maintained or improved  4/1/2024 2306 by Lucrecia Mckeon RN  Outcome: Progressing  4/1/2024 1830 by Elizabeth Barragan RN  Outcome: Progressing     Problem: Cardiovascular - Adult  Goal: Maintains optimal cardiac output and hemodynamic stability  4/1/2024 2306 by Lucrecia Mckeon RN  Outcome: Progressing  4/1/2024 1830 by Elizabeth Barragan RN  Outcome: Progressing  Goal: Absence of cardiac dysrhythmias or at baseline  4/1/2024 2306 by Lucrecia Mckeon RN  Outcome: Progressing  4/1/2024 1830 by Elizabeth Barragan RN  Outcome: Progressing     Problem: Safety - Adult  Goal: Free from fall injury  4/1/2024 2306 by Lucrecia Mckeon RN  Outcome: Progressing  Flowsheets (Taken 4/1/2024 2300)  Free From Fall Injury: Instruct family/caregiver on patient safety  4/1/2024 1830 by Yung 
  Problem: Discharge Planning  Goal: Discharge to home or other facility with appropriate resources  4/4/2024 0036 by John Montalvo, RN  Outcome: Progressing  Flowsheets (Taken 4/4/2024 0036)  Discharge to home or other facility with appropriate resources:   Identify barriers to discharge with patient and caregiver   Arrange for needed discharge resources and transportation as appropriate   Identify discharge learning needs (meds, wound care, etc)   Arrange for interpreters to assist at discharge as needed   Refer to discharge planning if patient needs post-hospital services based on physician order or complex needs related to functional status, cognitive ability or social support system     Problem: Pain  Goal: Verbalizes/displays adequate comfort level or baseline comfort level  4/4/2024 0036 by John Montalvo, RN  Outcome: Progressing  Flowsheets (Taken 4/4/2024 0036)  Verbalizes/displays adequate comfort level or baseline comfort level:   Encourage patient to monitor pain and request assistance   Assess pain using appropriate pain scale   Administer analgesics based on type and severity of pain and evaluate response   Implement non-pharmacological measures as appropriate and evaluate response   Consider cultural and social influences on pain and pain management   Notify Licensed Independent Practitioner if interventions unsuccessful or patient reports new pain     Problem: Respiratory - Adult  Goal: Achieves optimal ventilation and oxygenation  4/4/2024 0036 by John Montalvo, RN  Outcome: Progressing  Flowsheets (Taken 4/4/2024 0036)  Achieves optimal ventilation and oxygenation:   Assess for changes in respiratory status   Assess for changes in mentation and behavior   Position to facilitate oxygenation and minimize respiratory effort   Oxygen supplementation based on oxygen saturation or arterial blood gases   Initiate smoking cessation protocol as indicated   Encourage broncho-pulmonary hygiene including cough, 
  Problem: Discharge Planning  Goal: Discharge to home or other facility with appropriate resources  4/4/2024 1136 by Merary Guerrero, RN  Outcome: Progressing  Flowsheets (Taken 4/4/2024 0800)  Discharge to home or other facility with appropriate resources:   Identify barriers to discharge with patient and caregiver   Arrange for needed discharge resources and transportation as appropriate   Identify discharge learning needs (meds, wound care, etc)   Refer to discharge planning if patient needs post-hospital services based on physician order or complex needs related to functional status, cognitive ability or social support system  4/4/2024 0036 by John Montalvo, RN  Outcome: Progressing  Flowsheets (Taken 4/4/2024 0036)  Discharge to home or other facility with appropriate resources:   Identify barriers to discharge with patient and caregiver   Arrange for needed discharge resources and transportation as appropriate   Identify discharge learning needs (meds, wound care, etc)   Arrange for interpreters to assist at discharge as needed   Refer to discharge planning if patient needs post-hospital services based on physician order or complex needs related to functional status, cognitive ability or social support system     Problem: Pain  Goal: Verbalizes/displays adequate comfort level or baseline comfort level  4/4/2024 1136 by Merary Guerrero, RN  Outcome: Progressing  4/4/2024 0036 by John Montalvo, RN  Outcome: Progressing  Flowsheets (Taken 4/4/2024 0036)  Verbalizes/displays adequate comfort level or baseline comfort level:   Encourage patient to monitor pain and request assistance   Assess pain using appropriate pain scale   Administer analgesics based on type and severity of pain and evaluate response   Implement non-pharmacological measures as appropriate and evaluate response   Consider cultural and social influences on pain and pain management   Notify Licensed Independent Practitioner if interventions 
  Problem: Discharge Planning  Goal: Discharge to home or other facility with appropriate resources  4/5/2024 1031 by Merary Guerrero RN  Outcome: Progressing  Flowsheets (Taken 4/5/2024 0800)  Discharge to home or other facility with appropriate resources:   Identify barriers to discharge with patient and caregiver   Arrange for needed discharge resources and transportation as appropriate   Identify discharge learning needs (meds, wound care, etc)   Refer to discharge planning if patient needs post-hospital services based on physician order or complex needs related to functional status, cognitive ability or social support system  4/5/2024 0233 by Lucio Drake, RN  Outcome: Progressing  Flowsheets (Taken 4/4/2024 2000)  Discharge to home or other facility with appropriate resources: Identify barriers to discharge with patient and caregiver     Problem: Pain  Goal: Verbalizes/displays adequate comfort level or baseline comfort level  4/5/2024 1031 by Merary Guerrero RN  Outcome: Progressing  4/5/2024 0233 by Lucio Drake, RN  Outcome: Progressing  Flowsheets  Taken 4/4/2024 2052  Verbalizes/displays adequate comfort level or baseline comfort level: Encourage patient to monitor pain and request assistance  Taken 4/4/2024 1600  Verbalizes/displays adequate comfort level or baseline comfort level: Encourage patient to monitor pain and request assistance     Problem: Respiratory - Adult  Goal: Achieves optimal ventilation and oxygenation  4/5/2024 1031 by Merary Guerrero RN  Outcome: Progressing  4/5/2024 0803 by Kala Gómez RCP  Outcome: Progressing  Flowsheets (Taken 4/5/2024 0800 by Merary Guerrero RN)  Achieves optimal ventilation and oxygenation: Assess for changes in respiratory status  4/5/2024 0233 by Lucio Drake, RN  Outcome: Progressing  4/4/2024 2037 by Rose Mary Sykes RCP  Outcome: Progressing  Goal: Adequate oxygenation  Description: Adequate oxygenation  4/5/2024 0803 by 
  Problem: Discharge Planning  Goal: Discharge to home or other facility with appropriate resources  Outcome: Progressing     Problem: Pain  Goal: Verbalizes/displays adequate comfort level or baseline comfort level  Outcome: Progressing     Problem: Respiratory - Adult  Goal: Achieves optimal ventilation and oxygenation  4/1/2024 1830 by Elizabeth Barragan RN  Outcome: Progressing  4/1/2024 0756 by Nuvia Kim RCP  Outcome: Progressing  Goal: Adequate oxygenation  Description: Adequate oxygenation  4/1/2024 0756 by Nuvia Kim RCP  Outcome: Progressing  Goal: Able to breathe comfortably  4/1/2024 0756 by Nuvia Kim RCP  Outcome: Progressing  Goal: Patient's breath sounds will be clear and equal  4/1/2024 0756 by Nuvia Kim RCP  Outcome: Progressing     Problem: Chronic Conditions and Co-morbidities  Goal: Patient's chronic conditions and co-morbidity symptoms are monitored and maintained or improved  Outcome: Progressing     Problem: Cardiovascular - Adult  Goal: Maintains optimal cardiac output and hemodynamic stability  Outcome: Progressing  Goal: Absence of cardiac dysrhythmias or at baseline  Outcome: Progressing     Problem: Safety - Adult  Goal: Free from fall injury  Outcome: Progressing     
  Problem: Discharge Planning  Goal: Discharge to home or other facility with appropriate resources  Outcome: Progressing     Problem: Pain  Goal: Verbalizes/displays adequate comfort level or baseline comfort level  Outcome: Progressing     Problem: Respiratory - Adult  Goal: Achieves optimal ventilation and oxygenation  4/3/2024 0726 by Ricky Riddle RN  Outcome: Progressing  4/2/2024 1940 by Wayne Ward RCP  Outcome: Progressing  Goal: Adequate oxygenation  Description: Adequate oxygenation  4/2/2024 1940 by Wayne Ward RCP  Outcome: Progressing  Goal: Able to breathe comfortably  4/2/2024 1940 by Wayne Ward RCP  Outcome: Progressing  Goal: Patient's breath sounds will be clear and equal  4/2/2024 1940 by Wayne Ward RCP  Outcome: Progressing     Problem: Chronic Conditions and Co-morbidities  Goal: Patient's chronic conditions and co-morbidity symptoms are monitored and maintained or improved  Outcome: Progressing     Problem: Cardiovascular - Adult  Goal: Maintains optimal cardiac output and hemodynamic stability  Outcome: Progressing  Goal: Absence of cardiac dysrhythmias or at baseline  Outcome: Progressing     Problem: Safety - Adult  Goal: Free from fall injury  Outcome: Progressing     
  Problem: Discharge Planning  Goal: Discharge to home or other facility with appropriate resources  Outcome: Progressing     Problem: Pain  Goal: Verbalizes/displays adequate comfort level or baseline comfort level  Outcome: Progressing     Problem: Respiratory - Adult  Goal: Achieves optimal ventilation and oxygenation  Outcome: Progressing     
  Problem: Discharge Planning  Goal: Discharge to home or other facility with appropriate resources  Outcome: Progressing  Flowsheets (Taken 3/30/2024 1915)  Discharge to home or other facility with appropriate resources:   Identify barriers to discharge with patient and caregiver   Arrange for needed discharge resources and transportation as appropriate   Identify discharge learning needs (meds, wound care, etc)   Refer to discharge planning if patient needs post-hospital services based on physician order or complex needs related to functional status, cognitive ability or social support system  Note: Ongoing assessment, DC planner on board.     Problem: Pain  Goal: Verbalizes/displays adequate comfort level or baseline comfort level  Outcome: Progressing  Flowsheets (Taken 3/31/2024 0424)  Verbalizes/displays adequate comfort level or baseline comfort level:   Encourage patient to monitor pain and request assistance   Administer analgesics based on type and severity of pain and evaluate response   Consider cultural and social influences on pain and pain management   Assess pain using appropriate pain scale   Implement non-pharmacological measures as appropriate and evaluate response   Notify Licensed Independent Practitioner if interventions unsuccessful or patient reports new pain  Note: Ongoing assessment, chronic pain, prn meds available.     Problem: Respiratory - Adult  Goal: Achieves optimal ventilation and oxygenation  3/31/2024 0424 by Laly Ward, RN  Outcome: Progressing  Flowsheets (Taken 3/30/2024 1915)  Achieves optimal ventilation and oxygenation:   Position to facilitate oxygenation and minimize respiratory effort   Assess for changes in mentation and behavior   Assess for changes in respiratory status   Oxygen supplementation based on oxygen saturation or arterial blood gases   Encourage broncho-pulmonary hygiene including cough, deep breathe, incentive spirometry   Assess and instruct to 
  Problem: Discharge Planning  Goal: Discharge to home or other facility with appropriate resources  Outcome: Progressing  Flowsheets (Taken 4/4/2024 2000)  Discharge to home or other facility with appropriate resources: Identify barriers to discharge with patient and caregiver     Problem: Pain  Goal: Verbalizes/displays adequate comfort level or baseline comfort level  Outcome: Progressing  Flowsheets (Taken 4/4/2024 2052)  Verbalizes/displays adequate comfort level or baseline comfort level: Encourage patient to monitor pain and request assistance     Problem: Respiratory - Adult  Goal: Achieves optimal ventilation and oxygenation  4/5/2024 0233 by Lucio Drake, RN  Outcome: Progressing  4/4/2024 2037 by Rose Mary Sykes RCP  Outcome: Progressing  Goal: Adequate oxygenation  Description: Adequate oxygenation  4/4/2024 2037 by Rose Mary Sykes RCP  Outcome: Progressing  Goal: Able to breathe comfortably  4/4/2024 2037 by Rose Mary Sykes RCP  Outcome: Progressing  Goal: Patient's breath sounds will be clear and equal  4/4/2024 2037 by Rose Mary Sykes RCP  Outcome: Progressing     Problem: Chronic Conditions and Co-morbidities  Goal: Patient's chronic conditions and co-morbidity symptoms are monitored and maintained or improved  Outcome: Progressing  Flowsheets (Taken 4/4/2024 2000)  Care Plan - Patient's Chronic Conditions and Co-Morbidity Symptoms are Monitored and Maintained or Improved: Monitor and assess patient's chronic conditions and comorbid symptoms for stability, deterioration, or improvement     Problem: Cardiovascular - Adult  Goal: Maintains optimal cardiac output and hemodynamic stability  Outcome: Progressing  Flowsheets (Taken 4/4/2024 2000)  Maintains optimal cardiac output and hemodynamic stability: Monitor blood pressure and heart rate  Goal: Absence of cardiac dysrhythmias or at baseline  Outcome: Progressing  Flowsheets (Taken 4/4/2024 2000)  Absence of cardiac dysrhythmias or at baseline: 
  Problem: Respiratory - Adult  Goal: Achieves optimal ventilation and oxygenation  3/30/2024 0001 by Rose Mary Sykes RCP  Outcome: Progressing     Problem: Respiratory - Adult  Goal: Adequate oxygenation  Description: Adequate oxygenation  Outcome: Progressing     Problem: Respiratory - Adult  Goal: Able to breathe comfortably  Outcome: Progressing      PROVIDE ADEQUATE OXYGENATION WITH ACCEPTABLE SP02/ABG'S    [x]  IDENTIFY APPROPRIATE OXYGEN THERAPY  [x]   MONITOR SP02/ABG'S AS NEEDED   [x]   PATIENT EDUCATION AS NEEDED        NONINVASIVE VENTILATION    PROVIDE OPTIMAL VENTILATION/ACCEPTABLE SPO2   IMPLEMENT NONINVASIVE VENTILATION PROTOCOL   MAINTAIN ACCEPTABLE SPO2   ASSESS SKIN INTEGRITY/BREAKDOWN SCORE   PATIENT EDUCATION AS NEEDED   BIPAP AS NEEDED           
  Problem: Respiratory - Adult  Goal: Achieves optimal ventilation and oxygenation  3/30/2024 0710 by Anali Rich RCP  Outcome: Progressing  3/30/2024 0001 by Rose Mary Sykes RCP  Outcome: Progressing  3/29/2024 2150 by Margie Hunt RN  Outcome: Progressing  Flowsheets (Taken 3/29/2024 2000)  Achieves optimal ventilation and oxygenation:   Assess for changes in respiratory status   Assess for changes in mentation and behavior  Goal: Adequate oxygenation  Description: Adequate oxygenation  3/30/2024 0710 by Anali Rich RCP  Outcome: Progressing  3/30/2024 0001 by Rose Mary Sykes RCP  Outcome: Progressing  Goal: Able to breathe comfortably  3/30/2024 0710 by Anali Rich RCP  Outcome: Progressing  3/30/2024 0001 by Rose Mary Sykes RCP  Outcome: Progressing     
  Problem: Respiratory - Adult  Goal: Achieves optimal ventilation and oxygenation  3/30/2024 1915 by Rose Mary Sykes RCP  Outcome: Progressing     Problem: Respiratory - Adult  Goal: Adequate oxygenation  Description: Adequate oxygenation  3/30/2024 1915 by Rose Mary Sykes RCP  Outcome: Progressing     Problem: Respiratory - Adult  Goal: Able to breathe comfortably  3/30/2024 1915 by Rose Mary Sykes RCP  Outcome: Progressing     Problem: Respiratory - Adult  Goal: Patient's breath sounds will be clear and equal  Outcome: Progressing       BRONCHOSPASM/BRONCHOCONSTRICTION    IMPROVE  AERATION/BREATHSOUNDS  ADMINISTER BRONCHODILATOR THERAPY AS APPROPRIATE  ASSESS BREATH SOUNDS  INITIATE AEROSOL PROTOCOL IF ORDERED TO DO SO  PATIENT EDUCATION AS NEEDED      PROVIDE ADEQUATE OXYGENATION WITH ACCEPTABLE SP02/ABG'S    [x]  IDENTIFY APPROPRIATE OXYGEN THERAPY  [x]   MONITOR SP02/ABG'S AS NEEDED   []   PATIENT EDUCATION AS NEEDED      NONINVASIVE VENTILATION    PROVIDE OPTIMAL VENTILATION/ACCEPTABLE SPO2   IMPLEMENT NONINVASIVE VENTILATION PROTOCOL   MAINTAIN ACCEPTABLE SPO2   ASSESS SKIN INTEGRITY/BREAKDOWN SCORE   PATIENT EDUCATION AS NEEDED   BIPAP AS NEEDED          
  Problem: Respiratory - Adult  Goal: Achieves optimal ventilation and oxygenation  3/31/2024 0752 by Yoselin Sierra RCP  Outcome: Progressing  3/31/2024 0424 by Laly Ward, RN  Outcome: Progressing  Flowsheets (Taken 3/30/2024 1915)  Achieves optimal ventilation and oxygenation:   Position to facilitate oxygenation and minimize respiratory effort   Assess for changes in mentation and behavior   Assess for changes in respiratory status   Oxygen supplementation based on oxygen saturation or arterial blood gases   Encourage broncho-pulmonary hygiene including cough, deep breathe, incentive spirometry   Assess and instruct to report shortness of breath or any respiratory difficulty   Assess the need for suctioning and aspirate as needed   Respiratory therapy support as indicated  Note: Patient on & off bipap, desats while on NC @ 4L NC into low 70's high 60's. Patient is often disoriented when first waking up. Patient verbalizes inability to have outpatient sleep study. Education provided, patient not in agreement with plan of care as she does not feel she can financially afford it & states she cannot take off work to do the test.  3/30/2024 1915 by Rose Mary Sykes RCP  Outcome: Progressing  Flowsheets (Taken 3/30/2024 1915 by Laly Ward, RN)  Achieves optimal ventilation and oxygenation:   Position to facilitate oxygenation and minimize respiratory effort   Assess for changes in mentation and behavior   Assess for changes in respiratory status   Oxygen supplementation based on oxygen saturation or arterial blood gases   Encourage broncho-pulmonary hygiene including cough, deep breathe, incentive spirometry   Assess and instruct to report shortness of breath or any respiratory difficulty   Assess the need for suctioning and aspirate as needed   Respiratory therapy support as indicated  Goal: Adequate oxygenation  Description: Adequate oxygenation  3/31/2024 0752 by Yoselin Sierra RCP  Outcome: 
  Problem: Respiratory - Adult  Goal: Achieves optimal ventilation and oxygenation  3/31/2024 1919 by Wayne Ward RCP  Outcome: Progressing     Problem: Respiratory - Adult  Goal: Adequate oxygenation  Description: Adequate oxygenation  3/31/2024 1919 by Wayne Ward RCP  Outcome: Progressing     Problem: Respiratory - Adult  Goal: Able to breathe comfortably  3/31/2024 1919 by Wayne Ward RCP  Outcome: Progressing     Problem: Respiratory - Adult  Goal: Patient's breath sounds will be clear and equal  3/31/2024 1919 by Wayne Ward RCP  Outcome: Progressing     
  Problem: Respiratory - Adult  Goal: Achieves optimal ventilation and oxygenation  4/1/2024 0756 by Nuvia Kim RCP  Outcome: Progressing     Problem: Respiratory - Adult  Goal: Adequate oxygenation  Description: Adequate oxygenation  4/1/2024 0756 by Nuvia Kim RCP  Outcome: Progressing     Problem: Respiratory - Adult  Goal: Able to breathe comfortably  4/1/2024 0756 by Nuvia Kim RCP  Outcome: Progressing     Problem: Respiratory - Adult  Goal: Patient's breath sounds will be clear and equal  4/1/2024 0756 by Nuvia Kim RCP  Outcome: Progressing     
  Problem: Respiratory - Adult  Goal: Achieves optimal ventilation and oxygenation  4/3/2024 0759 by Anali Rich RCP  Outcome: Progressing  4/3/2024 0726 by Ricky Riddle, RN  Outcome: Progressing  4/3/2024 0726 by Ricky Riddle, RN  Outcome: Progressing  4/2/2024 1940 by Wayne Ward RCP  Outcome: Progressing  Goal: Adequate oxygenation  Description: Adequate oxygenation  4/3/2024 0759 by Anali Rich RCP  Outcome: Progressing  4/2/2024 1940 by Wayne Ward RCP  Outcome: Progressing  Goal: Able to breathe comfortably  4/3/2024 0759 by Anali Rich RCP  Outcome: Progressing  4/2/2024 1940 by Wayne Ward RCP  Outcome: Progressing  Goal: Patient's breath sounds will be clear and equal  4/3/2024 0759 by Anali Rich RCP  Outcome: Progressing  4/2/2024 1940 by Wayne Ward RCP  Outcome: Progressing     
  Problem: Respiratory - Adult  Goal: Achieves optimal ventilation and oxygenation  4/3/2024 1958 by Shelia Stout RCP  Outcome: Progressing     Problem: Respiratory - Adult  Goal: Adequate oxygenation  Description: Adequate oxygenation  4/3/2024 1958 by Shelia Stout RCP  Outcome: Progressing     Problem: Respiratory - Adult  Goal: Able to breathe comfortably  4/3/2024 1958 by Shelia Stout RCP  Outcome: Progressing     Problem: Respiratory - Adult  Goal: Patient's breath sounds will be clear and equal  4/3/2024 1958 by Shelia Stout RCP  Outcome: Progressing     
  Problem: Respiratory - Adult  Goal: Achieves optimal ventilation and oxygenation  4/4/2024 0757 by Yoselin Sierra RCP  Outcome: Progressing  4/4/2024 0036 by John Montalvo, RN  Outcome: Progressing  Flowsheets (Taken 4/4/2024 0036)  Achieves optimal ventilation and oxygenation:   Assess for changes in respiratory status   Assess for changes in mentation and behavior   Position to facilitate oxygenation and minimize respiratory effort   Oxygen supplementation based on oxygen saturation or arterial blood gases   Initiate smoking cessation protocol as indicated   Encourage broncho-pulmonary hygiene including cough, deep breathe, incentive spirometry  4/3/2024 1958 by Shelia Stout RCP  Outcome: Progressing  4/3/2024 1812 by Cortney Bedolla, RN  Outcome: Progressing  Goal: Adequate oxygenation  Description: Adequate oxygenation  4/4/2024 0757 by Yoselin Sierra RCP  Outcome: Progressing  4/3/2024 1958 by Shelia Stout RCP  Outcome: Progressing  Goal: Able to breathe comfortably  4/4/2024 0757 by Yoselin Sierra RCP  Outcome: Progressing  4/3/2024 1958 by Shelia Stout RCP  Outcome: Progressing  Goal: Patient's breath sounds will be clear and equal  4/4/2024 0757 by Yoselin Sierra RCP  Outcome: Progressing  4/3/2024 1958 by Shelia Stout RCP  Outcome: Progressing     
  Problem: Respiratory - Adult  Goal: Achieves optimal ventilation and oxygenation  4/4/2024 2037 by Rose Mary Sykes RCP  Outcome: Progressing     Problem: Respiratory - Adult  Goal: Adequate oxygenation  Description: Adequate oxygenation  4/4/2024 2037 by Rose Mary Sykes RCP  Outcome: Progressing     Problem: Respiratory - Adult  Goal: Able to breathe comfortably  4/4/2024 2037 by Rose Mary Sykes RCP  Outcome: Progressing     Problem: Respiratory - Adult  Goal: Patient's breath sounds will be clear and equal  4/4/2024 2037 by Rose Mary Sykes RCP  Outcome: Progressing        BRONCHOSPASM/BRONCHOCONSTRICTION    IMPROVE  AERATION/BREATHSOUNDS  ADMINISTER BRONCHODILATOR THERAPY AS APPROPRIATE  ASSESS BREATH SOUNDS  INITIATE AEROSOL PROTOCOL IF ORDERED TO DO SO  PATIENT EDUCATION AS NEEDED      PROVIDE ADEQUATE OXYGENATION WITH ACCEPTABLE SP02/ABG'S    [x]  IDENTIFY APPROPRIATE OXYGEN THERAPY  [x]   MONITOR SP02/ABG'S AS NEEDED   [x]   PATIENT EDUCATION AS NEEDED      NONINVASIVE VENTILATION    PROVIDE OPTIMAL VENTILATION/ACCEPTABLE SPO2   IMPLEMENT NONINVASIVE VENTILATION PROTOCOL   MAINTAIN ACCEPTABLE SPO2   ASSESS SKIN INTEGRITY/BREAKDOWN SCORE   PATIENT EDUCATION AS NEEDED   BIPAP AS NEEDED          
  Problem: Respiratory - Adult  Goal: Achieves optimal ventilation and oxygenation  4/5/2024 0803 by Kala Gómez RCP  Outcome: Progressing  4/5/2024 0233 by Lucio Drake, RN  Outcome: Progressing  4/4/2024 2037 by Rose Mary Sykes RCP  Outcome: Progressing  Goal: Adequate oxygenation  Description: Adequate oxygenation  4/5/2024 0803 by Kala Gómez RCP  Outcome: Progressing  4/4/2024 2037 by Rose Mary Sykes RCP  Outcome: Progressing  Goal: Able to breathe comfortably  4/5/2024 0803 by Kala Gómez RCP  Outcome: Progressing  4/4/2024 2037 by Rose Mary Sykes RCP  Outcome: Progressing  Goal: Patient's breath sounds will be clear and equal  4/5/2024 0803 by Kala Gómez RCP  Outcome: Progressing  4/4/2024 2037 by Rose Mary Sykes RCP  Outcome: Progressing     
  Problem: Respiratory - Adult  Goal: Achieves optimal ventilation and oxygenation  Outcome: Progressing     Problem: Respiratory - Adult  Goal: Adequate oxygenation  Description: Adequate oxygenation  Outcome: Progressing     Problem: Respiratory - Adult  Goal: Able to breathe comfortably  Outcome: Progressing     Problem: Respiratory - Adult  Goal: Patient's breath sounds will be clear and equal  Outcome: Progressing     
  Problem: Respiratory - Adult  Goal: Adequate oxygenation  Description: Adequate oxygenation  4/1/2024 1941 by Wayne Ward RCP  Outcome: Progressing     Problem: Respiratory - Adult  Goal: Able to breathe comfortably  4/1/2024 1941 by Wayne Ward RCP  Outcome: Progressing     Problem: Respiratory - Adult  Goal: Patient's breath sounds will be clear and equal  4/1/2024 1941 by Wayne Ward RCP  Outcome: Progressing     
Patient is disappointed she did not get discharged today, sat and talked to patient for a while, she expressed her frustrations.  Wanting to smoke,not allowed per policy.   She is getting diuresed with IV lasix TID, steroids switches to oral, benadryl given for face rash, complains of headache, states nothing works that we have tried (tylenol, fioricet, and ultram)  Weaned down to room air, vitals are stable   Given glycolax for constipation  Fall precautions in place, free from falls, ambulatory.    Problem: Discharge Planning  Goal: Discharge to home or other facility with appropriate resources  4/3/2024 1812 by Cortney Bedolla, RN  Outcome: Progressing     Problem: Pain  Goal: Verbalizes/displays adequate comfort level or baseline comfort level  4/3/2024 1812 by Cortney Bedolla, RN  Outcome: Progressing     Problem: Respiratory - Adult  Goal: Achieves optimal ventilation and oxygenation  4/3/2024 1812 by Cortney Bedolla, RN  Outcome: Progressing     Problem: Safety - Adult  Goal: Free from fall injury  4/3/2024 1812 by Cortney Bedolla, RN  Outcome: Progressing     
Patient was evaluated today for the diagnosis of COPD, CHF.  I entered a DME order for home oxygen at 6 lpm because the diagnosis and testing require the patient to have supplemental oxygen during night.  Condition will improve or be benefited by oxygen use.  The patient is not able to perform good mobility in a home setting and therefore does require the use of a portable oxygen system.  The need for this equipment was discussed with the patient and she understands and is in agreement.       Max Andrew MD    
ABIEL Reagan  Outcome: Progressing     Problem: Coping  Goal: Pt/Family able to verbalize concerns and demonstrate effective coping strategies  Description: INTERVENTIONS:  1. Assist patient/family to identify coping skills, available support systems and cultural and spiritual values  2. Provide emotional support, including active listening and acknowledgement of concerns of patient and caregivers  3. Reduce environmental stimuli, as able  4. Instruct patient/family in relaxation techniques, as appropriate  5. Assess for spiritual pain/suffering and initiate Spiritual Care, Psychosocial Clinical Specialist consults as needed  4/5/2024 1638 by Merary Guerrero RN  Outcome: Adequate for Discharge  4/5/2024 1031 by Merary Guerrero RN  Outcome: Progressing     Problem: Respiratory - Adult  Goal: Adequate oxygenation  Description: Adequate oxygenation  4/5/2024 0803 by Kala Gómez RCP  Outcome: Progressing  Goal: Able to breathe comfortably  4/5/2024 0803 by Kala Gómez RCP  Outcome: Progressing  Goal: Patient's breath sounds will be clear and equal  4/5/2024 0803 by Kala Gómez RCP  Outcome: Progressing     
RN  Outcome: Progressing

## 2024-04-05 NOTE — RT PROTOCOL NOTE
RT Inhaler-Nebulizer Bronchodilator Protocol Note    There is a bronchodilator order in the chart from a provider indicating to follow the RT Bronchodilator Protocol and there is an “Initiate RT Inhaler-Nebulizer Bronchodilator Protocol” order as well (see protocol at bottom of note).    CXR Findings:  XR CHEST PORTABLE    Result Date: 3/30/2024  No acute process.     XR CHEST PORTABLE    Result Date: 3/28/2024  Congestive heart failure is most likely given the radiographic findings; associated sub pulmonic pleural effusion.       The findings from the last RT Protocol Assessment were as follows:   History Pulmonary Disease: Smoker 15 pack years or more  Respiratory Pattern: Mild dyspnea at rest, irregular pattern, or RR 21-25 bpm  Breath Sounds: Severe inspiratory and expiratory wheezing or severely diminished  Cough: Strong, productive  Indication for Bronchodilator Therapy: Decreased or absent breath sounds  Bronchodilator Assessment Score: 14    Aerosolized bronchodilator medication orders have been revised according to the RT Inhaler-Nebulizer Bronchodilator Protocol below.    Respiratory Therapist to perform RT Therapy Protocol Assessment initially then follow the protocol.  Repeat RT Therapy Protocol Assessment PRN for score 0-3 or on second treatment, BID, and PRN for scores above 3.    No Indications - adjust the frequency to every 6 hours PRN wheezing or bronchospasm, if no treatments needed after 48 hours then discontinue using Per Protocol order mode.     If indication present, adjust the RT bronchodilator orders based on the Bronchodilator Assessment Score as indicated below.  Use Inhaler orders unless patient has one or more of the following: on home nebulizer, not able to hold breath for 10 seconds, is not alert and oriented, cannot activate and use MDI correctly, or respiratory rate 25 breaths per minute or more, then use the equivalent nebulizer order(s) with same Frequency and PRN reasons based on 
RT Inhaler-Nebulizer Bronchodilator Protocol Note    There is a bronchodilator order in the chart from a provider indicating to follow the RT Bronchodilator Protocol and there is an “Initiate RT Inhaler-Nebulizer Bronchodilator Protocol” order as well (see protocol at bottom of note).    CXR Findings:  XR CHEST PORTABLE    Result Date: 3/30/2024  No acute process.     XR CHEST PORTABLE    Result Date: 3/28/2024  Congestive heart failure is most likely given the radiographic findings; associated sub pulmonic pleural effusion.       The findings from the last RT Protocol Assessment were as follows:   History Pulmonary Disease: Smoker 15 pack years or more  Respiratory Pattern: Mild dyspnea at rest, irregular pattern, or RR 21-25 bpm  Breath Sounds: Severe inspiratory and expiratory wheezing or severely diminished (BS Diminished)  Cough: Strong, spontaneous, non-productive  Indication for Bronchodilator Therapy: Decreased or absent breath sounds, Wheezing associated with pulm disorder  Bronchodilator Assessment Score: 13    Aerosolized bronchodilator medication orders have been revised according to the RT Inhaler-Nebulizer Bronchodilator Protocol below.    Respiratory Therapist to perform RT Therapy Protocol Assessment initially then follow the protocol.  Repeat RT Therapy Protocol Assessment PRN for score 0-3 or on second treatment, BID, and PRN for scores above 3.    No Indications - adjust the frequency to every 6 hours PRN wheezing or bronchospasm, if no treatments needed after 48 hours then discontinue using Per Protocol order mode.     If indication present, adjust the RT bronchodilator orders based on the Bronchodilator Assessment Score as indicated below.  Use Inhaler orders unless patient has one or more of the following: on home nebulizer, not able to hold breath for 10 seconds, is not alert and oriented, cannot activate and use MDI correctly, or respiratory rate 25 breaths per minute or more, then use the 
RT Inhaler-Nebulizer Bronchodilator Protocol Note    There is a bronchodilator order in the chart from a provider indicating to follow the RT Bronchodilator Protocol and there is an “Initiate RT Inhaler-Nebulizer Bronchodilator Protocol” order as well (see protocol at bottom of note).    CXR Findings:  XR CHEST PORTABLE    Result Date: 4/1/2024  Low lung volumes with increased opacities throughout the bilateral lower lung zones, which could represent atelectasis versus pneumonia.       The findings from the last RT Protocol Assessment were as follows:   History Pulmonary Disease: Smoker 15 pack years or more  Respiratory Pattern: Mild dyspnea at rest, irregular pattern, or RR 21-25 bpm  Breath Sounds: Inspiratory and expiratory or bilateral wheezing and/or rhonchi  Cough: Strong, spontaneous, non-productive  Indication for Bronchodilator Therapy: Decreased or absent breath sounds  Bronchodilator Assessment Score: 11    Aerosolized bronchodilator medication orders have been revised according to the RT Inhaler-Nebulizer Bronchodilator Protocol below.    Respiratory Therapist to perform RT Therapy Protocol Assessment initially then follow the protocol.  Repeat RT Therapy Protocol Assessment PRN for score 0-3 or on second treatment, BID, and PRN for scores above 3.    No Indications - adjust the frequency to every 6 hours PRN wheezing or bronchospasm, if no treatments needed after 48 hours then discontinue using Per Protocol order mode.     If indication present, adjust the RT bronchodilator orders based on the Bronchodilator Assessment Score as indicated below.  Use Inhaler orders unless patient has one or more of the following: on home nebulizer, not able to hold breath for 10 seconds, is not alert and oriented, cannot activate and use MDI correctly, or respiratory rate 25 breaths per minute or more, then use the equivalent nebulizer order(s) with same Frequency and PRN reasons based on the score.  If a patient is on 
RT Inhaler-Nebulizer Bronchodilator Protocol Note    There is a bronchodilator order in the chart from a provider indicating to follow the RT Bronchodilator Protocol and there is an “Initiate RT Inhaler-Nebulizer Bronchodilator Protocol” order as well (see protocol at bottom of note).    CXR Findings:  XR CHEST PORTABLE    Result Date: 4/2/2024  Improving perihilar airspace disease suggesting improving pulmonary edema.       The findings from the last RT Protocol Assessment were as follows:   History Pulmonary Disease: Smoker 15 pack years or more  Respiratory Pattern: Dyspnea on exertion or RR 21-25 bpm  Breath Sounds: Severe inspiratory and expiratory wheezing or severely diminished  Cough: Strong, spontaneous, non-productive  Indication for Bronchodilator Therapy: Decreased or absent breath sounds  Bronchodilator Assessment Score: 11    Aerosolized bronchodilator medication orders have been revised according to the RT Inhaler-Nebulizer Bronchodilator Protocol below.    Respiratory Therapist to perform RT Therapy Protocol Assessment initially then follow the protocol.  Repeat RT Therapy Protocol Assessment PRN for score 0-3 or on second treatment, BID, and PRN for scores above 3.    No Indications - adjust the frequency to every 6 hours PRN wheezing or bronchospasm, if no treatments needed after 48 hours then discontinue using Per Protocol order mode.     If indication present, adjust the RT bronchodilator orders based on the Bronchodilator Assessment Score as indicated below.  Use Inhaler orders unless patient has one or more of the following: on home nebulizer, not able to hold breath for 10 seconds, is not alert and oriented, cannot activate and use MDI correctly, or respiratory rate 25 breaths per minute or more, then use the equivalent nebulizer order(s) with same Frequency and PRN reasons based on the score.  If a patient is on this medication at home then do not decrease Frequency below that used at 
RT Inhaler-Nebulizer Bronchodilator Protocol Note    There is a bronchodilator order in the chart from a provider indicating to follow the RT Bronchodilator Protocol and there is an “Initiate RT Inhaler-Nebulizer Bronchodilator Protocol” order as well (see protocol at bottom of note).    CXR Findings:  XR CHEST PORTABLE    Result Date: 4/3/2024  Persistent though yet mildly improved cardiogenic pulmonary edema.       The findings from the last RT Protocol Assessment were as follows:   History Pulmonary Disease: Chronic pulmonary disease  Respiratory Pattern: Dyspnea on exertion or RR 21-25 bpm  Breath Sounds: Severe inspiratory and expiratory wheezing or severely diminished  Cough: Strong, spontaneous, non-productive  Indication for Bronchodilator Therapy: Decreased or absent breath sounds  Bronchodilator Assessment Score: 12    Aerosolized bronchodilator medication orders have been revised according to the RT Inhaler-Nebulizer Bronchodilator Protocol below.    Respiratory Therapist to perform RT Therapy Protocol Assessment initially then follow the protocol.  Repeat RT Therapy Protocol Assessment PRN for score 0-3 or on second treatment, BID, and PRN for scores above 3.    No Indications - adjust the frequency to every 6 hours PRN wheezing or bronchospasm, if no treatments needed after 48 hours then discontinue using Per Protocol order mode.     If indication present, adjust the RT bronchodilator orders based on the Bronchodilator Assessment Score as indicated below.  Use Inhaler orders unless patient has one or more of the following: on home nebulizer, not able to hold breath for 10 seconds, is not alert and oriented, cannot activate and use MDI correctly, or respiratory rate 25 breaths per minute or more, then use the equivalent nebulizer order(s) with same Frequency and PRN reasons based on the score.  If a patient is on this medication at home then do not decrease Frequency below that used at home.    0-3 - 
RT Inhaler-Nebulizer Bronchodilator Protocol Note    There is a bronchodilator order in the chart from a provider indicating to follow the RT Bronchodilator Protocol and there is an “Initiate RT Inhaler-Nebulizer Bronchodilator Protocol” order as well (see protocol at bottom of note).    CXR Findings:  XR CHEST PORTABLE    Result Date: 4/3/2024  Persistent though yet mildly improved cardiogenic pulmonary edema.     XR CHEST PORTABLE    Result Date: 4/2/2024  Improving perihilar airspace disease suggesting improving pulmonary edema.       The findings from the last RT Protocol Assessment were as follows:   History Pulmonary Disease: Smoker 15 pack years or more  Respiratory Pattern: Dyspnea on exertion or RR 21-25 bpm  Breath Sounds: Severe inspiratory and expiratory wheezing or severely diminished  Cough: Strong, spontaneous, non-productive  Indication for Bronchodilator Therapy: Decreased or absent breath sounds  Bronchodilator Assessment Score: 11    Aerosolized bronchodilator medication orders have been revised according to the RT Inhaler-Nebulizer Bronchodilator Protocol below.    Respiratory Therapist to perform RT Therapy Protocol Assessment initially then follow the protocol.  Repeat RT Therapy Protocol Assessment PRN for score 0-3 or on second treatment, BID, and PRN for scores above 3.    No Indications - adjust the frequency to every 6 hours PRN wheezing or bronchospasm, if no treatments needed after 48 hours then discontinue using Per Protocol order mode.     If indication present, adjust the RT bronchodilator orders based on the Bronchodilator Assessment Score as indicated below.  Use Inhaler orders unless patient has one or more of the following: on home nebulizer, not able to hold breath for 10 seconds, is not alert and oriented, cannot activate and use MDI correctly, or respiratory rate 25 breaths per minute or more, then use the equivalent nebulizer order(s) with same Frequency and PRN reasons based 
RT Nebulizer Bronchodilator Protocol Note    There is a bronchodilator order in the chart from a provider indicating to follow the RT Bronchodilator Protocol and there is an “Initiate RT Bronchodilator Protocol” order as well (see protocol at bottom of note).    CXR Findings:  XR CHEST PORTABLE    Result Date: 3/30/2024  No acute process.       The findings from the last RT Protocol Assessment were as follows:  Smoking: Smoker 15 pack years or more  Respiratory Pattern: Mild dyspnea at rest, irregular pattern, or RR 21-25 bpm  Breath Sounds: Inspiratory and expiratory or bilateral wheezing and/or rhonchi  Cough: Strong, spontaneous, non-productive  Indication for Bronchodilator Therapy: Decreased or absent breath sounds  Bronchodilator Assessment Score: 11    Aerosolized bronchodilator medication orders have been revised according to the RT Nebulizer Bronchodilator Protocol below.    Respiratory Therapist to perform RT Therapy Protocol Assessment initially then follow the protocol.  Repeat RT Therapy Protocol Assessment PRN for score 0-3 or on second treatment, BID, and PRN for scores above 3.    No Indications - adjust the frequency to every 6 hours PRN wheezing or bronchospasm, if no treatments needed after 48 hours then discontinue using Per Protocol order mode.     If indication present, adjust the RT bronchodilator orders based on the Bronchodilator Assessment Score as indicated below.  If a patient is on this medication at home then do not decrease Frequency below that used at home.    0-3 - enter or revise RT bronchodilator order(s) to equivalent RT Bronchodilator order with Frequency of every 4 hours PRN for wheezing or increased work of breathing using Per Protocol order mode.       4-6 - enter or revise RT Bronchodilator order(s) to two equivalent RT bronchodilator orders with one order with BID Frequency and one order with Frequency of every 4 hours PRN wheezing or increased work of breathing using Per 
RT Nebulizer Bronchodilator Protocol Note    There is a bronchodilator order in the chart from a provider indicating to follow the RT Bronchodilator Protocol and there is an “Initiate RT Bronchodilator Protocol” order as well (see protocol at bottom of note).    CXR Findings:  XR CHEST PORTABLE    Result Date: 4/1/2024  Low lung volumes with increased opacities throughout the bilateral lower lung zones, which could represent atelectasis versus pneumonia.       The findings from the last RT Protocol Assessment were as follows:  Smoking: Smoker 15 pack years or more  Respiratory Pattern: Mild dyspnea at rest, irregular pattern, or RR 21-25 bpm  Breath Sounds: Inspiratory and expiratory or bilateral wheezing and/or rhonchi  Cough: Strong, spontaneous, non-productive  Indication for Bronchodilator Therapy: Decreased or absent breath sounds  Bronchodilator Assessment Score: 11    Aerosolized bronchodilator medication orders have been revised according to the RT Nebulizer Bronchodilator Protocol below.    Respiratory Therapist to perform RT Therapy Protocol Assessment initially then follow the protocol.  Repeat RT Therapy Protocol Assessment PRN for score 0-3 or on second treatment, BID, and PRN for scores above 3.    No Indications - adjust the frequency to every 6 hours PRN wheezing or bronchospasm, if no treatments needed after 48 hours then discontinue using Per Protocol order mode.     If indication present, adjust the RT bronchodilator orders based on the Bronchodilator Assessment Score as indicated below.  If a patient is on this medication at home then do not decrease Frequency below that used at home.    0-3 - enter or revise RT bronchodilator order(s) to equivalent RT Bronchodilator order with Frequency of every 4 hours PRN for wheezing or increased work of breathing using Per Protocol order mode.       4-6 - enter or revise RT Bronchodilator order(s) to two equivalent RT bronchodilator orders with one order 
RT Nebulizer Bronchodilator Protocol Note    There is a bronchodilator order in the chart from a provider indicating to follow the RT Bronchodilator Protocol and there is an “Initiate RT Bronchodilator Protocol” order as well (see protocol at bottom of note).    CXR Findings:  XR CHEST PORTABLE    Result Date: 4/2/2024  Improving perihilar airspace disease suggesting improving pulmonary edema.     XR CHEST PORTABLE    Result Date: 4/1/2024  Low lung volumes with increased opacities throughout the bilateral lower lung zones, which could represent atelectasis versus pneumonia.       The findings from the last RT Protocol Assessment were as follows:  Smoking: Smoker 15 pack years or more  Respiratory Pattern: Mild dyspnea at rest, irregular pattern, or RR 21-25 bpm  Breath Sounds: Inspiratory and expiratory or bilateral wheezing and/or rhonchi  Cough: Strong, spontaneous, non-productive  Indication for Bronchodilator Therapy: Decreased or absent breath sounds  Bronchodilator Assessment Score: 11    Aerosolized bronchodilator medication orders have been revised according to the RT Nebulizer Bronchodilator Protocol below.    Respiratory Therapist to perform RT Therapy Protocol Assessment initially then follow the protocol.  Repeat RT Therapy Protocol Assessment PRN for score 0-3 or on second treatment, BID, and PRN for scores above 3.    No Indications - adjust the frequency to every 6 hours PRN wheezing or bronchospasm, if no treatments needed after 48 hours then discontinue using Per Protocol order mode.     If indication present, adjust the RT bronchodilator orders based on the Bronchodilator Assessment Score as indicated below.  If a patient is on this medication at home then do not decrease Frequency below that used at home.    0-3 - enter or revise RT bronchodilator order(s) to equivalent RT Bronchodilator order with Frequency of every 4 hours PRN for wheezing or increased work of breathing using Per Protocol order 
Protocol order mode.         7-10 - enter or revise RT Bronchodilator order(s) to two equivalent RT bronchodilator orders with one order with TID Frequency and one order with Frequency of every 4 hours PRN wheezing or increased work of breathing using Per Protocol order mode.       11-13 - enter or revise RT Bronchodilator order(s) to one equivalent RT bronchodilator order with QID Frequency and an Albuterol order with Frequency of every 4 hours PRN wheezing or increased work of breathing using Per Protocol order mode.      Greater than 13 - enter or revise RT Bronchodilator order(s) to one equivalent RT bronchodilator order with every 4 hours Frequency and an Albuterol order with Frequency of every 2 hours PRN wheezing or increased work of breathing using Per Protocol order mode.     RT to enter RT Home Evaluation for COPD & MDI Assessment order using Per Protocol order mode.    Electronically signed by SHARON JEROME JR, RCP on 3/31/2024 at 7:20 PM  
enter or revise RT bronchodilator order(s) to equivalent RT Bronchodilator order with Frequency of every 4 hours PRN for wheezing or increased work of breathing using Per Protocol order mode.        4-6 - enter or revise RT Bronchodilator order(s) to two equivalent RT bronchodilator orders with one order with BID Frequency and one order with Frequency of every 4 hours PRN wheezing or increased work of breathing using Per Protocol order mode.        7-10 - enter or revise RT Bronchodilator order(s) to two equivalent RT bronchodilator orders with one order with TID Frequency and one order with Frequency of every 4 hours PRN wheezing or increased work of breathing using Per Protocol order mode.       11-13 - enter or revise RT Bronchodilator order(s) to one equivalent RT bronchodilator order with QID Frequency and an Albuterol order with Frequency of every 4 hours PRN wheezing or increased work of breathing using Per Protocol order mode.      Greater than 13 - enter or revise RT Bronchodilator order(s) to one equivalent RT bronchodilator order with every 4 hours Frequency and an Albuterol order with Frequency of every 2 hours PRN wheezing or increased work of breathing using Per Protocol order mode.     RT to enter RT Home Evaluation for COPD & MDI Assessment order using Per Protocol order mode.    Electronically signed by MARTI BARNETT RCP on 4/4/2024 at 7:57 AM  
enter or revise RT bronchodilator order(s) to equivalent RT Bronchodilator order with Frequency of every 4 hours PRN for wheezing or increased work of breathing using Per Protocol order mode.        4-6 - enter or revise RT Bronchodilator order(s) to two equivalent RT bronchodilator orders with one order with BID Frequency and one order with Frequency of every 4 hours PRN wheezing or increased work of breathing using Per Protocol order mode.        7-10 - enter or revise RT Bronchodilator order(s) to two equivalent RT bronchodilator orders with one order with TID Frequency and one order with Frequency of every 4 hours PRN wheezing or increased work of breathing using Per Protocol order mode.       11-13 - enter or revise RT Bronchodilator order(s) to one equivalent RT bronchodilator order with QID Frequency and an Albuterol order with Frequency of every 4 hours PRN wheezing or increased work of breathing using Per Protocol order mode.      Greater than 13 - enter or revise RT Bronchodilator order(s) to one equivalent RT bronchodilator order with every 4 hours Frequency and an Albuterol order with Frequency of every 2 hours PRN wheezing or increased work of breathing using Per Protocol order mode.     RT to enter RT Home Evaluation for COPD & MDI Assessment order using Per Protocol order mode.    Electronically signed by angelica romeo RCP on 4/5/2024 at 8:04 AM  
home.    0-3 - enter or revise RT bronchodilator order(s) to equivalent RT Bronchodilator order with Frequency of every 4 hours PRN for wheezing or increased work of breathing using Per Protocol order mode.        4-6 - enter or revise RT Bronchodilator order(s) to two equivalent RT bronchodilator orders with one order with BID Frequency and one order with Frequency of every 4 hours PRN wheezing or increased work of breathing using Per Protocol order mode.        7-10 - enter or revise RT Bronchodilator order(s) to two equivalent RT bronchodilator orders with one order with TID Frequency and one order with Frequency of every 4 hours PRN wheezing or increased work of breathing using Per Protocol order mode.       11-13 - enter or revise RT Bronchodilator order(s) to one equivalent RT bronchodilator order with QID Frequency and an Albuterol order with Frequency of every 4 hours PRN wheezing or increased work of breathing using Per Protocol order mode.      Greater than 13 - enter or revise RT Bronchodilator order(s) to one equivalent RT bronchodilator order with every 4 hours Frequency and an Albuterol order with Frequency of every 2 hours PRN wheezing or increased work of breathing using Per Protocol order mode.     RT to enter RT Home Evaluation for COPD & MDI Assessment order using Per Protocol order mode.    Electronically signed by MARTI BARNETT RCP on 3/31/2024 at 7:52 AM

## 2024-04-05 NOTE — CARE COORDINATION
DC Planning    Spoke with Dr. Main's office pt appointment set up with Patti Rivera on April 9, 2024 @ 1:45 [place] office.

## 2024-04-05 NOTE — PROGRESS NOTES
Section of Cardiology  Progress Note      Date:  3/30/2024  Patient: Gaurang Fiore  Admission:  3/28/2024 10:18 PM  Admit DX: Orthopnea [R06.01]  Dyspnea on exertion [R06.09]  Suspected CHF (congestive heart failure) [R09.89]  Age:  48 y.o., 1975     LOS: 1 day     Reason for evaluation:   Chest pain      SUBJECTIVE:     No acute events overnight.  No events on telemetry.    Patient examined in the stress lab today.  She has been complaining of some lightheadedness and dizziness.  Said this she believes this is because she has not been allowed out of bed for several days.  Does not think she can walk on treadmill.    Has had intermittent chest pain none currently.    OBJECTIVE:        Current Inpatient Medications:   sodium chloride flush  5-40 mL IntraVENous 2 times per day    gabapentin  1,200 mg Oral TID    atorvastatin  40 mg Oral Daily       IV Infusions (if any):   sodium chloride      sodium chloride 10 mL/hr at 03/29/24 0843           EXAM:   Vitals:    VITALS:  BP (!) 93/57   Pulse 69   Temp 96.8 °F (36 °C) (Temporal)   Resp 13   Ht 1.626 m (5' 4\")   Wt 127.2 kg (280 lb 6.4 oz)   LMP 04/25/2020   SpO2 96%   BMI 48.13 kg/m²   24HR INTAKE/OUTPUT:  No intake or output data in the 24 hours ending 03/30/24 0911    General: Lying flat in bed in no acute distress, alert oriented x3  HEENT:  Anicteric, Fair dentition  Neck: JVP less than 7 cm  Chest:  Clear to auscultation bilaterally  CV:  S2 normal, regular rate, no murmurs  Abdomen: Obese, soft, nontender, nondistended  Extremities:  2+ pulses, no edema  Neuro:  Grossly intact        Labs:   CBC:  Recent Labs     03/29/24  0853 03/30/24  0304   WBC 6.0 6.7   HGB 14.4 14.5   HCT 46.5 46.4    174     Magnesium:  Recent Labs     03/29/24 2242 03/30/24  0304   MG 1.9 2.1     BMP:  Recent Labs     03/29/24  0246 03/29/24 2242 03/30/24  0304     --  142   K 3.7 3.8 4.2   CALCIUM 9.2  --  8.8   CO2 28  --  34*   BUN 13  --  16 
      Section of Cardiology  Progress Note      Date:  3/31/2024  Patient: Gaurang Fiore  Admission:  3/28/2024 10:18 PM  Admit DX: Orthopnea [R06.01]  Dyspnea on exertion [R06.09]  Suspected CHF (congestive heart failure) [R09.89]  Age:  48 y.o., 1975     LOS: 2 days     Reason for evaluation:   Chest pain      SUBJECTIVE:     Patient underwent stress test yesterday.  Monitored overnight.  Of note, significant hypoxia documented while sleeping.  Initiated on BiPAP overnight.    No chest pain this morning.  Patient has multiple questions about resources for weight loss.    Normal sinus rhythm on telemetry without significant ectopy    OBJECTIVE:        Current Inpatient Medications:   ipratropium 0.5 mg-albuterol 2.5 mg  1 Dose Inhalation Q4H RT    nicotine  1 patch TransDERmal Daily    fluticasone  2 spray Each Nostril Daily    sodium chloride flush  5-40 mL IntraVENous 2 times per day    gabapentin  1,200 mg Oral TID    atorvastatin  40 mg Oral Daily       IV Infusions (if any):   sodium chloride 10 mL/hr at 03/29/24 0843           EXAM:   Vitals:    VITALS:  BP (!) 140/81   Pulse 97   Temp 97.4 °F (36.3 °C) (Temporal)   Resp 16   Ht 1.626 m (5' 4\")   Wt 127 kg (280 lb)   LMP 04/25/2020   SpO2 94%   BMI 48.06 kg/m²   24HR INTAKE/OUTPUT:  No intake or output data in the 24 hours ending 03/31/24 0815    General: Lying in bed in no acute distress, alert oriented x3  HEENT:  Anicteric, Fair dentition  Neck: JVP less than 7 cm  Chest:  Clear to auscultation bilaterally with distant breath sounds  CV:  S2 soft, regular rate, no murmurs  Abdomen:  Soft, nontender, nondistended  Extremities:  2+ pulses, no edema  Neuro:  Grossly intact      Labs:   CBC:  Recent Labs     03/30/24  0304 03/31/24  0640   WBC 6.7 6.3   HGB 14.5 13.8   HCT 46.4 44.9    183     Magnesium:  Recent Labs     03/30/24  0304 03/31/24  0640   MG 2.1 2.0     BMP:  Recent Labs     03/30/24  0304 03/31/24  0640    139   K 4.2 
ADMISSION NOTE         Patient admitted to room: 2036     Time of admit: 0140     Admit from:  Home     Reason for admission: Dyspnea upon exertion/chest pain     Where patient has been residing for the last 24 hrs:      Has the patient been admitted to any facility in the last 4 weeks, which one:  No     Family at bedside: No     Patient is currently: Pt resting in bed comfortably, vitals obtained, telemetry placed on pt. No distress noted. Pt does complain of intermittent chest pressure. Patient has been oriented to room, educated on how to use call light, and to call for assistance prior to getting up. Bed in lowest and locked position. 2 siderails up for safety. Call light within reach and pt able to make needs known.  
ANTIMICROBIAL STEWARDSHIP  Upon review of the patient's chart, the committee has the following recommendation for your consideration:    Indication: Possible COPD  Day of Antimicrobial Therapy:   Recommendation   Patient was empirically started on rocephin/azithromycin for possible COPD/CAP.     CXR appears negative for acute infection. Procalcitonin is negative. Patient is afebrile with no leukocytosis.   Clinical manifestations do not appear to warrant antibiotic therapy.      We recommend monitoring off antibiotics.         Recent Labs     03/31/24  0640   WBC 6.3     Recent Labs     04/02/24  0340   PROCAL <0.02       Unnecessary or inappropriate antimicrobial use increases the risk of subsequent infections with resistant bacterial infections and drug-associated toxicities including C. difficile infection.     Thank you.  Esther Lozoya [facility], PharmD  4/2/2024  11:23 AM    The Antimicrobial Stewardship Committee at [facility] is led by  Riana Fox MD, Infectious Diseases Section Chair.    
CLINICAL PHARMACY NOTE: MEDS TO BEDS    Total # of Prescriptions Filled: 6   The following medications were delivered to the patient:  Prednisone 20 mg tabs  Fluticasone nasal spray  Albuterol HFA inhaler 108  Metformin  mg tabs  Furosemide 20 mg tabs  Metoprolol Succinate ER 25 mg tabs    Additional Documentation:   Also had Azithromycin 500 mg tabs and Pantoprazole 40 mg tabs filled, but they were cancelled by the MD.  
Called Pulm to verify if they want the 2nd respiratory panel done, Dr. Rodgers wants it completed again. Patient frustrated but agrees to it, specimen labeled and sent to lab   
End Of Shift Note  [place] [facility]  Summary of shift: Patient alert and oriented. Was up to the shower and able to walk around unassisted. Patient had complaints of chest pain and dizziness, cardiology and NP notified, EKG WNL, PRN nitro ordered. Patient O2 in the 80s on NC 3L. Did nocturnal pulse ox test, patient was put on Bipap to sleep. Was on bipap from 0127 to 0300, patient requested to have mask taken off, put on NC 6L. Patient refused to go back on Bipap. RT able to get patient to put Bipap back on at 0348. Patient to get stress test today, NPO after midnight. Patient was displeased wit being NPO for so long as she says the O2 makes her feel dry and dehydrated.     Vitals:    Vitals:    03/30/24 0007 03/30/24 0026 03/30/24 0127 03/30/24 0348   BP:  137/71     Pulse: 84 79 72 62   Resp:       Temp:       TempSrc:       SpO2: 95% 93% 95% 97%   Weight:       Height:            I&O: No intake or output data in the 24 hours ending 03/30/24 0421    Resp Status: NC, Bipap    Ventilator Settings:     / / /FiO2 : 30 %    Critical Care IV infusions:   sodium chloride 10 mL/hr at 03/29/24 0843        LDA:   Peripheral IV 03/28/24 Left;Proximal Forearm (Active)   Number of days: 1          
End Of Shift Note  [facility]  Summary of shift: Patient had eventful day. Patient got up to the shower and walked the unit. Patient still diuresing well with good urine output. Plans for patient to have a 3rd nocturnal sleep study tonight to verify if patient requires a bipap at night or if she can tolerate 6L NC due to her SpO2 demands increasing while asleep. Patient still complaining of headache pain PRN tylenol and fioricet was given with no relief providers are aware, no new orders at this time. Patient also having increasing anxiety and agitation, PRN clonazepam was given which helped slightly. RN notified provider that the patient had seen psych previously for her bipolar disorder, no new orders for psych consult at this time. RN spoke with daughter Adrienne and updated her on plan of care, RN verified with patient she is okay to give information to. Patient resting in chair, expressing no needs at this time.     Vitals:    Vitals:    04/04/24 0921 04/04/24 1100 04/04/24 1553 04/04/24 1600   BP: (!) 124/91 (!) 124/98     Pulse:  98 93 98   Resp:       Temp: 97.5 °F (36.4 °C) 97.2 °F (36.2 °C)  98.4 °F (36.9 °C)   TempSrc: Temporal Temporal  Temporal   SpO2:  95% 94% 95%   Weight:       Height:            I&O:   Intake/Output Summary (Last 24 hours) at 4/4/2024 2020  Last data filed at 4/4/2024 1000  Gross per 24 hour   Intake 300 ml   Output 2660 ml   Net -2360 ml       Resp Status: Patient on room air throughout the day and tolerated well.     Ventilator Settings:     / / /FiO2 : 30 %    Critical Care IV infusions:   sodium chloride 5 mL/hr at 04/01/24 1412        LDA:   Peripheral IV 03/28/24 Left;Proximal Forearm (Active)   Number of days: 6          
End Of Shift Note  [place] [facility]  Summary of shift: Uneventful shift. Patient has nocturnal O2 test over night and collected this morning. 400 UOP from external cath, up to bathroom x2. Ultram x1 for H/A. Call light within reach.     Vitals:    Vitals:    04/03/24 0450 04/03/24 0500 04/03/24 0550 04/03/24 0600   BP:       Pulse: 99 92 84 84   Resp:       Temp:       TempSrc:       SpO2: 91%  90% 95%   Weight:       Height:            I&O:   Intake/Output Summary (Last 24 hours) at 4/3/2024 0726  Last data filed at 4/3/2024 0621  Gross per 24 hour   Intake --   Output 401 ml   Net -401 ml       Resp Status: 2L     Ventilator Settings:     / / /FiO2 : 21 %    Critical Care IV infusions:   sodium chloride 5 mL/hr at 04/01/24 1412        LDA:   Peripheral IV 03/28/24 Left;Proximal Forearm (Active)   Number of days: 5          
End Of Shift Note  [facility]    Summary of shift: Patient had an eventful, emotional day. O2sat drops into high 70's-low 80's when sleeping, difficult to awake. Nocturnal O2 to be done overnight. Plan for stress test tomorrow. Chest pain this morning, Heparin drip started, but now discontinued per Cardiology. Negative trops x3.     Vitals:    Vitals:    03/29/24 1743 03/29/24 1744 03/29/24 1745 03/29/24 1746   BP:       Pulse: 84 84 91 89   Resp:       Temp:       TempSrc:       SpO2: (!) 87% (!) 87% 91% 92%   Weight:       Height:            I&O: No intake or output data in the 24 hours ending 03/29/24 1803    Resp Status: 2-4 L during day, Bipap night, 50% VM.    Ventilator Settings:     / / /FiO2 : 50 %    Critical Care IV infusions:   sodium chloride 10 mL/hr at 03/29/24 0843        LDA:   Peripheral IV 03/28/24 Left;Proximal Forearm (Active)   Number of days: 0         
End Of Shift Note  [facility]    Summary of shift: Patient had an uneventful shift. She remained hemodynamically stable. She tolerated BiPAP well however did switch to NC throughout the night.    Currently, she is in bed, call light in reach and bed in lowest position. She appears in NAD at this time.    Vitals:    Vitals:    04/01/24 0019 04/01/24 0246 04/01/24 0338 04/01/24 0400   BP: 124/82      Pulse: (!) 103 88 82    Resp: 21  19    Temp:    97.1 °F (36.2 °C)   TempSrc:    Temporal   SpO2: (!) 88% 94% 99%    Weight:    129.5 kg (285 lb 8 oz)   Height:            I&O: No intake or output data in the 24 hours ending 04/01/24 0639    Resp Status: 3-4L NC; BiPAP @night    Ventilator Settings:     / / /FiO2 : 30 %    Critical Care IV infusions:   sodium chloride 10 mL/hr at 03/29/24 0843        LDA:   Peripheral IV 03/28/24 Left;Proximal Forearm (Active)   Number of days: 3          
End Of Shift Note  [facility]  Summary of shift: Patient on & off bipap during the night, w/a total of maybe 3 hours wear, pulls the mask off in her sleep. C/O mask being too tight, feeling like she's going to die. Expresses her financial constraints & inability to take time off work will prevent her from doing an outpatient sleep study. States she will stay until Monday & then she is leaving. Explained the seriousness of her condition, she states \"yeah, yeah.\" 4 L NC while awake, but gets OOB on her own & goes to the bathroom without O2. Very anxious with erratic behavior @ times, especially when first waking up. Laly Ward RN     Vitals:    Vitals:    03/31/24 0335 03/31/24 0410 03/31/24 0421 03/31/24 0424   BP:       Pulse: 86 96  82   Resp:  16     Temp:  97.4 °F (36.3 °C)     TempSrc:  Temporal     SpO2: 93% (!) 89%  94%   Weight:   127 kg (280 lb)    Height:            I&O: No intake or output data in the 24 hours ending 03/31/24 0554    Resp Status: 4 L NC w/bipap @ night    Ventilator Settings:     / / /FiO2 : 30 %    Critical Care IV infusions:   sodium chloride 10 mL/hr at 03/29/24 0843        LDA:   Peripheral IV 03/28/24 Left;Proximal Forearm (Active)   Number of days: 2          
End Of Shift Note  [facility]  Summary of shift: Patient resting comfortable in bed she is wearing bipap for naps and is satting well. Patient is to stay another night to be diuresed further and receive IV steroids. Patient is agreeable to plan and family has been updated.     Vitals:    Vitals:    04/02/24 1135 04/02/24 1152 04/02/24 1456 04/02/24 1550   BP:  (!) 144/82  (!) 151/72   Pulse:  (!) 105  (!) 108   Resp:  18  22   Temp:  97 °F (36.1 °C)  97.5 °F (36.4 °C)   TempSrc:  Temporal  Temporal   SpO2: 92% 94% 93% (!) 88%   Weight:       Height:            I&O:   Intake/Output Summary (Last 24 hours) at 4/2/2024 1849  Last data filed at 4/2/2024 0951  Gross per 24 hour   Intake 600 ml   Output 1 ml   Net 599 ml       Resp Status: 3-4 liters NC    Ventilator Settings:     / / /FiO2 : 30 %    Critical Care IV infusions:   sodium chloride 5 mL/hr at 04/01/24 1412        LDA:   Peripheral IV 03/28/24 Left;Proximal Forearm (Active)   Number of days: 4          
End Of Shift Note  [facility]  Summary of shift: Patient resting in bed. Treatments were initiated for pneumonia today. Solumedrol, lasix, zithromax, and rocephine were started today. Patient has been anxious throughout the shift and has voiced several times that she just wants to be discharged. She has worn the BIPAP for short periods throughout the day but aggressively takes it off when she feels too constricted. Patient has been educated on the importance of wearing her oxygen and BIPAP for naps.     Vitals:    Vitals:    04/01/24 1200 04/01/24 1300 04/01/24 1456 04/01/24 1600   BP:  (!) 80/49  132/75   Pulse:  89 95 96   Resp:  20  20   Temp: 97.2 °F (36.2 °C) 97.2 °F (36.2 °C)  97.3 °F (36.3 °C)   TempSrc: Temporal Temporal  Temporal   SpO2:  94% 94% 95%   Weight:       Height:            I&O: No intake or output data in the 24 hours ending 04/01/24 1825    Resp Status: 2-4 liters NC    Ventilator Settings:     / / /FiO2 : 30 %    Critical Care IV infusions:   sodium chloride 5 mL/hr at 04/01/24 1412        LDA:   Peripheral IV 03/28/24 Left;Proximal Forearm (Active)   Number of days: 3          
End Of Shift Note  [facility]  Summary of shift: Pt currently on BIPAP, uses 3-4lpm NC when awake during day. Pt has been napping on and off and states to be very tired. She has been in chair, using incentive spirometer and walking in room and to bathroom. When upright in chair pt oxygen saturation much better. Pt was complaining of chest pressure pain once today with headache and resolved. Plan to repeat CXR tomorrow AM    Vitals:    Vitals:    03/31/24 1534 03/31/24 1600 03/31/24 1630 03/31/24 1700   BP:  123/79     Pulse: 98 88     Resp:       Temp:  97.2 °F (36.2 °C)     TempSrc:  Temporal     SpO2:  95% 100% 98%   Weight:       Height:            I&O: No intake or output data in the 24 hours ending 03/31/24 1813    Resp Status: 3lpm NC, BIPAP    Ventilator Settings:     / / /FiO2 : 30 %    Critical Care IV infusions:   sodium chloride 10 mL/hr at 03/29/24 0843        LDA:   Peripheral IV 03/28/24 Left;Proximal Forearm (Active)   Number of days: 2          
End Of Shift Note  [facility]  Summary of shift: Pt had an uneventful night. VSS. Pt given Lasix in the ED and has been diuresing well throughout the night. Pt complains of chest pressure at times. Pt given Tylenol for headache. Plan for Echo today.    Vitals:    Vitals:    03/29/24 0155 03/29/24 0200 03/29/24 0213 03/29/24 0400   BP: (!) 153/111 (!) 153/111 128/88 (!) 149/109   Pulse: 92 92 84 79   Resp:  21  20   Temp:  97.1 °F (36.2 °C)  97 °F (36.1 °C)   TempSrc:  Temporal  Temporal   SpO2:  99%  (!) 80%   Weight:  126.6 kg (279 lb)     Height:            I&O: No intake or output data in the 24 hours ending 03/29/24 0601    Resp Status: 2-4L during sleep. RA when awake.    Ventilator Settings:     / / /     Critical Care IV infusions:   sodium chloride          LDA:   Peripheral IV 03/28/24 Left;Proximal Forearm (Active)   Number of days: 0          
End Of Shift Note  [facility]  Summary of shift: Pt requiring oxygen both awake and asleep, prevented d/c today. Otherwise pt stable.    Vitals:    Vitals:    03/30/24 1200 03/30/24 1300 03/30/24 1500 03/30/24 1524   BP:  (!) 139/127     Pulse: 96 85  87   Resp: 20      Temp: 97.3 °F (36.3 °C)      TempSrc: Temporal      SpO2:  96% 95% 93%   Weight:       Height:            I&O: No intake or output data in the 24 hours ending 03/30/24 1832    Resp Status: 4L NC/ Bipap    Ventilator Settings:     / / /FiO2 : 30 %    Critical Care IV infusions:   sodium chloride 10 mL/hr at 03/29/24 0843        LDA:   Peripheral IV 03/28/24 Left;Proximal Forearm (Active)   Number of days: 1        Shamika Rodriguez RN    
End Of Shift Note  [facility]  Summary of shift: The pt had an uneventful night. She did c/o pain x2 and was given pain medications. She had issues with the BiPaP during the night but was able to rest the rest of the night. At the time of the note the pt was in bed, all needs have been met and call light was within reach.         Vitals:    Vitals:    04/02/24 0200 04/02/24 0300 04/02/24 0400 04/02/24 0419   BP:   (!) 135/94 (!) 138/94   Pulse: (!) 113 82  96   Resp:   20 21   Temp:   97.7 °F (36.5 °C)    TempSrc:   Temporal    SpO2:  95%  93%   Weight:   127.5 kg (281 lb 1.6 oz)    Height:            I&O:   Intake/Output Summary (Last 24 hours) at 4/2/2024 0621  Last data filed at 4/1/2024 2300  Gross per 24 hour   Intake 600 ml   Output --   Net 600 ml       Resp Status: BiPaP    Ventilator Settings:     / / /FiO2 : 30 %    Critical Care IV infusions:   sodium chloride 5 mL/hr at 04/01/24 1412        LDA:   Peripheral IV 03/28/24 Left;Proximal Forearm (Active)   Number of days: 4          
Home Oxygen Evaluation    Home Oxygen Evaluation completed.      Resting SpO2 on room air = 96%    SpO2 on room air with exercise = 90%      Nocturnal Oximetry done see notes in chart.    NINI PIERRE RCP  12:18 PM  
Home Oxygen Evaluation    Home Oxygen Evaluation completed.    Patient is on 2 liters per minute via NC.  Resting SpO2 = 96%  Resting SpO2 on room air = 93%    SpO2 on room air with exercise = 92%  SpO2 on oxygen as above with exercise = 93%      Cortney Bedolla RN  10:59 AM  
Home Oxygen Evaluation    Home Oxygen Evaluation completed.    Patient is on 4 liters per minute via nasal cannula SpO2 95%.  Resting SpO2 = 95%  Resting SpO2 on room air = 83%      SpO2 on oxygen as above with exercise = 91%        NINI PIERRE RCP  2:19 PM  
Home Oxygen Evaluation    Home Oxygen Evaluation completed.    Patient is on na liters per minute via na.  Resting SpO2 = na%  Resting SpO2 on room air = 91%    SpO2 on room air with exercise = 89%  SpO2 on oxygen as above with exercise = na%      angelica romeo RCP  11:27 AM  
Message sent to NP Shirley Waterhouse to get PRN pain medication for pat because of headache 10/10. Awaiting response.     --2240 New orders for tramadol placed in pts chart. Medication given to pt.    
Message was sent to on call NP Tiff Fletcher regarding the patients c/o pain. The patient c/o pain 8/10 and was asking for something stronger than Tylenol; 650mg. NP stated that the pt needs to take the Tylenol first. Writer advised NP that the Tylenol is only for pain 1-3/10. Writer asked if the parameters could be changed and orders were modified.   
[facility]  Office: [phone]  Migue Leonard DO, Tripp Shaffer, DO, Won Slaughter DO, Chapincito Hathaway, DO, Wade Torres MD, Amira Polk MD, Shannan Garrett MD, Ivon Pena MD,  Jason Loera MD, Michael Arana MD, Jose Duncan MD,  Jayda Herr DO, Paris Posada MD, Freddy Esqueda MD, Colten Leonard DO, Maryellen Silveira MD,  Manjit Mcdonough DO, Valentine Sanabria MD, Cherelle Alex MD, Corrie Hassan MD, Alhaji Juarez MD,  Rich Romero MD, Donte Velasquez MD, Priscila Avendaño MD, Holley Laboy MD, Max Andrew MD, Faith Esteban MD, Charly Alvarado DO, Rc Garber DO, Puja Bautista MD,  Papa Guerrero MD, Shirley Waterhouse, CNP,  Caren Bianchi CNP, Filippo Pearl, CNP,  Magalie Urena, DNP, Elizabeth Barton, CNP, Rosa Guardado, CNP, Tiff Fletcher, CNP, Gracia Tanner, CNP, Ariana Nicholson, PA-C, Pari Warren PA-C, Aicha Yang, CNP, Luz Woodard, CNP, Amanda Lerner, CNP, Katt Street, CNP, Danii Mcmahan, CNP, Marie Orellana, CNS, Rose Mary Jerez, CNP, Kala Aguilar CNP, Tracy Schwab, CNP       [facility]      Daily Progress Note     Admit Date: 3/28/2024  Bed/Room No.  2036/2036-01  Admitting Physician : Shannan Garrett MD  Code Status :Full Code  Hospital Day:  LOS: 4 days   Chief Complaint:     Chief Complaint   Patient presents with    Chest Pain     Mid sternal, was seen here earlier this week for same, was seen by Dr. Goldberger who wanted to admit patient, but patient could not due to family situation. Pt has follow up appointment with PCP tomorrow.     Shortness of Breath     With exertion with chest pain.      Principal Problem:    Atypical chest pain  Active Problems:    Panic attacks    Dyspnea on exertion    Essential hypertension    Hyperlipidemia    Former smoker    Neuropathy    Muscle spasm    Small pleural effusion  Resolved Problems:    * No resolved hospital problems. *    Subjective :        Interval History/Significant events :  
Nocturnal Pulse Ox (NOX) set up on patient at this time.  Patient on room air.  RN and patient aware to keep oxygen off t/o study.  Low SaO2 alarm set at 85%.  Pt's was wide awake and fell asleep about 1 minute after test started. Pt's SaO2 immediately decreased into low to mid 80's.    (22:18) Placed pt on 1L. (SaO2 remaining in mid 80's)    (22:20) Increased O2 to 2L. (SaO2 dropping into high 70's)    (22:22) Increased O2 to 3L. (Pt woke up and requesting to use the restroom. NOX disconnected and pt went into restroom.)    **RN placed a commode at pt's bedside for future use.**    (22:31)  Pt back into bed and NOX testing continued at this time on 3L. Pt awake with chest pain. EKG being done at this time and Lab in for draws.    (22:42) Pt is now alone and going back to sleep.    (23:03) Increased O2 to 4L. (SaO2 dipping into low 80's)    (23:05) Increased O2 to 5L.    (23:07)  Increased to 6L.  (Pt dipping into high 70's to low 80's)    **RN to give pt Nitro and RT will then place pt on Bipap**    (23:48)  Pt placed on Bipap 16/10 and 30%.    (00:05)  Increased Bipap IPAP to 20/10 and 30% due to VTs in 200's.    (00:08) Increased rate to 16.    (00:16)  Increased IPAP to 22.  (22/10 and 30%)    (00:26)  Increased IPAP to 24 and rate to 18.  (24/10 and 30%)    (03:00)  Pt wanted Bipap off per RN.  Placed back on 6L cannula.    (03:44)  Pt back on Bipap 24/10 and 30%.                
Nocturnal Pulse Ox (NOX) set up on patient at this time.  Patient on room air.  RN and patient aware to keep oxygen off t/o study.  Low SaO2 alarm set at 85%.  RN (Pavel) to call RT if patient condition warrants oxygen placement. RA SaO2 90% at this time.     (23:16) Nasal cannula 1L placed on pt due to SaO2 drops into low 80s.    (23:51) RT in to increase oxygen to 2L due to desats.  Pt's nasal cannula found on pt's forehead. Will continue to monitor at 1L with Oxygen back in nose.    (0430)  Increased O2 to 2L.    (0431)  Increased O2 to 3L.    (0435)  Increased O2 to 4L.    (0437)  Increased O2 to 5L.    (0439)  Increased O2 to 6L.    (0444)  Pt SaO2 remaining mid to high 90's on 6L. Will hold at 6. RN aware to call RT if pt's SaO2 starts to drop again.                      
Nutrition Assessment     Type and Reason for Visit: Patient Education    Nutrition Recommendations/Plan:   Regular diet  Patient requested education education on weight loss and diabetic diet. Handouts provided: Carbohydrate counting for people with diabetes; 1500 calorie 5-day sample menu, and setting goals for weight management,      Malnutrition Assessment:  Malnutrition Status: No malnutrition    Nutrition Assessment:  Patient assessed for length of stay. Patient admitted for orthopnea, dyspnea on exertion and suspected CHF. Patient is eating fine at meals and has a good appetite. At time of visit patient expressed frustration with her healthcare given from her PCP and desires to lose weight. Patient claims she has gained a lot of weight over the past year and suspects she is a borderline diabetic. Patient was tearful and requested diabetes education and weight loss information. Patient given education with handouts. Continue current diet. Monitor p.o intakes and labs      Current Nutrition Therapies:    ADULT DIET; Regular    Anthropometric Measures:  Height: 162.6 cm (5' 4\")  Current Body Wt: 128.4 kg (283 lb)   BMI: 48.6    Nutrition Diagnosis:   Food & Nutrition-related knowledge deficit related to cardiac dysfunction, endocrine dysfuntion as evidenced by other (comment) (incomplete information about weight loss and carbohydrate controlled diet)    Nutrition Interventions:   Food and/or Nutrient Delivery: Continue Current Diet  Nutrition Education/Counseling: Education completed  Coordination of Nutrition Care: Continue to monitor while inpatient       Goals:     Goals: PO intake 75% or greater       Nutrition Monitoring and Evaluation:      Food/Nutrient Intake Outcomes: Food and Nutrient Intake  Physical Signs/Symptoms Outcomes: Biochemical Data, Skin, Weight    Discharge Planning:    Continue current diet           Danii LEON, RDN, LDN  Lead Clinical Dietitian  RD Office Phone [phone]    
Nutrition Education    Educated on Weight monitoring and the importance of a low sodium diet.   Learners: Patient  Readiness: Acceptance  Method: Explanation and Handout  Response: Verbalizes Understanding  Contact name and number provided.    Ronnie Ann RD  Contact Number: [phone]    
Patient given discharge instructions verbal and written including new medications. Education provided regarding sleep apnea, heart failure self care, oxygen therapy, and smoking cessation. Patient follow ups also included with discharge paperwork. Patient also given information regarding heart failure zones and to bring paperwork with her to next PCP appointment. Patient also provided with scale and told to purchase BP cuff to measure at home. Patient picked up medications at inpatient pharmacy.     RN spoke with daughter Adrienne earlier, she had wanted RN to call back to notify when patient was discharged to be picked up. Patient stated that she did not want RN to call daughter to notify her and that she will drive herself home.     Patient also provided with a return to work paper to verify she has been hospitalized from 3/28/24-4/5/24. RN reached out to MD to see if she could get an extension per patients request, and patient was told to speak to her PCP if she needs a work extension.     Patient verbalizes understanding. Patient discharged with all personal belongings via wheelchair to her car.     See discharge event for time of discharge.    
Patient had a mostly uneventful night. Slept on bipap from 00:00. Reports she feels her swelling has dramatically decreased. Patient stresses she would like to go home today.  
Patient on Bipap from 0127 to 0300, patient requested to take Bipap off while getting labs done, placed on NC 6L. Patient was taking NC off sating in 70s-80s. Patient refusing to go back on Bipap at this time.  
Patient verbalizes that she cannot have a sleep study due to financial constraints & she is unable to take time off @ work. She expresses concern regarding the O2 needs because she states she will not be able to go to work wearing O2. She says she wants to leave & will not be able to do \"the things the doctors say to do.\" Writer explains the seriousness & risks associated with not wearing O2 & untreated sleep apnea, patient does not agree & \"just wants to get of this fucking place.\" Laly Ward RN   
Physical Therapy  DATE: 4/3/2024    NAME: Gaurang Fiore  MRN: [id number]   : 1975    Patient not seen this date for Physical Therapy due to:      [] Cancel by RN or physician due to:    [] Hemodialysis    [] Critical Lab Value Level     [] Blood transfusion in progress    [] Acute or unstable cardiovascular status   _MAP < 55 or more than >115  _HR < 40 or > 130    [] Acute or unstable pulmonary status   -FiO2 > 60%   _RR < 5 or >40    _O2 sats < 85%    [] Strict Bedrest    [] Off Unit for surgery or procedure    [] Off Unit for testing       [] Pending imaging to R/O fracture    [] Refusal by Patient      [] Other      [x] PT being discontinued at this time. Patient independent. No further needs.  Please re-order skilled PT if functional mobility status changes.       [] PT being discontinued at this time as the patient has been transferred to hospice care. No further needs.      Nati Yan, PT      
Pt awake with 6L nasal cannula on. RN will be in shortly to give pt some Ativan to help pt tolerate Bipap. RN will call when pt is ready for Bipap placement.  
Pt completed Nocturnal oxygen monitoring through the evening, reports per respiratory. Pt began the evening with complaints of headache and anxiety, treated with PRN pain medications as ordered, pt now reports feeling better and hopeful for discharge today.  
Pulmonary Critical Care Progress Note    Patient seen for the follow up of Atypical chest pain     Subjective:    She has been oxygen at 2 L nasal cannula.  She has improved shortness of breath.  She has occasional dry cough.  She denies chest pain.  She still smokes half a pack a day prior to presentation.  Stress test was done.  She is off BiPAP    Examination:    Vitals: /74   Pulse 95   Temp 97.1 °F (36.2 °C) (Temporal)   Resp 20   Ht 1.626 m (5' 4\")   Wt 129.5 kg (285 lb 8 oz)   LMP 2020   SpO2 95%   BMI 49.01 kg/m²   SpO2  Av.3 %  Min: 88 %  Max: 100 %  General appearance: alert and cooperative with exam  Neck: No JVD  Lungs: Decreased breath sound mild crackle  Heart: regular rate and rhythm, S1, S2 normal, no gallop  Abdomen: Soft, non tender, + BS  Extremities: no cyanosis or clubbing. No significant edema    LABs:    CBC:   Recent Labs     24  0304 24  0640   WBC 6.7 6.3   HGB 14.5 13.8   HCT 46.4 44.9    183     BMP:   Recent Labs     24  0640 24  0549    140   K 4.0 3.9   CO2 30 31   BUN 12 7   CREATININE 0.5 0.6   LABGLOM >90 >90   GLUCOSE 136* 150*      Latest Reference Range & Units 24 01:20   Chlamydia pneumoniae By PCR Not Detected  Not Detected   Rhino/Enterovirus PCR Not Detected  Not Detected   Human Metapneumovirus PCR Not Detected  Not Detected   Adenovirus PCR Not Detected  Not Detected   B Pertussis by PCR Not Detected  Not Detected   Coronavirus 229E PCR Not Detected  Not Detected   Coronavirus HKU1 PCR Not Detected  Not Detected   Coronavirus NL63 PCR Not Detected  Not Detected   Coronavirus OC Not Detected  Not Detected   Influenza A by PCR Not Detected  Not Detected   Influenza B by PCR Not Detected  Not Detected   Parainfluenza 1 PCR Not Detected  Not Detected   Parainfluenza 2 PCR Not Detected  Not Detected   Parainfluenza 3 PCR Not Detected  Not Detected   Parainfluenza 4 PCR Not Detected  Not Detected   Resp Syncytial 
Pulmonary Critical Care Progress Note    Patient seen for the follow up of Atypical chest pain     Subjective:    She has full oxygen saturation on room air at rest.  She had good urine output on diuresis that I started yesterday.  She has improved shortness of breath.  She has occasional dry cough.  She denies chest pain.  She still smokes half a pack a day prior to presentation.   She is off BiPAP.   Examination:    Vitals: BP (!) 144/82   Pulse (!) 105   Temp 97 °F (36.1 °C) (Temporal)   Resp 18   Ht 1.626 m (5' 4\")   Wt 127.5 kg (281 lb 1.6 oz)   LMP 2020   SpO2 94%   BMI 48.25 kg/m²   SpO2  Av.7 %  Min: 90 %  Max: 97 %  General appearance: alert and cooperative with exam  Neck: No JVD  Lungs: Decreased breath sound mild crackle  Heart: regular rate and rhythm, S1, S2 normal, no gallop  Abdomen: Soft, non tender, + BS  Extremities: no cyanosis or clubbing. No significant edema    LABs:    CBC:   Recent Labs     24  0640   WBC 6.3   HGB 13.8   HCT 44.9          BMP:   Recent Labs     24  0549 24  0340    139   K 3.9 4.8   CO2 31 30   BUN 7 11   CREATININE 0.6 0.7   LABGLOM >90 >90   GLUCOSE 150* 190*        Latest Reference Range & Units 24 01:20   Chlamydia pneumoniae By PCR Not Detected  Not Detected   Rhino/Enterovirus PCR Not Detected  Not Detected   Human Metapneumovirus PCR Not Detected  Not Detected   Adenovirus PCR Not Detected  Not Detected   B Pertussis by PCR Not Detected  Not Detected   Coronavirus 229E PCR Not Detected  Not Detected   Coronavirus HKU1 PCR Not Detected  Not Detected   Coronavirus NL63 PCR Not Detected  Not Detected   Coronavirus OC Not Detected  Not Detected   Influenza A by PCR Not Detected  Not Detected   Influenza B by PCR Not Detected  Not Detected   Parainfluenza 1 PCR Not Detected  Not Detected   Parainfluenza 2 PCR Not Detected  Not Detected   Parainfluenza 3 PCR Not Detected  Not Detected   Parainfluenza 4 PCR Not 
Pulmonary Critical Care Progress Note    Patient seen for the follow up of Atypical chest pain     Subjective:    She has improved oxygen saturation and she slept off BiPAP required oxygen up to 6 L.  She has occasional dry cough.  She denies chest pain.  She still smokes half a pack a day prior to presentation.   She has less itching.  She is anxious to leave     examination:    Vitals: BP (!) 137/92   Pulse 97   Temp 97.7 °F (36.5 °C) (Temporal)   Resp 16   Ht 1.626 m (5' 4\")   Wt 128.8 kg (283 lb 14.4 oz)   LMP 2020   SpO2 93%   BMI 48.73 kg/m²   SpO2  Av.8 %  Min: 83 %  Max: 100 %  General appearance: alert and cooperative with exam  Neck: No JVD  Lungs: Decreased breath sound mild crackle  Heart: regular rate and rhythm, S1, S2 normal, no gallop  Abdomen: Soft, non tender, + BS  Extremities: no cyanosis or clubbing. No significant edema    LABs:    CBC:   Recent Labs     24  0402   WBC 15.1*   HGB 14.8   HCT 47.0          BMP:   Recent Labs     24  0559 24  0331    141   K 3.7 3.6*   CO2 37* 31   BUN 24* 23*   CREATININE 0.6 0.7   LABGLOM >90 >90   GLUCOSE 133* 128*        Latest Reference Range & Units 24 01:20   Chlamydia pneumoniae By PCR Not Detected  Not Detected   Rhino/Enterovirus PCR Not Detected  Not Detected   Human Metapneumovirus PCR Not Detected  Not Detected   Adenovirus PCR Not Detected  Not Detected   B Pertussis by PCR Not Detected  Not Detected   Coronavirus 229E PCR Not Detected  Not Detected   Coronavirus HKU1 PCR Not Detected  Not Detected   Coronavirus NL63 PCR Not Detected  Not Detected   Coronavirus OC Not Detected  Not Detected   Influenza A by PCR Not Detected  Not Detected   Influenza B by PCR Not Detected  Not Detected   Parainfluenza 1 PCR Not Detected  Not Detected   Parainfluenza 2 PCR Not Detected  Not Detected   Parainfluenza 3 PCR Not Detected  Not Detected   Parainfluenza 4 PCR Not Detected  Not Detected   Resp Syncytial 
Pulmonary Critical Care Progress Note    Patient seen for the follow up of Atypical chest pain     Subjective:    She has improved oxygen saturation now at 97% on room air.  She was placed on BiPAP support overnight.  She had good urine output on diuresis She has improved shortness of breath.  She has occasional dry cough.  She denies chest pain.  She still smokes half a pack a day prior to presentation.   She was requesting from staff to go outside and smoke.  She is angry and threatening to leave.  She reports having diffuse redness and rash related to her gown/bed claims she has significant allergies    Examination:    Vitals: BP (!) 124/98   Pulse 98   Temp 97.2 °F (36.2 °C) (Temporal)   Resp 20   Ht 1.626 m (5' 4\")   Wt 128.8 kg (284 lb)   LMP 2020   SpO2 95%   BMI 48.75 kg/m²   SpO2  Av.1 %  Min: 90 %  Max: 97 %  General appearance: alert and cooperative with exam  Neck: No JVD  Lungs: Decreased breath sound mild crackle  Heart: regular rate and rhythm, S1, S2 normal, no gallop  Abdomen: Soft, non tender, + BS  Extremities: no cyanosis or clubbing. No significant edema    LABs:    CBC:   Recent Labs     24  0402   WBC 15.1*   HGB 14.8   HCT 47.0          BMP:   Recent Labs     24  0402 24  0559    141   K 4.7 3.7   CO2 34* 37*   BUN 22* 24*   CREATININE 0.8 0.6   LABGLOM 90 >90   GLUCOSE 314* 133*        Latest Reference Range & Units 24 01:20   Chlamydia pneumoniae By PCR Not Detected  Not Detected   Rhino/Enterovirus PCR Not Detected  Not Detected   Human Metapneumovirus PCR Not Detected  Not Detected   Adenovirus PCR Not Detected  Not Detected   B Pertussis by PCR Not Detected  Not Detected   Coronavirus 229E PCR Not Detected  Not Detected   Coronavirus HKU1 PCR Not Detected  Not Detected   Coronavirus NL63 PCR Not Detected  Not Detected   Coronavirus OC Not Detected  Not Detected   Influenza A by PCR Not Detected  Not Detected   Influenza B by PCR Not 
SPIRITUAL CARE DEPARTMENT - [facility]  PROGRESS NOTE    Room # 2036/2036-01   Name: Gaurang Fiore              Reason for visit: Routine    I visited the patient.    Admit Date & Time: 3/28/2024 10:18 PM    Assessment:  Gaurang Fiore is a 48 y.o. female.  Upon entering the room patient was sleeping.      Intervention:   provided a ministry presence and brief prayer.    Outcome:  Patient did not respond.    Plan:  Chaplains will remain available to offer spiritual and emotional support as needed.    Electronically signed by Chaplain Roseline, on 4/5/2024 at 1:56 PM.  Spiritual Care Department  [facility]      04/05/24 1355   Encounter Summary   Service Provided For: Patient   Referral/Consult From: Yulissa   Last Encounter  04/05/24   Complexity of Encounter Low   Begin Time 1057   End Time  1058   Total Time Calculated 1 min   Assessment/Intervention/Outcome   Assessment Unable to assess   Intervention Prayer (assurance of)/[place];Sustaining Presence/Ministry of presence       
[facility]  Office: [phone]  Migue Leonard DO, Tripp Shaffer, DO, Won Slaughter DO, Chapincito Hathaway, DO, Wade Torres MD, Amira Polk MD, Shannan Garrett MD, Ivon Pena MD,  Jason Loera MD, Michael Arana MD, Jose Duncan MD,  Jayda Herr DO, Paris Posada MD, Freddy Esqueda MD, Colten Leonard DO, Maryellen Silveira MD,  Manjit Mcdonough DO, Valentine Sanabria MD, Cherelle Alex MD, Corrie Hassan MD, Alhaji Juarez MD,  Rich Romero MD, Donte Velasquez MD, Priscila Avendaño MD, Holley Laboy MD, Max Andrew MD, Faith Esteban MD, Charly Alvarado DO, Rc Garber DO, Puja Bautista MD,  Papa Guerrero MD, Shirley Waterhouse, CNP,  Caren Bianchi, CNP, Filippo Pearl, CNP,  Magalie Urena, DNP, Elizabeth Barton, CNP, Rosa Guardado, CNP, Tiff Fletcher, CNP, Gracia Tanner, CNP, Ariana Nicholson, PA-C, Pari Warren PA-C, Aicha Yang, CNP, Luz Woodard, CNP, Amanda Lerner, CNP, Katt Street, CNP, Danii Mcmahan, CNP, Marie Orellana, CNS, Rose Mary Jerez, CNP, Kala Aguilar CNP, Tracy Schwab, CNP       [facility]      Daily Progress Note     Admit Date: 3/28/2024  Bed/Room No.  2036/2036-01  Admitting Physician : Max Andrew MD  Code Status :Full Code  Hospital Day:  LOS: 6 days   Chief Complaint:     Chief Complaint   Patient presents with    Chest Pain     Mid sternal, was seen here earlier this week for same, was seen by Dr. Goldberger who wanted to admit patient, but patient could not due to family situation. Pt has follow up appointment with PCP tomorrow.     Shortness of Breath     With exertion with chest pain.      Principal Problem:    Atypical chest pain  Active Problems:    Panic attacks    Dyspnea on exertion    Essential hypertension    Hyperlipidemia    Former smoker    Neuropathy    Muscle spasm    Small pleural effusion    Obesity hypoventilation syndrome (HCC)    Prediabetes    Acute heart failure with preserved ejection fraction (HFpEF) 
[facility]  Office: [phone]  Migue Leonard, DO, Tripp Shaffer, DO, Won Slaughter DO, Chapincito Hathaway, DO, Wade Torres MD, Amira Polk MD, Shnanan Garrett MD, Ivon Pena MD,  Jason Loera MD, Michael Arana MD, Jose Duncan MD,  Jayda Herr DO, Paris Posada MD, Freddy Esqueda MD, Colten Leonard DO, Maryellen Silveira MD,  Manjit Mcdonough DO, Valentine Sanabria MD, Cherelle Alex MD, Corrie Hassan MD, Alhaji Juarez MD,  Rich Romero MD, Donte Velasquez MD, Priscila Avendaño MD, Holley Laboy MD, Max Andrew MD, Faith Esteban MD, Charly Alvarado DO, Rc Garber DO, Puja Bautista MD,  Papa Guerrero MD, Shirley Waterhouse, CNP,  Caren Bianchi CNP, Filippo Pearl, CNP,  Magalie Urena, DNP, Elizabeth Barton, CNP, Rosa Guardado, CNP, Tiff Flecther, CNP, Gracia Tanner, CNP, Ariana Nicholson, PA-C, Pari Warren PA-C, Aicha Yang, CNP, Luz Woodard, CNP, Amanda Lerner, CNP, Katt Street, CNP, Danii Mcmahan, CNP, Marie Orellana, CNS, Rose Mary Jerez, CNP, Kala Aguilar CNP, Tracy Schwab, CNP         [facility]   IN-PATIENT SERVICE   [facility]    Progress Note    4/1/2024    2:17 PM    Name:   Gaurang Fiore  MRN:     [id number]     Acct:      [id number]   Room:   2036/2036-01  IP Day:  3  Admit Date:  3/28/2024 10:18 PM    PCP:   Cristina Bernal DO  Code Status:  Full Code    Subjective:     Pt not improving. Still hypoxemic at rest and with ambulation. Has no chest pain, fevers, chills, nausea, vomiting or diarrhea. No chest pain. She still has a high oxygen requirement       Brief History:     This is a 48-year-old female who presented to the hospital for evaluation of chest pain and shortness of breath.  Initially was concern for ACS however EKG was negative for ischemic changes and serial troponin have been negative.  Echocardiogram showed a preserved LVEF with no significant valvular abnormalities.  Patient had stress test, pulmonary was 
Transitions of Care Pharmacy Service   Medication Review    The patient's list of current home medications has been reviewed.     Source(s) of information: spoke to patient and sure scripts     Based on information provided by the above source(s), I have updated the patient's home med list as described below.       I changed or updated the following medications on the patient's home medication list:  Discontinued amphetamine-dextroamphetamine (ADDERALL) 20 MG tablet  gabapentin (NEURONTIN) 800 MG tablet  Bupropion  mg tablet      Added albuterol sulfate HFA (VENTOLIN HFA) 108 (90 Base) MCG/ACT inhaler  ibuprofen (ADVIL;MOTRIN) 800 MG tablet  ketorolac (TORADOL) 10 MG tablet  lidocaine (LIDODERM) 5 %  omeprazole (PRILOSEC) 20 MG delayed release capsule  Berberine Chloride (BERBERINE HCI) 500 MG CAPS  gabapentin (NEURONTIN) 600 MG tablet     Adjusted   none     Other Notes None            Please feel free to call me with any questions about this encounter. Thank you.    This note will be reviewed and co-signed by the Transitions of Care Pharmacist. The pharmacist will review inpatient orders and contact the physician about any discrepancies.    Marcelo Caraballo, pharmacy technician  Transitions of Care Pharmacy Service  Phone:  [phone]  Fax: [phone]      Electronically signed by Marcelo Caraballo on 4/2/2024 at 11:08 AM       Prior to Admission medications    Medication Sig   albuterol sulfate HFA (VENTOLIN HFA) 108 (90 Base) MCG/ACT inhaler Inhale 2 puffs into the lungs every 6 hours as needed for Wheezing   ibuprofen (ADVIL;MOTRIN) 800 MG tablet Take 1 tablet by mouth every 8 hours as needed for Pain   ketorolac (TORADOL) 10 MG tablet Take 1 tablet by mouth every 6 hours as needed for Pain   lidocaine (LIDODERM) 5 % Place 1 patch onto the skin daily 12 hours on, 12 hours off to back area   omeprazole (PRILOSEC) 20 MG delayed release capsule Take 1 capsule by mouth daily   Berberine Chloride (BERBERINE 
Upon shift assessment, patient stated 10/10 chest pain, EKG ordered and preformed, patient placed on 2L NC. Message sent to Dr. Herr, Dr. Herr at bedside, orders received. Dr. Long consulted per Dr. Herr, will continue to monitor.   
Detected  Not Detected   Parainfluenza 4 PCR Not Detected  Not Detected   Resp Syncytial Virus PCR Not Detected  Not Detected   Mycoplasma pneumo by PCR Not Detected  Not Detected   Bordetella parapertussis by PCR Not Detected  Not Detected   SARS-CoV-2, PCR Not Detected  Not Detected      Latest Reference Range & Units 03/26/24 22:35 03/28/24 22:50 03/29/24 08:53 03/29/24 15:06 03/29/24 22:42   Pro-BNP <300 pg/mL  113      Troponin, High Sensitivity 0 - 14 ng/L 7 8 7 8 10      Latest Reference Range & Units 03/26/24 22:35   D-Dimer, Quant 0.00 - 0.59 ug/mL FEU 0.34      Latest Reference Range & Units 03/30/24 15:25   POC TCO2 22 - 30 mmol/L 31 (H)   POC HCO3 21.0 - 28.0 mmol/L 31.7 (H)   POC O2 SAT 94.0 - 98.0 % 97.4   POC pCO2 35.0 - 48.0 mm Hg 56.2 (H)   POC pH 7.350 - 7.450  7.359   POC PO2 83.0 - 108.0 mm Hg 101.0   (H): Data is abnormally high   Latest Reference Range & Units 04/02/24 03:40   Rheumatoid Factor 0 - 13 IU/mL <10      Latest Reference Range & Units 03/26/24 22:35   D-Dimer, Quant 0.00 - 0.59 ug/mL FEU 0.34     Radiology:  CXR 4/2  Improving perihilar airspace disease suggesting improving pulmonary edema.       Chest x-ray  Low lung volumes with increased opacities throughout the bilateral lower lung  zones, which could represent atelectasis versus pneumonia.    CT chest 3/28/24  No PE  Basilar atelectasis.  The lungs are without acute process.  No  focal consolidation or pulmonary edema.  No evidence of pleural effusion or  pneumothorax.      Stress test    Stress Combined Conclusion: The study is negative for myocardial ischemia. Findings suggest a low risk of cardiac events.    Stress Function: Left ventricular function post-stress is normal. Post-stress ejection fraction is 67%. The stress end diastolic cavity size is normal. The stress end systolic cavity size is normal.    Perfusion Comments: Prone images were obtained. Prone imaging was helpful in correcting soft tissue attenuation. LV 
  RDW 14.3 14.2 14.1    174 183   MPV 11.5 10.8 10.9   INR 1.1  --   --        Chemistry:  Recent Labs     03/28/24  2250 03/28/24  2250 03/29/24  0246 03/29/24  0853 03/29/24  1506 03/29/24  2242 03/30/24  0304 03/31/24  0640     --  140  --   --   --  142 139   K 3.9  --  3.7  --   --  3.8 4.2 4.0     --  101  --   --   --  102 102   CO2 30  --  28  --   --   --  34* 30   GLUCOSE 95  --  100*  --   --   --  105* 136*   BUN 14  --  13  --   --   --  16 12   CREATININE 0.7  --  0.6  --   --   --  0.6 0.5   MG  --    < > 1.8  --   --  1.9 2.1 2.0   ANIONGAP 9  --  11  --   --   --  6* 7*   LABGLOM >90  --  >90  --   --   --  >90 >90   CALCIUM 9.2  --  9.2  --   --   --  8.8 8.9   PROBNP 113  --   --   --   --   --   --   --    TROPHS 8  --   --  7 8 10  --   --     < > = values in this interval not displayed.       Recent Labs     03/29/24  0853 03/30/24  0304 03/31/24  0826   LABA1C 5.8  --   --    CHOL  --  146  --    HDL  --  42  --    LDLCHOLESTEROL  --  78  --    CHOLHDLRATIO  --  4.0  --    TRIG  --  132  --    VLDL  --  26  --    POCGLU  --   --  120*       ABG:  Lab Results   Component Value Date/Time    POCPH 7.359 03/30/2024 03:25 PM    POCPCO2 56.2 03/30/2024 03:25 PM    POCPO2 101.0 03/30/2024 03:25 PM    POCHCO3 31.7 03/30/2024 03:25 PM    PBEA 4.4 03/30/2024 03:25 PM    MLLF0CMO 97.4 03/30/2024 03:25 PM    FIO2 36.0 03/30/2024 03:25 PM     Lab Results   Component Value Date/Time    SPECIAL NOT REPORTED 09/26/2019 04:02 PM     Lab Results   Component Value Date/Time    CULTURE NO GROWTH 03/29/2018 10:19 AM    CULTURE  03/29/2018 10:19 AM     [facility] [address] [place] [address] [id number] [phone]       Radiology:  XR CHEST PORTABLE    Result Date: 3/30/2024  No acute process.     CT CHEST PULMONARY EMBOLISM W CONTRAST    Result Date: 3/29/2024  No evidence of pulmonary embolism or acute pulmonary abnormality.     XR CHEST PORTABLE    Result Date: 3/28/2024  Congestive heart 
03/30/24  0304    140  --   --   --  142   K 3.9 3.7  --   --  3.8 4.2    101  --   --   --  102   CO2 30 28  --   --   --  34*   GLUCOSE 95 100*  --   --   --  105*   BUN 14 13  --   --   --  16   CREATININE 0.7 0.6  --   --   --  0.6   MG  --  1.8  --   --  1.9 2.1   ANIONGAP 9 11  --   --   --  6*   LABGLOM >90 >90  --   --   --  >90   CALCIUM 9.2 9.2  --   --   --  8.8   PROBNP 113  --   --   --   --   --    TROPHS 8  --  7 8 10  --      Recent Labs     03/29/24  0853 03/30/24  0304   LABA1C 5.8  --    CHOL  --  146   HDL  --  42   LDLCHOLESTEROL  --  78   CHOLHDLRATIO  --  4.0   TRIG  --  132   VLDL  --  26     ABG:No results found for: \"POCPH\", \"PHART\", \"PH\", \"POCPCO2\", \"ZPU6GYP\", \"PCO2\", \"POCPO2\", \"PO2ART\", \"PO2\", \"POCHCO3\", \"DEU8QDU\", \"HCO3\", \"NBEA\", \"PBEA\", \"BEART\", \"BE\", \"THGBART\", \"THB\", \"GMP7TAW\", \"OBEL2IEX\", \"A2WPFLYT\", \"O2SAT\", \"FIO2\"  Lab Results   Component Value Date/Time    SPECIAL NOT REPORTED 09/26/2019 04:02 PM     Lab Results   Component Value Date/Time    CULTURE NO GROWTH 03/29/2018 10:19 AM    CULTURE  03/29/2018 10:19 AM     [facility] [address] [place] [id number] [phone]       Radiology:  XR CHEST PORTABLE    Result Date: 3/30/2024  No acute process.     CT CHEST PULMONARY EMBOLISM W CONTRAST    Result Date: 3/29/2024  No evidence of pulmonary embolism or acute pulmonary abnormality.     XR CHEST PORTABLE    Result Date: 3/28/2024  Congestive heart failure is most likely given the radiographic findings; associated sub pulmonic pleural effusion.     XR CHEST PORTABLE    Result Date: 3/26/2024  No acute process.       Physical Examination:     General appearance:  alert, cooperative and no distress  Mental Status:  oriented to person, place and time and normal affect  Lungs:  clear to auscultation bilaterally, normal effort  Heart:  regular rate and rhythm, no murmur  Abdomen:  soft, nontender, nondistended, normal bowel sounds, no masses, hepatomegaly, 
needed.  Completed azithromycin for 3 days for bronchitis.  Low salt diet, fluid restriction .  CXR showed persistent pulmonary edema. Continue lasix. Add Toprol XL  Atypical chest pain likely secondary to panic attack.  Stress test negative.  Echo normal.  Morbid obesity-recommend medical nutrition therapy, lifestyle change.  Prediabetes -start metformin 500 mg daily.  Anxiety/depression/bipolar disorder -patient on Klonopin, bupropion.     Hold discharge until oxygenation and breathing better . May need Trilogy.   Discussed with Pulmonary   Change solumedrol to prednisone.      Plan and updates discussed with patient ,  answers  explained to satisfaction.   Plan discussed with Staff Cortney BEEBE     (Please note that portions of this note were completed with a voice recognition program. Efforts were made to edit the dictations but occasionally words are mis-transcribed.)      Shannan Garrett MD  4/3/2024

## 2024-04-05 NOTE — FLOWSHEET NOTE
04/05/24 0841   Treatment Team Notification   Reason for Communication Evaluate   Name of Team Member Notified Dr Andrew   Treatment Team Role Attending Provider   Method of Communication Secure Message   Response See orders   Notification Time 0843     Notified MD of patient having continuous headache with no relief for 8 days. She is tearful. Patient is also complaining of dry throat and heart burn. RN has given tylenol and fioricet with no relief. Asked if there is anything else we can have ordered for her?     MD placed orders for prn roxicodone, which was given to patient and helped relieve headache pain to a tolerable level.

## 2024-04-06 LAB
MICROORGANISM SPEC CULT: NORMAL
MICROORGANISM SPEC CULT: NORMAL
SERVICE CMNT-IMP: NORMAL
SERVICE CMNT-IMP: NORMAL
SPECIMEN DESCRIPTION: NORMAL
SPECIMEN DESCRIPTION: NORMAL

## 2024-04-06 NOTE — DISCHARGE SUMMARY
medications prescribed for you. Read the directions carefully, and ask your doctor or other care provider to review them with you.                CONTINUE taking these medications      amLODIPine 5 MG tablet  Commonly known as: NORVASC     atorvastatin 20 MG tablet  Commonly known as: LIPITOR     Berberine  MG Caps  Generic drug: Berberine Chloride     cyclobenzaprine 10 MG tablet  Commonly known as: FLEXERIL     gabapentin 600 MG tablet  Commonly known as: NEURONTIN     lidocaine 5 %  Commonly known as: LIDODERM     omeprazole 20 MG delayed release capsule  Commonly known as: PRILOSEC            STOP taking these medications      buPROPion 150 MG extended release tablet  Commonly known as: WELLBUTRIN XL     clonazePAM 1 MG tablet  Commonly known as: KLONOPIN     ibuprofen 800 MG tablet  Commonly known as: ADVIL;MOTRIN     ketorolac 10 MG tablet  Commonly known as: TORADOL     meloxicam 15 MG tablet  Commonly known as: MOBIC               Where to Get Your Medications        These medications were sent to [facility] [place], [place] - [address] [place] [address] -  [phone] -  [phone]  [address] [place], [place] [id number]      Phone: [phone]   albuterol sulfate  (90 Base) MCG/ACT inhaler  fluticasone 50 MCG/ACT nasal spray  furosemide 20 MG tablet  metFORMIN 500 MG extended release tablet  metoprolol succinate 25 MG extended release tablet  predniSONE 20 MG tablet         Discharge Procedure Orders   [facility] - Cici Main MD, Pulmonology, [place]   Referral Priority: Routine Referral Type: Eval and Treat   Referral Reason: Specialty Services Required   Referred to Provider: CICI MAIN Requested Specialty: Pulmonology   Number of Visits Requested: 1     Catalina Rowan CNP, Family Medicine, [facility]   Referral Priority: Routine Referral Type: Eval and Treat   Referral Reason: Specialty Services Required   Referred to Provider: CATALINA LYLE Requested Specialty: 
Certified Nurse Practitioner   Number of Visits Requested: 1     IA POLYSOM 6/>YRS SLEEP W/CPAP 4/> ADDL JOSE ATTND       Time Spent on discharge is  36 mins in patient examination, evaluation, counseling as well as medication reconciliation, prescriptions for required medications, discharge plan and follow up.    Electronically signed by   Max Andrew MD  4/5/2024  4:21 PM      Thank you Cristina Kang DO for the opportunity to be involved in this patient's care.

## 2024-05-03 ENCOUNTER — HOSPITAL ENCOUNTER (EMERGENCY)
Age: 49
Discharge: HOME | End: 2024-05-03
Attending: EMERGENCY MEDICINE
Payer: COMMERCIAL

## 2024-05-03 ENCOUNTER — APPOINTMENT (OUTPATIENT)
Dept: CT IMAGING | Age: 49
End: 2024-05-03
Payer: COMMERCIAL

## 2024-05-03 VITALS
OXYGEN SATURATION: 94 % | TEMPERATURE: 98.3 F | RESPIRATION RATE: 18 BRPM | HEART RATE: 103 BPM | WEIGHT: 289 LBS | SYSTOLIC BLOOD PRESSURE: 151 MMHG | HEIGHT: 64 IN | BODY MASS INDEX: 49.34 KG/M2 | DIASTOLIC BLOOD PRESSURE: 98 MMHG

## 2024-05-03 DIAGNOSIS — R10.9 FLANK PAIN: Primary | ICD-10-CM

## 2024-05-03 DIAGNOSIS — N63.0 BREAST NODULE: ICD-10-CM

## 2024-05-03 LAB
ALBUMIN SERPL-MCNC: 4.4 G/DL (ref 3.5–5.2)
ALP SERPL-CCNC: 88 U/L (ref 35–104)
ALT SERPL-CCNC: 45 U/L (ref 5–33)
ANION GAP SERPL CALCULATED.3IONS-SCNC: 12 MMOL/L (ref 9–17)
AST SERPL-CCNC: 30 U/L
BASOPHILS # BLD: 0.04 K/UL (ref 0–0.2)
BASOPHILS NFR BLD: 1 % (ref 0–2)
BILIRUB SERPL-MCNC: 0.3 MG/DL (ref 0.3–1.2)
BILIRUB UR QL STRIP: NEGATIVE
BUN SERPL-MCNC: 16 MG/DL (ref 6–20)
BUN/CREAT SERPL: 20 (ref 9–20)
CALCIUM SERPL-MCNC: 9.5 MG/DL (ref 8.6–10.4)
CHLORIDE SERPL-SCNC: 104 MMOL/L (ref 98–107)
CLARITY UR: CLEAR
CO2 SERPL-SCNC: 26 MMOL/L (ref 20–31)
COLOR UR: YELLOW
COMMENT: ABNORMAL
CREAT SERPL-MCNC: 0.8 MG/DL (ref 0.5–0.9)
EOSINOPHIL # BLD: 0.43 K/UL (ref 0–0.44)
EOSINOPHILS RELATIVE PERCENT: 6 % (ref 1–4)
ERYTHROCYTE [DISTWIDTH] IN BLOOD BY AUTOMATED COUNT: 15.5 % (ref 11.8–14.4)
GFR, ESTIMATED: 90 ML/MIN/1.73M2
GLUCOSE SERPL-MCNC: 111 MG/DL (ref 70–99)
GLUCOSE UR STRIP-MCNC: NEGATIVE MG/DL
HCT VFR BLD AUTO: 42.8 % (ref 36.3–47.1)
HGB BLD-MCNC: 13.7 G/DL (ref 11.9–15.1)
HGB UR QL STRIP.AUTO: NEGATIVE
IMM GRANULOCYTES # BLD AUTO: 0.12 K/UL (ref 0–0.3)
IMM GRANULOCYTES NFR BLD: 2 %
KETONES UR STRIP-MCNC: ABNORMAL MG/DL
LEUKOCYTE ESTERASE UR QL STRIP: NEGATIVE
LIPASE SERPL-CCNC: 35 U/L (ref 13–60)
LYMPHOCYTES NFR BLD: 2.47 K/UL (ref 1.1–3.7)
LYMPHOCYTES RELATIVE PERCENT: 33 % (ref 24–43)
MCH RBC QN AUTO: 30.7 PG (ref 25.2–33.5)
MCHC RBC AUTO-ENTMCNC: 32 G/DL (ref 28.4–34.8)
MCV RBC AUTO: 96 FL (ref 82.6–102.9)
MONOCYTES NFR BLD: 0.6 K/UL (ref 0.1–1.2)
MONOCYTES NFR BLD: 8 % (ref 3–12)
NEUTROPHILS NFR BLD: 50 % (ref 36–65)
NEUTS SEG NFR BLD: 3.9 K/UL (ref 1.5–8.1)
NITRITE UR QL STRIP: NEGATIVE
NRBC BLD-RTO: 0.7 PER 100 WBC
PH UR STRIP: 6 [PH] (ref 5–8)
PLATELET # BLD AUTO: 229 K/UL (ref 138–453)
PMV BLD AUTO: 11 FL (ref 8.1–13.5)
POTASSIUM SERPL-SCNC: 4.1 MMOL/L (ref 3.7–5.3)
PROT SERPL-MCNC: 6.9 G/DL (ref 6.4–8.3)
PROT UR STRIP-MCNC: NEGATIVE MG/DL
RBC # BLD AUTO: 4.46 M/UL (ref 3.95–5.11)
RBC # BLD: ABNORMAL 10*6/UL
SODIUM SERPL-SCNC: 142 MMOL/L (ref 135–144)
SP GR UR STRIP: 1.02 (ref 1–1.03)
UROBILINOGEN UR STRIP-ACNC: NORMAL EU/DL (ref 0–1)
WBC OTHER # BLD: 7.6 K/UL (ref 3.5–11.3)

## 2024-05-03 PROCEDURE — 74176 CT ABD & PELVIS W/O CONTRAST: CPT

## 2024-05-03 PROCEDURE — 99284 EMERGENCY DEPT VISIT MOD MDM: CPT

## 2024-05-03 PROCEDURE — 96375 TX/PRO/DX INJ NEW DRUG ADDON: CPT

## 2024-05-03 PROCEDURE — 81003 URINALYSIS AUTO W/O SCOPE: CPT

## 2024-05-03 PROCEDURE — 83690 ASSAY OF LIPASE: CPT

## 2024-05-03 PROCEDURE — 85025 COMPLETE CBC W/AUTO DIFF WBC: CPT

## 2024-05-03 PROCEDURE — 96374 THER/PROPH/DIAG INJ IV PUSH: CPT

## 2024-05-03 PROCEDURE — 2580000003 HC RX 258: Performed by: EMERGENCY MEDICINE

## 2024-05-03 PROCEDURE — 80053 COMPREHEN METABOLIC PANEL: CPT

## 2024-05-03 PROCEDURE — 6360000002 HC RX W HCPCS: Performed by: EMERGENCY MEDICINE

## 2024-05-03 RX ORDER — 0.9 % SODIUM CHLORIDE 0.9 %
1000 INTRAVENOUS SOLUTION INTRAVENOUS ONCE
Status: COMPLETED | OUTPATIENT
Start: 2024-05-03 | End: 2024-05-03

## 2024-05-03 RX ORDER — KETOROLAC TROMETHAMINE 30 MG/ML
30 INJECTION, SOLUTION INTRAMUSCULAR; INTRAVENOUS ONCE
Status: COMPLETED | OUTPATIENT
Start: 2024-05-03 | End: 2024-05-03

## 2024-05-03 RX ORDER — MORPHINE SULFATE 2 MG/ML
2 INJECTION, SOLUTION INTRAMUSCULAR; INTRAVENOUS ONCE
Status: COMPLETED | OUTPATIENT
Start: 2024-05-03 | End: 2024-05-03

## 2024-05-03 RX ORDER — HYDROCODONE BITARTRATE AND ACETAMINOPHEN 5; 325 MG/1; MG/1
1 TABLET ORAL EVERY 6 HOURS PRN
Qty: 5 TABLET | Refills: 0 | Status: SHIPPED | OUTPATIENT
Start: 2024-05-03 | End: 2024-05-06

## 2024-05-03 RX ADMIN — MORPHINE SULFATE 2 MG: 2 INJECTION, SOLUTION INTRAMUSCULAR; INTRAVENOUS at 19:56

## 2024-05-03 RX ADMIN — SODIUM CHLORIDE 1000 ML: 9 INJECTION, SOLUTION INTRAVENOUS at 19:28

## 2024-05-03 RX ADMIN — KETOROLAC TROMETHAMINE 30 MG: 30 INJECTION, SOLUTION INTRAMUSCULAR at 19:42

## 2024-05-03 ASSESSMENT — PAIN SCALES - GENERAL
PAINLEVEL_OUTOF10: 10

## 2024-05-03 ASSESSMENT — ENCOUNTER SYMPTOMS
DIARRHEA: 0
EYE PAIN: 0
COUGH: 0
SHORTNESS OF BREATH: 0
ABDOMINAL PAIN: 1
SORE THROAT: 0
EYE REDNESS: 0
VOMITING: 0
BACK PAIN: 0

## 2024-05-03 ASSESSMENT — PAIN DESCRIPTION - LOCATION
LOCATION: ABDOMEN;FLANK
LOCATION: ABDOMEN;FLANK

## 2024-05-03 ASSESSMENT — PAIN - FUNCTIONAL ASSESSMENT: PAIN_FUNCTIONAL_ASSESSMENT: 0-10

## 2024-05-03 ASSESSMENT — PAIN DESCRIPTION - ORIENTATION
ORIENTATION: RIGHT
ORIENTATION: RIGHT

## 2024-05-03 ASSESSMENT — PAIN DESCRIPTION - DESCRIPTORS
DESCRIPTORS: CRAMPING
DESCRIPTORS: CRAMPING

## 2024-05-03 NOTE — ED PROVIDER NOTES
[facility]  EMERGENCY MEDICINE     Pt Name: Gaurang Fiore  MRN: [id number]  Birthdate 1975  Date of evaluation: 5/3/2024  PCP:    Cristina Bernal DO  Provider: Colten Fontanez DO    CHIEF COMPLAINT       Chief Complaint   Patient presents with    Back Pain    Shortness of Breath     Pt here with right sided back pain, pt states pain is worse with breathing, denies chest pain, pain worse with movement    Flank Pain       HISTORY OF PRESENT ILLNESS    Patient is 48-year-old female with the presents secondary to right sided pain.  Patient states this pain started this morning.  Patient states pain is worse with movement of her body.  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  Denies any history of kidney stones.  Denies any vomiting diarrhea or constipation.  Patient denies any chest pain.  Per chart, patient had negative CT PE study in March 2024.         Triage notes and Nursing notes were reviewed by myself.  Any discrepancies are addressed above.    PAST MEDICAL HISTORY     Past Medical History:   Diagnosis Date    Abnormal uterine bleeding 05/2018    Anxiety     Bipolar 1 disorder (HCC)     No Rx.     Blood clot in vein 2000    right calf    Fibroid 05/2018    Finger pain, right     Fracture, finger     right small finger    Panic attacks     Snores     Under care of team     No PCP       SURGICAL HISTORY       Past Surgical History:   Procedure Laterality Date    FINGER CLOSED REDUCTION Right 06/01/2021    CLOSED REDUCTION PERCUTANEOUS  PINNING, C-ARM (Right    FINGER SURGERY Right 6/1/2021    CLOSED REDUCTION PERCUTANEOUS  PINNING , RIGHT LITTLE FINGER , SPLINT C-ARM performed by Rafaela Paiz MD at [facility] OR    HAND SURGERY Right 11/01/2021    MANIPULATION UNDER ANESTHESIA SMALL FINGER (Right )    HYSTERECTOMY (CERVIX STATUS UNKNOWN)  01/07/2021    [facility]    HYSTEROSCOPY N/A 05/17/2019    HYSTEROSCOPY WITH A D AND C performed by Sophie Cortez DO at [facility] OR    TUBAL LIGATION  1994       CURRENT

## 2024-05-03 NOTE — ED NOTES
RN at bedside, patient pulse oxygenation reading is between 84-86% on room air. Patient falling asleep easily but arousable. Patient reports she has been using oxygen 6L via NC at home since her hospital discharge. RN alerts doctor and holds morphine administration. Physician at bedside, patient taken off oxygen and monitoring oxygen levels. Patient maintains oxygen levels at 92-95% on room air and doctor okay with morphine administration at this time. Patient tolerating well.

## 2024-05-04 NOTE — DISCHARGE INSTRUCTIONS
Will need to follow up for mammography for breast nodule. Use lidocaine patches for pain. Return with worsening symptoms.

## 2024-05-16 ENCOUNTER — HOSPITAL ENCOUNTER (INPATIENT)
Age: 49
LOS: 3 days | Discharge: HOME | DRG: 291 | End: 2024-05-19
Attending: EMERGENCY MEDICINE | Admitting: INTERNAL MEDICINE
Payer: COMMERCIAL

## 2024-05-16 ENCOUNTER — APPOINTMENT (OUTPATIENT)
Dept: GENERAL RADIOLOGY | Age: 49
DRG: 291 | End: 2024-05-16
Payer: COMMERCIAL

## 2024-05-16 DIAGNOSIS — R06.00 DYSPNEA, UNSPECIFIED TYPE: Primary | ICD-10-CM

## 2024-05-16 DIAGNOSIS — R09.02 HYPOXIA: ICD-10-CM

## 2024-05-16 DIAGNOSIS — G89.29 CHRONIC INTRACTABLE HEADACHE, UNSPECIFIED HEADACHE TYPE: ICD-10-CM

## 2024-05-16 DIAGNOSIS — R51.9 CHRONIC INTRACTABLE HEADACHE, UNSPECIFIED HEADACHE TYPE: ICD-10-CM

## 2024-05-16 PROBLEM — J96.21 ACUTE ON CHRONIC HYPOXIC RESPIRATORY FAILURE (HCC): Status: ACTIVE | Noted: 2024-05-16

## 2024-05-16 LAB
ALBUMIN SERPL-MCNC: 3.9 G/DL (ref 3.5–5.2)
ALP SERPL-CCNC: 68 U/L (ref 35–104)
ALT SERPL-CCNC: 34 U/L (ref 5–33)
ANION GAP SERPL CALCULATED.3IONS-SCNC: 9 MMOL/L (ref 9–17)
AST SERPL-CCNC: 30 U/L
BASOPHILS # BLD: 0.04 K/UL (ref 0–0.2)
BASOPHILS NFR BLD: 1 % (ref 0–2)
BILIRUB DIRECT SERPL-MCNC: <0.1 MG/DL
BILIRUB INDIRECT SERPL-MCNC: ABNORMAL MG/DL (ref 0–1)
BILIRUB SERPL-MCNC: 0.3 MG/DL (ref 0.3–1.2)
BNP SERPL-MCNC: 777 PG/ML
BUN SERPL-MCNC: 12 MG/DL (ref 6–20)
BUN/CREAT SERPL: 17 (ref 9–20)
CALCIUM SERPL-MCNC: 8.9 MG/DL (ref 8.6–10.4)
CHLORIDE SERPL-SCNC: 106 MMOL/L (ref 98–107)
CO2 SERPL-SCNC: 27 MMOL/L (ref 20–31)
CREAT SERPL-MCNC: 0.7 MG/DL (ref 0.5–0.9)
CRP SERPL HS-MCNC: <3 MG/L (ref 0–5)
EOSINOPHIL # BLD: 0.22 K/UL (ref 0–0.44)
EOSINOPHILS RELATIVE PERCENT: 3 % (ref 1–4)
ERYTHROCYTE [DISTWIDTH] IN BLOOD BY AUTOMATED COUNT: 15.6 % (ref 11.8–14.4)
GFR, ESTIMATED: >90 ML/MIN/1.73M2
GLUCOSE SERPL-MCNC: 110 MG/DL (ref 70–99)
HCT VFR BLD AUTO: 40.9 % (ref 36.3–47.1)
HGB BLD-MCNC: 12.6 G/DL (ref 11.9–15.1)
IMM GRANULOCYTES # BLD AUTO: 0.1 K/UL (ref 0–0.3)
IMM GRANULOCYTES NFR BLD: 1 %
LYMPHOCYTES NFR BLD: 2.05 K/UL (ref 1.1–3.7)
LYMPHOCYTES RELATIVE PERCENT: 29 % (ref 24–43)
MAGNESIUM SERPL-MCNC: 1.9 MG/DL (ref 1.6–2.6)
MCH RBC QN AUTO: 30.4 PG (ref 25.2–33.5)
MCHC RBC AUTO-ENTMCNC: 30.8 G/DL (ref 28.4–34.8)
MCV RBC AUTO: 98.8 FL (ref 82.6–102.9)
MONOCYTES NFR BLD: 0.6 K/UL (ref 0.1–1.2)
MONOCYTES NFR BLD: 8 % (ref 3–12)
NEUTROPHILS NFR BLD: 58 % (ref 36–65)
NEUTS SEG NFR BLD: 4.14 K/UL (ref 1.5–8.1)
NRBC BLD-RTO: 0.3 PER 100 WBC
PLATELET # BLD AUTO: 182 K/UL (ref 138–453)
PMV BLD AUTO: 10.8 FL (ref 8.1–13.5)
POTASSIUM SERPL-SCNC: 3.9 MMOL/L (ref 3.7–5.3)
PROT SERPL-MCNC: 6.2 G/DL (ref 6.4–8.3)
RBC # BLD AUTO: 4.14 M/UL (ref 3.95–5.11)
RBC # BLD: ABNORMAL 10*6/UL
SODIUM SERPL-SCNC: 142 MMOL/L (ref 135–144)
TROPONIN I SERPL HS-MCNC: 12 NG/L (ref 0–14)
WBC OTHER # BLD: 7.2 K/UL (ref 3.5–11.3)

## 2024-05-16 PROCEDURE — 85025 COMPLETE CBC W/AUTO DIFF WBC: CPT

## 2024-05-16 PROCEDURE — 6360000002 HC RX W HCPCS: Performed by: EMERGENCY MEDICINE

## 2024-05-16 PROCEDURE — 6370000000 HC RX 637 (ALT 250 FOR IP): Performed by: EMERGENCY MEDICINE

## 2024-05-16 PROCEDURE — 94640 AIRWAY INHALATION TREATMENT: CPT

## 2024-05-16 PROCEDURE — 80048 BASIC METABOLIC PNL TOTAL CA: CPT

## 2024-05-16 PROCEDURE — 36415 COLL VENOUS BLD VENIPUNCTURE: CPT

## 2024-05-16 PROCEDURE — 83735 ASSAY OF MAGNESIUM: CPT

## 2024-05-16 PROCEDURE — 93005 ELECTROCARDIOGRAM TRACING: CPT | Performed by: EMERGENCY MEDICINE

## 2024-05-16 PROCEDURE — 96374 THER/PROPH/DIAG INJ IV PUSH: CPT

## 2024-05-16 PROCEDURE — 2700000000 HC OXYGEN THERAPY PER DAY

## 2024-05-16 PROCEDURE — 2060000000 HC ICU INTERMEDIATE R&B

## 2024-05-16 PROCEDURE — 94761 N-INVAS EAR/PLS OXIMETRY MLT: CPT

## 2024-05-16 PROCEDURE — 86140 C-REACTIVE PROTEIN: CPT

## 2024-05-16 PROCEDURE — 80076 HEPATIC FUNCTION PANEL: CPT

## 2024-05-16 PROCEDURE — 99223 1ST HOSP IP/OBS HIGH 75: CPT | Performed by: INTERNAL MEDICINE

## 2024-05-16 PROCEDURE — 84484 ASSAY OF TROPONIN QUANT: CPT

## 2024-05-16 PROCEDURE — 99285 EMERGENCY DEPT VISIT HI MDM: CPT

## 2024-05-16 PROCEDURE — 71045 X-RAY EXAM CHEST 1 VIEW: CPT

## 2024-05-16 PROCEDURE — 83880 ASSAY OF NATRIURETIC PEPTIDE: CPT

## 2024-05-16 PROCEDURE — 84443 ASSAY THYROID STIM HORMONE: CPT

## 2024-05-16 RX ORDER — SODIUM CHLORIDE 0.9 % (FLUSH) 0.9 %
5-40 SYRINGE (ML) INJECTION EVERY 12 HOURS SCHEDULED
Status: DISCONTINUED | OUTPATIENT
Start: 2024-05-17 | End: 2024-05-19 | Stop reason: HOSPADM

## 2024-05-16 RX ORDER — POTASSIUM CHLORIDE 7.45 MG/ML
10 INJECTION INTRAVENOUS PRN
Status: DISCONTINUED | OUTPATIENT
Start: 2024-05-16 | End: 2024-05-19 | Stop reason: HOSPADM

## 2024-05-16 RX ORDER — SODIUM CHLORIDE 0.9 % (FLUSH) 0.9 %
5-40 SYRINGE (ML) INJECTION PRN
Status: DISCONTINUED | OUTPATIENT
Start: 2024-05-16 | End: 2024-05-19 | Stop reason: HOSPADM

## 2024-05-16 RX ORDER — MAGNESIUM SULFATE IN WATER 40 MG/ML
2000 INJECTION, SOLUTION INTRAVENOUS PRN
Status: DISCONTINUED | OUTPATIENT
Start: 2024-05-16 | End: 2024-05-19 | Stop reason: HOSPADM

## 2024-05-16 RX ORDER — POTASSIUM CHLORIDE 20 MEQ/1
40 TABLET, EXTENDED RELEASE ORAL PRN
Status: DISCONTINUED | OUTPATIENT
Start: 2024-05-16 | End: 2024-05-19 | Stop reason: HOSPADM

## 2024-05-16 RX ORDER — SODIUM CHLORIDE 9 MG/ML
INJECTION, SOLUTION INTRAVENOUS PRN
Status: DISCONTINUED | OUTPATIENT
Start: 2024-05-16 | End: 2024-05-19 | Stop reason: HOSPADM

## 2024-05-16 RX ORDER — ENOXAPARIN SODIUM 100 MG/ML
30 INJECTION SUBCUTANEOUS 2 TIMES DAILY
Status: DISCONTINUED | OUTPATIENT
Start: 2024-05-17 | End: 2024-05-19 | Stop reason: HOSPADM

## 2024-05-16 RX ORDER — POLYETHYLENE GLYCOL 3350 17 G/17G
17 POWDER, FOR SOLUTION ORAL DAILY PRN
Status: DISCONTINUED | OUTPATIENT
Start: 2024-05-16 | End: 2024-05-19 | Stop reason: HOSPADM

## 2024-05-16 RX ORDER — ACETAMINOPHEN 650 MG/1
650 SUPPOSITORY RECTAL EVERY 6 HOURS PRN
Status: DISCONTINUED | OUTPATIENT
Start: 2024-05-16 | End: 2024-05-19 | Stop reason: HOSPADM

## 2024-05-16 RX ORDER — ACETAMINOPHEN 325 MG/1
650 TABLET ORAL EVERY 6 HOURS PRN
Status: DISCONTINUED | OUTPATIENT
Start: 2024-05-16 | End: 2024-05-19 | Stop reason: HOSPADM

## 2024-05-16 RX ORDER — FUROSEMIDE 10 MG/ML
40 INJECTION INTRAMUSCULAR; INTRAVENOUS ONCE
Status: COMPLETED | OUTPATIENT
Start: 2024-05-16 | End: 2024-05-16

## 2024-05-16 RX ORDER — IPRATROPIUM BROMIDE AND ALBUTEROL SULFATE 2.5; .5 MG/3ML; MG/3ML
1 SOLUTION RESPIRATORY (INHALATION) ONCE
Status: COMPLETED | OUTPATIENT
Start: 2024-05-16 | End: 2024-05-16

## 2024-05-16 RX ORDER — ONDANSETRON 2 MG/ML
4 INJECTION INTRAMUSCULAR; INTRAVENOUS EVERY 6 HOURS PRN
Status: DISCONTINUED | OUTPATIENT
Start: 2024-05-16 | End: 2024-05-19 | Stop reason: HOSPADM

## 2024-05-16 RX ORDER — ONDANSETRON 4 MG/1
4 TABLET, ORALLY DISINTEGRATING ORAL EVERY 8 HOURS PRN
Status: DISCONTINUED | OUTPATIENT
Start: 2024-05-16 | End: 2024-05-19 | Stop reason: HOSPADM

## 2024-05-16 RX ADMIN — FUROSEMIDE 40 MG: 10 INJECTION, SOLUTION INTRAMUSCULAR; INTRAVENOUS at 22:05

## 2024-05-16 RX ADMIN — IPRATROPIUM BROMIDE AND ALBUTEROL SULFATE 1 DOSE: .5; 2.5 SOLUTION RESPIRATORY (INHALATION) at 21:22

## 2024-05-17 PROBLEM — E66.01 MORBID OBESITY WITH BMI OF 45.0-49.9, ADULT (HCC): Status: ACTIVE | Noted: 2024-05-17

## 2024-05-17 LAB
BASOPHILS # BLD: 0.04 K/UL (ref 0–0.2)
BASOPHILS NFR BLD: 1 % (ref 0–2)
D DIMER PPP FEU-MCNC: 0.75 UG/ML FEU (ref 0–0.59)
EKG ATRIAL RATE: 102 BPM
EKG P AXIS: 58 DEGREES
EKG P-R INTERVAL: 162 MS
EKG Q-T INTERVAL: 356 MS
EKG QRS DURATION: 86 MS
EKG QTC CALCULATION (BAZETT): 463 MS
EKG R AXIS: 12 DEGREES
EKG T AXIS: 59 DEGREES
EKG VENTRICULAR RATE: 102 BPM
EOSINOPHIL # BLD: 0.25 K/UL (ref 0–0.44)
EOSINOPHILS RELATIVE PERCENT: 4 % (ref 1–4)
ERYTHROCYTE [DISTWIDTH] IN BLOOD BY AUTOMATED COUNT: 15.5 % (ref 11.8–14.4)
HCT VFR BLD AUTO: 40.2 % (ref 36.3–47.1)
HGB BLD-MCNC: 12.4 G/DL (ref 11.9–15.1)
IMM GRANULOCYTES # BLD AUTO: 0.08 K/UL (ref 0–0.3)
IMM GRANULOCYTES NFR BLD: 1 %
LYMPHOCYTES NFR BLD: 2.32 K/UL (ref 1.1–3.7)
LYMPHOCYTES RELATIVE PERCENT: 33 % (ref 24–43)
MCH RBC QN AUTO: 30.4 PG (ref 25.2–33.5)
MCHC RBC AUTO-ENTMCNC: 30.8 G/DL (ref 28.4–34.8)
MCV RBC AUTO: 98.5 FL (ref 82.6–102.9)
MONOCYTES NFR BLD: 0.57 K/UL (ref 0.1–1.2)
MONOCYTES NFR BLD: 8 % (ref 3–12)
NEUTROPHILS NFR BLD: 53 % (ref 36–65)
NEUTS SEG NFR BLD: 3.68 K/UL (ref 1.5–8.1)
NRBC BLD-RTO: 0 PER 100 WBC
PLATELET # BLD AUTO: 180 K/UL (ref 138–453)
PMV BLD AUTO: 11.3 FL (ref 8.1–13.5)
PROCALCITONIN SERPL-MCNC: 0.04 NG/ML (ref 0–0.09)
RBC # BLD AUTO: 4.08 M/UL (ref 3.95–5.11)
RBC # BLD: ABNORMAL 10*6/UL
SARS-COV-2 RDRP RESP QL NAA+PROBE: NOT DETECTED
SPECIMEN DESCRIPTION: NORMAL
TSH SERPL DL<=0.05 MIU/L-ACNC: 1.08 UIU/ML (ref 0.3–5)
WBC OTHER # BLD: 6.9 K/UL (ref 3.5–11.3)

## 2024-05-17 PROCEDURE — 6370000000 HC RX 637 (ALT 250 FOR IP): Performed by: INTERNAL MEDICINE

## 2024-05-17 PROCEDURE — 36415 COLL VENOUS BLD VENIPUNCTURE: CPT

## 2024-05-17 PROCEDURE — 82164 ANGIOTENSIN I ENZYME TEST: CPT

## 2024-05-17 PROCEDURE — 6360000002 HC RX W HCPCS: Performed by: INTERNAL MEDICINE

## 2024-05-17 PROCEDURE — 85025 COMPLETE CBC W/AUTO DIFF WBC: CPT

## 2024-05-17 PROCEDURE — 2700000000 HC OXYGEN THERAPY PER DAY

## 2024-05-17 PROCEDURE — 94640 AIRWAY INHALATION TREATMENT: CPT

## 2024-05-17 PROCEDURE — 2060000000 HC ICU INTERMEDIATE R&B

## 2024-05-17 PROCEDURE — 2500000003 HC RX 250 WO HCPCS: Performed by: INTERNAL MEDICINE

## 2024-05-17 PROCEDURE — 2580000003 HC RX 258: Performed by: INTERNAL MEDICINE

## 2024-05-17 PROCEDURE — 85379 FIBRIN DEGRADATION QUANT: CPT

## 2024-05-17 PROCEDURE — 6370000000 HC RX 637 (ALT 250 FOR IP): Performed by: NURSE PRACTITIONER

## 2024-05-17 PROCEDURE — 94761 N-INVAS EAR/PLS OXIMETRY MLT: CPT

## 2024-05-17 PROCEDURE — 84145 PROCALCITONIN (PCT): CPT

## 2024-05-17 PROCEDURE — 87635 SARS-COV-2 COVID-19 AMP PRB: CPT

## 2024-05-17 PROCEDURE — 99233 SBSQ HOSP IP/OBS HIGH 50: CPT | Performed by: INTERNAL MEDICINE

## 2024-05-17 RX ORDER — BUDESONIDE AND FORMOTEROL FUMARATE DIHYDRATE 160; 4.5 UG/1; UG/1
2 AEROSOL RESPIRATORY (INHALATION)
Status: DISCONTINUED | OUTPATIENT
Start: 2024-05-17 | End: 2024-05-19 | Stop reason: HOSPADM

## 2024-05-17 RX ORDER — IBUPROFEN 400 MG/1
400 TABLET ORAL ONCE
Status: COMPLETED | OUTPATIENT
Start: 2024-05-17 | End: 2024-05-17

## 2024-05-17 RX ORDER — BUTALBITAL, ACETAMINOPHEN AND CAFFEINE 50; 325; 40 MG/1; MG/1; MG/1
1 TABLET ORAL ONCE
Status: COMPLETED | OUTPATIENT
Start: 2024-05-17 | End: 2024-05-17

## 2024-05-17 RX ORDER — ALBUTEROL SULFATE 90 UG/1
2 AEROSOL, METERED RESPIRATORY (INHALATION) EVERY 4 HOURS PRN
Status: DISCONTINUED | OUTPATIENT
Start: 2024-05-17 | End: 2024-05-19 | Stop reason: HOSPADM

## 2024-05-17 RX ORDER — FLUTICASONE FUROATE AND VILANTEROL 100; 25 UG/1; UG/1
1 POWDER RESPIRATORY (INHALATION) DAILY
Status: ON HOLD | COMMUNITY
End: 2024-05-19 | Stop reason: HOSPADM

## 2024-05-17 RX ORDER — ALBUTEROL SULFATE 90 UG/1
2 AEROSOL, METERED RESPIRATORY (INHALATION)
Status: DISCONTINUED | OUTPATIENT
Start: 2024-05-17 | End: 2024-05-17

## 2024-05-17 RX ORDER — IPRATROPIUM BROMIDE AND ALBUTEROL SULFATE 2.5; .5 MG/3ML; MG/3ML
1 SOLUTION RESPIRATORY (INHALATION)
Status: DISCONTINUED | OUTPATIENT
Start: 2024-05-17 | End: 2024-05-19 | Stop reason: HOSPADM

## 2024-05-17 RX ORDER — ATORVASTATIN CALCIUM 20 MG/1
20 TABLET, FILM COATED ORAL DAILY
Status: DISCONTINUED | OUTPATIENT
Start: 2024-05-17 | End: 2024-05-19 | Stop reason: HOSPADM

## 2024-05-17 RX ORDER — CYCLOBENZAPRINE HCL 10 MG
10 TABLET ORAL 3 TIMES DAILY PRN
Status: DISCONTINUED | OUTPATIENT
Start: 2024-05-17 | End: 2024-05-19 | Stop reason: HOSPADM

## 2024-05-17 RX ORDER — TRAMADOL HYDROCHLORIDE 50 MG/1
50 TABLET ORAL EVERY 6 HOURS PRN
Status: DISCONTINUED | OUTPATIENT
Start: 2024-05-17 | End: 2024-05-19 | Stop reason: HOSPADM

## 2024-05-17 RX ORDER — IBUPROFEN 800 MG/1
800 TABLET ORAL EVERY 8 HOURS PRN
Status: ON HOLD | COMMUNITY
End: 2024-05-19 | Stop reason: HOSPADM

## 2024-05-17 RX ORDER — METOPROLOL SUCCINATE 25 MG/1
25 TABLET, EXTENDED RELEASE ORAL DAILY
Status: DISCONTINUED | OUTPATIENT
Start: 2024-05-17 | End: 2024-05-19 | Stop reason: HOSPADM

## 2024-05-17 RX ORDER — AMLODIPINE BESYLATE 5 MG/1
5 TABLET ORAL DAILY
Status: DISCONTINUED | OUTPATIENT
Start: 2024-05-17 | End: 2024-05-19 | Stop reason: HOSPADM

## 2024-05-17 RX ORDER — IPRATROPIUM BROMIDE AND ALBUTEROL SULFATE 2.5; .5 MG/3ML; MG/3ML
1 SOLUTION RESPIRATORY (INHALATION)
Status: DISCONTINUED | OUTPATIENT
Start: 2024-05-17 | End: 2024-05-17

## 2024-05-17 RX ORDER — SPIRONOLACTONE 25 MG/1
25 TABLET ORAL DAILY
Status: DISCONTINUED | OUTPATIENT
Start: 2024-05-17 | End: 2024-05-19 | Stop reason: HOSPADM

## 2024-05-17 RX ORDER — PREDNISONE 20 MG/1
40 TABLET ORAL DAILY
Status: DISCONTINUED | OUTPATIENT
Start: 2024-05-17 | End: 2024-05-19 | Stop reason: HOSPADM

## 2024-05-17 RX ORDER — ALBUTEROL SULFATE 2.5 MG/3ML
2.5 SOLUTION RESPIRATORY (INHALATION) EVERY 4 HOURS PRN
Status: DISCONTINUED | OUTPATIENT
Start: 2024-05-17 | End: 2024-05-19 | Stop reason: HOSPADM

## 2024-05-17 RX ORDER — PANTOPRAZOLE SODIUM 40 MG/1
40 TABLET, DELAYED RELEASE ORAL
Status: DISCONTINUED | OUTPATIENT
Start: 2024-05-17 | End: 2024-05-19 | Stop reason: HOSPADM

## 2024-05-17 RX ORDER — FUROSEMIDE 10 MG/ML
40 INJECTION INTRAMUSCULAR; INTRAVENOUS 2 TIMES DAILY
Status: DISCONTINUED | OUTPATIENT
Start: 2024-05-17 | End: 2024-05-19 | Stop reason: HOSPADM

## 2024-05-17 RX ORDER — GABAPENTIN 400 MG/1
1200 CAPSULE ORAL 3 TIMES DAILY
Status: DISCONTINUED | OUTPATIENT
Start: 2024-05-17 | End: 2024-05-19 | Stop reason: HOSPADM

## 2024-05-17 RX ADMIN — GABAPENTIN 1200 MG: 400 CAPSULE ORAL at 09:44

## 2024-05-17 RX ADMIN — ENOXAPARIN SODIUM 30 MG: 100 INJECTION SUBCUTANEOUS at 09:45

## 2024-05-17 RX ADMIN — SODIUM CHLORIDE, PRESERVATIVE FREE 10 ML: 5 INJECTION INTRAVENOUS at 21:50

## 2024-05-17 RX ADMIN — BUTALBITAL, ACETAMINOPHEN, AND CAFFEINE 1 TABLET: 50; 325; 40 TABLET ORAL at 10:09

## 2024-05-17 RX ADMIN — TRAMADOL HYDROCHLORIDE 50 MG: 50 TABLET ORAL at 19:20

## 2024-05-17 RX ADMIN — SPIRONOLACTONE 25 MG: 25 TABLET ORAL at 13:17

## 2024-05-17 RX ADMIN — IPRATROPIUM BROMIDE AND ALBUTEROL SULFATE 1 DOSE: 2.5; .5 SOLUTION RESPIRATORY (INHALATION) at 15:01

## 2024-05-17 RX ADMIN — AMLODIPINE BESYLATE 5 MG: 5 TABLET ORAL at 09:45

## 2024-05-17 RX ADMIN — ALBUTEROL SULFATE 2 PUFF: 90 AEROSOL, METERED RESPIRATORY (INHALATION) at 08:18

## 2024-05-17 RX ADMIN — IPRATROPIUM BROMIDE AND ALBUTEROL SULFATE 1 DOSE: 2.5; .5 SOLUTION RESPIRATORY (INHALATION) at 11:01

## 2024-05-17 RX ADMIN — ACETAMINOPHEN 650 MG: 325 TABLET ORAL at 01:23

## 2024-05-17 RX ADMIN — SODIUM CHLORIDE, PRESERVATIVE FREE 10 ML: 5 INJECTION INTRAVENOUS at 09:45

## 2024-05-17 RX ADMIN — METOPROLOL SUCCINATE 25 MG: 25 TABLET, EXTENDED RELEASE ORAL at 09:45

## 2024-05-17 RX ADMIN — IBUPROFEN 400 MG: 400 TABLET, FILM COATED ORAL at 04:23

## 2024-05-17 RX ADMIN — DOXYCYCLINE 100 MG: 100 INJECTION, POWDER, LYOPHILIZED, FOR SOLUTION INTRAVENOUS at 01:04

## 2024-05-17 RX ADMIN — BUDESONIDE AND FORMOTEROL FUMARATE DIHYDRATE 2 PUFF: 160; 4.5 AEROSOL RESPIRATORY (INHALATION) at 20:31

## 2024-05-17 RX ADMIN — IPRATROPIUM BROMIDE AND ALBUTEROL SULFATE 1 DOSE: 2.5; .5 SOLUTION RESPIRATORY (INHALATION) at 20:30

## 2024-05-17 RX ADMIN — IBUPROFEN 400 MG: 400 TABLET, FILM COATED ORAL at 03:16

## 2024-05-17 RX ADMIN — FUROSEMIDE 40 MG: 10 INJECTION, SOLUTION INTRAMUSCULAR; INTRAVENOUS at 13:18

## 2024-05-17 RX ADMIN — PREDNISONE 40 MG: 20 TABLET ORAL at 09:45

## 2024-05-17 RX ADMIN — TRAMADOL HYDROCHLORIDE 50 MG: 50 TABLET ORAL at 13:18

## 2024-05-17 RX ADMIN — GABAPENTIN 1200 MG: 400 CAPSULE ORAL at 21:50

## 2024-05-17 RX ADMIN — CYCLOBENZAPRINE 10 MG: 10 TABLET, FILM COATED ORAL at 19:10

## 2024-05-17 RX ADMIN — FUROSEMIDE 40 MG: 10 INJECTION, SOLUTION INTRAMUSCULAR; INTRAVENOUS at 17:09

## 2024-05-17 RX ADMIN — WATER 1000 MG: 1 INJECTION INTRAMUSCULAR; INTRAVENOUS; SUBCUTANEOUS at 02:20

## 2024-05-17 RX ADMIN — GABAPENTIN 1200 MG: 400 CAPSULE ORAL at 13:17

## 2024-05-17 RX ADMIN — PANTOPRAZOLE SODIUM 40 MG: 40 TABLET, DELAYED RELEASE ORAL at 05:17

## 2024-05-17 RX ADMIN — ATORVASTATIN CALCIUM 20 MG: 20 TABLET, FILM COATED ORAL at 09:44

## 2024-05-17 ASSESSMENT — PAIN DESCRIPTION - LOCATION
LOCATION: HEAD

## 2024-05-17 ASSESSMENT — PAIN DESCRIPTION - DESCRIPTORS
DESCRIPTORS: ACHING
DESCRIPTORS: ACHING
DESCRIPTORS: ACHING;POUNDING
DESCRIPTORS: ACHING
DESCRIPTORS: ACHING;POUNDING
DESCRIPTORS: ACHING

## 2024-05-17 ASSESSMENT — PAIN DESCRIPTION - ORIENTATION
ORIENTATION: MID

## 2024-05-17 ASSESSMENT — PAIN SCALES - GENERAL
PAINLEVEL_OUTOF10: 9
PAINLEVEL_OUTOF10: 10
PAINLEVEL_OUTOF10: 6
PAINLEVEL_OUTOF10: 10
PAINLEVEL_OUTOF10: 9

## 2024-05-17 NOTE — PLAN OF CARE
Problem: Respiratory - Adult  Goal: Achieves optimal ventilation and oxygenation  5/17/2024 0824 by Anali Rich RCP  Outcome: Progressing  5/17/2024 0501 by Angelika Nayak RN  Outcome: Progressing  5/17/2024 0355 by Wayne Ward RCP  Outcome: Progressing

## 2024-05-17 NOTE — H&P
[facility]  Office: [phone]  Migue Leonard DO, Tripp Shaffer DO, Won Slaughter DO, Chapinicto Hathaway DO, Wade Torres MD, Amira Polk MD, Shannan Garrett MD, Ivon Pena MD,  Jason Loera MD, Michael Arana MD, Jose Duncan MD,  Jayda Herr DO, Paris Posada MD, Freddy Esqueda MD, Colten Leonard DO, Maryellen Silveira MD,  Manjit Mcdonough DO, Valentine Sanabria MD, Cherelle Alex MD, Corrie Hassan MD, Alhaji Juarez MD,  Rich Romero MD, Donte Velasquez MD, Priscila Avendaño MD, Holley Laboy MD, Max Andrew MD, Faith Esteban MD, Charly Alvarado DO, Rc Garber DO, Puja Bautista MD,  Papa Guerrero MD, Shirley Waterhouse, CNP,  Caren Bianchi CNP, Filippo Pearl, CNP,  Magalie Urena, DNP, Elizabeth Barton, CNP, Rosa Guardado, CNP, Tiff Fletcher, CNP, Gracia Tanner, CNP, Ariana Nicholson, PA-C, Pari Warren PA-C, Aicha Yang, CNP, Luz Woodard, CNP, Amanda Lerner, CNP, Katt Street, CNP, Danii Mcmahan, CNP, Marie Orellana, CNS, Rose Mary Jerez, CNP, Kala Aguilar CNP, Tracy Schwab, CNP         [facility]   IN-PATIENT SERVICE   [facility]    HISTORY AND PHYSICAL EXAMINATION            Date:   5/16/2024  Patient name:  Gaurang Fiore  Date of admission:  5/16/2024  8:39 PM  MRN:   [id number]  Account:  [id number]  YOB: 1975  PCP:    Cristina Bernal DO  Room:   [place] [address]  Code Status:    Prior    Chief Complaint:     Chief Complaint   Patient presents with    Shortness of Breath     Was at work and hands started shaking, feels short of breath, pt states she is dizzy.        History Obtained From:     patient    History of Present Illness:     Gaurang Fiore is a 48 y.o. female with underlying anxiety, abnormal uterine breathing and remote hx of DVT in left lower extremity who was in her normal state of health at work when she suddenly felt diaphoretic, light headed, dyspneic, flushed with tremors requiring her to stop and

## 2024-05-17 NOTE — CARE COORDINATION
Case Management Assessment  Initial Evaluation    Date/Time of Evaluation: 5/17/2024 1:16 PM  Assessment Completed by: RAFIA BAPTISTE RN    If patient is discharged prior to next notation, then this note serves as note for discharge by case management.    Patient Name: Gaurang Fiore                   YOB: 1975  Diagnosis: Hypoxia [R09.02]  Dyspnea, unspecified type [R06.00]  Acute on chronic hypoxic respiratory failure (HCC) [J96.21]                   Date / Time: 5/16/2024  8:39 PM    Patient Admission Status: Inpatient   Readmission Risk (Low < 19, Mod (19-27), High > 27): Readmission Risk Score: 19.5    Current PCP: Deisi Cook APRN - NP  PCP verified by CM? Yes    Chart Reviewed: Yes      History Provided by: Patient  Patient Orientation: Alert and Oriented    Patient Cognition: Alert    Hospitalization in the last 30 days (Readmission):  No    If yes, Readmission Assessment in CM Navigator will be completed.    Advance Directives:      Code Status: Full Code   Patient's Primary Decision Maker is: Legal Next of Kin      Discharge Planning:    Patient lives with: Children, Parent Type of Home: House  Primary Care Giver: Self  Patient Support Systems include: Parent, Children, Family Members   Current Financial resources: Other (Comment) ([employer])  Current community resources: None  Current services prior to admission: Oxygen Therapy (6 L NC at HS thru [facility])            Current DME:              Type of Home Care services:  None    ADLS  Prior functional level: Independent in ADLs/IADLs  Current functional level: Independent in ADLs/IADLs    PT AM-PAC:   /24  OT AM-PAC:   /24    Family can provide assistance at DC: Yes  Would you like Case Management to discuss the discharge plan with any other family members/significant others, and if so, who? No  Plans to Return to Present Housing: Yes  Other Identified Issues/Barriers to RETURNING to current housing: home 02   Potential Assistance needed

## 2024-05-17 NOTE — PLAN OF CARE
Pt remains free of falls as well as acute events throughout the shift,  Pt alert and oriented X4.   Vital signs stable.  Pt on 6L of Oxygen via nasal cannula.  Pt received P.O Tramadol for pain management.   Pt received IV lasix X2 throughout the shift, output adequate.   Safety measures in place, call light within reach, all concerns addressed at this time.   Problem: Discharge Planning  Goal: Discharge to home or other facility with appropriate resources  Outcome: Progressing     Problem: Pain  Goal: Verbalizes/displays adequate comfort level or baseline comfort level  Outcome: Progressing   Patient having pain on their head and rates it a 10. Pain interventions includecorrect body alignment/body mechanics, relaxation techniques, medication, and diversional activities. Patients goal for pain relief is 4. The need for pain and symptom management will be considered in the discharge planning process to ensure patients comfort.    Problem: Respiratory - Adult  Goal: Achieves optimal ventilation and oxygenation  5/17/2024 1937 by Chris Lopez RN  Outcome: Progressing

## 2024-05-17 NOTE — PLAN OF CARE
Pt A&O x4. Ambulating in room independently. Complaints of headache, see MAR. Remains on 2.5L NC. Bed locked in lowest position, call light in reach, safety maintained.     Problem: Discharge Planning  Goal: Discharge to home or other facility with appropriate resources  Outcome: Progressing     Problem: Pain  Goal: Verbalizes/displays adequate comfort level or baseline comfort level  Outcome: Progressing     Problem: Respiratory - Adult  Goal: Achieves optimal ventilation and oxygenation  5/17/2024 0501 by Angelika Nayak RN  Outcome: Progressing

## 2024-05-17 NOTE — ED PROVIDER NOTES
EMERGENCY DEPARTMENT ENCOUNTER    Pt Name: Gaurang Fiore  MRN: [id number]  Birthdate 1975  Date of evaluation: 5/16/24  CHIEF COMPLAINT       Chief Complaint   Patient presents with    Shortness of Breath     Was at work and hands started shaking, feels short of breath, pt states she is dizzy.      HISTORY OF PRESENT ILLNESS   48-year-old female presents to the emergency room with shortness of breath and chest tightness.  Symptoms started earlier today.  She was at work and started to get dizzy.  She felt some weakness in her hand and got lightheaded.  Patient came here to the emergency room.  Patient does have history of mild heart failure.  She is a smoker.  She is on oxygen at night.  She does not use it during the day.             REVIEW OF SYSTEMS     Review of Systems   Respiratory:  Positive for chest tightness and shortness of breath.    Neurological:  Positive for dizziness.     PASTMEDICAL HISTORY     Past Medical History:   Diagnosis Date    Abnormal uterine bleeding 05/2018    Anxiety     Bipolar 1 disorder (HCC)     No Rx.     Blood clot in vein 2000    right calf    Fibroid 05/2018    Finger pain, right     Fracture, finger     right small finger    Panic attacks     Snores     Under care of team     No PCP     Past Problem List  Patient Active Problem List   Diagnosis Code    Fibroid D21.9    Abnormal uterine bleeding (AUB) N93.9    Hx of tubal ligation (1994) Z98.51    Panic attacks F41.0    Tobacco abuse Z72.0    Hysteroscopy, D&C 5/17/19 Z98.890    Abnormal mammogram of left breast R92.8    Non-compliant patient Z91.199    Closed displaced fracture of proximal phalanx of right little finger S62.616A    Obesity with body mass index 30 or greater E66.9    Insomnia G47.00    Hypokalemia E87.6    Gastroesophageal reflux disease K21.9    Fracture of metacarpal bone S62.309A    Fibrocystic disease of breast N60.19    Anxiety F41.9    Ankylosis of finger M24.649    Acute right ankle pain M25.571

## 2024-05-18 PROBLEM — G25.3 MYOCLONUS: Status: ACTIVE | Noted: 2024-05-18

## 2024-05-18 LAB
AMMONIA PLAS-SCNC: 50 UMOL/L (ref 11–51)
ANION GAP SERPL CALCULATED.3IONS-SCNC: 7 MMOL/L (ref 9–17)
BUN SERPL-MCNC: 15 MG/DL (ref 6–20)
BUN/CREAT SERPL: 25 (ref 9–20)
CALCIUM SERPL-MCNC: 8.9 MG/DL (ref 8.6–10.4)
CHLORIDE SERPL-SCNC: 99 MMOL/L (ref 98–107)
CO2 SERPL-SCNC: 34 MMOL/L (ref 20–31)
CREAT SERPL-MCNC: 0.6 MG/DL (ref 0.5–0.9)
GFR, ESTIMATED: >90 ML/MIN/1.73M2
GLUCOSE SERPL-MCNC: 143 MG/DL (ref 70–99)
HCO3 VENOUS: 37.9 MMOL/L (ref 22–29)
O2 SAT, VEN: 98.8 % (ref 60–85)
PCO2, VEN: 58.5 MM HG (ref 41–51)
PH VENOUS: 7.42 (ref 7.32–7.43)
PO2, VEN: 127.3 MM HG (ref 30–50)
POSITIVE BASE EXCESS, VEN: 10.5 MMOL/L (ref 0–3)
POTASSIUM SERPL-SCNC: 3.6 MMOL/L (ref 3.7–5.3)
SODIUM SERPL-SCNC: 140 MMOL/L (ref 135–144)

## 2024-05-18 PROCEDURE — 6370000000 HC RX 637 (ALT 250 FOR IP): Performed by: INTERNAL MEDICINE

## 2024-05-18 PROCEDURE — 6360000002 HC RX W HCPCS: Performed by: INTERNAL MEDICINE

## 2024-05-18 PROCEDURE — 80048 BASIC METABOLIC PNL TOTAL CA: CPT

## 2024-05-18 PROCEDURE — 2700000000 HC OXYGEN THERAPY PER DAY

## 2024-05-18 PROCEDURE — 97116 GAIT TRAINING THERAPY: CPT

## 2024-05-18 PROCEDURE — 2060000000 HC ICU INTERMEDIATE R&B

## 2024-05-18 PROCEDURE — 82140 ASSAY OF AMMONIA: CPT

## 2024-05-18 PROCEDURE — 99222 1ST HOSP IP/OBS MODERATE 55: CPT | Performed by: PSYCHIATRY & NEUROLOGY

## 2024-05-18 PROCEDURE — 94640 AIRWAY INHALATION TREATMENT: CPT

## 2024-05-18 PROCEDURE — 2580000003 HC RX 258: Performed by: INTERNAL MEDICINE

## 2024-05-18 PROCEDURE — 99232 SBSQ HOSP IP/OBS MODERATE 35: CPT | Performed by: INTERNAL MEDICINE

## 2024-05-18 PROCEDURE — 97162 PT EVAL MOD COMPLEX 30 MIN: CPT

## 2024-05-18 PROCEDURE — 82803 BLOOD GASES ANY COMBINATION: CPT

## 2024-05-18 PROCEDURE — 94761 N-INVAS EAR/PLS OXIMETRY MLT: CPT

## 2024-05-18 PROCEDURE — 36415 COLL VENOUS BLD VENIPUNCTURE: CPT

## 2024-05-18 RX ORDER — NICOTINE 21 MG/24HR
1 PATCH, TRANSDERMAL 24 HOURS TRANSDERMAL DAILY
Status: DISCONTINUED | OUTPATIENT
Start: 2024-05-18 | End: 2024-05-19 | Stop reason: HOSPADM

## 2024-05-18 RX ORDER — POTASSIUM CHLORIDE 20 MEQ/1
40 TABLET, EXTENDED RELEASE ORAL ONCE
Status: COMPLETED | OUTPATIENT
Start: 2024-05-18 | End: 2024-05-18

## 2024-05-18 RX ADMIN — SODIUM CHLORIDE, PRESERVATIVE FREE 10 ML: 5 INJECTION INTRAVENOUS at 09:31

## 2024-05-18 RX ADMIN — GABAPENTIN 1200 MG: 400 CAPSULE ORAL at 13:41

## 2024-05-18 RX ADMIN — SODIUM CHLORIDE, PRESERVATIVE FREE 10 ML: 5 INJECTION INTRAVENOUS at 21:43

## 2024-05-18 RX ADMIN — GABAPENTIN 1200 MG: 400 CAPSULE ORAL at 09:26

## 2024-05-18 RX ADMIN — ENOXAPARIN SODIUM 30 MG: 100 INJECTION SUBCUTANEOUS at 21:42

## 2024-05-18 RX ADMIN — SPIRONOLACTONE 25 MG: 25 TABLET ORAL at 09:27

## 2024-05-18 RX ADMIN — BUDESONIDE AND FORMOTEROL FUMARATE DIHYDRATE 2 PUFF: 160; 4.5 AEROSOL RESPIRATORY (INHALATION) at 06:16

## 2024-05-18 RX ADMIN — PANTOPRAZOLE SODIUM 40 MG: 40 TABLET, DELAYED RELEASE ORAL at 05:06

## 2024-05-18 RX ADMIN — ENOXAPARIN SODIUM 30 MG: 100 INJECTION SUBCUTANEOUS at 09:26

## 2024-05-18 RX ADMIN — FUROSEMIDE 40 MG: 10 INJECTION, SOLUTION INTRAMUSCULAR; INTRAVENOUS at 18:13

## 2024-05-18 RX ADMIN — IPRATROPIUM BROMIDE AND ALBUTEROL SULFATE 1 DOSE: 2.5; .5 SOLUTION RESPIRATORY (INHALATION) at 14:13

## 2024-05-18 RX ADMIN — ATORVASTATIN CALCIUM 20 MG: 20 TABLET, FILM COATED ORAL at 09:27

## 2024-05-18 RX ADMIN — TRAMADOL HYDROCHLORIDE 50 MG: 50 TABLET ORAL at 09:42

## 2024-05-18 RX ADMIN — METOPROLOL SUCCINATE 25 MG: 25 TABLET, EXTENDED RELEASE ORAL at 09:27

## 2024-05-18 RX ADMIN — IPRATROPIUM BROMIDE AND ALBUTEROL SULFATE 1 DOSE: 2.5; .5 SOLUTION RESPIRATORY (INHALATION) at 10:21

## 2024-05-18 RX ADMIN — POLYETHYLENE GLYCOL 3350 17 G: 17 POWDER, FOR SOLUTION ORAL at 09:42

## 2024-05-18 RX ADMIN — IPRATROPIUM BROMIDE AND ALBUTEROL SULFATE 1 DOSE: 2.5; .5 SOLUTION RESPIRATORY (INHALATION) at 18:57

## 2024-05-18 RX ADMIN — FUROSEMIDE 40 MG: 10 INJECTION, SOLUTION INTRAMUSCULAR; INTRAVENOUS at 09:26

## 2024-05-18 RX ADMIN — BUDESONIDE AND FORMOTEROL FUMARATE DIHYDRATE 2 PUFF: 160; 4.5 AEROSOL RESPIRATORY (INHALATION) at 18:57

## 2024-05-18 RX ADMIN — POTASSIUM CHLORIDE 40 MEQ: 1500 TABLET, EXTENDED RELEASE ORAL at 13:40

## 2024-05-18 RX ADMIN — PREDNISONE 40 MG: 20 TABLET ORAL at 09:26

## 2024-05-18 RX ADMIN — TRAMADOL HYDROCHLORIDE 50 MG: 50 TABLET ORAL at 21:50

## 2024-05-18 RX ADMIN — IPRATROPIUM BROMIDE AND ALBUTEROL SULFATE 1 DOSE: 2.5; .5 SOLUTION RESPIRATORY (INHALATION) at 06:16

## 2024-05-18 RX ADMIN — TRAMADOL HYDROCHLORIDE 50 MG: 50 TABLET ORAL at 05:10

## 2024-05-18 RX ADMIN — AMLODIPINE BESYLATE 5 MG: 5 TABLET ORAL at 09:27

## 2024-05-18 RX ADMIN — GABAPENTIN 1200 MG: 400 CAPSULE ORAL at 21:41

## 2024-05-18 ASSESSMENT — PAIN DESCRIPTION - DESCRIPTORS
DESCRIPTORS: ACHING

## 2024-05-18 ASSESSMENT — PAIN SCALES - GENERAL
PAINLEVEL_OUTOF10: 10
PAINLEVEL_OUTOF10: 10
PAINLEVEL_OUTOF10: 0
PAINLEVEL_OUTOF10: 9
PAINLEVEL_OUTOF10: 9
PAINLEVEL_OUTOF10: 6

## 2024-05-18 ASSESSMENT — PAIN DESCRIPTION - LOCATION
LOCATION: HEAD

## 2024-05-18 ASSESSMENT — PAIN DESCRIPTION - FREQUENCY: FREQUENCY: INTERMITTENT

## 2024-05-18 ASSESSMENT — PAIN DESCRIPTION - ORIENTATION
ORIENTATION: MID
ORIENTATION: MID

## 2024-05-18 ASSESSMENT — PAIN DESCRIPTION - PAIN TYPE: TYPE: ACUTE PAIN

## 2024-05-18 ASSESSMENT — PAIN - FUNCTIONAL ASSESSMENT: PAIN_FUNCTIONAL_ASSESSMENT: ACTIVITIES ARE NOT PREVENTED

## 2024-05-18 ASSESSMENT — PAIN DESCRIPTION - ONSET: ONSET: SUDDEN

## 2024-05-18 NOTE — PLAN OF CARE
Problem: Respiratory - Adult  Goal: Achieves optimal ventilation and oxygenation  5/17/2024 2035 by Shelia Stout, RCP  Outcome: Progressing

## 2024-05-18 NOTE — PLAN OF CARE
Problem: Respiratory - Adult  Goal: Achieves optimal ventilation and oxygenation  5/18/2024 1029 by Nuvia Kim, RCP  Outcome: Progressing

## 2024-05-18 NOTE — CONSULTS
[facility] Neurology   IN-PATIENT SERVICE      NEUROLOGY CONSULT  NOTE            Date:   5/18/2024  Patient name:  Gaurang Fiore  Date of admission:  5/16/2024  YOB: 1975      Chief Complaint:     Chief Complaint   Patient presents with    Shortness of Breath     Was at work and hands started shaking, feels short of breath, pt states she is dizzy.        Reason for Consult:      Involuntary movements    History of Present Illness:     The patient is a 48 y.o. female who presents with Shortness of Breath (Was at work and hands started shaking, feels short of breath, pt states she is dizzy. )  . The patient was seen and examined and the chart was reviewed.  Patient with history of COPD, smoking, home O2 dependent presents to the hospital with involuntary movements in the upper extremities.  She was also found to be hypoxic and admitted for further workup.  She also has history of CHF, on Lasix.  She describes symptoms of jerking movements in the hands which have been ongoing for the last couple of days.  She states she is never really felt the symptoms in the past.  Feels as though when she grabs something that it drops suddenly from her hands.  Denies any other abnormal movements anywhere else in the body.  Her basic metabolic panel does show elevated CO2 of 34 at this time.  She actually was in the ED just a couple days prior to her hospitalization for rib pain and at that time her CO2 was 27.  There is no ABG or VBG done.  Patient states she does not wear a CPAP at home because insurance does not cover it.    Past Medical History:     Past Medical History:   Diagnosis Date    Abnormal uterine bleeding 05/2018    Anxiety     Bipolar 1 disorder (HCC)     No Rx.     Blood clot in vein 2000    right calf    Fibroid 05/2018    Finger pain, right     Fracture, finger     right small finger    Panic attacks     Snores     Under care of team     No PCP        Past Surgical History:     Past Surgical

## 2024-05-18 NOTE — PLAN OF CARE
Pt alert and oriented x4 this shift. Ambulating independently in room. On 5L NC while sleeping. Complaints of headache this shift, see MAR. Bed locked in lowest position, call light in reach, safety maintained.   Patient having pain on their head and rates it a 10. Pain interventions includefrequent position changes, correct body alignment/body mechanics, relaxation techniques, and medication. Patients goal for pain relief is  0 . The need for pain and symptom management will be considered in the discharge planning process to ensure patients comfort.    Problem: Discharge Planning  Goal: Discharge to home or other facility with appropriate resources  5/18/2024 0451 by Angelika Nayak RN  Outcome: Progressing     Problem: Pain  Goal: Verbalizes/displays adequate comfort level or baseline comfort level  5/18/2024 0451 by Angelika Nayak RN  Outcome: Progressing     Problem: Respiratory - Adult  Goal: Achieves optimal ventilation and oxygenation  5/18/2024 0451 by Angelika Nayak RN  Outcome: Progressing

## 2024-05-19 VITALS
TEMPERATURE: 98.1 F | OXYGEN SATURATION: 94 % | SYSTOLIC BLOOD PRESSURE: 101 MMHG | RESPIRATION RATE: 18 BRPM | BODY MASS INDEX: 45.8 KG/M2 | HEART RATE: 88 BPM | DIASTOLIC BLOOD PRESSURE: 88 MMHG | HEIGHT: 67 IN | WEIGHT: 291.8 LBS

## 2024-05-19 LAB
ACE SERPL-CCNC: 23 U/L (ref 16–85)
ANION GAP SERPL CALCULATED.3IONS-SCNC: 3 MMOL/L (ref 9–17)
BUN SERPL-MCNC: 17 MG/DL (ref 6–20)
BUN/CREAT SERPL: 28 (ref 9–20)
CALCIUM SERPL-MCNC: 9.4 MG/DL (ref 8.6–10.4)
CHLORIDE SERPL-SCNC: 100 MMOL/L (ref 98–107)
CO2 SERPL-SCNC: 41 MMOL/L (ref 20–31)
CREAT SERPL-MCNC: 0.6 MG/DL (ref 0.5–0.9)
GFR, ESTIMATED: >90 ML/MIN/1.73M2
GLUCOSE SERPL-MCNC: 139 MG/DL (ref 70–99)
POTASSIUM SERPL-SCNC: 3.9 MMOL/L (ref 3.7–5.3)
SODIUM SERPL-SCNC: 144 MMOL/L (ref 135–144)

## 2024-05-19 PROCEDURE — 94640 AIRWAY INHALATION TREATMENT: CPT

## 2024-05-19 PROCEDURE — 80048 BASIC METABOLIC PNL TOTAL CA: CPT

## 2024-05-19 PROCEDURE — 94761 N-INVAS EAR/PLS OXIMETRY MLT: CPT

## 2024-05-19 PROCEDURE — 6370000000 HC RX 637 (ALT 250 FOR IP): Performed by: INTERNAL MEDICINE

## 2024-05-19 PROCEDURE — 2700000000 HC OXYGEN THERAPY PER DAY

## 2024-05-19 PROCEDURE — 99231 SBSQ HOSP IP/OBS SF/LOW 25: CPT | Performed by: PSYCHIATRY & NEUROLOGY

## 2024-05-19 PROCEDURE — 6360000002 HC RX W HCPCS: Performed by: INTERNAL MEDICINE

## 2024-05-19 PROCEDURE — 2580000003 HC RX 258: Performed by: INTERNAL MEDICINE

## 2024-05-19 PROCEDURE — 36415 COLL VENOUS BLD VENIPUNCTURE: CPT

## 2024-05-19 PROCEDURE — 99239 HOSP IP/OBS DSCHRG MGMT >30: CPT | Performed by: INTERNAL MEDICINE

## 2024-05-19 RX ORDER — NICOTINE 21 MG/24HR
1 PATCH, TRANSDERMAL 24 HOURS TRANSDERMAL DAILY
Qty: 30 PATCH | Refills: 3 | Status: SHIPPED | OUTPATIENT
Start: 2024-05-20

## 2024-05-19 RX ORDER — FUROSEMIDE 40 MG/1
40 TABLET ORAL DAILY
Qty: 30 TABLET | Refills: 1 | Status: SHIPPED | OUTPATIENT
Start: 2024-05-19

## 2024-05-19 RX ORDER — TRAMADOL HYDROCHLORIDE 50 MG/1
50 TABLET ORAL EVERY 6 HOURS PRN
Qty: 10 TABLET | Refills: 0 | Status: SHIPPED | OUTPATIENT
Start: 2024-05-19 | End: 2024-05-22

## 2024-05-19 RX ORDER — SPIRONOLACTONE 25 MG/1
25 TABLET ORAL DAILY
Qty: 30 TABLET | Refills: 3 | Status: SHIPPED | OUTPATIENT
Start: 2024-05-20

## 2024-05-19 RX ORDER — BUDESONIDE AND FORMOTEROL FUMARATE DIHYDRATE 160; 4.5 UG/1; UG/1
2 AEROSOL RESPIRATORY (INHALATION)
Qty: 10.2 G | Refills: 3 | Status: SHIPPED | OUTPATIENT
Start: 2024-05-19

## 2024-05-19 RX ADMIN — SPIRONOLACTONE 25 MG: 25 TABLET ORAL at 10:25

## 2024-05-19 RX ADMIN — ATORVASTATIN CALCIUM 20 MG: 20 TABLET, FILM COATED ORAL at 10:25

## 2024-05-19 RX ADMIN — IPRATROPIUM BROMIDE AND ALBUTEROL SULFATE 1 DOSE: 2.5; .5 SOLUTION RESPIRATORY (INHALATION) at 07:00

## 2024-05-19 RX ADMIN — FUROSEMIDE 40 MG: 10 INJECTION, SOLUTION INTRAMUSCULAR; INTRAVENOUS at 10:25

## 2024-05-19 RX ADMIN — IPRATROPIUM BROMIDE AND ALBUTEROL SULFATE 1 DOSE: 2.5; .5 SOLUTION RESPIRATORY (INHALATION) at 15:10

## 2024-05-19 RX ADMIN — AMLODIPINE BESYLATE 5 MG: 5 TABLET ORAL at 10:25

## 2024-05-19 RX ADMIN — METOPROLOL SUCCINATE 25 MG: 25 TABLET, EXTENDED RELEASE ORAL at 10:25

## 2024-05-19 RX ADMIN — GABAPENTIN 1200 MG: 400 CAPSULE ORAL at 10:25

## 2024-05-19 RX ADMIN — TRAMADOL HYDROCHLORIDE 50 MG: 50 TABLET ORAL at 10:35

## 2024-05-19 RX ADMIN — PANTOPRAZOLE SODIUM 40 MG: 40 TABLET, DELAYED RELEASE ORAL at 05:16

## 2024-05-19 RX ADMIN — TRAMADOL HYDROCHLORIDE 50 MG: 50 TABLET ORAL at 05:16

## 2024-05-19 RX ADMIN — ENOXAPARIN SODIUM 30 MG: 100 INJECTION SUBCUTANEOUS at 10:26

## 2024-05-19 RX ADMIN — IPRATROPIUM BROMIDE AND ALBUTEROL SULFATE 1 DOSE: 2.5; .5 SOLUTION RESPIRATORY (INHALATION) at 10:54

## 2024-05-19 RX ADMIN — SODIUM CHLORIDE, PRESERVATIVE FREE 10 ML: 5 INJECTION INTRAVENOUS at 10:26

## 2024-05-19 RX ADMIN — PREDNISONE 40 MG: 20 TABLET ORAL at 10:25

## 2024-05-19 RX ADMIN — BUDESONIDE AND FORMOTEROL FUMARATE DIHYDRATE 2 PUFF: 160; 4.5 AEROSOL RESPIRATORY (INHALATION) at 07:00

## 2024-05-19 ASSESSMENT — PAIN DESCRIPTION - ORIENTATION
ORIENTATION: MID
ORIENTATION: MID

## 2024-05-19 ASSESSMENT — PAIN DESCRIPTION - LOCATION
LOCATION: HEAD

## 2024-05-19 ASSESSMENT — PAIN DESCRIPTION - PAIN TYPE: TYPE: ACUTE PAIN

## 2024-05-19 ASSESSMENT — PAIN DESCRIPTION - DESCRIPTORS
DESCRIPTORS: ACHING

## 2024-05-19 ASSESSMENT — PAIN DESCRIPTION - ONSET: ONSET: SUDDEN

## 2024-05-19 ASSESSMENT — PAIN SCALES - GENERAL
PAINLEVEL_OUTOF10: 0
PAINLEVEL_OUTOF10: 6
PAINLEVEL_OUTOF10: 10
PAINLEVEL_OUTOF10: 2

## 2024-05-19 ASSESSMENT — PAIN DESCRIPTION - FREQUENCY: FREQUENCY: INTERMITTENT

## 2024-05-19 NOTE — PROGRESS NOTES
[facility] Neurology   IN-PATIENT SERVICE      NEUROLOGY PROGRESS  NOTE                Interval History:     Myoclonus much improved today.  Bicarb is actually higher up to 41.  Did not use BiPAP overnight.  Currently being weaned off of oxygen.  She is eager to go home.    History of Present Illness:     The patient is a 48 y.o. female who presents with Shortness of Breath (Was at work and hands started shaking, feels short of breath, pt states she is dizzy. )  . The patient was seen and examined and the chart was reviewed.  Patient with history of COPD, smoking, home O2 dependent presents to the hospital with involuntary movements in the upper extremities.  She was also found to be hypoxic and admitted for further workup.  She also has history of CHF, on Lasix.  She describes symptoms of jerking movements in the hands which have been ongoing for the last couple of days.  She states she is never really felt the symptoms in the past.  Feels as though when she grabs something that it drops suddenly from her hands.  Denies any other abnormal movements anywhere else in the body.  Her basic metabolic panel does show elevated CO2 of 34 at this time.  She actually was in the ED just a couple days prior to her hospitalization for rib pain and at that time her CO2 was 27.  There is no ABG or VBG done.  Patient states she does not wear a CPAP at home because insurance does not cover it.    Physical Exam:   /88   Pulse 88   Temp 98.1 °F (36.7 °C) (Oral)   Resp 18   Ht 1.702 m (5' 7\")   Wt 132.4 kg (291 lb 12.8 oz)   LMP 2020   SpO2 94%   BMI 45.70 kg/m²   Temp (24hrs), Av.6 °F (36.4 °C), Min:97.2 °F (36.2 °C), Max:98.1 °F (36.7 °C)      Neurological examination:    Mental status   Alert and oriented x 3; following all commands; speech is fluent.    Cranial nerves   CN II - XII intact   Motor function  Strength: 5/5 RUE, 5/5 RLE, 5/5 LUE, 5/5  LLE                  Sensory function Intact to touch 
      [x] There are one or more home medications that need clarification before Medication Reconciliation can be completed. The Med List Status has been marked as In Progress.     To assist with Home Medication Reconciliation the following actions have been taken:    [x] Pharmacy medication reconciliation service requested. (Note: This can be done by sending a Perfect Serve message to The [facility] Rec Pharmacist or by phoning [phone].)         
[facility]  Office: [phone]  Migue Leonard, DO, Tripp Shaffer, DO, Won Slaughter DO, Chapincito Hathaway, DO, Wade Torres MD, Amira Polk MD, Shannan Garrett MD, Ivon Pena MD,  Jason Loera MD, Michael Arana MD, Jose Duncan MD,  Jayda Herr DO, Paris Posada MD, Freddy Esqueda MD, Colten Leonard DO, Maryellen Silveira MD,  Manjit Mcdonough DO, Valentine Sanabria MD, Cherelle Alex MD, Corrie Hassan MD, Alhaji Juarez MD,  Rich Romero MD, Donte Velasquez MD, Priscila Avendaño MD, Holley Laboy MD, Max Andrew MD, Faith Esteban MD, Charly Alvarado DO, Rc Garber DO, Puja Bautista MD,  Papa Guerrero MD, Shirley Waterhouse, CNP,  Caren Bianchi CNP, Filippo Pearl, CNP,  Magalie Urena, DNP, Elizabeth Barton, CNP, Rosa Guardado, CNP, Tiff Fletcher, CNP, Gracia Tanner, CNP, Ariana Nicholson, PA-C, Pari Warren PA-C, Aicha Yang, CNP, Luz Woodard, CNP, Amanda Lerner, CNP, Katt Street, CNP, Danii Mcmahan, CNP, Marie Orellana, CNS, Rose Mary Jerez, CNP, Kala Aguilar CNP, Tracy Schwab, CNP         [facility]   IN-PATIENT SERVICE   [facility]    Progress Note    5/18/2024    12:22 PM    Name:   Gaurang Fiore  MRN:     [id number]     Acct:      [id number]   Room:   [address] [place] [address] Day:  2  Admit Date:  5/16/2024  8:39 PM    PCP:   Deisi Cook APRN - NP  Code Status:  Full Code    Subjective:     C/C:   Chief Complaint   Patient presents with    Shortness of Breath     Was at work and hands started shaking, feels short of breath, pt states she is dizzy.      Interval History Status: improved.     Does feel better  Denies n/v  Did have chest pressure (recent normal stress test) and has had sob    has headache-now tells me this actually a 10 year chronic headache    Continues to have involuntary movements of hands    Brief History:       This 48 yof states she was at work and dropped things from hands-she had no control over them, this is unusual 
IV and telemetry removed, discharge teaching and instructions given to patient using AVS. Medication pick-up information reviewed, patient voiced understanding regarding resuming medications, new prescriptions, follow up appointments, and care of self at home. Patient accompanied to emergency room exit where private vehicle is parked and patient discharged   
Physical Therapy  Facility/Department: [facility] PROGRESSIVE CARE  Physical Therapy Initial Assessment    Name: Gaurang Fiore  : 1975  MRN: [id number]  Date of Service: 2024    Discharge Recommendations:      PT Equipment Recommendations  Equipment Needed: No    HPI per chart: Gaurang Fiore is a 48 y.o. female with underlying anxiety, abnormal uterine breathing and remote hx of DVT in left lower extremity who was in her normal state of health at work when she suddenly felt diaphoretic, light headed, dyspneic, flushed with tremors requiring her to stop and sit down. Colleagues concerned given change in status and recommended go to ER which patient was brought to ER immediately. Work up unrevealing for PE. Vague lung considations and low volme concerning for reduced 02 perfusion.Patient sustained 88% for greater than 90 seconds. Patient placed on 2.5 L nasal cannula  Work up to determine cause of ongoing 02 needs. Denies any more diaphoretic presyncope sensations but feels anxious and slighly dyspnic at time. She denies cp, vomiting, nausea, numbness. Patient remained stable at rest while in ER, Lasix and DuoNeb ordered here in the ED with minor improvement . Her symptoms ar prominent with very little activity       Patient Diagnosis(es): The primary encounter diagnosis was Dyspnea, unspecified type. A diagnosis of Hypoxia was also pertinent to this visit.  Past Medical History:  has a past medical history of Abnormal uterine bleeding, Anxiety, Bipolar 1 disorder (HCC), Blood clot in vein, Fibroid, Finger pain, right, Fracture, finger, Panic attacks, Snores, and Under care of team.  Past Surgical History:  has a past surgical history that includes Tubal ligation (); hysteroscopy (N/A, 2019); Hysterectomy (2021); Finger Closed Reduction (Right, 2021); Finger surgery (Right, 2021); and Hand surgery (Right, 2021).    Assessment   Body Structures, Functions, Activity 
Pt admitted to room 1023 from ED. Admission documentation complete, vitals taken and telemetry placed. Pt oriented to room and unit routine, including hourly rounding. Call light in reach and safety maintained. Will continue to provide support and education.    
[facility]  Office: [phone]  Migue Leonard, DO, Tripp Shaffer, DO, Won Slaughter DO, Chapincito Hathaway, DO, Wade Torres MD, Amira Polk MD, Shannan Garrett MD, Ivon Pena MD,  Jason Loera MD, Michael Arana MD, Jose Duncan MD,  Jayda Herr DO, Paris Posada MD, Freddy Esqueda MD, Colten Leonard DO, Maryellen Silveira MD,  Manjit Mcdonough DO, Valentine Sanabria MD, Cherelle Alex MD, Corrie Hassan MD, Alhaji Juarez MD,  Rich Romero MD, Donte Velasquez MD, Priscila Avendaño MD, Holley Laboy MD, Max Andrew MD, Faith Esteban MD, Charly Alvarado DO, Rc Garber DO, Puja Bautista MD,  Papa Guerrero MD, Shirley Waterhouse, CNP,  Caren Bianchi CNP, Filippo Pearl, CNP,  Magalie Urena, DNP, Elizabeth Barton, CNP, Rosa Guardado, CNP, Tiff Fletcher, CNP, Gracia Tanner, CNP, Ariana Nicholson, PA-C, Pari Warren PA-C, Aicha Yang, CNP, Luz Woodard, CNP, Amanda Lerner, CNP, Katt Street, CNP, Danii Mcmahan, CNP, Marie Orellana, CNS, Rose Mary Jerez, CNP, Kala Aguilar CNP, Tracy Schwab, CNP         [facility]   IN-PATIENT SERVICE   [facility]    Progress Note    5/17/2024    12:46 PM    Name:   Gaurang Fiore  MRN:     [id number]     Acct:      [id number]   Room:   [address] [place], [place] [address] Day:  1  Admit Date:  5/16/2024  8:39 PM    PCP:   Cristina Bernal DO  Code Status:  Full Code    Subjective:     C/C:   Chief Complaint   Patient presents with    Shortness of Breath     Was at work and hands started shaking, feels short of breath, pt states she is dizzy.      Interval History Status: improved.     Does feel better than last night  Denies n/v  Did have chest pressure (recent normal stress test) and has had sob    Now has headache    Brief History:       This 48 yof states she was at work and dropped things from hands-she had no control over them, this is unusual for her.  She also had some chest pressure and sob and lips got numb and purplish-which she says 
Transitions of Care Pharmacy Service   Medication Review    The patient's list of current home medications has been reviewed.     Source(s) of information: patient, [employer]    Based on information provided by the above source(s), I have updated the patient's home med list as described below.     Please review the ACTION REQUESTED section of this note below for any discrepancies on current hospital orders.      I changed or updated the following medications on the patient's home medication list:  Removed Albuterol HFA - Duplicate  Berberine     Added Breo Ellipta  Visine  Motrin         PROVIDER ACTION REQUESTED  Medications that need to be addressed by a physician/nurse practitioner:    Medication Action Requested     Breo Ellipta     Please review home medications and order as appropriate.            Please feel free to call me with any questions about this encounter. Thank you.    Que Knight RPH   Transitions of Care Pharmacy Service  Phone:  [phone]  Fax: [phone]      Electronically signed by Que Knight RP on 5/17/2024 at 6:16 PM       Medications Prior to Admission: fluticasone furoate-vilanterol (BREO ELLIPTA) 100-25 MCG/ACT inhaler, Inhale 1 puff into the lungs daily  naphazoline-pheniramine (NAPHCON-A) 0.025-0.3 % ophthalmic solution, Place 1 drop into both eyes daily as needed (dry/red eyes)  ibuprofen (ADVIL;MOTRIN) 800 MG tablet, Take 1 tablet by mouth every 8 hours as needed for Pain  metoprolol succinate (TOPROL XL) 25 MG extended release tablet, Take 1 tablet by mouth daily  fluticasone (FLONASE) 50 MCG/ACT nasal spray, 2 sprays by Each Nostril route daily  albuterol sulfate HFA (VENTOLIN HFA) 108 (90 Base) MCG/ACT inhaler, Inhale 2 puffs into the lungs 4 times daily as needed for Wheezing  lidocaine (LIDODERM) 5 %, Place 1 patch onto the skin daily 12 hours on, 12 hours off to back area  omeprazole (PRILOSEC) 20 MG delayed release capsule, Take 1 capsule by mouth daily  metFORMIN 
  BILIDIR <0.1  --    AMMONIA  --  50     ABG:  Lab Results   Component Value Date/Time    POCPH 7.359 03/30/2024 03:25 PM    POCPCO2 56.2 03/30/2024 03:25 PM    POCPO2 101.0 03/30/2024 03:25 PM    POCHCO3 31.7 03/30/2024 03:25 PM    PBEA 4.4 03/30/2024 03:25 PM    GVHQ0OLP 97.4 03/30/2024 03:25 PM    FIO2 36.0 03/30/2024 03:25 PM     Lab Results   Component Value Date/Time    SPECIAL RAC, 20ML 04/01/2024 09:33 AM     Lab Results   Component Value Date/Time    CULTURE NO GROWTH 5 DAYS 04/01/2024 09:33 AM       Radiology:  XR CHEST PORTABLE    Result Date: 5/16/2024  Central pulmonary vascular congestion and mild bilateral perihilar opacities, which may represent pulmonary edema.       Physical Examination:        General appearance:  alert, cooperative and no distress  Mental Status:  oriented to person, place and time and normal affect  Lungs:  clear bilaterally, normal effort  Heart:  regular rate and rhythm, no murmur  Abdomen:  soft, nontender, nondistended, normal bowel sounds, no masses, hepatomegaly, splenomegaly; obese  Extremities:  no redness, tenderness in the calves; ble edema- less  Skin:  no gross lesions, rashes, induration      Assessment:        Hospital Problems             Last Modified POA    * (Principal) Acute heart failure with preserved ejection fraction (HFpEF) ([facility]) 5/17/2024 Yes    Acute on chronic hypoxic respiratory failure ([facility]) 5/17/2024 Yes    Tobacco abuse 5/17/2024 Yes    Essential hypertension 5/17/2024 Yes    Obesity hypoventilation syndrome (HCC) 5/17/2024 Yes    Morbid obesity with BMI of 45.0-49.9, adult ([facility]) 5/17/2024 Yes    Myoclonus 5/18/2024 Yes   Suspected james    Plan:        Iv diuresis  Will need GDMT for hfpef  Wean o2 as able; usually uses at hs only.  If able to wean off o2, dc home  Smoking cessation  Needs outpatient sleep study-she states her insurance isn't covering it  Prior echo reviewed: lvdd with normal ef  Treat headache  D/w neuro, she needs sleep study as

## 2024-05-19 NOTE — PLAN OF CARE
Pt A&Ox4 this shift. Ambulating to restroom independently this shift. Remains on 5L NC. Complaints of headaches, see MAR. Bed locked in lowest position with call light in reach, safety maintained.   Patient having pain on their head and rates it a 10. Pain interventions includefrequent position changes, correct body alignment/body mechanics, relaxation techniques, and medication. Patients goal for pain relief is 5. The need for pain and symptom management will be considered in the discharge planning process to ensure patients comfort.      Problem: Discharge Planning  Goal: Discharge to home or other facility with appropriate resources  Outcome: Progressing     Problem: Pain  Goal: Verbalizes/displays adequate comfort level or baseline comfort level  Outcome: Progressing     Problem: Respiratory - Adult  Goal: Achieves optimal ventilation and oxygenation  Outcome: Progressing     Problem: Safety - Adult  Goal: Free from fall injury  Outcome: Progressing

## 2024-05-19 NOTE — RT PROTOCOL NOTE
RT Inhaler-Nebulizer Bronchodilator Protocol Note    There is a bronchodilator order in the chart from a provider indicating to follow the RT Bronchodilator Protocol and there is an “Initiate RT Inhaler-Nebulizer Bronchodilator Protocol” order as well (see protocol at bottom of note).    CXR Findings:  XR CHEST PORTABLE    Result Date: 5/16/2024  Central pulmonary vascular congestion and mild bilateral perihilar opacities, which may represent pulmonary edema.       The findings from the last RT Protocol Assessment were as follows:   History Pulmonary Disease: Smoker 15 pack years or more  Respiratory Pattern: Dyspnea on exertion or RR 21-25 bpm  Breath Sounds: Severe inspiratory and expiratory wheezing or severely diminished  Cough: Strong, spontaneous, non-productive  Indication for Bronchodilator Therapy: On home bronchodilators  Bronchodilator Assessment Score: 11    Aerosolized bronchodilator medication orders have been revised according to the RT Inhaler-Nebulizer Bronchodilator Protocol below.    Respiratory Therapist to perform RT Therapy Protocol Assessment initially then follow the protocol.  Repeat RT Therapy Protocol Assessment PRN for score 0-3 or on second treatment, BID, and PRN for scores above 3.    No Indications - adjust the frequency to every 6 hours PRN wheezing or bronchospasm, if no treatments needed after 48 hours then discontinue using Per Protocol order mode.     If indication present, adjust the RT bronchodilator orders based on the Bronchodilator Assessment Score as indicated below.  Use Inhaler orders unless patient has one or more of the following: on home nebulizer, not able to hold breath for 10 seconds, is not alert and oriented, cannot activate and use MDI correctly, or respiratory rate 25 breaths per minute or more, then use the equivalent nebulizer order(s) with same Frequency and PRN reasons based on the score.  If a patient is on this medication at home then do not decrease 
RT Inhaler-Nebulizer Bronchodilator Protocol Note    There is a bronchodilator order in the chart from a provider indicating to follow the RT Bronchodilator Protocol and there is an “Initiate RT Inhaler-Nebulizer Bronchodilator Protocol” order as well (see protocol at bottom of note).    CXR Findings:  XR CHEST PORTABLE    Result Date: 5/16/2024  Central pulmonary vascular congestion and mild bilateral perihilar opacities, which may represent pulmonary edema.       The findings from the last RT Protocol Assessment were as follows:   History Pulmonary Disease: Smoker 15 pack years or more  Respiratory Pattern: Dyspnea on exertion or RR 21-25 bpm  Breath Sounds: Severe inspiratory and expiratory wheezing or severely diminished  Cough: Strong, spontaneous, non-productive  Indication for Bronchodilator Therapy: On home bronchodilators, Decreased or absent breath sounds  Bronchodilator Assessment Score: 11    Aerosolized bronchodilator medication orders have been revised according to the RT Inhaler-Nebulizer Bronchodilator Protocol below.    Respiratory Therapist to perform RT Therapy Protocol Assessment initially then follow the protocol.  Repeat RT Therapy Protocol Assessment PRN for score 0-3 or on second treatment, BID, and PRN for scores above 3.    No Indications - adjust the frequency to every 6 hours PRN wheezing or bronchospasm, if no treatments needed after 48 hours then discontinue using Per Protocol order mode.     If indication present, adjust the RT bronchodilator orders based on the Bronchodilator Assessment Score as indicated below.  Use Inhaler orders unless patient has one or more of the following: on home nebulizer, not able to hold breath for 10 seconds, is not alert and oriented, cannot activate and use MDI correctly, or respiratory rate 25 breaths per minute or more, then use the equivalent nebulizer order(s) with same Frequency and PRN reasons based on the score.  If a patient is on this medication 
RT Inhaler-Nebulizer Bronchodilator Protocol Note    There is a bronchodilator order in the chart from a provider indicating to follow the RT Bronchodilator Protocol and there is an “Initiate RT Inhaler-Nebulizer Bronchodilator Protocol” order as well (see protocol at bottom of note).    CXR Findings:  XR CHEST PORTABLE    Result Date: 5/16/2024  Central pulmonary vascular congestion and mild bilateral perihilar opacities, which may represent pulmonary edema.       The findings from the last RT Protocol Assessment were as follows:   History Pulmonary Disease: Smoker 15 pack years or more  Respiratory Pattern: Dyspnea on exertion or RR 21-25 bpm  Breath Sounds: Slightly diminished and/or crackles  Cough: Strong, spontaneous, non-productive  Indication for Bronchodilator Therapy: Decreased or absent breath sounds, On home bronchodilators  Bronchodilator Assessment Score: 5    Aerosolized bronchodilator medication orders have been revised according to the RT Inhaler-Nebulizer Bronchodilator Protocol below.    Respiratory Therapist to perform RT Therapy Protocol Assessment initially then follow the protocol.  Repeat RT Therapy Protocol Assessment PRN for score 0-3 or on second treatment, BID, and PRN for scores above 3.    No Indications - adjust the frequency to every 6 hours PRN wheezing or bronchospasm, if no treatments needed after 48 hours then discontinue using Per Protocol order mode.     If indication present, adjust the RT bronchodilator orders based on the Bronchodilator Assessment Score as indicated below.  Use Inhaler orders unless patient has one or more of the following: on home nebulizer, not able to hold breath for 10 seconds, is not alert and oriented, cannot activate and use MDI correctly, or respiratory rate 25 breaths per minute or more, then use the equivalent nebulizer order(s) with same Frequency and PRN reasons based on the score.  If a patient is on this medication at home then do not decrease 
Bronchodilator order with Frequency of every 4 hours PRN for wheezing or increased work of breathing using Per Protocol order mode.        4-6 - enter or revise RT Bronchodilator order(s) to two equivalent RT bronchodilator orders with one order with BID Frequency and one order with Frequency of every 4 hours PRN wheezing or increased work of breathing using Per Protocol order mode.        7-10 - enter or revise RT Bronchodilator order(s) to two equivalent RT bronchodilator orders with one order with TID Frequency and one order with Frequency of every 4 hours PRN wheezing or increased work of breathing using Per Protocol order mode.       11-13 - enter or revise RT Bronchodilator order(s) to one equivalent RT bronchodilator order with QID Frequency and an Albuterol order with Frequency of every 4 hours PRN wheezing or increased work of breathing using Per Protocol order mode.      Greater than 13 - enter or revise RT Bronchodilator order(s) to one equivalent RT bronchodilator order with every 4 hours Frequency and an Albuterol order with Frequency of every 2 hours PRN wheezing or increased work of breathing using Per Protocol order mode.     RT to enter RT Home Evaluation for COPD & MDI Assessment order using Per Protocol order mode.    Electronically signed by Yoselin Arnold RCP on 5/19/2024 at 7:04 AM  
Frequency below that used at home.    0-3 - enter or revise RT bronchodilator order(s) to equivalent RT Bronchodilator order with Frequency of every 4 hours PRN for wheezing or increased work of breathing using Per Protocol order mode.        4-6 - enter or revise RT Bronchodilator order(s) to two equivalent RT bronchodilator orders with one order with BID Frequency and one order with Frequency of every 4 hours PRN wheezing or increased work of breathing using Per Protocol order mode.        7-10 - enter or revise RT Bronchodilator order(s) to two equivalent RT bronchodilator orders with one order with TID Frequency and one order with Frequency of every 4 hours PRN wheezing or increased work of breathing using Per Protocol order mode.       11-13 - enter or revise RT Bronchodilator order(s) to one equivalent RT bronchodilator order with QID Frequency and an Albuterol order with Frequency of every 4 hours PRN wheezing or increased work of breathing using Per Protocol order mode.      Greater than 13 - enter or revise RT Bronchodilator order(s) to one equivalent RT bronchodilator order with every 4 hours Frequency and an Albuterol order with Frequency of every 2 hours PRN wheezing or increased work of breathing using Per Protocol order mode.     RT to enter RT Home Evaluation for COPD & MDI Assessment order using Per Protocol order mode.    Electronically signed by Nuvia Kim RCP on 5/18/2024 at 10:29 AM

## 2024-05-19 NOTE — PLAN OF CARE
Problem: Respiratory - Adult  Goal: Achieves optimal ventilation and oxygenation  5/19/2024 0701 by Yoselin Arnold RCP  Outcome: Progressing

## 2024-05-19 NOTE — DISCHARGE INSTRUCTIONS
In regards to a sleep study     called [facility] they explained that they prefer home sleep studies unless it is contraindicated. Because you are on 6 L at night may need to get prior authorization from PCP for an inpatient sleep lab.     Patient will need sleep study ordered and sent to [facility] so they begin process of PA due to the 02 at night.

## 2024-05-19 NOTE — DISCHARGE SUMMARY
05/16/2020 09:59 PM    PROT 6.7 12/29/2012 09:11 AM    CALCIUM 9.4 05/19/2024 05:28 AM        Radiology:  XR CHEST PORTABLE    Result Date: 5/16/2024  Central pulmonary vascular congestion and mild bilateral perihilar opacities, which may represent pulmonary edema.       Consultations:    Consults:     Final Specialist Recommendations/Findings:   IP CONSULT TO INTERNAL MEDICINE  IP CONSULT TO NEUROLOGY      The patient was seen and examined on day of discharge and this discharge summary is in conjunction with any daily progress note from day of discharge.    Discharge plan:     Disposition: Home    Physician Follow Up:     Deisi Cook, TOR - BRIAN  [address]  [facility] [address]  [phone]    Go on 5/23/2024  new patient appointment at 0800    [facility]  [address] [place] [address] [phone]  Follow up  call them on arrival home to arrange for them to come check your concentrator       Requiring Further Evaluation/Follow Up POST HOSPITALIZATION/Incidental Findings: needs sleep study    Also needs to have arb or ace-I added and follow up labs done    Diet: cardiac diet    Activity: As tolerated    Instructions to Patient: take medications as prescribed      Discharge Medications:      Medication List        START taking these medications      budesonide-formoterol 160-4.5 MCG/ACT Aero  Commonly known as: SYMBICORT  Inhale 2 puffs into the lungs in the morning and 2 puffs in the evening.  Replaces: Breo Ellipta 100-25 MCG/ACT inhaler     empagliflozin 10 MG tablet  Commonly known as: Jardiance  Take 1 tablet by mouth daily     nicotine 21 MG/24HR  Commonly known as: NICODERM CQ  Place 1 patch onto the skin daily  Start taking on: May 20, 2024     spironolactone 25 MG tablet  Commonly known as: ALDACTONE  Take 1 tablet by mouth daily  Start taking on: May 20, 2024     traMADol 50 MG tablet  Commonly known as: ULTRAM  Take 1 tablet by mouth every 6 hours as needed (pain or headache) for up

## 2024-05-23 ENCOUNTER — OFFICE VISIT (OUTPATIENT)
Dept: FAMILY MEDICINE CLINIC | Age: 49
End: 2024-05-23
Payer: COMMERCIAL

## 2024-05-23 VITALS
RESPIRATION RATE: 18 BRPM | HEIGHT: 67 IN | SYSTOLIC BLOOD PRESSURE: 118 MMHG | HEART RATE: 104 BPM | WEIGHT: 285.4 LBS | DIASTOLIC BLOOD PRESSURE: 74 MMHG | BODY MASS INDEX: 44.8 KG/M2 | OXYGEN SATURATION: 94 %

## 2024-05-23 DIAGNOSIS — Z76.89 ENCOUNTER TO ESTABLISH CARE: Primary | ICD-10-CM

## 2024-05-23 DIAGNOSIS — I50.31 ACUTE HEART FAILURE WITH PRESERVED EJECTION FRACTION (HFPEF) (HCC): ICD-10-CM

## 2024-05-23 DIAGNOSIS — R92.8 ABNORMAL MAMMOGRAM OF LEFT BREAST: ICD-10-CM

## 2024-05-23 DIAGNOSIS — F31.9 BIPOLAR AFFECTIVE DISORDER, REMISSION STATUS UNSPECIFIED (HCC): ICD-10-CM

## 2024-05-23 DIAGNOSIS — G47.33 OSA (OBSTRUCTIVE SLEEP APNEA): ICD-10-CM

## 2024-05-23 PROCEDURE — 3074F SYST BP LT 130 MM HG: CPT | Performed by: NURSE PRACTITIONER

## 2024-05-23 PROCEDURE — 99204 OFFICE O/P NEW MOD 45 MIN: CPT | Performed by: NURSE PRACTITIONER

## 2024-05-23 PROCEDURE — 3078F DIAST BP <80 MM HG: CPT | Performed by: NURSE PRACTITIONER

## 2024-05-23 SDOH — ECONOMIC STABILITY: FOOD INSECURITY: WITHIN THE PAST 12 MONTHS, YOU WORRIED THAT YOUR FOOD WOULD RUN OUT BEFORE YOU GOT MONEY TO BUY MORE.: NEVER TRUE

## 2024-05-23 SDOH — ECONOMIC STABILITY: FOOD INSECURITY: WITHIN THE PAST 12 MONTHS, THE FOOD YOU BOUGHT JUST DIDN'T LAST AND YOU DIDN'T HAVE MONEY TO GET MORE.: NEVER TRUE

## 2024-05-23 SDOH — ECONOMIC STABILITY: HOUSING INSECURITY
IN THE LAST 12 MONTHS, WAS THERE A TIME WHEN YOU DID NOT HAVE A STEADY PLACE TO SLEEP OR SLEPT IN A SHELTER (INCLUDING NOW)?: NO

## 2024-05-23 SDOH — ECONOMIC STABILITY: INCOME INSECURITY: HOW HARD IS IT FOR YOU TO PAY FOR THE VERY BASICS LIKE FOOD, HOUSING, MEDICAL CARE, AND HEATING?: NOT HARD AT ALL

## 2024-05-23 ASSESSMENT — PATIENT HEALTH QUESTIONNAIRE - PHQ9
SUM OF ALL RESPONSES TO PHQ QUESTIONS 1-9: 0
1. LITTLE INTEREST OR PLEASURE IN DOING THINGS: NOT AT ALL
SUM OF ALL RESPONSES TO PHQ QUESTIONS 1-9: 0
2. FEELING DOWN, DEPRESSED OR HOPELESS: NOT AT ALL
SUM OF ALL RESPONSES TO PHQ QUESTIONS 1-9: 0
SUM OF ALL RESPONSES TO PHQ QUESTIONS 1-9: 0
SUM OF ALL RESPONSES TO PHQ9 QUESTIONS 1 & 2: 0

## 2024-05-23 NOTE — PROGRESS NOTES
Gaurang Fiore (:  1975) is a 49 y.o. female,New patient, here for evaluation of the following chief complaint(s):  New Patient (Previously with [facility] family practice. Unsure last time she saw them./Will obtain records. ///Pulm at [facility]. ), Health Maintenance (Depression screen completed. /SDOH completed. /), and Follow-Up from Hospital ([facility] 2024-2024-CHF. /)      Assessment & Plan   1. Encounter to establish care  Comments:  Record release signed today to obtain previous PCP OV notes for review.  2. Acute heart failure with preserved ejection fraction (HFpEF) ([facility])  -     AFL (Epic) - Nawaf Puckett MD, Cardiology, [place]  3. YARITZA (obstructive sleep apnea)  Comments:  Record release form signed today to obtain specialist OV notes  4. Abnormal mammogram of left breast  Comments:  Refuses f/u diagnostic mammogram today. Understands risks associated with this could possibly include delay of treatment or missed malignancy.  5. Bipolar affective disorder, remission status unspecified ([facility])  Comments:  Refuses referral to psychiatry. Denies feelings of diomedes today.      Return in about 4 weeks (around 2024).       Subjective   Presents as new patient to establish care and review recent hospitalization   Works full time at [employer] - will be getting laid off after next month   Previous PCP through [facility]     Pulm: Follows with [facility]   Undiagnosed YARITZA - insurance will not cover sleep study   Did require night time nasal canula     Psychiatry: Bipolar, anxiety  Stopped seeing specialist at [facility] more than 6 months ago   Not interested in finding new psychiatrist \"I really don't like reliving my past over and over\"    Recent hospital admission to [facility] 2024 - 2024  Hospital Course:  \"Gaurang Fiore is a 48 y.o. female who was admitted for the management of  Acute heart failure with preserved ejection fraction (HFpEF) ([facility]) ,

## 2024-06-07 ENCOUNTER — HOSPITAL ENCOUNTER (EMERGENCY)
Age: 49
Discharge: HOME | End: 2024-06-07
Attending: EMERGENCY MEDICINE
Payer: COMMERCIAL

## 2024-06-07 ENCOUNTER — APPOINTMENT (OUTPATIENT)
Dept: CT IMAGING | Age: 49
End: 2024-06-07
Payer: COMMERCIAL

## 2024-06-07 VITALS
RESPIRATION RATE: 15 BRPM | OXYGEN SATURATION: 92 % | BODY MASS INDEX: 65.95 KG/M2 | SYSTOLIC BLOOD PRESSURE: 112 MMHG | DIASTOLIC BLOOD PRESSURE: 73 MMHG | HEART RATE: 77 BPM | HEIGHT: 55 IN | WEIGHT: 285 LBS | TEMPERATURE: 98.2 F

## 2024-06-07 DIAGNOSIS — S39.012A BACK STRAIN, INITIAL ENCOUNTER: Primary | ICD-10-CM

## 2024-06-07 LAB
ALBUMIN SERPL-MCNC: 4 G/DL (ref 3.5–5.2)
ALP SERPL-CCNC: 85 U/L (ref 35–104)
ALT SERPL-CCNC: 18 U/L (ref 5–33)
ANION GAP SERPL CALCULATED.3IONS-SCNC: 10 MMOL/L (ref 9–17)
AST SERPL-CCNC: 14 U/L
BASOPHILS # BLD: <0.03 K/UL (ref 0–0.2)
BASOPHILS NFR BLD: 0 % (ref 0–2)
BILIRUB SERPL-MCNC: 0.3 MG/DL (ref 0.3–1.2)
BUN SERPL-MCNC: 14 MG/DL (ref 6–20)
BUN/CREAT SERPL: 23 (ref 9–20)
CALCIUM SERPL-MCNC: 9.1 MG/DL (ref 8.6–10.4)
CHLORIDE SERPL-SCNC: 109 MMOL/L (ref 98–107)
CO2 SERPL-SCNC: 23 MMOL/L (ref 20–31)
CREAT SERPL-MCNC: 0.6 MG/DL (ref 0.5–0.9)
EOSINOPHIL # BLD: 0.38 K/UL (ref 0–0.44)
EOSINOPHILS RELATIVE PERCENT: 5 % (ref 1–4)
ERYTHROCYTE [DISTWIDTH] IN BLOOD BY AUTOMATED COUNT: 16.4 % (ref 11.8–14.4)
GFR, ESTIMATED: >90 ML/MIN/1.73M2
GLUCOSE SERPL-MCNC: 110 MG/DL (ref 70–99)
HCT VFR BLD AUTO: 42.2 % (ref 36.3–47.1)
HGB BLD-MCNC: 13.2 G/DL (ref 11.9–15.1)
IMM GRANULOCYTES # BLD AUTO: 0.11 K/UL (ref 0–0.3)
IMM GRANULOCYTES NFR BLD: 2 %
LYMPHOCYTES NFR BLD: 2.09 K/UL (ref 1.1–3.7)
LYMPHOCYTES RELATIVE PERCENT: 29 % (ref 24–43)
MCH RBC QN AUTO: 30.1 PG (ref 25.2–33.5)
MCHC RBC AUTO-ENTMCNC: 31.3 G/DL (ref 28.4–34.8)
MCV RBC AUTO: 96.1 FL (ref 82.6–102.9)
MONOCYTES NFR BLD: 0.55 K/UL (ref 0.1–1.2)
MONOCYTES NFR BLD: 8 % (ref 3–12)
NEUTROPHILS NFR BLD: 56 % (ref 36–65)
NEUTS SEG NFR BLD: 4.15 K/UL (ref 1.5–8.1)
NRBC BLD-RTO: 0.7 PER 100 WBC
PLATELET # BLD AUTO: 243 K/UL (ref 138–453)
PMV BLD AUTO: 10.8 FL (ref 8.1–13.5)
POTASSIUM SERPL-SCNC: 4.1 MMOL/L (ref 3.7–5.3)
PROT SERPL-MCNC: 6.9 G/DL (ref 6.4–8.3)
RBC # BLD AUTO: 4.39 M/UL (ref 3.95–5.11)
RBC # BLD: ABNORMAL 10*6/UL
SODIUM SERPL-SCNC: 142 MMOL/L (ref 135–144)
TROPONIN I SERPL HS-MCNC: 7 NG/L (ref 0–14)
WBC OTHER # BLD: 7.3 K/UL (ref 3.5–11.3)

## 2024-06-07 PROCEDURE — 96372 THER/PROPH/DIAG INJ SC/IM: CPT

## 2024-06-07 PROCEDURE — 99285 EMERGENCY DEPT VISIT HI MDM: CPT

## 2024-06-07 PROCEDURE — 6360000002 HC RX W HCPCS: Performed by: EMERGENCY MEDICINE

## 2024-06-07 PROCEDURE — 96376 TX/PRO/DX INJ SAME DRUG ADON: CPT

## 2024-06-07 PROCEDURE — 96375 TX/PRO/DX INJ NEW DRUG ADDON: CPT

## 2024-06-07 PROCEDURE — 2580000003 HC RX 258: Performed by: EMERGENCY MEDICINE

## 2024-06-07 PROCEDURE — 85025 COMPLETE CBC W/AUTO DIFF WBC: CPT

## 2024-06-07 PROCEDURE — 80053 COMPREHEN METABOLIC PANEL: CPT

## 2024-06-07 PROCEDURE — 6360000004 HC RX CONTRAST MEDICATION: Performed by: EMERGENCY MEDICINE

## 2024-06-07 PROCEDURE — 96374 THER/PROPH/DIAG INJ IV PUSH: CPT

## 2024-06-07 PROCEDURE — 84484 ASSAY OF TROPONIN QUANT: CPT

## 2024-06-07 PROCEDURE — 71260 CT THORAX DX C+: CPT

## 2024-06-07 RX ORDER — HYDROMORPHONE HYDROCHLORIDE 1 MG/ML
1 INJECTION, SOLUTION INTRAMUSCULAR; INTRAVENOUS; SUBCUTANEOUS ONCE
Status: COMPLETED | OUTPATIENT
Start: 2024-06-07 | End: 2024-06-07

## 2024-06-07 RX ORDER — SODIUM CHLORIDE 0.9 % (FLUSH) 0.9 %
10 SYRINGE (ML) INJECTION PRN
Status: DISCONTINUED | OUTPATIENT
Start: 2024-06-07 | End: 2024-06-07 | Stop reason: HOSPADM

## 2024-06-07 RX ORDER — IBUPROFEN 800 MG/1
800 TABLET ORAL EVERY 8 HOURS PRN
Qty: 60 TABLET | Refills: 0 | Status: SHIPPED | OUTPATIENT
Start: 2024-06-07

## 2024-06-07 RX ORDER — 0.9 % SODIUM CHLORIDE 0.9 %
80 INTRAVENOUS SOLUTION INTRAVENOUS ONCE
Status: COMPLETED | OUTPATIENT
Start: 2024-06-07 | End: 2024-06-07

## 2024-06-07 RX ORDER — ORPHENADRINE CITRATE 30 MG/ML
60 INJECTION INTRAMUSCULAR; INTRAVENOUS ONCE
Status: COMPLETED | OUTPATIENT
Start: 2024-06-07 | End: 2024-06-07

## 2024-06-07 RX ORDER — KETOROLAC TROMETHAMINE 30 MG/ML
30 INJECTION, SOLUTION INTRAMUSCULAR; INTRAVENOUS ONCE
Status: COMPLETED | OUTPATIENT
Start: 2024-06-07 | End: 2024-06-07

## 2024-06-07 RX ORDER — OXYCODONE HYDROCHLORIDE 5 MG/1
5 TABLET ORAL EVERY 6 HOURS PRN
Qty: 12 TABLET | Refills: 0 | Status: SHIPPED | OUTPATIENT
Start: 2024-06-07 | End: 2024-06-10

## 2024-06-07 RX ADMIN — SODIUM CHLORIDE 80 ML: 9 INJECTION, SOLUTION INTRAVENOUS at 05:58

## 2024-06-07 RX ADMIN — HYDROMORPHONE HYDROCHLORIDE 0.5 MG: 1 INJECTION, SOLUTION INTRAMUSCULAR; INTRAVENOUS; SUBCUTANEOUS at 07:21

## 2024-06-07 RX ADMIN — SODIUM CHLORIDE, PRESERVATIVE FREE 10 ML: 5 INJECTION INTRAVENOUS at 05:58

## 2024-06-07 RX ADMIN — ORPHENADRINE CITRATE 60 MG: 60 INJECTION INTRAMUSCULAR; INTRAVENOUS at 05:34

## 2024-06-07 RX ADMIN — HYDROMORPHONE HYDROCHLORIDE 1 MG: 1 INJECTION, SOLUTION INTRAMUSCULAR; INTRAVENOUS; SUBCUTANEOUS at 04:05

## 2024-06-07 RX ADMIN — KETOROLAC TROMETHAMINE 30 MG: 30 INJECTION, SOLUTION INTRAMUSCULAR at 04:05

## 2024-06-07 RX ADMIN — IOPAMIDOL 100 ML: 755 INJECTION, SOLUTION INTRAVENOUS at 05:58

## 2024-06-07 RX ADMIN — HYDROMORPHONE HYDROCHLORIDE 1 MG: 1 INJECTION, SOLUTION INTRAMUSCULAR; INTRAVENOUS; SUBCUTANEOUS at 05:33

## 2024-06-07 ASSESSMENT — PAIN DESCRIPTION - LOCATION
LOCATION: BACK;FLANK
LOCATION: BACK
LOCATION: ABDOMEN;BACK;FLANK
LOCATION: BACK;FLANK
LOCATION: BACK

## 2024-06-07 ASSESSMENT — PAIN SCALES - GENERAL
PAINLEVEL_OUTOF10: 10
PAINLEVEL_OUTOF10: 10
PAINLEVEL_OUTOF10: 7
PAINLEVEL_OUTOF10: 7
PAINLEVEL_OUTOF10: 10

## 2024-06-07 ASSESSMENT — PAIN DESCRIPTION - ORIENTATION
ORIENTATION: LEFT;UPPER;MID
ORIENTATION: LEFT
ORIENTATION: LEFT;MID;LOWER
ORIENTATION: LEFT

## 2024-06-07 ASSESSMENT — PAIN DESCRIPTION - DESCRIPTORS
DESCRIPTORS: SHARP;PRESSURE
DESCRIPTORS: STABBING

## 2024-06-07 ASSESSMENT — PAIN - FUNCTIONAL ASSESSMENT: PAIN_FUNCTIONAL_ASSESSMENT: 0-10

## 2024-06-07 ASSESSMENT — ENCOUNTER SYMPTOMS: BACK PAIN: 1

## 2024-06-07 ASSESSMENT — PAIN DESCRIPTION - PAIN TYPE
TYPE: ACUTE PAIN
TYPE: ACUTE PAIN

## 2024-06-07 NOTE — ED PROVIDER NOTES
06/07/24 0703   Fri Jun 07, 2024   0551 Patient persisted to have fairly severe pain despite medications administered here including Dilaudid and Toradol.  Discussed additional medication with the patient and plan for CT chest [EG]      ED Course User Index  [EG] Goldberger, Erica B, MD         CRITICAL CARE:       PROCEDURES:    Procedures      DATA FOR LAB AND RADIOLOGY TESTS ORDERED BELOW ARE REVIEWED BY THE ED CLINICIAN:    RADIOLOGY: All x-rays, CT, MRI, and formal ultrasound images (except ED bedside ultrasound) are read by the radiologist, see reports below, unless otherwise noted in MDM or here.  Reports below are reviewed by myself.  CT CHEST PULMONARY EMBOLISM W CONTRAST   Preliminary Result   1. No evidence of pulmonary embolism.   2. Mild ectasia of the ascending thoracic aorta, which measures 3.9 cm in   diameter. No evidence of thoracic aortic dissection.   3. At the bases of the lower lobes of both lungs there are mild atelectatic   changes, but subtle infiltrates may not be excluded. Chronic fibrotic change   in the lung bases may also be possible. Minimal dependent atelectatic changes   at the posterior aspects of upper lobes of both lungs. In the rest of both   lungs, there is no other focal abnormality or acute process.             LABS: Lab orders shown below, the results are reviewed by myself, and all abnormals are listed below.  Labs Reviewed   CBC WITH AUTO DIFFERENTIAL - Abnormal; Notable for the following components:       Result Value    RDW 16.4 (*)     NRBC Automated 0.7 (*)     Eosinophils % 5 (*)     Immature Granulocytes % 2 (*)     All other components within normal limits   COMPREHENSIVE METABOLIC PANEL - Abnormal; Notable for the following components:    Chloride 109 (*)     Glucose 110 (*)     BUN/Creatinine Ratio 23 (*)     All other components within normal limits   TROPONIN       Vitals Reviewed:    Vitals:    06/07/24 0448 06/07/24 0458 06/07/24 0508 06/07/24 0518   BP:  (!)

## 2024-06-07 NOTE — DISCHARGE INSTRUCTIONS
I recommend rest cold compresses.  You can take up to 1000 mg of Tylenol up to 4 times in 24 hours.  Motrin 800 mg can be used every 8 hours.  The oxycodone is for more severe episodes of pain.  This is a narcotic and is not recommended to be used within 4 hours of driving or operating machinery.    I would recommend taking these medications with food.  Opioids do cause constipation I recommend starting stool softener for home.

## 2024-06-09 LAB
EKG ATRIAL RATE: 90 BPM
EKG P AXIS: 61 DEGREES
EKG P-R INTERVAL: 154 MS
EKG Q-T INTERVAL: 366 MS
EKG QRS DURATION: 84 MS
EKG QTC CALCULATION (BAZETT): 447 MS
EKG R AXIS: 5 DEGREES
EKG T AXIS: 49 DEGREES
EKG VENTRICULAR RATE: 90 BPM

## 2024-06-12 ENCOUNTER — TELEPHONE (OUTPATIENT)
Dept: FAMILY MEDICINE CLINIC | Age: 49
End: 2024-06-12

## 2024-06-12 DIAGNOSIS — S39.012D BACK STRAIN, SUBSEQUENT ENCOUNTER: Primary | ICD-10-CM

## 2024-06-12 RX ORDER — LIDOCAINE 50 MG/G
1 PATCH TOPICAL DAILY
Qty: 10 PATCH | Refills: 0 | Status: SHIPPED | OUTPATIENT
Start: 2024-06-12 | End: 2024-06-22

## 2024-06-12 NOTE — TELEPHONE ENCOUNTER
Patient states she already has a 90 day supply of lidocaine patches, uses them every day. She states will go back to the ER

## 2024-06-12 NOTE — TELEPHONE ENCOUNTER
I can not send in controlled medications  She should not be driving or operating machinery while taking narcotics either   Advise she continue ibuprofen. New Rx for lidocaine patch sent in to pharmacy.

## 2024-06-12 NOTE — TELEPHONE ENCOUNTER
Patient contacted office, she states she was in the ED for back pain. They prescribed her pain medication and she is asking if our office could refill it for her so she can go to work.

## 2024-06-14 ENCOUNTER — APPOINTMENT (OUTPATIENT)
Dept: CT IMAGING | Age: 49
End: 2024-06-14
Payer: COMMERCIAL

## 2024-06-14 ENCOUNTER — HOSPITAL ENCOUNTER (EMERGENCY)
Age: 49
Discharge: HOME | End: 2024-06-14
Attending: STUDENT IN AN ORGANIZED HEALTH CARE EDUCATION/TRAINING PROGRAM
Payer: COMMERCIAL

## 2024-06-14 VITALS
BODY MASS INDEX: 1391.51 KG/M2 | RESPIRATION RATE: 14 BRPM | TEMPERATURE: 98.1 F | OXYGEN SATURATION: 92 % | DIASTOLIC BLOOD PRESSURE: 97 MMHG | WEIGHT: 285 LBS | SYSTOLIC BLOOD PRESSURE: 119 MMHG | HEART RATE: 90 BPM

## 2024-06-14 DIAGNOSIS — R10.13 ABDOMINAL PAIN, EPIGASTRIC: Primary | ICD-10-CM

## 2024-06-14 LAB
ALBUMIN SERPL-MCNC: 4.1 G/DL (ref 3.5–5.2)
ALP SERPL-CCNC: 80 U/L (ref 35–104)
ALT SERPL-CCNC: 29 U/L (ref 5–33)
ANION GAP SERPL CALCULATED.3IONS-SCNC: 11 MMOL/L (ref 9–17)
AST SERPL-CCNC: 20 U/L
BASOPHILS # BLD: 0.04 K/UL (ref 0–0.2)
BASOPHILS NFR BLD: 1 % (ref 0–2)
BILIRUB SERPL-MCNC: 0.4 MG/DL (ref 0.3–1.2)
BILIRUB UR QL STRIP: NEGATIVE
BUN SERPL-MCNC: 15 MG/DL (ref 6–20)
BUN/CREAT SERPL: 25 (ref 9–20)
C TRACH DNA SPEC QL PROBE+SIG AMP: NORMAL
CALCIUM SERPL-MCNC: 9.3 MG/DL (ref 8.6–10.4)
CANDIDA SPECIES: NEGATIVE
CHLORIDE SERPL-SCNC: 106 MMOL/L (ref 98–107)
CLARITY UR: CLEAR
CO2 SERPL-SCNC: 25 MMOL/L (ref 20–31)
COLOR UR: YELLOW
CREAT SERPL-MCNC: 0.6 MG/DL (ref 0.5–0.9)
EOSINOPHIL # BLD: 0.41 K/UL (ref 0–0.44)
EOSINOPHILS RELATIVE PERCENT: 6 % (ref 1–4)
ERYTHROCYTE [DISTWIDTH] IN BLOOD BY AUTOMATED COUNT: 16.4 % (ref 11.8–14.4)
GARDNERELLA VAGINALIS: NEGATIVE
GFR, ESTIMATED: >90 ML/MIN/1.73M2
GLUCOSE SERPL-MCNC: 102 MG/DL (ref 70–99)
GLUCOSE UR STRIP-MCNC: NEGATIVE MG/DL
HCT VFR BLD AUTO: 47.5 % (ref 36.3–47.1)
HGB BLD-MCNC: 14.7 G/DL (ref 11.9–15.1)
HGB UR QL STRIP.AUTO: NEGATIVE
IMM GRANULOCYTES # BLD AUTO: 0.05 K/UL (ref 0–0.3)
IMM GRANULOCYTES NFR BLD: 1 %
KETONES UR STRIP-MCNC: NEGATIVE MG/DL
LEUKOCYTE ESTERASE UR QL STRIP: NEGATIVE
LIPASE SERPL-CCNC: 32 U/L (ref 13–60)
LYMPHOCYTES NFR BLD: 2.18 K/UL (ref 1.1–3.7)
LYMPHOCYTES RELATIVE PERCENT: 30 % (ref 24–43)
MCH RBC QN AUTO: 29.9 PG (ref 25.2–33.5)
MCHC RBC AUTO-ENTMCNC: 30.9 G/DL (ref 28.4–34.8)
MCV RBC AUTO: 96.5 FL (ref 82.6–102.9)
MONOCYTES NFR BLD: 0.67 K/UL (ref 0.1–1.2)
MONOCYTES NFR BLD: 9 % (ref 3–12)
N GONORRHOEA DNA SPEC QL PROBE+SIG AMP: NORMAL
NEUTROPHILS NFR BLD: 53 % (ref 36–65)
NEUTS SEG NFR BLD: 3.89 K/UL (ref 1.5–8.1)
NITRITE UR QL STRIP: NEGATIVE
NRBC BLD-RTO: 0 PER 100 WBC
PH UR STRIP: 5.5 [PH] (ref 5–8)
PLATELET # BLD AUTO: 239 K/UL (ref 138–453)
PMV BLD AUTO: 10.4 FL (ref 8.1–13.5)
POTASSIUM SERPL-SCNC: 4.6 MMOL/L (ref 3.7–5.3)
PROT SERPL-MCNC: 7.2 G/DL (ref 6.4–8.3)
PROT UR STRIP-MCNC: NEGATIVE MG/DL
RBC # BLD AUTO: 4.92 M/UL (ref 3.95–5.11)
RBC # BLD: ABNORMAL 10*6/UL
SODIUM SERPL-SCNC: 142 MMOL/L (ref 135–144)
SOURCE: NORMAL
SP GR UR STRIP: 1.03 (ref 1–1.03)
SPECIMEN DESCRIPTION: NORMAL
TRICHOMONAS: NEGATIVE
TROPONIN I SERPL HS-MCNC: 7 NG/L (ref 0–14)
UROBILINOGEN UR STRIP-ACNC: NORMAL EU/DL (ref 0–1)
WBC OTHER # BLD: 7.2 K/UL (ref 3.5–11.3)

## 2024-06-14 PROCEDURE — 6360000004 HC RX CONTRAST MEDICATION: Performed by: STUDENT IN AN ORGANIZED HEALTH CARE EDUCATION/TRAINING PROGRAM

## 2024-06-14 PROCEDURE — 96375 TX/PRO/DX INJ NEW DRUG ADDON: CPT

## 2024-06-14 PROCEDURE — 85025 COMPLETE CBC W/AUTO DIFF WBC: CPT

## 2024-06-14 PROCEDURE — 99285 EMERGENCY DEPT VISIT HI MDM: CPT

## 2024-06-14 PROCEDURE — 84484 ASSAY OF TROPONIN QUANT: CPT

## 2024-06-14 PROCEDURE — 6360000002 HC RX W HCPCS: Performed by: STUDENT IN AN ORGANIZED HEALTH CARE EDUCATION/TRAINING PROGRAM

## 2024-06-14 PROCEDURE — 2580000003 HC RX 258: Performed by: STUDENT IN AN ORGANIZED HEALTH CARE EDUCATION/TRAINING PROGRAM

## 2024-06-14 PROCEDURE — 74177 CT ABD & PELVIS W/CONTRAST: CPT

## 2024-06-14 PROCEDURE — 83690 ASSAY OF LIPASE: CPT

## 2024-06-14 PROCEDURE — 87510 GARDNER VAG DNA DIR PROBE: CPT

## 2024-06-14 PROCEDURE — 96374 THER/PROPH/DIAG INJ IV PUSH: CPT

## 2024-06-14 PROCEDURE — 87491 CHLMYD TRACH DNA AMP PROBE: CPT

## 2024-06-14 PROCEDURE — 80053 COMPREHEN METABOLIC PANEL: CPT

## 2024-06-14 PROCEDURE — 87480 CANDIDA DNA DIR PROBE: CPT

## 2024-06-14 PROCEDURE — 87591 N.GONORRHOEAE DNA AMP PROB: CPT

## 2024-06-14 PROCEDURE — 81003 URINALYSIS AUTO W/O SCOPE: CPT

## 2024-06-14 PROCEDURE — 87660 TRICHOMONAS VAGIN DIR PROBE: CPT

## 2024-06-14 RX ORDER — SUCRALFATE 1 G/1
1 TABLET ORAL 4 TIMES DAILY
Qty: 120 TABLET | Refills: 3 | Status: SHIPPED | OUTPATIENT
Start: 2024-06-14

## 2024-06-14 RX ORDER — 0.9 % SODIUM CHLORIDE 0.9 %
80 INTRAVENOUS SOLUTION INTRAVENOUS ONCE
Status: COMPLETED | OUTPATIENT
Start: 2024-06-14 | End: 2024-06-14

## 2024-06-14 RX ORDER — SODIUM CHLORIDE 0.9 % (FLUSH) 0.9 %
10 SYRINGE (ML) INJECTION PRN
Status: DISCONTINUED | OUTPATIENT
Start: 2024-06-14 | End: 2024-06-14 | Stop reason: HOSPADM

## 2024-06-14 RX ORDER — OMEPRAZOLE 40 MG/1
40 CAPSULE, DELAYED RELEASE ORAL DAILY
Qty: 30 CAPSULE | Refills: 1 | Status: SHIPPED | OUTPATIENT
Start: 2024-06-14

## 2024-06-14 RX ORDER — ONDANSETRON 2 MG/ML
4 INJECTION INTRAMUSCULAR; INTRAVENOUS ONCE
Status: COMPLETED | OUTPATIENT
Start: 2024-06-14 | End: 2024-06-14

## 2024-06-14 RX ORDER — KETOROLAC TROMETHAMINE 30 MG/ML
30 INJECTION, SOLUTION INTRAMUSCULAR; INTRAVENOUS ONCE
Status: COMPLETED | OUTPATIENT
Start: 2024-06-14 | End: 2024-06-14

## 2024-06-14 RX ADMIN — KETOROLAC TROMETHAMINE 30 MG: 30 INJECTION, SOLUTION INTRAMUSCULAR at 05:34

## 2024-06-14 RX ADMIN — SODIUM CHLORIDE, PRESERVATIVE FREE 10 ML: 5 INJECTION INTRAVENOUS at 06:16

## 2024-06-14 RX ADMIN — ONDANSETRON 4 MG: 2 INJECTION INTRAMUSCULAR; INTRAVENOUS at 05:34

## 2024-06-14 RX ADMIN — SODIUM CHLORIDE 80 ML: 0.9 INJECTION, SOLUTION INTRAVENOUS at 06:14

## 2024-06-14 RX ADMIN — IOPAMIDOL 75 ML: 755 INJECTION, SOLUTION INTRAVENOUS at 06:15

## 2024-06-14 ASSESSMENT — PAIN SCALES - GENERAL: PAINLEVEL_OUTOF10: 10

## 2024-06-14 ASSESSMENT — PAIN - FUNCTIONAL ASSESSMENT: PAIN_FUNCTIONAL_ASSESSMENT: 0-10

## 2024-06-14 NOTE — ED PROVIDER NOTES
[facility] ED  EMERGENCY DEPARTMENT ENCOUNTER   ATTENDING ATTESTATION     Pt Name: Gaurang Fiore  MRN: [id number]  Birthdate 1975  Date of evaluation: 6/14/24       Gaurang Fiore is a 49 y.o. female who presents with Abdominal Pain (LUQ radiating to back)      MDM:     SO Dr Ybarra    49-year-old female, left upper quadrant pain, rating to the back.  Patient CT scan is negative for acute pathology.  She had a CTA recently that showed no evidence of dissection or other acute abnormality.  She is not hypertensive, she had similar symptoms when she was here about a week ago.  Plan is treatment for dyspepsia, outpatient follow-up with gastroenterology and return if symptoms worsen or change.    Impression: Epigastric pain    Vitals:   Vitals:    06/14/24 0421   BP: (!) 119/97   Pulse: 90   Resp: 14   Temp: 98.1 °F (36.7 °C)   TempSrc: Oral   SpO2: 92%   Weight: 129.3 kg (285 lb)         This visit was performed by both a physician and an APC. I personally evaluated and examined the patient. I performed all aspects of the MDM as documented     Dawit Hall MD  Attending Emergency  Physician                  Dawit Hall MD  06/14/24 3514

## 2024-06-14 NOTE — ED PROVIDER NOTES
[facility] EMERGENCY DEPARTMENT ENCOUNTER      Pt Name: Gaurang Fiore  MRN: [id number]  Birthdate 1975  Date of evaluation: 6/14/24    CHIEF COMPLAINT       Chief Complaint   Patient presents with    Abdominal Pain     LUQ radiating to back       HISTORY OF PRESENT ILLNESS   Gaurang Fiore is a 49 y.o. female who presents with left upper quadrant abdominal pain rating the back.  Recent PE study negative.  His pain has been persistent ever since.  Worse with forward bending worse with movement.    PASTMEDICAL HISTORY     Past Medical History:   Diagnosis Date    Abnormal uterine bleeding 05/2018    Anxiety     Bipolar 1 disorder (HCC)     No Rx.     Blood clot in vein 2000    right calf    Fibroid 05/2018    Finger pain, right     Fracture, finger     right small finger    Panic attacks     Snores     Under care of team     No PCP     Past Problem List  Patient Active Problem List   Diagnosis Code    Fibroid D21.9    Abnormal uterine bleeding (AUB) N93.9    Hx of tubal ligation (1994) Z98.51    Panic attacks F41.0    Tobacco abuse Z72.0    Hysteroscopy, D&C 5/17/19 Z98.890    Abnormal mammogram of left breast R92.8    Non-compliant patient Z91.199    Closed displaced fracture of proximal phalanx of right little finger S62.616A    Obesity with body mass index 30 or greater E66.9    Insomnia G47.00    Hypokalemia E87.6    Gastroesophageal reflux disease K21.9    Fracture of metacarpal bone S62.309A    Fibrocystic disease of breast N60.19    Anxiety F41.9    Ankylosis of finger M24.649    Acute right ankle pain M25.571    Dyspnea on exertion R06.09    Essential hypertension I10    Hyperlipidemia E78.5    Former smoker Z87.891    Neuropathy G62.9    Muscle spasm M62.838    Small pleural effusion J90    Obesity hypoventilation syndrome (HCC) E66.2    Prediabetes R73.03    Acute heart failure with preserved ejection fraction (HFpEF) ([facility]) I50.31    Acute on chronic hypoxic respiratory failure ([facility]) J96.21    Morbid

## 2024-06-17 LAB
C TRACH DNA SPEC QL PROBE+SIG AMP: NEGATIVE
N GONORRHOEA DNA SPEC QL PROBE+SIG AMP: NEGATIVE
SPECIMEN DESCRIPTION: NORMAL

## 2024-06-24 ENCOUNTER — OFFICE VISIT (OUTPATIENT)
Dept: FAMILY MEDICINE CLINIC | Age: 49
End: 2024-06-24
Payer: COMMERCIAL

## 2024-06-24 ENCOUNTER — HOSPITAL ENCOUNTER (OUTPATIENT)
Age: 49
Setting detail: SPECIMEN
Discharge: HOME | End: 2024-06-24

## 2024-06-24 VITALS
OXYGEN SATURATION: 92 % | SYSTOLIC BLOOD PRESSURE: 118 MMHG | DIASTOLIC BLOOD PRESSURE: 74 MMHG | RESPIRATION RATE: 18 BRPM | HEART RATE: 100 BPM | BODY MASS INDEX: 43.95 KG/M2 | HEIGHT: 67 IN | WEIGHT: 280 LBS

## 2024-06-24 DIAGNOSIS — I50.31 ACUTE HEART FAILURE WITH PRESERVED EJECTION FRACTION (HFPEF) (HCC): ICD-10-CM

## 2024-06-24 DIAGNOSIS — G89.29 CHRONIC MIDLINE THORACIC BACK PAIN: ICD-10-CM

## 2024-06-24 DIAGNOSIS — R10.12 LUQ ABDOMINAL PAIN: Primary | ICD-10-CM

## 2024-06-24 DIAGNOSIS — R05.3 CHRONIC COUGH: ICD-10-CM

## 2024-06-24 DIAGNOSIS — I10 ESSENTIAL HYPERTENSION: ICD-10-CM

## 2024-06-24 DIAGNOSIS — M54.6 CHRONIC MIDLINE THORACIC BACK PAIN: ICD-10-CM

## 2024-06-24 DIAGNOSIS — R09.82 PND (POST-NASAL DRIP): ICD-10-CM

## 2024-06-24 DIAGNOSIS — Z02.89 MEDICATION MANAGEMENT CONTRACT AGREEMENT: ICD-10-CM

## 2024-06-24 DIAGNOSIS — E78.5 HYPERLIPIDEMIA, UNSPECIFIED HYPERLIPIDEMIA TYPE: ICD-10-CM

## 2024-06-24 DIAGNOSIS — K21.9 GASTROESOPHAGEAL REFLUX DISEASE, UNSPECIFIED WHETHER ESOPHAGITIS PRESENT: ICD-10-CM

## 2024-06-24 PROCEDURE — 3074F SYST BP LT 130 MM HG: CPT | Performed by: NURSE PRACTITIONER

## 2024-06-24 PROCEDURE — 3078F DIAST BP <80 MM HG: CPT | Performed by: NURSE PRACTITIONER

## 2024-06-24 PROCEDURE — 99215 OFFICE O/P EST HI 40 MIN: CPT | Performed by: NURSE PRACTITIONER

## 2024-06-24 RX ORDER — IBUPROFEN 800 MG/1
800 TABLET ORAL EVERY 8 HOURS PRN
Qty: 60 TABLET | Refills: 3 | Status: SHIPPED | OUTPATIENT
Start: 2024-06-24

## 2024-06-24 RX ORDER — BUDESONIDE AND FORMOTEROL FUMARATE DIHYDRATE 160; 4.5 UG/1; UG/1
2 AEROSOL RESPIRATORY (INHALATION)
Qty: 10.2 G | Refills: 3 | Status: SHIPPED | OUTPATIENT
Start: 2024-06-24

## 2024-06-24 RX ORDER — ALBUTEROL SULFATE 90 UG/1
2 AEROSOL, METERED RESPIRATORY (INHALATION) 4 TIMES DAILY PRN
Qty: 18 G | Refills: 0 | Status: SHIPPED | OUTPATIENT
Start: 2024-06-24

## 2024-06-24 RX ORDER — SUCRALFATE 1 G/1
1 TABLET ORAL 4 TIMES DAILY
Qty: 120 TABLET | Refills: 3 | Status: CANCELLED | OUTPATIENT
Start: 2024-06-24

## 2024-06-24 RX ORDER — FLUTICASONE PROPIONATE 50 MCG
2 SPRAY, SUSPENSION (ML) NASAL DAILY
Qty: 16 G | Refills: 3 | Status: SHIPPED | OUTPATIENT
Start: 2024-06-24

## 2024-06-24 RX ORDER — SPIRONOLACTONE 25 MG/1
25 TABLET ORAL DAILY
Qty: 30 TABLET | Refills: 3 | Status: SHIPPED | OUTPATIENT
Start: 2024-06-24

## 2024-06-24 RX ORDER — AMLODIPINE BESYLATE 5 MG/1
5 TABLET ORAL DAILY
Qty: 90 TABLET | Refills: 1 | Status: SHIPPED | OUTPATIENT
Start: 2024-06-24

## 2024-06-24 RX ORDER — CYCLOBENZAPRINE HCL 10 MG
10 TABLET ORAL 3 TIMES DAILY PRN
Qty: 90 TABLET | Refills: 2 | Status: SHIPPED | OUTPATIENT
Start: 2024-06-24

## 2024-06-24 RX ORDER — OMEPRAZOLE 40 MG/1
40 CAPSULE, DELAYED RELEASE ORAL DAILY
Qty: 30 CAPSULE | Refills: 1 | Status: SHIPPED | OUTPATIENT
Start: 2024-06-24

## 2024-06-24 RX ORDER — GABAPENTIN 800 MG/1
800 TABLET ORAL 3 TIMES DAILY
Qty: 90 TABLET | Refills: 0 | Status: SHIPPED | OUTPATIENT
Start: 2024-06-24 | End: 2024-07-24

## 2024-06-24 RX ORDER — ATORVASTATIN CALCIUM 20 MG/1
20 TABLET, FILM COATED ORAL DAILY
Qty: 90 TABLET | Refills: 1 | Status: SHIPPED | OUTPATIENT
Start: 2024-06-24

## 2024-06-24 RX ORDER — METOPROLOL SUCCINATE 25 MG/1
25 TABLET, EXTENDED RELEASE ORAL DAILY
Qty: 90 TABLET | Refills: 1 | Status: SHIPPED | OUTPATIENT
Start: 2024-06-24

## 2024-06-24 RX ORDER — FUROSEMIDE 40 MG/1
40 TABLET ORAL DAILY
Qty: 90 TABLET | Refills: 1 | Status: SHIPPED | OUTPATIENT
Start: 2024-06-24

## 2024-06-24 ASSESSMENT — ENCOUNTER SYMPTOMS
CONSTIPATION: 0
DIARRHEA: 0
EYE PAIN: 0
ABDOMINAL PAIN: 1
NAUSEA: 0
SINUS PAIN: 0
VOMITING: 0
CHEST TIGHTNESS: 0
BACK PAIN: 1
SHORTNESS OF BREATH: 0
SINUS PRESSURE: 0
COLOR CHANGE: 0

## 2024-06-24 NOTE — PROGRESS NOTES
Gaurang Fiore (:  1975) is a 49 y.o. female,Established patient, here for evaluation of the following chief complaint(s):  Establish Care (4 week est care follow up) and Health Maintenance (Mammogram-ordered. /Colon-needs to see GI. )      Assessment & Plan   1. LUQ abdominal pain  Comments:  Advised to follow up with GI  Orders:  -     US GALLBLADDER RUQ; Future  2. Chronic cough  -     albuterol sulfate HFA (VENTOLIN HFA) 108 (90 Base) MCG/ACT inhaler; Inhale 2 puffs into the lungs 4 times daily as needed for Wheezing, Disp-18 g, R-0Normal  -     budesonide-formoterol (SYMBICORT) 160-4.5 MCG/ACT AERO; Inhale 2 puffs into the lungs in the morning and 2 puffs in the evening., Disp-10.2 g, R-3Normal  3. Acute heart failure with preserved ejection fraction (HFpEF) ([facility])  -     empagliflozin (JARDIANCE) 10 MG tablet; Take 1 tablet by mouth daily, Disp-90 tablet, R-1Normal  -     furosemide (LASIX) 40 MG tablet; Take 1 tablet by mouth daily, Disp-90 tablet, R-1Normal  -     metoprolol succinate (TOPROL XL) 25 MG extended release tablet; Take 1 tablet by mouth daily, Disp-90 tablet, R-1Normal  4. Chronic midline thoracic back pain  -     gabapentin (NEURONTIN) 800 MG tablet; Take 1 tablet by mouth 3 times daily for 30 days., Disp-90 tablet, R-0Normal  -     cyclobenzaprine (FLEXERIL) 10 MG tablet; Take 1 tablet by mouth 3 times daily as needed for Muscle spasms, Disp-90 tablet, R-2Normal  -     ibuprofen (ADVIL;MOTRIN) 800 MG tablet; Take 1 tablet by mouth every 8 hours as needed for Pain, Disp-60 tablet, R-3Normal  5. Essential hypertension  -     amLODIPine (NORVASC) 5 MG tablet; Take 1 tablet by mouth daily, Disp-90 tablet, R-1Normal  -     spironolactone (ALDACTONE) 25 MG tablet; Take 1 tablet by mouth daily, Disp-30 tablet, R-3Normal  6. Hyperlipidemia, unspecified hyperlipidemia type  -     atorvastatin (LIPITOR) 20 MG tablet; Take 1 tablet by mouth daily, Disp-90 tablet, R-1Normal  7.

## 2024-06-26 ENCOUNTER — HOSPITAL ENCOUNTER (OUTPATIENT)
Dept: ULTRASOUND IMAGING | Age: 49
Discharge: HOME | End: 2024-06-28
Payer: COMMERCIAL

## 2024-06-26 DIAGNOSIS — R10.12 LUQ ABDOMINAL PAIN: ICD-10-CM

## 2024-06-26 PROCEDURE — 76705 ECHO EXAM OF ABDOMEN: CPT

## 2024-06-27 ENCOUNTER — CARE COORDINATION (OUTPATIENT)
Dept: CARE COORDINATION | Age: 49
End: 2024-06-27

## 2024-06-29 LAB
6-ACETYLMORPHINE, UR: NOT DETECTED
7-AMINOCLONAZEPAM, URINE: NOT DETECTED
ALPHA-OH-ALPRAZ, URINE: NOT DETECTED
ALPHA-OH-MIDAZOLAM, URINE: NOT DETECTED
ALPRAZOLAM, URINE: NOT DETECTED
AMPHETAMINE, URINE: NOT DETECTED
BARBITURATES, URINE: NEGATIVE
BENZOYLECGONINE, UR: NEGATIVE
BUPRENORPHINE URINE: NOT DETECTED
CARISOPRODOL, UR: NEGATIVE
CLONAZEPAM, URINE: NOT DETECTED
CODEINE, URINE: NOT DETECTED
CREAT UR-MCNC: 117.8 MG/DL (ref 20–400)
DIAZEPAM, URINE: NOT DETECTED
DRUGS EXPECTED, UR: NORMAL
EER HI RES INTERP UR: NORMAL
ETHYL GLUCURONIDE UR: NEGATIVE
FENTANYL URINE: NOT DETECTED
GABAPENTIN: PRESENT
HYDROCODONE, URINE: NOT DETECTED
HYDROMORPHONE, URINE: NOT DETECTED
LORAZEPAM, URINE: NOT DETECTED
MARIJUANA METAB, UR: NEGATIVE
MDA, UR: NOT DETECTED
MDEA, EVE, UR: NOT DETECTED
MDMA URINE: NOT DETECTED
MEPERIDINE METAB, UR: NOT DETECTED
METHADONE, URINE: NEGATIVE
METHAMPHETAMINE, URINE: NOT DETECTED
METHYLPHENIDATE: NOT DETECTED
MIDAZOLAM, URINE: NOT DETECTED
MORPHINE, OPI1M: NOT DETECTED
NALOXONE URINE: NOT DETECTED
NORBUPRENORPHINE, URINE: NOT DETECTED
NORDIAZEPAM, URINE: NOT DETECTED
NORFENTANYL, URINE: NOT DETECTED
NORHYDROCODONE, URINE: NOT DETECTED
NOROXYCODONE, URINE: NOT DETECTED
NOROXYMORPHONE, URINE: NOT DETECTED
OXAZEPAM, URINE: NOT DETECTED
OXYCODONE URINE: NOT DETECTED
OXYMORPHONE, URINE: NOT DETECTED
PAIN MANAGEMENT DRUG PANEL INTERP, URINE: NORMAL
PAIN MGT DRUG PANEL, HI RES, UR: NORMAL
PCP,URINE: NEGATIVE
PHENTERMINE, UR: NOT DETECTED
PREGABALIN: NOT DETECTED
TAPENTADOL, URINE: NOT DETECTED
TAPENTADOL-O-SULFATE, URINE: NOT DETECTED
TEMAZEPAM, URINE: NOT DETECTED
TRAMADOL, URINE: NEGATIVE
ZOLPIDEM METABOLITE (ZCA), URINE: NOT DETECTED
ZOLPIDEM, URINE: NOT DETECTED

## 2024-07-01 DIAGNOSIS — K80.20 CALCULUS OF GALLBLADDER WITHOUT CHOLECYSTITIS WITHOUT OBSTRUCTION: Primary | ICD-10-CM

## 2024-07-03 ENCOUNTER — CARE COORDINATION (OUTPATIENT)
Dept: CARE COORDINATION | Age: 49
End: 2024-07-03

## 2024-07-03 NOTE — CARE COORDINATION
called [facility] about referral, scheduling will call her today to schedule appt.    Negror called Dr. Gomez's office to check on surgery referral, they attempted to call her yesterday to schedule appt  but couldn't leave a message. 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 Negror sent her a [employer] message with that office phone number to call to schedule.    Will follow up in a few weeks to check on appts.    Future Appointments   Date Time Provider Department Center   9/25/2024  7:30 AM Deisi Cook APRN - NP [facility]

## 2024-07-04 ENCOUNTER — HOSPITAL ENCOUNTER (OUTPATIENT)
Age: 49
Setting detail: OBSERVATION
Discharge: HOME | End: 2024-07-07
Attending: EMERGENCY MEDICINE | Admitting: FAMILY MEDICINE
Payer: COMMERCIAL

## 2024-07-04 ENCOUNTER — APPOINTMENT (OUTPATIENT)
Dept: CT IMAGING | Age: 49
End: 2024-07-04
Payer: COMMERCIAL

## 2024-07-04 DIAGNOSIS — R10.9 ABDOMINAL PAIN, UNSPECIFIED ABDOMINAL LOCATION: ICD-10-CM

## 2024-07-04 DIAGNOSIS — Z90.49 STATUS POST LAPAROSCOPIC CHOLECYSTECTOMY: ICD-10-CM

## 2024-07-04 DIAGNOSIS — K80.20 CALCULUS OF GALLBLADDER WITHOUT CHOLECYSTITIS WITHOUT OBSTRUCTION: ICD-10-CM

## 2024-07-04 DIAGNOSIS — R10.9 INTRACTABLE ABDOMINAL PAIN: Primary | ICD-10-CM

## 2024-07-04 PROBLEM — E66.01 MORBID OBESITY WITH BMI OF 45.0-49.9, ADULT (HCC): Status: RESOLVED | Noted: 2024-05-17 | Resolved: 2024-07-04

## 2024-07-04 LAB
ALBUMIN SERPL-MCNC: 4.2 G/DL (ref 3.5–5.2)
ALP SERPL-CCNC: 76 U/L (ref 35–104)
ALT SERPL-CCNC: 21 U/L (ref 5–33)
ANION GAP SERPL CALCULATED.3IONS-SCNC: 11 MMOL/L (ref 9–17)
AST SERPL-CCNC: 16 U/L
BASOPHILS # BLD: 0.04 K/UL (ref 0–0.2)
BASOPHILS NFR BLD: 1 % (ref 0–2)
BILIRUB SERPL-MCNC: 0.4 MG/DL (ref 0.3–1.2)
BILIRUB UR QL STRIP: NEGATIVE
BUN SERPL-MCNC: 11 MG/DL (ref 6–20)
BUN/CREAT SERPL: 18 (ref 9–20)
CALCIUM SERPL-MCNC: 9 MG/DL (ref 8.6–10.4)
CHLORIDE SERPL-SCNC: 108 MMOL/L (ref 98–107)
CLARITY UR: CLEAR
CO2 SERPL-SCNC: 24 MMOL/L (ref 20–31)
COLOR UR: YELLOW
COMMENT: NORMAL
CREAT SERPL-MCNC: 0.6 MG/DL (ref 0.5–0.9)
EOSINOPHIL # BLD: 0.35 K/UL (ref 0–0.44)
EOSINOPHILS RELATIVE PERCENT: 5 % (ref 1–4)
ERYTHROCYTE [DISTWIDTH] IN BLOOD BY AUTOMATED COUNT: 15.8 % (ref 11.8–14.4)
GFR, ESTIMATED: >90 ML/MIN/1.73M2
GLUCOSE SERPL-MCNC: 117 MG/DL (ref 70–99)
GLUCOSE UR STRIP-MCNC: NEGATIVE MG/DL
HCG UR QL: NEGATIVE
HCT VFR BLD AUTO: 44.6 % (ref 36.3–47.1)
HGB BLD-MCNC: 14 G/DL (ref 11.9–15.1)
HGB UR QL STRIP.AUTO: NEGATIVE
IMM GRANULOCYTES # BLD AUTO: 0.04 K/UL (ref 0–0.3)
IMM GRANULOCYTES NFR BLD: 1 %
KETONES UR STRIP-MCNC: NEGATIVE MG/DL
LEUKOCYTE ESTERASE UR QL STRIP: NEGATIVE
LIPASE SERPL-CCNC: 32 U/L (ref 13–60)
LYMPHOCYTES NFR BLD: 2.21 K/UL (ref 1.1–3.7)
LYMPHOCYTES RELATIVE PERCENT: 33 % (ref 24–43)
MCH RBC QN AUTO: 30.6 PG (ref 25.2–33.5)
MCHC RBC AUTO-ENTMCNC: 31.4 G/DL (ref 28.4–34.8)
MCV RBC AUTO: 97.4 FL (ref 82.6–102.9)
MONOCYTES NFR BLD: 0.63 K/UL (ref 0.1–1.2)
MONOCYTES NFR BLD: 9 % (ref 3–12)
NEUTROPHILS NFR BLD: 51 % (ref 36–65)
NEUTS SEG NFR BLD: 3.44 K/UL (ref 1.5–8.1)
NITRITE UR QL STRIP: NEGATIVE
NRBC BLD-RTO: 0 PER 100 WBC
PH UR STRIP: 5 [PH] (ref 5–8)
PLATELET # BLD AUTO: 210 K/UL (ref 138–453)
PMV BLD AUTO: 11 FL (ref 8.1–13.5)
POTASSIUM SERPL-SCNC: 4 MMOL/L (ref 3.7–5.3)
PROT SERPL-MCNC: 6.6 G/DL (ref 6.4–8.3)
PROT UR STRIP-MCNC: NEGATIVE MG/DL
RBC # BLD AUTO: 4.58 M/UL (ref 3.95–5.11)
RBC # BLD: ABNORMAL 10*6/UL
SODIUM SERPL-SCNC: 143 MMOL/L (ref 135–144)
SP GR UR STRIP: 1.02 (ref 1–1.03)
UROBILINOGEN UR STRIP-ACNC: NORMAL EU/DL (ref 0–1)
WBC OTHER # BLD: 6.7 K/UL (ref 3.5–11.3)

## 2024-07-04 PROCEDURE — 2580000003 HC RX 258: Performed by: EMERGENCY MEDICINE

## 2024-07-04 PROCEDURE — 81025 URINE PREGNANCY TEST: CPT

## 2024-07-04 PROCEDURE — 74177 CT ABD & PELVIS W/CONTRAST: CPT

## 2024-07-04 PROCEDURE — 99285 EMERGENCY DEPT VISIT HI MDM: CPT

## 2024-07-04 PROCEDURE — 6370000000 HC RX 637 (ALT 250 FOR IP): Performed by: FAMILY MEDICINE

## 2024-07-04 PROCEDURE — 6370000000 HC RX 637 (ALT 250 FOR IP): Performed by: SURGERY

## 2024-07-04 PROCEDURE — 6370000000 HC RX 637 (ALT 250 FOR IP): Performed by: NURSE PRACTITIONER

## 2024-07-04 PROCEDURE — 94761 N-INVAS EAR/PLS OXIMETRY MLT: CPT

## 2024-07-04 PROCEDURE — 96376 TX/PRO/DX INJ SAME DRUG ADON: CPT

## 2024-07-04 PROCEDURE — 2500000003 HC RX 250 WO HCPCS: Performed by: NURSE PRACTITIONER

## 2024-07-04 PROCEDURE — 6360000002 HC RX W HCPCS: Performed by: NURSE PRACTITIONER

## 2024-07-04 PROCEDURE — 83690 ASSAY OF LIPASE: CPT

## 2024-07-04 PROCEDURE — 80053 COMPREHEN METABOLIC PANEL: CPT

## 2024-07-04 PROCEDURE — 85025 COMPLETE CBC W/AUTO DIFF WBC: CPT

## 2024-07-04 PROCEDURE — 2580000003 HC RX 258: Performed by: NURSE PRACTITIONER

## 2024-07-04 PROCEDURE — G0378 HOSPITAL OBSERVATION PER HR: HCPCS

## 2024-07-04 PROCEDURE — 81003 URINALYSIS AUTO W/O SCOPE: CPT

## 2024-07-04 PROCEDURE — 6360000004 HC RX CONTRAST MEDICATION: Performed by: EMERGENCY MEDICINE

## 2024-07-04 PROCEDURE — 96374 THER/PROPH/DIAG INJ IV PUSH: CPT

## 2024-07-04 PROCEDURE — 6360000002 HC RX W HCPCS: Performed by: EMERGENCY MEDICINE

## 2024-07-04 PROCEDURE — 94640 AIRWAY INHALATION TREATMENT: CPT

## 2024-07-04 PROCEDURE — 99222 1ST HOSP IP/OBS MODERATE 55: CPT | Performed by: NURSE PRACTITIONER

## 2024-07-04 PROCEDURE — 96375 TX/PRO/DX INJ NEW DRUG ADDON: CPT

## 2024-07-04 RX ORDER — FLUTICASONE PROPIONATE 50 MCG
2 SPRAY, SUSPENSION (ML) NASAL DAILY
Status: DISCONTINUED | OUTPATIENT
Start: 2024-07-04 | End: 2024-07-07 | Stop reason: HOSPADM

## 2024-07-04 RX ORDER — HYDROMORPHONE HYDROCHLORIDE 2 MG/1
1 TABLET ORAL
Status: DISCONTINUED | OUTPATIENT
Start: 2024-07-04 | End: 2024-07-06

## 2024-07-04 RX ORDER — POTASSIUM CHLORIDE 7.45 MG/ML
10 INJECTION INTRAVENOUS PRN
Status: DISCONTINUED | OUTPATIENT
Start: 2024-07-04 | End: 2024-07-07 | Stop reason: HOSPADM

## 2024-07-04 RX ORDER — BUDESONIDE AND FORMOTEROL FUMARATE DIHYDRATE 160; 4.5 UG/1; UG/1
2 AEROSOL RESPIRATORY (INHALATION)
Status: DISCONTINUED | OUTPATIENT
Start: 2024-07-04 | End: 2024-07-07 | Stop reason: HOSPADM

## 2024-07-04 RX ORDER — CYCLOBENZAPRINE HCL 10 MG
10 TABLET ORAL 3 TIMES DAILY PRN
Status: DISCONTINUED | OUTPATIENT
Start: 2024-07-04 | End: 2024-07-07 | Stop reason: HOSPADM

## 2024-07-04 RX ORDER — ONDANSETRON 2 MG/ML
4 INJECTION INTRAMUSCULAR; INTRAVENOUS EVERY 6 HOURS PRN
Status: DISCONTINUED | OUTPATIENT
Start: 2024-07-04 | End: 2024-07-07 | Stop reason: HOSPADM

## 2024-07-04 RX ORDER — MORPHINE SULFATE 10 MG/ML
6 INJECTION, SOLUTION INTRAMUSCULAR; INTRAVENOUS ONCE
Status: COMPLETED | OUTPATIENT
Start: 2024-07-04 | End: 2024-07-04

## 2024-07-04 RX ORDER — ALBUTEROL SULFATE 90 UG/1
2 AEROSOL, METERED RESPIRATORY (INHALATION)
Status: DISCONTINUED | OUTPATIENT
Start: 2024-07-04 | End: 2024-07-07 | Stop reason: HOSPADM

## 2024-07-04 RX ORDER — SODIUM CHLORIDE 0.9 % (FLUSH) 0.9 %
5-40 SYRINGE (ML) INJECTION EVERY 12 HOURS SCHEDULED
Status: DISCONTINUED | OUTPATIENT
Start: 2024-07-04 | End: 2024-07-07 | Stop reason: HOSPADM

## 2024-07-04 RX ORDER — 0.9 % SODIUM CHLORIDE 0.9 %
100 INTRAVENOUS SOLUTION INTRAVENOUS ONCE
Status: COMPLETED | OUTPATIENT
Start: 2024-07-04 | End: 2024-07-04

## 2024-07-04 RX ORDER — ACETAMINOPHEN 650 MG/1
650 SUPPOSITORY RECTAL EVERY 6 HOURS PRN
Status: DISCONTINUED | OUTPATIENT
Start: 2024-07-04 | End: 2024-07-06

## 2024-07-04 RX ORDER — HYDROCODONE BITARTRATE AND ACETAMINOPHEN 5; 325 MG/1; MG/1
1 TABLET ORAL EVERY 6 HOURS PRN
Status: ON HOLD | COMMUNITY
End: 2024-07-06

## 2024-07-04 RX ORDER — FUROSEMIDE 40 MG/1
40 TABLET ORAL DAILY
Status: DISCONTINUED | OUTPATIENT
Start: 2024-07-04 | End: 2024-07-06

## 2024-07-04 RX ORDER — METOPROLOL SUCCINATE 25 MG/1
25 TABLET, EXTENDED RELEASE ORAL DAILY
Status: DISCONTINUED | OUTPATIENT
Start: 2024-07-04 | End: 2024-07-07 | Stop reason: HOSPADM

## 2024-07-04 RX ORDER — FENTANYL CITRATE 50 UG/ML
100 INJECTION, SOLUTION INTRAMUSCULAR; INTRAVENOUS ONCE
Status: COMPLETED | OUTPATIENT
Start: 2024-07-04 | End: 2024-07-04

## 2024-07-04 RX ORDER — MAGNESIUM SULFATE 1 G/100ML
1000 INJECTION INTRAVENOUS PRN
Status: DISCONTINUED | OUTPATIENT
Start: 2024-07-04 | End: 2024-07-07 | Stop reason: HOSPADM

## 2024-07-04 RX ORDER — POLYETHYLENE GLYCOL 3350 17 G/17G
17 POWDER, FOR SOLUTION ORAL DAILY PRN
Status: DISCONTINUED | OUTPATIENT
Start: 2024-07-04 | End: 2024-07-07 | Stop reason: HOSPADM

## 2024-07-04 RX ORDER — ENOXAPARIN SODIUM 100 MG/ML
30 INJECTION SUBCUTANEOUS 2 TIMES DAILY
Status: DISCONTINUED | OUTPATIENT
Start: 2024-07-04 | End: 2024-07-06

## 2024-07-04 RX ORDER — NICOTINE 21 MG/24HR
1 PATCH, TRANSDERMAL 24 HOURS TRANSDERMAL DAILY
Status: DISCONTINUED | OUTPATIENT
Start: 2024-07-04 | End: 2024-07-07 | Stop reason: HOSPADM

## 2024-07-04 RX ORDER — SUCRALFATE 1 G/1
1 TABLET ORAL 4 TIMES DAILY
Status: DISCONTINUED | OUTPATIENT
Start: 2024-07-04 | End: 2024-07-04

## 2024-07-04 RX ORDER — ACETAMINOPHEN 325 MG/1
650 TABLET ORAL EVERY 6 HOURS PRN
Status: DISCONTINUED | OUTPATIENT
Start: 2024-07-04 | End: 2024-07-07 | Stop reason: HOSPADM

## 2024-07-04 RX ORDER — ONDANSETRON 4 MG/1
4 TABLET, ORALLY DISINTEGRATING ORAL EVERY 8 HOURS PRN
Status: DISCONTINUED | OUTPATIENT
Start: 2024-07-04 | End: 2024-07-07 | Stop reason: HOSPADM

## 2024-07-04 RX ORDER — SODIUM CHLORIDE 0.9 % (FLUSH) 0.9 %
10 SYRINGE (ML) INJECTION PRN
Status: DISCONTINUED | OUTPATIENT
Start: 2024-07-04 | End: 2024-07-04

## 2024-07-04 RX ORDER — KETOROLAC TROMETHAMINE 30 MG/ML
30 INJECTION, SOLUTION INTRAMUSCULAR; INTRAVENOUS EVERY 6 HOURS PRN
Status: DISCONTINUED | OUTPATIENT
Start: 2024-07-04 | End: 2024-07-06

## 2024-07-04 RX ORDER — SODIUM CHLORIDE 0.9 % (FLUSH) 0.9 %
10 SYRINGE (ML) INJECTION PRN
Status: DISCONTINUED | OUTPATIENT
Start: 2024-07-04 | End: 2024-07-07 | Stop reason: HOSPADM

## 2024-07-04 RX ORDER — SODIUM CHLORIDE 9 MG/ML
INJECTION, SOLUTION INTRAVENOUS CONTINUOUS
Status: DISCONTINUED | OUTPATIENT
Start: 2024-07-04 | End: 2024-07-06

## 2024-07-04 RX ORDER — AMLODIPINE BESYLATE 5 MG/1
5 TABLET ORAL DAILY
Status: DISCONTINUED | OUTPATIENT
Start: 2024-07-04 | End: 2024-07-07 | Stop reason: HOSPADM

## 2024-07-04 RX ORDER — SODIUM CHLORIDE 9 MG/ML
INJECTION, SOLUTION INTRAVENOUS PRN
Status: DISCONTINUED | OUTPATIENT
Start: 2024-07-04 | End: 2024-07-07 | Stop reason: HOSPADM

## 2024-07-04 RX ORDER — SPIRONOLACTONE 25 MG/1
25 TABLET ORAL DAILY
Status: DISCONTINUED | OUTPATIENT
Start: 2024-07-04 | End: 2024-07-07 | Stop reason: HOSPADM

## 2024-07-04 RX ORDER — GABAPENTIN 400 MG/1
800 CAPSULE ORAL 3 TIMES DAILY
Status: DISCONTINUED | OUTPATIENT
Start: 2024-07-04 | End: 2024-07-07 | Stop reason: HOSPADM

## 2024-07-04 RX ORDER — ALBUTEROL SULFATE 90 UG/1
2 AEROSOL, METERED RESPIRATORY (INHALATION) 4 TIMES DAILY PRN
Status: DISCONTINUED | OUTPATIENT
Start: 2024-07-04 | End: 2024-07-07 | Stop reason: HOSPADM

## 2024-07-04 RX ORDER — POTASSIUM CHLORIDE 20 MEQ/1
40 TABLET, EXTENDED RELEASE ORAL PRN
Status: DISCONTINUED | OUTPATIENT
Start: 2024-07-04 | End: 2024-07-07 | Stop reason: HOSPADM

## 2024-07-04 RX ORDER — ATORVASTATIN CALCIUM 20 MG/1
20 TABLET, FILM COATED ORAL DAILY
Status: DISCONTINUED | OUTPATIENT
Start: 2024-07-04 | End: 2024-07-07 | Stop reason: HOSPADM

## 2024-07-04 RX ADMIN — CYCLOBENZAPRINE 10 MG: 10 TABLET, FILM COATED ORAL at 15:01

## 2024-07-04 RX ADMIN — GABAPENTIN 800 MG: 400 CAPSULE ORAL at 20:54

## 2024-07-04 RX ADMIN — IOPAMIDOL 75 ML: 755 INJECTION, SOLUTION INTRAVENOUS at 08:54

## 2024-07-04 RX ADMIN — ONDANSETRON 4 MG: 2 INJECTION INTRAMUSCULAR; INTRAVENOUS at 15:02

## 2024-07-04 RX ADMIN — ENOXAPARIN SODIUM 30 MG: 100 INJECTION SUBCUTANEOUS at 15:06

## 2024-07-04 RX ADMIN — BUDESONIDE AND FORMOTEROL FUMARATE DIHYDRATE 2 PUFF: 160; 4.5 AEROSOL RESPIRATORY (INHALATION) at 20:08

## 2024-07-04 RX ADMIN — AMLODIPINE BESYLATE 5 MG: 5 TABLET ORAL at 15:02

## 2024-07-04 RX ADMIN — FENTANYL CITRATE 100 MCG: 50 INJECTION INTRAMUSCULAR; INTRAVENOUS at 10:30

## 2024-07-04 RX ADMIN — BUDESONIDE AND FORMOTEROL FUMARATE DIHYDRATE 2 PUFF: 160; 4.5 AEROSOL RESPIRATORY (INHALATION) at 13:10

## 2024-07-04 RX ADMIN — SODIUM CHLORIDE, PRESERVATIVE FREE 10 ML: 5 INJECTION INTRAVENOUS at 08:54

## 2024-07-04 RX ADMIN — SODIUM CHLORIDE: 9 INJECTION, SOLUTION INTRAVENOUS at 12:41

## 2024-07-04 RX ADMIN — FAMOTIDINE 20 MG: 10 INJECTION, SOLUTION INTRAVENOUS at 20:57

## 2024-07-04 RX ADMIN — SPIRONOLACTONE 25 MG: 25 TABLET ORAL at 15:02

## 2024-07-04 RX ADMIN — MORPHINE SULFATE 6 MG: 10 INJECTION, SOLUTION INTRAMUSCULAR; INTRAVENOUS at 09:05

## 2024-07-04 RX ADMIN — HYDROMORPHONE HYDROCHLORIDE 1 MG: 2 TABLET ORAL at 22:59

## 2024-07-04 RX ADMIN — HYDROMORPHONE HYDROCHLORIDE 0.5 MG: 1 INJECTION, SOLUTION INTRAMUSCULAR; INTRAVENOUS; SUBCUTANEOUS at 12:40

## 2024-07-04 RX ADMIN — GABAPENTIN 800 MG: 400 CAPSULE ORAL at 15:01

## 2024-07-04 RX ADMIN — HYDROMORPHONE HYDROCHLORIDE 1 MG: 2 TABLET ORAL at 18:56

## 2024-07-04 RX ADMIN — EMPAGLIFLOZIN 10 MG: 10 TABLET, FILM COATED ORAL at 15:02

## 2024-07-04 RX ADMIN — MORPHINE SULFATE 6 MG: 10 INJECTION, SOLUTION INTRAMUSCULAR; INTRAVENOUS at 07:59

## 2024-07-04 RX ADMIN — METOPROLOL SUCCINATE 25 MG: 25 TABLET, EXTENDED RELEASE ORAL at 15:18

## 2024-07-04 RX ADMIN — ATORVASTATIN CALCIUM 20 MG: 20 TABLET, FILM COATED ORAL at 15:01

## 2024-07-04 RX ADMIN — KETOROLAC TROMETHAMINE 30 MG: 30 INJECTION INTRAMUSCULAR; INTRAVENOUS at 18:06

## 2024-07-04 RX ADMIN — SODIUM CHLORIDE 100 ML: 9 INJECTION, SOLUTION INTRAVENOUS at 08:54

## 2024-07-04 RX ADMIN — FAMOTIDINE 20 MG: 10 INJECTION, SOLUTION INTRAVENOUS at 15:03

## 2024-07-04 RX ADMIN — ALBUTEROL SULFATE 2 PUFF: 90 AEROSOL, METERED RESPIRATORY (INHALATION) at 20:08

## 2024-07-04 ASSESSMENT — PAIN SCALES - GENERAL
PAINLEVEL_OUTOF10: 10
PAINLEVEL_OUTOF10: 7

## 2024-07-04 ASSESSMENT — PAIN - FUNCTIONAL ASSESSMENT
PAIN_FUNCTIONAL_ASSESSMENT: ACTIVITIES ARE NOT PREVENTED
PAIN_FUNCTIONAL_ASSESSMENT: 0-10

## 2024-07-04 ASSESSMENT — PAIN DESCRIPTION - LOCATION
LOCATION: ABDOMEN

## 2024-07-04 ASSESSMENT — PAIN DESCRIPTION - PAIN TYPE: TYPE: ACUTE PAIN

## 2024-07-04 ASSESSMENT — PAIN DESCRIPTION - ORIENTATION
ORIENTATION: RIGHT;UPPER
ORIENTATION: RIGHT;UPPER
ORIENTATION: RIGHT
ORIENTATION: RIGHT;UPPER

## 2024-07-04 ASSESSMENT — PAIN DESCRIPTION - DESCRIPTORS
DESCRIPTORS: ACHING
DESCRIPTORS: BURNING;SHARP
DESCRIPTORS: SHARP

## 2024-07-04 ASSESSMENT — PAIN SCALES - WONG BAKER
WONGBAKER_NUMERICALRESPONSE: HURTS WHOLE LOT
WONGBAKER_NUMERICALRESPONSE: HURTS WHOLE LOT

## 2024-07-04 NOTE — H&P
[facility]  Office: [phone]  Migue Lenoard DO, Tripp Shaffer DO, Won Slaughter DO, Chapincito Hathaway DO, Wade Torres MD, Amira Polk MD, Shannan Garrett MD, Ivon Pena MD,  Jason Loera MD, Michael Arana MD, Jose Duncan MD,  Jayda Herr DO, Paris Posada MD, Freddy Esqueda MD, Colten Leonard DO, Maryellen Silveira MD,  Manjit Mcdonough DO, Valentine Sanabria MD, Cherelle Alex MD, Corrie Hassan MD, Alhaji Juarez MD,  Rich Romero MD, Donte Velasquez MD, Priscila Avendaño MD, Holley Laboy MD, Max Andrew MD, Faith Esteban MD, Charly Alvarado DO, Rc Garber DO, Puja Bautista MD,  Papa Guerrero MD, Shirley Waterhouse, CNP,  Caren Bianchi CNP, Filippo Pearl, CNP,  Magalie Urena, TATI, Elizabeth Barton, CNP, Rosa Guardado, CNP, Tiff Fletcher, CNP, Gracia Tanner, CNP, Ariana Nicholson, PA-C, Pari Warren PA-C, Aicha Yang, CNP, Luz Woodard, CNP, Amanda Lerner, CNP, Katt Street, CNP, Danii Mcmahan, CNP, Marie Orellana, CNS, Rose Mary Jerez, CNP, Kala Aguilar CNP, Tracy Schwab, CNP         [facility]   IN-PATIENT SERVICE   [facility]    HISTORY AND PHYSICAL EXAMINATION            Date:   7/4/2024  Patient name:  Gaurang Fiore  Date of admission:  7/4/2024  7:32 AM  MRN:   [id number]  Account:  [id number]  YOB: 1975  PCP:    Deisi Cook APRN - NP  Room:   2017/2017-02  Code Status:    Full Code    Chief Complaint:     Chief Complaint   Patient presents with    Abdominal Pain     Right upper \"gallbladder\". Known gallbladder issues. Appt with md in August  to have gallbladder removed     Nausea       History Obtained From:     patient    History of Present Illness:     Gaurang Fiore is a 49 y.o.  /  female who presents with Abdominal Pain (Right upper \"gallbladder\". Known gallbladder issues. Appt with md in August  to have gallbladder removed ) and Nausea   and is admitted to the hospital for the management

## 2024-07-04 NOTE — ED NOTES
ED TO INPATIENT SBAR HANDOFF    Patient Name: Gaurang Fiore   :  1975  49 y.o.   MRN:  [id number]  Preferred Name    ED Room #:  [facility]/08  Family/Caregiver Present no   Restraints no   Sitter no   Sepsis Risk Score      Situation  Code Status: Full    Allergies: Sulfa antibiotics, Sulfasalazine, Naproxen, and Shellfish-derived products  Weight: Patient Vitals for the past 96 hrs (Last 3 readings):   Weight   24 0726 122.5 kg (270 lb)     Arrived from: home  Chief Complaint:   Chief Complaint   Patient presents with    Abdominal Pain     Right upper \"gallbladder\". Known gallbladder issues. Appt with md in August  to have gallbladder removed     Nausea     Hospital Problem/Diagnosis:  Active Problems:    * No active hospital problems. *  Resolved Problems:    * No resolved hospital problems. *    Imaging:   CT ABDOMEN PELVIS W IV CONTRAST Additional Contrast? None   Preliminary Result   1. Normal appendix.  Mild stool burden.  Mild colonic diverticulosis.   2. No small bowel obstruction.   3. No obstructing calculus or hydronephrosis.   4. Prior hysterectomy.           Abnormal labs:   Abnormal Labs Reviewed   CBC WITH AUTO DIFFERENTIAL - Abnormal; Notable for the following components:       Result Value    RDW 15.8 (*)     Eosinophils % 5 (*)     Immature Granulocytes % 1 (*)     All other components within normal limits   COMPREHENSIVE METABOLIC PANEL - Abnormal; Notable for the following components:    Chloride 108 (*)     Glucose 117 (*)     All other components within normal limits     Critical values: no     Abnormal Assessment Findings: upper right abd pain    Background  History:   Past Medical History:   Diagnosis Date    Abnormal uterine bleeding 2018    Anxiety     Bipolar 1 disorder (HCC)     No Rx.     Blood clot in vein     right calf    Fibroid 2018    Finger pain, right     Fracture, finger     right small finger    Panic attacks     Snores     Under care of team     No PCP

## 2024-07-04 NOTE — ED PROVIDER NOTES
EMERGENCY DEPARTMENT ENCOUNTER    Pt Name: Gaurang Fiore  MRN: [id number]  Birthdate 1975  Date of evaluation: 7/4/24  CHIEF COMPLAINT       Chief Complaint   Patient presents with    Abdominal Pain     Right upper \"gallbladder\". Known gallbladder issues. Appt with md in August  to have gallbladder removed     Nausea     HISTORY OF PRESENT ILLNESS   The history is provided by the patient and medical records.    The patient is a 49-year-old female who presents to the ED for right upper quadrant pain.  Pain started 5 hours ago.  Pain described as severe, constant, nonradiating.  She was diagnosed with cholelithiasis on recent ultrasound and CT scan and scheduled to follow-up with surgery in August.  States she is tired of the pain and \"wants her gallbladder out \" as soon as possible. No reports of hematemesis.  No reports of melena or bright red blood in stool.    REVIEW OF SYSTEMS     Review of Systems  All other systems reviewed and are negative.    PASTMEDICAL HISTORY     Past Medical History:   Diagnosis Date    Abnormal uterine bleeding 05/2018    Anxiety     Bipolar 1 disorder (HCC)     No Rx.     Blood clot in vein 2000    right calf    Fibroid 05/2018    Finger pain, right     Fracture, finger     right small finger    Panic attacks     Snores     Under care of team     No PCP     Past Problem List  Patient Active Problem List   Diagnosis Code    Fibroid D21.9    Abnormal uterine bleeding (AUB) N93.9    Hx of tubal ligation (1994) Z98.51    Panic attacks F41.0    Tobacco abuse Z72.0    Hysteroscopy, D&C 5/17/19 Z98.890    Abnormal mammogram of left breast R92.8    Non-compliant patient Z91.199    Closed displaced fracture of proximal phalanx of right little finger S62.616A    Obesity with body mass index 30 or greater E66.9    Insomnia G47.00    Hypokalemia E87.6    Gastroesophageal reflux disease K21.9    Fracture of metacarpal bone S62.309A    Fibrocystic disease of breast N60.19    Anxiety F41.9

## 2024-07-04 NOTE — CONSULTS
Chief complaint right upper quadrant pain    History of present illness    Patient is a 49-year-old female, non-, who tells me that about 5 hours prior to presentation she had onset of right upper quadrant pain radiating to her right flank right back and right shoulder.  She was having some nausea but no vomiting.  The nausea is gotten better since then with some antiemetic medication.  She continues to however have the pain.    Patient had similar difficulties in June of this year.  Was seen for what was thought to be back problems and workup of this was negative patient was found to have a somewhat contracted gallbladder with gallstones present by ultrasound.  She was to see another surgical group as an outpatient and has tentatively an appointment in August.  She tells me however since she was seen in June she has been having difficulties with lots of meals with similar symptomatology.  Her last meal prior to the onset of the symptoms was spinach in the salmon and without any butter or creamy sauces on it.  She denies any fevers or chills.  She denies any jaundice.    There is a strong family history of biliary disease with the patient's 2 sisters and mother all having had gallbladder surgery.  Patient also has a known history of reflux for many years but tells me that her current symptoms feel quite different from her reflux.    She had laboratory evaluation in the ER earlier today which showed a normal white count and normal differential normal electrolytes and normal liver function test and lipase.    CT scan was performed.  This shows somewhat fatty liver gallbladder is not thick-walled and is not edematous the gallstone as she does not show on her CT scan at all but does show on her ultrasound from a couple of weeks ago.    Past medical history  Tobacco abuse  Overweight  Reflux  Elevated cholesterol  Gallstones  Uterine fibroid for which she is post hysterectomy.  History of panic attacks  Bipolar

## 2024-07-05 ENCOUNTER — ANESTHESIA (OUTPATIENT)
Dept: OPERATING ROOM | Age: 49
End: 2024-07-05
Payer: COMMERCIAL

## 2024-07-05 ENCOUNTER — ANESTHESIA EVENT (OUTPATIENT)
Dept: OPERATING ROOM | Age: 49
End: 2024-07-05
Payer: COMMERCIAL

## 2024-07-05 LAB
ALBUMIN SERPL-MCNC: 4 G/DL (ref 3.5–5.2)
ALP SERPL-CCNC: 65 U/L (ref 35–104)
ALT SERPL-CCNC: 21 U/L (ref 5–33)
ANION GAP SERPL CALCULATED.3IONS-SCNC: 11 MMOL/L (ref 9–17)
AST SERPL-CCNC: 18 U/L
BASOPHILS # BLD: 0.05 K/UL (ref 0–0.2)
BASOPHILS NFR BLD: 1 % (ref 0–2)
BILIRUB SERPL-MCNC: 0.5 MG/DL (ref 0.3–1.2)
BUN SERPL-MCNC: 9 MG/DL (ref 6–20)
BUN/CREAT SERPL: 13 (ref 9–20)
CALCIUM SERPL-MCNC: 8.9 MG/DL (ref 8.6–10.4)
CHLORIDE SERPL-SCNC: 104 MMOL/L (ref 98–107)
CO2 SERPL-SCNC: 22 MMOL/L (ref 20–31)
CREAT SERPL-MCNC: 0.7 MG/DL (ref 0.5–0.9)
EOSINOPHIL # BLD: 0.39 K/UL (ref 0–0.44)
EOSINOPHILS RELATIVE PERCENT: 5 % (ref 1–4)
ERYTHROCYTE [DISTWIDTH] IN BLOOD BY AUTOMATED COUNT: 15.6 % (ref 11.8–14.4)
GFR, ESTIMATED: >90 ML/MIN/1.73M2
GLUCOSE SERPL-MCNC: 93 MG/DL (ref 70–99)
HCT VFR BLD AUTO: 47 % (ref 36.3–47.1)
HGB BLD-MCNC: 13.9 G/DL (ref 11.9–15.1)
IMM GRANULOCYTES # BLD AUTO: 0.05 K/UL (ref 0–0.3)
IMM GRANULOCYTES NFR BLD: 1 %
INR PPP: 1
LIPASE SERPL-CCNC: 24 U/L (ref 13–60)
LYMPHOCYTES NFR BLD: 2.29 K/UL (ref 1.1–3.7)
LYMPHOCYTES RELATIVE PERCENT: 31 % (ref 24–43)
MAGNESIUM SERPL-MCNC: 2.3 MG/DL (ref 1.6–2.6)
MCH RBC QN AUTO: 30.5 PG (ref 25.2–33.5)
MCHC RBC AUTO-ENTMCNC: 29.6 G/DL (ref 28.4–34.8)
MCV RBC AUTO: 103.3 FL (ref 82.6–102.9)
MONOCYTES NFR BLD: 0.52 K/UL (ref 0.1–1.2)
MONOCYTES NFR BLD: 7 % (ref 3–12)
NEUTROPHILS NFR BLD: 56 % (ref 36–65)
NEUTS SEG NFR BLD: 4.13 K/UL (ref 1.5–8.1)
NRBC BLD-RTO: 0 PER 100 WBC
PHOSPHATE SERPL-MCNC: 4.4 MG/DL (ref 2.6–4.5)
PLATELET # BLD AUTO: 183 K/UL (ref 138–453)
PMV BLD AUTO: 10.8 FL (ref 8.1–13.5)
POTASSIUM SERPL-SCNC: 4.3 MMOL/L (ref 3.7–5.3)
PROT SERPL-MCNC: 6.8 G/DL (ref 6.4–8.3)
PROTHROMBIN TIME: 13.1 SEC (ref 11.5–14.2)
RBC # BLD AUTO: 4.55 M/UL (ref 3.95–5.11)
RBC # BLD: ABNORMAL 10*6/UL
RBC # BLD: ABNORMAL 10*6/UL
SODIUM SERPL-SCNC: 137 MMOL/L (ref 135–144)
WBC OTHER # BLD: 7.4 K/UL (ref 3.5–11.3)

## 2024-07-05 PROCEDURE — 99232 SBSQ HOSP IP/OBS MODERATE 35: CPT | Performed by: NURSE PRACTITIONER

## 2024-07-05 PROCEDURE — 85025 COMPLETE CBC W/AUTO DIFF WBC: CPT

## 2024-07-05 PROCEDURE — 36415 COLL VENOUS BLD VENIPUNCTURE: CPT

## 2024-07-05 PROCEDURE — 2700000000 HC OXYGEN THERAPY PER DAY

## 2024-07-05 PROCEDURE — 83690 ASSAY OF LIPASE: CPT

## 2024-07-05 PROCEDURE — 80053 COMPREHEN METABOLIC PANEL: CPT

## 2024-07-05 PROCEDURE — 94640 AIRWAY INHALATION TREATMENT: CPT

## 2024-07-05 PROCEDURE — G0378 HOSPITAL OBSERVATION PER HR: HCPCS

## 2024-07-05 PROCEDURE — 6370000000 HC RX 637 (ALT 250 FOR IP): Performed by: SURGERY

## 2024-07-05 PROCEDURE — 94761 N-INVAS EAR/PLS OXIMETRY MLT: CPT

## 2024-07-05 PROCEDURE — 6360000002 HC RX W HCPCS: Performed by: NURSE PRACTITIONER

## 2024-07-05 PROCEDURE — 84100 ASSAY OF PHOSPHORUS: CPT

## 2024-07-05 PROCEDURE — 2500000003 HC RX 250 WO HCPCS: Performed by: NURSE PRACTITIONER

## 2024-07-05 PROCEDURE — 85610 PROTHROMBIN TIME: CPT

## 2024-07-05 PROCEDURE — 2580000003 HC RX 258: Performed by: NURSE PRACTITIONER

## 2024-07-05 PROCEDURE — 83735 ASSAY OF MAGNESIUM: CPT

## 2024-07-05 PROCEDURE — 6370000000 HC RX 637 (ALT 250 FOR IP): Performed by: NURSE PRACTITIONER

## 2024-07-05 RX ORDER — BUTALBITAL, ACETAMINOPHEN AND CAFFEINE 50; 325; 40 MG/1; MG/1; MG/1
1 TABLET ORAL EVERY 4 HOURS PRN
Status: DISCONTINUED | OUTPATIENT
Start: 2024-07-05 | End: 2024-07-07 | Stop reason: HOSPADM

## 2024-07-05 RX ADMIN — KETOROLAC TROMETHAMINE 30 MG: 30 INJECTION INTRAMUSCULAR; INTRAVENOUS at 00:49

## 2024-07-05 RX ADMIN — BUTALBITAL, ACETAMINOPHEN, AND CAFFEINE 1 TABLET: 50; 325; 40 TABLET ORAL at 13:37

## 2024-07-05 RX ADMIN — BUDESONIDE AND FORMOTEROL FUMARATE DIHYDRATE 2 PUFF: 160; 4.5 AEROSOL RESPIRATORY (INHALATION) at 08:06

## 2024-07-05 RX ADMIN — SODIUM CHLORIDE: 9 INJECTION, SOLUTION INTRAVENOUS at 00:52

## 2024-07-05 RX ADMIN — CYCLOBENZAPRINE 10 MG: 10 TABLET, FILM COATED ORAL at 20:54

## 2024-07-05 RX ADMIN — SODIUM CHLORIDE: 9 INJECTION, SOLUTION INTRAVENOUS at 15:54

## 2024-07-05 RX ADMIN — FAMOTIDINE 20 MG: 10 INJECTION, SOLUTION INTRAVENOUS at 13:38

## 2024-07-05 RX ADMIN — FAMOTIDINE 20 MG: 10 INJECTION, SOLUTION INTRAVENOUS at 20:54

## 2024-07-05 RX ADMIN — ALBUTEROL SULFATE 2 PUFF: 90 AEROSOL, METERED RESPIRATORY (INHALATION) at 08:06

## 2024-07-05 RX ADMIN — ALBUTEROL SULFATE 2 PUFF: 90 AEROSOL, METERED RESPIRATORY (INHALATION) at 20:40

## 2024-07-05 RX ADMIN — BUDESONIDE AND FORMOTEROL FUMARATE DIHYDRATE 2 PUFF: 160; 4.5 AEROSOL RESPIRATORY (INHALATION) at 20:39

## 2024-07-05 RX ADMIN — GABAPENTIN 800 MG: 400 CAPSULE ORAL at 20:54

## 2024-07-05 RX ADMIN — HYDROMORPHONE HYDROCHLORIDE 1 MG: 2 TABLET ORAL at 18:20

## 2024-07-05 RX ADMIN — HYDROMORPHONE HYDROCHLORIDE 1 MG: 2 TABLET ORAL at 04:24

## 2024-07-05 ASSESSMENT — PAIN - FUNCTIONAL ASSESSMENT
PAIN_FUNCTIONAL_ASSESSMENT: ACTIVITIES ARE NOT PREVENTED

## 2024-07-05 ASSESSMENT — PAIN DESCRIPTION - LOCATION
LOCATION: ABDOMEN
LOCATION: HEAD
LOCATION: ABDOMEN
LOCATION: FLANK
LOCATION: ABDOMEN

## 2024-07-05 ASSESSMENT — PAIN DESCRIPTION - ONSET
ONSET: GRADUAL
ONSET: GRADUAL
ONSET: ON-GOING

## 2024-07-05 ASSESSMENT — PAIN DESCRIPTION - ORIENTATION
ORIENTATION: RIGHT;UPPER
ORIENTATION: RIGHT
ORIENTATION: MID
ORIENTATION: RIGHT

## 2024-07-05 ASSESSMENT — PAIN SCALES - GENERAL
PAINLEVEL_OUTOF10: 10
PAINLEVEL_OUTOF10: 8
PAINLEVEL_OUTOF10: 7
PAINLEVEL_OUTOF10: 8
PAINLEVEL_OUTOF10: 8
PAINLEVEL_OUTOF10: 7
PAINLEVEL_OUTOF10: 10
PAINLEVEL_OUTOF10: 6
PAINLEVEL_OUTOF10: 5

## 2024-07-05 ASSESSMENT — PAIN DESCRIPTION - DESCRIPTORS
DESCRIPTORS: ACHING
DESCRIPTORS: DISCOMFORT
DESCRIPTORS: ACHING

## 2024-07-05 ASSESSMENT — PAIN DESCRIPTION - PAIN TYPE
TYPE: ACUTE PAIN

## 2024-07-05 ASSESSMENT — PAIN DESCRIPTION - FREQUENCY
FREQUENCY: INTERMITTENT

## 2024-07-05 NOTE — CARE COORDINATION
Case Management Assessment  Initial Evaluation    Date/Time of Evaluation: 7/5/2024 9:50 AM  Assessment Completed by: RAFIA BAPTISTE RN    If patient is discharged prior to next notation, then this note serves as note for discharge by case management.    Patient Name: Gaurang Fiore                   YOB: 1975  Diagnosis: Intractable abdominal pain [R10.9]                   Date / Time: 7/4/2024  7:32 AM    Patient Admission Status: Observation   Readmission Risk (Low < 19, Mod (19-27), High > 27): Readmission Risk Score: 20.5    Current PCP: Deisi Cook APRN - NP  PCP verified by CM? Yes    Chart Reviewed: Yes      History Provided by: Patient  Patient Orientation: Alert and Oriented    Patient Cognition: Alert    Hospitalization in the last 30 days (Readmission):  No    If yes, Readmission Assessment in  Navigator will be completed.    Advance Directives:      Code Status: Full Code   Patient's Primary Decision Maker is: Legal Next of Kin      Discharge Planning:    Patient lives with: Parent Type of Home: House  Primary Care Giver: Self  Patient Support Systems include: Parent   Current Financial resources: Other (Comment) ([employer])  Current community resources: None  Current services prior to admission: Oxygen Therapy (02 6 L thru [employer])            Current DME:              Type of Home Care services:  None    ADLS  Prior functional level: Independent in ADLs/IADLs  Current functional level: Independent in ADLs/IADLs    PT AM-PAC:   /24  OT AM-PAC:   /24    Family can provide assistance at DC: Yes  Would you like Case Management to discuss the discharge plan with any other family members/significant others, and if so, who? Yes  Plans to Return to Present Housing: Yes  Other Identified Issues/Barriers to RETURNING to current housing: surgery  Potential Assistance needed at discharge: Durable Medical Equipment            Potential DME: Oxygen Therapy (Comment)  Patient expects to discharge

## 2024-07-06 PROBLEM — Z90.49 STATUS POST LAPAROSCOPIC CHOLECYSTECTOMY: Status: ACTIVE | Noted: 2024-07-06

## 2024-07-06 LAB
HCO3 VENOUS: 28.7 MMOL/L (ref 22–29)
HCO3 VENOUS: 29.4 MMOL/L (ref 22–29)
O2 SAT, VEN: 96.3 % (ref 60–85)
O2 SAT, VEN: 98.9 % (ref 60–85)
PCO2 VENOUS: 56.8 MM HG (ref 41–51)
PCO2 VENOUS: 65.4 MM HG (ref 41–51)
PH VENOUS: 7.25 (ref 7.32–7.43)
PH VENOUS: 7.32 (ref 7.32–7.43)
PO2 VENOUS: 100.9 MM HG (ref 30–50)
PO2 VENOUS: 140.3 MM HG (ref 30–50)

## 2024-07-06 PROCEDURE — 2580000003 HC RX 258: Performed by: ANESTHESIOLOGY

## 2024-07-06 PROCEDURE — C1889 IMPLANT/INSERT DEVICE, NOC: HCPCS | Performed by: SURGERY

## 2024-07-06 PROCEDURE — 6370000000 HC RX 637 (ALT 250 FOR IP): Performed by: SURGERY

## 2024-07-06 PROCEDURE — S2900 ROBOTIC SURGICAL SYSTEM: HCPCS | Performed by: SURGERY

## 2024-07-06 PROCEDURE — 6360000002 HC RX W HCPCS: Performed by: ANESTHESIOLOGY

## 2024-07-06 PROCEDURE — 99232 SBSQ HOSP IP/OBS MODERATE 35: CPT | Performed by: NURSE PRACTITIONER

## 2024-07-06 PROCEDURE — 6360000002 HC RX W HCPCS: Performed by: SURGERY

## 2024-07-06 PROCEDURE — 6370000000 HC RX 637 (ALT 250 FOR IP): Performed by: NURSE PRACTITIONER

## 2024-07-06 PROCEDURE — 94640 AIRWAY INHALATION TREATMENT: CPT

## 2024-07-06 PROCEDURE — 2700000000 HC OXYGEN THERAPY PER DAY

## 2024-07-06 PROCEDURE — 2580000003 HC RX 258: Performed by: SURGERY

## 2024-07-06 PROCEDURE — 2500000003 HC RX 250 WO HCPCS: Performed by: ANESTHESIOLOGY

## 2024-07-06 PROCEDURE — G0378 HOSPITAL OBSERVATION PER HR: HCPCS

## 2024-07-06 PROCEDURE — 94761 N-INVAS EAR/PLS OXIMETRY MLT: CPT

## 2024-07-06 PROCEDURE — 7100000000 HC PACU RECOVERY - FIRST 15 MIN: Performed by: SURGERY

## 2024-07-06 PROCEDURE — 7100000001 HC PACU RECOVERY - ADDTL 15 MIN: Performed by: SURGERY

## 2024-07-06 PROCEDURE — 88304 TISSUE EXAM BY PATHOLOGIST: CPT

## 2024-07-06 PROCEDURE — 3600000009 HC SURGERY ROBOT BASE: Performed by: SURGERY

## 2024-07-06 PROCEDURE — 94660 CPAP INITIATION&MGMT: CPT

## 2024-07-06 PROCEDURE — 3700000001 HC ADD 15 MINUTES (ANESTHESIA): Performed by: SURGERY

## 2024-07-06 PROCEDURE — 2500000003 HC RX 250 WO HCPCS: Performed by: SURGERY

## 2024-07-06 PROCEDURE — 3600000019 HC SURGERY ROBOT ADDTL 15MIN: Performed by: SURGERY

## 2024-07-06 PROCEDURE — 3700000000 HC ANESTHESIA ATTENDED CARE: Performed by: SURGERY

## 2024-07-06 PROCEDURE — 82947 ASSAY GLUCOSE BLOOD QUANT: CPT

## 2024-07-06 PROCEDURE — 82803 BLOOD GASES ANY COMBINATION: CPT

## 2024-07-06 PROCEDURE — 2580000003 HC RX 258: Performed by: NURSE PRACTITIONER

## 2024-07-06 PROCEDURE — 2709999900 HC NON-CHARGEABLE SUPPLY: Performed by: SURGERY

## 2024-07-06 DEVICE — HEMOLOK L 6 CLIPS/CART
Type: IMPLANTABLE DEVICE | Status: FUNCTIONAL
Brand: WECK

## 2024-07-06 RX ORDER — OXYCODONE HYDROCHLORIDE 5 MG/1
5 TABLET ORAL
Status: DISCONTINUED | OUTPATIENT
Start: 2024-07-06 | End: 2024-07-06 | Stop reason: HOSPADM

## 2024-07-06 RX ORDER — SCOLOPAMINE TRANSDERMAL SYSTEM 1 MG/1
1 PATCH, EXTENDED RELEASE TRANSDERMAL
Status: DISCONTINUED | OUTPATIENT
Start: 2024-07-06 | End: 2024-07-07 | Stop reason: HOSPADM

## 2024-07-06 RX ORDER — MEPERIDINE HYDROCHLORIDE 50 MG/ML
12.5 INJECTION INTRAMUSCULAR; INTRAVENOUS; SUBCUTANEOUS EVERY 5 MIN PRN
Status: DISCONTINUED | OUTPATIENT
Start: 2024-07-06 | End: 2024-07-06 | Stop reason: HOSPADM

## 2024-07-06 RX ORDER — MIDAZOLAM HYDROCHLORIDE 1 MG/ML
INJECTION INTRAMUSCULAR; INTRAVENOUS PRN
Status: DISCONTINUED | OUTPATIENT
Start: 2024-07-06 | End: 2024-07-06 | Stop reason: SDUPTHER

## 2024-07-06 RX ORDER — FENTANYL CITRATE 50 UG/ML
INJECTION, SOLUTION INTRAMUSCULAR; INTRAVENOUS PRN
Status: DISCONTINUED | OUTPATIENT
Start: 2024-07-06 | End: 2024-07-06 | Stop reason: SDUPTHER

## 2024-07-06 RX ORDER — SODIUM CHLORIDE 9 MG/ML
INJECTION, SOLUTION INTRAVENOUS CONTINUOUS
Status: DISCONTINUED | OUTPATIENT
Start: 2024-07-06 | End: 2024-07-07

## 2024-07-06 RX ORDER — HYDROCODONE BITARTRATE AND ACETAMINOPHEN 5; 325 MG/1; MG/1
2 TABLET ORAL EVERY 6 HOURS PRN
Qty: 28 TABLET | Refills: 0 | Status: SHIPPED | OUTPATIENT
Start: 2024-07-06 | End: 2024-07-11

## 2024-07-06 RX ORDER — ONDANSETRON 2 MG/ML
INJECTION INTRAMUSCULAR; INTRAVENOUS PRN
Status: DISCONTINUED | OUTPATIENT
Start: 2024-07-06 | End: 2024-07-06 | Stop reason: SDUPTHER

## 2024-07-06 RX ORDER — SODIUM CHLORIDE 9 MG/ML
INJECTION, SOLUTION INTRAVENOUS CONTINUOUS PRN
Status: DISCONTINUED | OUTPATIENT
Start: 2024-07-06 | End: 2024-07-06 | Stop reason: SDUPTHER

## 2024-07-06 RX ORDER — FENTANYL CITRATE 50 UG/ML
25 INJECTION, SOLUTION INTRAMUSCULAR; INTRAVENOUS EVERY 5 MIN PRN
Status: DISCONTINUED | OUTPATIENT
Start: 2024-07-06 | End: 2024-07-06 | Stop reason: HOSPADM

## 2024-07-06 RX ORDER — LIDOCAINE HYDROCHLORIDE 20 MG/ML
INJECTION, SOLUTION EPIDURAL; INFILTRATION; INTRACAUDAL; PERINEURAL PRN
Status: DISCONTINUED | OUTPATIENT
Start: 2024-07-06 | End: 2024-07-06 | Stop reason: SDUPTHER

## 2024-07-06 RX ORDER — OXYCODONE HYDROCHLORIDE AND ACETAMINOPHEN 5; 325 MG/1; MG/1
1 TABLET ORAL EVERY 4 HOURS PRN
Status: DISCONTINUED | OUTPATIENT
Start: 2024-07-06 | End: 2024-07-07 | Stop reason: HOSPADM

## 2024-07-06 RX ORDER — DIPHENHYDRAMINE HYDROCHLORIDE 50 MG/ML
INJECTION INTRAMUSCULAR; INTRAVENOUS PRN
Status: DISCONTINUED | OUTPATIENT
Start: 2024-07-06 | End: 2024-07-06 | Stop reason: SDUPTHER

## 2024-07-06 RX ORDER — DEXAMETHASONE SODIUM PHOSPHATE 10 MG/ML
INJECTION, SOLUTION INTRAMUSCULAR; INTRAVENOUS PRN
Status: DISCONTINUED | OUTPATIENT
Start: 2024-07-06 | End: 2024-07-06 | Stop reason: SDUPTHER

## 2024-07-06 RX ORDER — ROCURONIUM BROMIDE 10 MG/ML
INJECTION, SOLUTION INTRAVENOUS PRN
Status: DISCONTINUED | OUTPATIENT
Start: 2024-07-06 | End: 2024-07-06 | Stop reason: SDUPTHER

## 2024-07-06 RX ORDER — DIPHENHYDRAMINE HYDROCHLORIDE 50 MG/ML
12.5 INJECTION INTRAMUSCULAR; INTRAVENOUS
Status: DISCONTINUED | OUTPATIENT
Start: 2024-07-06 | End: 2024-07-06 | Stop reason: HOSPADM

## 2024-07-06 RX ORDER — SODIUM CHLORIDE 9 MG/ML
INJECTION, SOLUTION INTRAVENOUS PRN
Status: DISCONTINUED | OUTPATIENT
Start: 2024-07-06 | End: 2024-07-06 | Stop reason: HOSPADM

## 2024-07-06 RX ORDER — PROPOFOL 10 MG/ML
INJECTION, EMULSION INTRAVENOUS PRN
Status: DISCONTINUED | OUTPATIENT
Start: 2024-07-06 | End: 2024-07-06 | Stop reason: SDUPTHER

## 2024-07-06 RX ORDER — PROCHLORPERAZINE EDISYLATE 5 MG/ML
10 INJECTION INTRAMUSCULAR; INTRAVENOUS
Status: DISCONTINUED | OUTPATIENT
Start: 2024-07-06 | End: 2024-07-06 | Stop reason: HOSPADM

## 2024-07-06 RX ORDER — ONDANSETRON 4 MG/1
4 TABLET, ORALLY DISINTEGRATING ORAL EVERY 8 HOURS PRN
Qty: 6 TABLET | Refills: 0 | Status: SHIPPED | OUTPATIENT
Start: 2024-07-06 | End: 2024-07-08

## 2024-07-06 RX ORDER — NALOXONE HYDROCHLORIDE 0.4 MG/ML
INJECTION, SOLUTION INTRAMUSCULAR; INTRAVENOUS; SUBCUTANEOUS PRN
Status: DISCONTINUED | OUTPATIENT
Start: 2024-07-06 | End: 2024-07-06 | Stop reason: HOSPADM

## 2024-07-06 RX ORDER — ENOXAPARIN SODIUM 100 MG/ML
30 INJECTION SUBCUTANEOUS 2 TIMES DAILY
Status: DISCONTINUED | OUTPATIENT
Start: 2024-07-06 | End: 2024-07-07 | Stop reason: HOSPADM

## 2024-07-06 RX ORDER — HYDROMORPHONE HYDROCHLORIDE 1 MG/ML
0.5 INJECTION, SOLUTION INTRAMUSCULAR; INTRAVENOUS; SUBCUTANEOUS EVERY 5 MIN PRN
Status: DISCONTINUED | OUTPATIENT
Start: 2024-07-06 | End: 2024-07-06 | Stop reason: HOSPADM

## 2024-07-06 RX ORDER — INDOCYANINE GREEN AND WATER 25 MG
KIT INJECTION PRN
Status: DISCONTINUED | OUTPATIENT
Start: 2024-07-06 | End: 2024-07-06 | Stop reason: ALTCHOICE

## 2024-07-06 RX ORDER — BUPIVACAINE HYDROCHLORIDE 5 MG/ML
INJECTION, SOLUTION PERINEURAL PRN
Status: DISCONTINUED | OUTPATIENT
Start: 2024-07-06 | End: 2024-07-06 | Stop reason: ALTCHOICE

## 2024-07-06 RX ORDER — SUCCINYLCHOLINE/SOD CL,ISO/PF 100 MG/5ML
SYRINGE (ML) INTRAVENOUS PRN
Status: DISCONTINUED | OUTPATIENT
Start: 2024-07-06 | End: 2024-07-06 | Stop reason: SDUPTHER

## 2024-07-06 RX ORDER — FUROSEMIDE 40 MG/1
40 TABLET ORAL DAILY
Status: DISCONTINUED | OUTPATIENT
Start: 2024-07-07 | End: 2024-07-07 | Stop reason: HOSPADM

## 2024-07-06 RX ORDER — SODIUM CHLORIDE 0.9 % (FLUSH) 0.9 %
5-40 SYRINGE (ML) INJECTION PRN
Status: DISCONTINUED | OUTPATIENT
Start: 2024-07-06 | End: 2024-07-06 | Stop reason: HOSPADM

## 2024-07-06 RX ORDER — SODIUM CHLORIDE 0.9 % (FLUSH) 0.9 %
5-40 SYRINGE (ML) INJECTION EVERY 12 HOURS SCHEDULED
Status: DISCONTINUED | OUTPATIENT
Start: 2024-07-06 | End: 2024-07-06 | Stop reason: HOSPADM

## 2024-07-06 RX ORDER — SODIUM CHLORIDE, SODIUM LACTATE, POTASSIUM CHLORIDE, CALCIUM CHLORIDE 600; 310; 30; 20 MG/100ML; MG/100ML; MG/100ML; MG/100ML
INJECTION, SOLUTION INTRAVENOUS CONTINUOUS PRN
Status: DISCONTINUED | OUTPATIENT
Start: 2024-07-06 | End: 2024-07-06 | Stop reason: SDUPTHER

## 2024-07-06 RX ORDER — CEFAZOLIN SODIUM 1 G/3ML
INJECTION, POWDER, FOR SOLUTION INTRAMUSCULAR; INTRAVENOUS PRN
Status: DISCONTINUED | OUTPATIENT
Start: 2024-07-06 | End: 2024-07-06 | Stop reason: SDUPTHER

## 2024-07-06 RX ORDER — METOCLOPRAMIDE HYDROCHLORIDE 5 MG/ML
10 INJECTION INTRAMUSCULAR; INTRAVENOUS
Status: DISCONTINUED | OUTPATIENT
Start: 2024-07-06 | End: 2024-07-06 | Stop reason: HOSPADM

## 2024-07-06 RX ORDER — FAMOTIDINE 10 MG/ML
INJECTION, SOLUTION INTRAVENOUS PRN
Status: DISCONTINUED | OUTPATIENT
Start: 2024-07-06 | End: 2024-07-06 | Stop reason: SDUPTHER

## 2024-07-06 RX ORDER — LORAZEPAM 2 MG/ML
2 INJECTION INTRAMUSCULAR ONCE
Status: DISCONTINUED | OUTPATIENT
Start: 2024-07-06 | End: 2024-07-06

## 2024-07-06 RX ADMIN — LIDOCAINE HYDROCHLORIDE 100 MG: 20 INJECTION, SOLUTION EPIDURAL; INFILTRATION; INTRACAUDAL; PERINEURAL at 07:45

## 2024-07-06 RX ADMIN — SODIUM CHLORIDE: 9 INJECTION, SOLUTION INTRAVENOUS at 12:26

## 2024-07-06 RX ADMIN — OXYCODONE HYDROCHLORIDE AND ACETAMINOPHEN 1 TABLET: 5; 325 TABLET ORAL at 20:00

## 2024-07-06 RX ADMIN — ALBUTEROL SULFATE 2 PUFF: 90 AEROSOL, METERED RESPIRATORY (INHALATION) at 20:17

## 2024-07-06 RX ADMIN — ROCURONIUM BROMIDE 10 MG: 10 INJECTION, SOLUTION INTRAVENOUS at 07:56

## 2024-07-06 RX ADMIN — Medication 200 MG: at 07:57

## 2024-07-06 RX ADMIN — SODIUM CHLORIDE, POTASSIUM CHLORIDE, SODIUM LACTATE AND CALCIUM CHLORIDE: 600; 310; 30; 20 INJECTION, SOLUTION INTRAVENOUS at 08:07

## 2024-07-06 RX ADMIN — DEXAMETHASONE SODIUM PHOSPHATE 10 MG: 10 INJECTION, SOLUTION INTRAMUSCULAR; INTRAVENOUS at 08:32

## 2024-07-06 RX ADMIN — ONDANSETRON 4 MG: 2 INJECTION INTRAMUSCULAR; INTRAVENOUS at 08:32

## 2024-07-06 RX ADMIN — BUDESONIDE AND FORMOTEROL FUMARATE DIHYDRATE 2 PUFF: 160; 4.5 AEROSOL RESPIRATORY (INHALATION) at 20:16

## 2024-07-06 RX ADMIN — CEFAZOLIN 3 G: 1 INJECTION, POWDER, FOR SOLUTION INTRAMUSCULAR; INTRAVENOUS at 08:09

## 2024-07-06 RX ADMIN — DIPHENHYDRAMINE HYDROCHLORIDE 12.5 MG: 50 INJECTION INTRAMUSCULAR; INTRAVENOUS at 08:32

## 2024-07-06 RX ADMIN — FENTANYL CITRATE 50 MCG: 50 INJECTION INTRAMUSCULAR; INTRAVENOUS at 09:16

## 2024-07-06 RX ADMIN — GABAPENTIN 800 MG: 400 CAPSULE ORAL at 15:34

## 2024-07-06 RX ADMIN — MIDAZOLAM 2 MG: 1 INJECTION INTRAMUSCULAR; INTRAVENOUS at 07:55

## 2024-07-06 RX ADMIN — SODIUM CHLORIDE: 9 INJECTION, SOLUTION INTRAVENOUS at 01:05

## 2024-07-06 RX ADMIN — KETOROLAC TROMETHAMINE 30 MG: 30 INJECTION INTRAMUSCULAR; INTRAVENOUS at 12:11

## 2024-07-06 RX ADMIN — PROPOFOL 200 MG: 10 INJECTION, EMULSION INTRAVENOUS at 07:56

## 2024-07-06 RX ADMIN — SUGAMMADEX 200 MG: 100 INJECTION, SOLUTION INTRAVENOUS at 10:04

## 2024-07-06 RX ADMIN — SUGAMMADEX 200 MG: 100 INJECTION, SOLUTION INTRAVENOUS at 09:11

## 2024-07-06 RX ADMIN — ROCURONIUM BROMIDE 10 MG: 10 INJECTION, SOLUTION INTRAVENOUS at 08:42

## 2024-07-06 RX ADMIN — FAMOTIDINE 20 MG: 10 INJECTION, SOLUTION INTRAVENOUS at 08:32

## 2024-07-06 RX ADMIN — OXYCODONE HYDROCHLORIDE AND ACETAMINOPHEN 1 TABLET: 5; 325 TABLET ORAL at 15:55

## 2024-07-06 RX ADMIN — PROPOFOL 100 MG: 10 INJECTION, EMULSION INTRAVENOUS at 09:05

## 2024-07-06 RX ADMIN — HYDROMORPHONE HYDROCHLORIDE 1 MG: 2 TABLET ORAL at 01:02

## 2024-07-06 RX ADMIN — SODIUM CHLORIDE: 9 INJECTION, SOLUTION INTRAVENOUS at 08:35

## 2024-07-06 RX ADMIN — NALOXONE HYDROCHLORIDE 2 ML: 0.4 INJECTION, SOLUTION INTRAMUSCULAR; INTRAVENOUS; SUBCUTANEOUS at 10:01

## 2024-07-06 RX ADMIN — ROCURONIUM BROMIDE 30 MG: 10 INJECTION, SOLUTION INTRAVENOUS at 08:13

## 2024-07-06 RX ADMIN — PROPOFOL 100 MG: 10 INJECTION, EMULSION INTRAVENOUS at 09:16

## 2024-07-06 RX ADMIN — SODIUM CHLORIDE: 9 INJECTION, SOLUTION INTRAVENOUS at 07:45

## 2024-07-06 RX ADMIN — ONDANSETRON 4 MG: 2 INJECTION INTRAMUSCULAR; INTRAVENOUS at 12:12

## 2024-07-06 RX ADMIN — FENTANYL CITRATE 100 MCG: 50 INJECTION INTRAMUSCULAR; INTRAVENOUS at 07:55

## 2024-07-06 RX ADMIN — GABAPENTIN 800 MG: 400 CAPSULE ORAL at 20:48

## 2024-07-06 ASSESSMENT — PAIN - FUNCTIONAL ASSESSMENT
PAIN_FUNCTIONAL_ASSESSMENT: ACTIVITIES ARE NOT PREVENTED
PAIN_FUNCTIONAL_ASSESSMENT: FACE, LEGS, ACTIVITY, CRY, AND CONSOLABILITY (FLACC)

## 2024-07-06 ASSESSMENT — PAIN DESCRIPTION - ONSET: ONSET: ON-GOING

## 2024-07-06 ASSESSMENT — PAIN SCALES - GENERAL
PAINLEVEL_OUTOF10: 2
PAINLEVEL_OUTOF10: 10
PAINLEVEL_OUTOF10: 6
PAINLEVEL_OUTOF10: 0
PAINLEVEL_OUTOF10: 7
PAINLEVEL_OUTOF10: 5
PAINLEVEL_OUTOF10: 10
PAINLEVEL_OUTOF10: 6

## 2024-07-06 ASSESSMENT — PAIN DESCRIPTION - LOCATION
LOCATION: ABDOMEN
LOCATION: FLANK

## 2024-07-06 ASSESSMENT — PAIN DESCRIPTION - ORIENTATION
ORIENTATION: ANTERIOR
ORIENTATION: RIGHT
ORIENTATION: MID

## 2024-07-06 ASSESSMENT — PAIN DESCRIPTION - DESCRIPTORS
DESCRIPTORS: ACHING;DISCOMFORT;JABBING
DESCRIPTORS: DISCOMFORT
DESCRIPTORS: DISCOMFORT
DESCRIPTORS: ACHING;DISCOMFORT;JABBING

## 2024-07-06 ASSESSMENT — PAIN DESCRIPTION - PAIN TYPE: TYPE: ACUTE PAIN

## 2024-07-06 ASSESSMENT — PAIN DESCRIPTION - FREQUENCY: FREQUENCY: INTERMITTENT

## 2024-07-06 ASSESSMENT — LIFESTYLE VARIABLES: SMOKING_STATUS: 1

## 2024-07-06 NOTE — ANESTHESIA PRE PROCEDURE
PREGSERUM NEGATIVE 03/29/2024        ABGs: No results found for: \"PHART\", \"PO2ART\", \"QVJ9LCN\", \"GEA5TLZ\", \"BEART\", \"U7OESNPB\"     Type & Screen (If Applicable):  No results found for: \"LABABO\"    Drug/Infectious Status (If Applicable):  No results found for: \"HIV\", \"HEPCAB\"    COVID-19 Screening (If Applicable):   Lab Results   Component Value Date/Time    COVID19 Not Detected 05/17/2024 09:35 AM    COVID19 Not Detected 04/03/2024 01:00 PM           Anesthesia Evaluation  Patient summary reviewed   no history of anesthetic complications:   Airway: Mallampati: IV  TM distance: <3 FB   Neck ROM: full  Mouth opening: > = 3 FB   Dental:          Pulmonary: breath sounds clear to auscultation  (+)     sleep apnea (On 6L O2/min at night):       current smoker                          ROS comment: CT PE 6/7/2024:  IMPRESSION:  1. No evidence of pulmonary embolism.  2. Mild ectasia of the ascending thoracic aorta, which measures 3.9 cm in  diameter. No evidence of thoracic aortic dissection.  3. At the bases of the lower lobes of both lungs there are mild atelectatic  changes, but subtle infiltrates may not be excluded. Chronic fibrotic change  in the lung bases may also be possible. Minimal dependent atelectatic changes  at the posterior aspects of upper lobes of both lungs. In the rest of both  lungs, there is no other focal abnormality or acute process.     Cardiovascular:    (+) hypertension:, CHF (Grade 1 diastolic dysfunction): diastolic, hyperlipidemia      ECG reviewed  Rhythm: regular  Rate: normal  Echocardiogram reviewed  Stress test reviewed             ROS comment: Echo 3/29/2024:  ·  Left Ventricle: Normal left ventricular systolic function with a visually estimated EF of 55 - 60%. Left ventricle size is normal. Findings consistent with mild concentric hypertrophy. Normal wall motion. Grade I diastolic dysfunction with normal LAP.  ·  Mitral Valve: Mild regurgitation.  ·  Image quality is suboptimal.

## 2024-07-06 NOTE — ANESTHESIA POSTPROCEDURE EVALUATION
Department of Anesthesiology  Postprocedure Note    Patient: Gaurang Fiore  MRN: [id number]  YOB: 1975  Date of evaluation: 7/6/2024    Procedure Summary       Date: 07/06/24 Room / Location: [address]    Anesthesia Start: 0745 Anesthesia Stop: 1009    Procedure: CHOLECYSTECTOMY LAPAROSCOPIC ROBOTIC XI (Abdomen) Diagnosis:       Abdominal pain, unspecified abdominal location      (Abdominal pain, unspecified abdominal location [R10.9])    Surgeons: Sandra Fitzpatrick MD Responsible Provider: Vonda Rosario MD    Anesthesia Type: general ASA Status: 3            Anesthesia Type: No value filed.    Tanner Phase I: Tanner Score: 4    Tanner Phase II:      Anesthesia Post Evaluation    Patient location during evaluation: PACU  Patient participation: complete - patient participated  Level of consciousness: awake and alert  Airway patency: patent  Nausea & Vomiting: no nausea and no vomiting  Cardiovascular status: hemodynamically stable  Respiratory status: BIPAP (BiPAP 20/8)  Hydration status: euvolemic  Comments: Patient awake and reaching to remove BiPAP machine  Pain management: adequate    No notable events documented.

## 2024-07-06 NOTE — OP NOTE
Operative Note      Patient: Gaurang Fiore  YOB: 1975  MRN: [id number]    Date of Procedure: 7/6/2024    Cholecystitis    Post-Op Diagnosis:  Mild cholecystitis       Procedure(s):  CHOLECYSTECTOMY LAPAROSCOPIC ROBOTIC XI with firefly    Surgeon(s):  Sandra Fitzpatrick MD    Assistant:   First Assistant: Samantha Bridges    Anesthesia: General and local 0.5% Marcaine    Estimated Blood Loss (mL): Minimal    Complications: None    Specimens:   ID Type Source Tests Collected by Time Destination   A : GALLBLADDER AND CONTENTS Tissue Gallbladder SURGICAL PATHOLOGY Sandra Fitzpatrick MD 7/6/2024 0858        Implants:  Implant Name Type Inv. Item Serial No.  Lot No. LRB No. Used Action   CLIP INT L POLYMER TEODORA LIG HEM O TEODORA (6EA/PK) - [id number]  CLIP INT L POLYMER TEODORA LIG HEM O TEODORA (6EA/PK)  [employer]- [id number] N/A 1 Implanted         Drains: * No LDAs found *    Findings:  Infection Present At Time Of Surgery (PATOS) (choose all levels that have infection present):  No infection present  Other Findings: Single stone in gallbladder.    Detailed Description of Procedure:   Patient is a 49-year-old female who had presented through the emergency room a couple of weeks ago was found to have a gallstone and some slightly contracted gallbladder after right upper quadrant pain.  She was seeing another surgical group as an outpatient.  She tells me she been having persistent intermittent episodes of pain since then and presented through the emergency room again about a day and a half ago with the same.  She presents at this time for cholecystectomy and informed consent is obtained for the patient prior to surgery.    Patient is brought to the operating room laid on table spine position.  General oral endotracheal anesthesia was induced.  Timeout is performed.  Patient's abdomen is prepped with ChloraPrep and after 3 minutes draped out in sterile fashion.    Using open Ortega technique a long balloon 
abdomen.  Patient is placed in reverse Trendelenburg position.  Robot is brought to the field and docked targeting is performed over the gallbladder area.  Instruments were brought in under direct visualization.    Gallbladder is grasped with a Prestige grasper and retracted up and over the liver.  The bottom of the gallbladder is retracted both medially and laterally and the peritoneum and fatty tissue in the triangle of BOLA are taken down circumferentially off the cystic duct and what appeared to be a very tiny cystic artery.  The artery was taken care of with the cautery.  The cystic duct was circumferentially dissected free and ligaclips were applied the duct was then divided.  The gallbladder was removed in the gallbladder fossa using the hook cautery.  There is very minimal bleeding here.    Robotic instruments were removed and the robot is pulled out of the field.  Using the camera and a hand-held fashion the gallbladder is placed a laparoscopic catch bag extracted and passed off in formalin for permanent pathology.  The fascia at the umbilicus was closed with 2 figure-of-eight sutures of 0 Vicryl.  All wounds were irrigated and closed the skin surface using 4-0 Monocryl.  The abdomen was washed and dried and surgical glue was applied to the site.  Sponge needle counts are correct.  Patient be awakened extubated returned to recovery room    Electronically signed by Sandra Fitzpatrick MD on 7/6/2024 at 9:15 AM

## 2024-07-07 VITALS
HEART RATE: 88 BPM | OXYGEN SATURATION: 90 % | TEMPERATURE: 98.1 F | DIASTOLIC BLOOD PRESSURE: 75 MMHG | RESPIRATION RATE: 16 BRPM | WEIGHT: 293 LBS | SYSTOLIC BLOOD PRESSURE: 120 MMHG | BODY MASS INDEX: 45.99 KG/M2 | HEIGHT: 67 IN

## 2024-07-07 PROBLEM — I50.9 CONGESTIVE HEART FAILURE (HCC): Status: ACTIVE | Noted: 2024-07-07

## 2024-07-07 PROBLEM — K81.9 CHOLECYSTITIS: Status: ACTIVE | Noted: 2024-07-07

## 2024-07-07 LAB
ANION GAP SERPL CALCULATED.3IONS-SCNC: 7 MMOL/L (ref 9–17)
BUN SERPL-MCNC: 8 MG/DL (ref 6–20)
BUN/CREAT SERPL: 13 (ref 9–20)
CALCIUM SERPL-MCNC: 8.9 MG/DL (ref 8.6–10.4)
CHLORIDE SERPL-SCNC: 105 MMOL/L (ref 98–107)
CO2 SERPL-SCNC: 28 MMOL/L (ref 20–31)
CREAT SERPL-MCNC: 0.6 MG/DL (ref 0.5–0.9)
ERYTHROCYTE [DISTWIDTH] IN BLOOD BY AUTOMATED COUNT: 15.1 % (ref 11.8–14.4)
GFR, ESTIMATED: >90 ML/MIN/1.73M2
GLUCOSE SERPL-MCNC: 151 MG/DL (ref 70–99)
HCT VFR BLD AUTO: 43.1 % (ref 36.3–47.1)
HGB BLD-MCNC: 13.3 G/DL (ref 11.9–15.1)
MCH RBC QN AUTO: 30.1 PG (ref 25.2–33.5)
MCHC RBC AUTO-ENTMCNC: 30.9 G/DL (ref 28.4–34.8)
MCV RBC AUTO: 97.5 FL (ref 82.6–102.9)
NRBC BLD-RTO: 0 PER 100 WBC
PLATELET # BLD AUTO: 193 K/UL (ref 138–453)
PMV BLD AUTO: 11.1 FL (ref 8.1–13.5)
POTASSIUM SERPL-SCNC: 4.4 MMOL/L (ref 3.7–5.3)
RBC # BLD AUTO: 4.42 M/UL (ref 3.95–5.11)
SODIUM SERPL-SCNC: 140 MMOL/L (ref 135–144)
WBC OTHER # BLD: 10.5 K/UL (ref 3.5–11.3)

## 2024-07-07 PROCEDURE — 6370000000 HC RX 637 (ALT 250 FOR IP): Performed by: SURGERY

## 2024-07-07 PROCEDURE — G0378 HOSPITAL OBSERVATION PER HR: HCPCS

## 2024-07-07 PROCEDURE — 99239 HOSP IP/OBS DSCHRG MGMT >30: CPT | Performed by: FAMILY MEDICINE

## 2024-07-07 PROCEDURE — 80048 BASIC METABOLIC PNL TOTAL CA: CPT

## 2024-07-07 PROCEDURE — 36415 COLL VENOUS BLD VENIPUNCTURE: CPT

## 2024-07-07 PROCEDURE — 94640 AIRWAY INHALATION TREATMENT: CPT

## 2024-07-07 PROCEDURE — 6370000000 HC RX 637 (ALT 250 FOR IP): Performed by: FAMILY MEDICINE

## 2024-07-07 PROCEDURE — 85027 COMPLETE CBC AUTOMATED: CPT

## 2024-07-07 RX ORDER — POLYETHYLENE GLYCOL 3350 17 G/17G
17 POWDER, FOR SOLUTION ORAL 2 TIMES DAILY
Status: DISCONTINUED | OUTPATIENT
Start: 2024-07-07 | End: 2024-07-07 | Stop reason: HOSPADM

## 2024-07-07 RX ORDER — POLYETHYLENE GLYCOL 3350 17 G/17G
17 POWDER, FOR SOLUTION ORAL 2 TIMES DAILY
Qty: 527 G | Refills: 1 | Status: SHIPPED | OUTPATIENT
Start: 2024-07-07 | End: 2024-08-06

## 2024-07-07 RX ORDER — OXYCODONE HYDROCHLORIDE AND ACETAMINOPHEN 5; 325 MG/1; MG/1
1 TABLET ORAL EVERY 8 HOURS PRN
Qty: 9 TABLET | Refills: 0 | Status: SHIPPED | OUTPATIENT
Start: 2024-07-07 | End: 2024-07-10

## 2024-07-07 RX ADMIN — CYCLOBENZAPRINE 10 MG: 10 TABLET, FILM COATED ORAL at 08:45

## 2024-07-07 RX ADMIN — ATORVASTATIN CALCIUM 20 MG: 20 TABLET, FILM COATED ORAL at 08:45

## 2024-07-07 RX ADMIN — SPIRONOLACTONE 25 MG: 25 TABLET ORAL at 08:45

## 2024-07-07 RX ADMIN — FLUTICASONE PROPIONATE 2 SPRAY: 50 SPRAY, METERED NASAL at 08:45

## 2024-07-07 RX ADMIN — BUDESONIDE AND FORMOTEROL FUMARATE DIHYDRATE 2 PUFF: 160; 4.5 AEROSOL RESPIRATORY (INHALATION) at 07:51

## 2024-07-07 RX ADMIN — ALBUTEROL SULFATE 2 PUFF: 90 AEROSOL, METERED RESPIRATORY (INHALATION) at 07:50

## 2024-07-07 RX ADMIN — POLYETHYLENE GLYCOL 3350 17 G: 17 POWDER, FOR SOLUTION ORAL at 08:46

## 2024-07-07 RX ADMIN — METOPROLOL SUCCINATE 25 MG: 25 TABLET, EXTENDED RELEASE ORAL at 08:45

## 2024-07-07 RX ADMIN — EMPAGLIFLOZIN 10 MG: 10 TABLET, FILM COATED ORAL at 08:46

## 2024-07-07 RX ADMIN — FUROSEMIDE 40 MG: 40 TABLET ORAL at 08:45

## 2024-07-07 RX ADMIN — OXYCODONE HYDROCHLORIDE AND ACETAMINOPHEN 1 TABLET: 5; 325 TABLET ORAL at 01:07

## 2024-07-07 RX ADMIN — AMLODIPINE BESYLATE 5 MG: 5 TABLET ORAL at 08:45

## 2024-07-07 RX ADMIN — OXYCODONE HYDROCHLORIDE AND ACETAMINOPHEN 1 TABLET: 5; 325 TABLET ORAL at 10:02

## 2024-07-07 RX ADMIN — GABAPENTIN 800 MG: 400 CAPSULE ORAL at 08:45

## 2024-07-07 RX ADMIN — OXYCODONE HYDROCHLORIDE AND ACETAMINOPHEN 1 TABLET: 5; 325 TABLET ORAL at 05:40

## 2024-07-07 ASSESSMENT — PAIN SCALES - GENERAL
PAINLEVEL_OUTOF10: 0
PAINLEVEL_OUTOF10: 0
PAINLEVEL_OUTOF10: 6
PAINLEVEL_OUTOF10: 10
PAINLEVEL_OUTOF10: 6
PAINLEVEL_OUTOF10: 9
PAINLEVEL_OUTOF10: 6

## 2024-07-07 ASSESSMENT — PAIN DESCRIPTION - DESCRIPTORS
DESCRIPTORS: DISCOMFORT
DESCRIPTORS: ACHING;DISCOMFORT;JABBING
DESCRIPTORS: DISCOMFORT

## 2024-07-07 ASSESSMENT — PAIN DESCRIPTION - LOCATION
LOCATION: ABDOMEN

## 2024-07-07 ASSESSMENT — PAIN DESCRIPTION - ORIENTATION
ORIENTATION: ANTERIOR
ORIENTATION: MID
ORIENTATION: ANTERIOR

## 2024-07-07 ASSESSMENT — PAIN - FUNCTIONAL ASSESSMENT: PAIN_FUNCTIONAL_ASSESSMENT: ACTIVITIES ARE NOT PREVENTED

## 2024-07-07 NOTE — PROGRESS NOTES
/70   Pulse 97   Temp 97.3 °F (36.3 °C) (Axillary)   Resp 22   Ht 1.702 m (5' 7\")   Wt 128.8 kg (284 lb)   LMP 04/25/2020   SpO2 (!) 65%   BMI 44.48 kg/m²     Alert, Follows Commands but Disoriented = 1    Diminished unilaterally = 1    Continuous Telemetry monitoring Yes  Accessory muscle use No  Signs of discomfort No  Compliant with mask Yes  Skin integrity compromised No  ABG/VBG Yes  
Dr Mercado to the nurses station; verbal orders received. Feed patient now, NPO after MN tonight for early surgery on 7-6-24.   
End Of Shift Note  [facility]  Summary of shift: Patient had an uneventful shift. She remained hemodynamically stable. She did dip into the 80%s while sleeping; O2 turned up to 6L. Plan is to discharge today.    Vitals:    Vitals:    07/07/24 0137 07/07/24 0515 07/07/24 0600 07/07/24 0610   BP:  131/83     Pulse:  87     Resp: 18 19  18   Temp:  97.5 °F (36.4 °C)     TempSrc:  Oral     SpO2:  94%     Weight:   134.3 kg (296 lb 1.6 oz)    Height:            I&O:   Intake/Output Summary (Last 24 hours) at 7/7/2024 0643  Last data filed at 7/6/2024 1145  Gross per 24 hour   Intake 2050 ml   Output --   Net 2050 ml       Resp Status: 2L NC; 6L NC @night    Ventilator Settings:     / / /FiO2 : (S) 45 %    Critical Care IV infusions:   sodium chloride 50 mL/hr at 07/06/24 1226    sodium chloride          LDA:   Peripheral IV 07/06/24 Right Hand (Active)   Number of days: 0       Incision 07/06/24 Abdomen Mid (Active)   Number of days: 0          
Patient states still having some right upper quadrant pain but nausea is better and looks to be moving around her bed in the room more readily than she was yesterday.    On examination appears to be nontoxic and afebrile  Still some mild right upper quadrant discomfort to palpation but not as much guarding as yesterday no obvious palpable mass.    LFTs remain normal.  Electrolytes are unremarkable.  White count is normal today.    Patient had been on operating room schedule for late afternoon early evening for robotic laparoscopic cholecystectomy.  We were bumped by anesthesia and orthopedics for an emergent surgery.  She is therefore rescheduled for tomorrow morning at 730 for robotic versus open cholecystectomy.  Discussed with patient be n.p.o. after midnight  
Pt admitted to room. Oriented to room, call light and bed mechanics. Side rails up x2. Call light within reach. Orders reviewed.   Pt agitated and tearful. Medicated for pain as ordered. DANNI hughes verified consult received. Danyelle Jose called to verify home meds. List updated  
Pt transferred to 1026, from pacu. Vitals taken and orders signed and unheld    
Respiratory at bedside with CPAP machine ready for pt on arrival to PACU. BiPAP applied and pt's blood oxygen level 66%. Pt unresponsive to stimuli. 80 mcg Narcan administered at 1001 by Dr. Rosario. No change to pt condition. A second dose of 80 mcg Narcan administed at 1004 by Dr. Rosario. Pt responding and oxygen saturation improved to 90%.   
Upon initiation of NIV patient is educated on the need to be NPO, to not interrupt NIV except for routine oral care, and the need for increased monitoring requirements.  Purpose and advantages of NIV discussed.  Patient instructed to immediately report nausea, chest discomfort, sudden increase in shortness of breath or severe headache.  
[] Medication Reconciliation was completed and the patient's home medication list was verified. The Med List Status has been marked \"Complete\". The following sources were used to assist with Medication Reconciliation:    [] Patient had a list of medications which was transcribed into the EHR.    [] Patient provided bottles of their medications    [] Home medications reviewed and confirmed with     [x] Contacted patient's pharmacy to confirm home medications    [] Contacted patient's physician office to confirm home medications    [] Medical Records from another facility and/or Care Everywhere were reviewed        
hydration continues as she is NPO for choley as an add-on today.     Brief History:     Gaurang Fiore is a 49 y.o.  /  female who presents with Abdominal Pain (Right upper \"gallbladder\". Known gallbladder issues. Appt with md in August  to have gallbladder removed ) and Nausea   and is admitted to the hospital for the management of Intractable abdominal pain.     Patient says she has had right-sided abdominal pain that radiates around to her right back for the last 2 months.  She had a CT of the abdomen and pelvis June 14 that showed some constipation, no obstruction, no obstructing kidney stone or hydronephrosis, a contracted gallbladder and fatty liver.  CT of the abdomen in May revealed no acute abnormalities.  She had a gallbladder ultrasound on June 26 that showed cholelithiasis and hepatic steatosis.  She obtained a referral to Dr. Gomez for removal of the gallbladder however she has not yet been into be evaluated by him.  She complains of not being able to eat anything due to anything she ingests causes her more pain.  She has a past medical history of YARITZA and wears 6 L nasal cannula O2 at night, psychiatric disorders/bipolar disorder, hysterectomy, diabetes, hypertension, GERD, heart failure.     While in the ED, abdominal CT was repeated and revealed a normal appendix, constipation, mild diverticulosis, no bowel obstruction, and no kidney or hydronephrosis.  General surgery was consulted and plans to evaluate later today.  She was given morphine and fentanyl for abdominal pain with little result.  She was admitted for observation of intractable abdominal pain, cholelithiasis, general surgery consult.     General surgery evaluated and plan for cholecystectomy today.       Review of Systems:     Review of Systems   Constitutional: Negative.    HENT: Negative.     Eyes: Negative.    Respiratory: Negative.     Cardiovascular: Negative.    Gastrointestinal:  Positive for abdominal pain. Negative 
  BUN 11 9   CREATININE 0.6 0.7   MG  --  2.3   ANIONGAP 11 11   LABGLOM >90 >90   CALCIUM 9.0 8.9   PHOS  --  4.4     Recent Labs     07/04/24  0754 07/05/24  0528   AST 16 18   ALT 21 21   ALKPHOS 76 65   BILITOT 0.4 0.5   LIPASE 32 24     ABG:  Lab Results   Component Value Date/Time    POCPH 7.359 03/30/2024 03:25 PM    POCPCO2 56.2 03/30/2024 03:25 PM    POCPO2 101.0 03/30/2024 03:25 PM    POCHCO3 31.7 03/30/2024 03:25 PM    PBEA 4.4 03/30/2024 03:25 PM    QKRO4EEJ 97.4 03/30/2024 03:25 PM    FIO2 36.0 03/30/2024 03:25 PM     Lab Results   Component Value Date/Time    SPECIAL RAC, 20ML 04/01/2024 09:33 AM     Lab Results   Component Value Date/Time    CULTURE NO GROWTH 5 DAYS 04/01/2024 09:33 AM       Radiology:  CT ABDOMEN PELVIS W IV CONTRAST Additional Contrast? None    Result Date: 7/4/2024  1. Normal appendix.  Mild stool burden.  Mild colonic diverticulosis. 2. No small bowel obstruction. 3. No obstructing calculus or hydronephrosis. 4. Prior hysterectomy.       Physical Examination:        Physical Exam  Vitals and nursing note reviewed.   Constitutional:       General: She is not in acute distress.     Appearance: She is ill-appearing. She is not toxic-appearing or diaphoretic.   HENT:      Head: Normocephalic and atraumatic.      Right Ear: External ear normal.      Left Ear: External ear normal.      Nose: Nose normal. No rhinorrhea.      Mouth/Throat:      Mouth: Mucous membranes are moist.   Eyes:      General: No scleral icterus.        Right eye: No discharge.         Left eye: No discharge.      Extraocular Movements: Extraocular movements intact.      Conjunctiva/sclera: Conjunctivae normal.      Pupils: Pupils are equal, round, and reactive to light.   Cardiovascular:      Rate and Rhythm: Normal rate and regular rhythm.      Pulses: Normal pulses.      Heart sounds: Normal heart sounds. No murmur heard.     No friction rub. No gallop.   Pulmonary:      Effort: Pulmonary effort is normal. No

## 2024-07-07 NOTE — DISCHARGE SUMMARY
inhaler  Commonly known as: Ventolin HFA  Inhale 2 puffs into the lungs 4 times daily as needed for Wheezing     amLODIPine 5 MG tablet  Commonly known as: NORVASC  Take 1 tablet by mouth daily     atorvastatin 20 MG tablet  Commonly known as: LIPITOR  Take 1 tablet by mouth daily     budesonide-formoterol 160-4.5 MCG/ACT Aero  Commonly known as: SYMBICORT  Inhale 2 puffs into the lungs in the morning and 2 puffs in the evening.     cyclobenzaprine 10 MG tablet  Commonly known as: FLEXERIL  Take 1 tablet by mouth 3 times daily as needed for Muscle spasms     empagliflozin 10 MG tablet  Commonly known as: Jardiance  Take 1 tablet by mouth daily     fluticasone 50 MCG/ACT nasal spray  Commonly known as: FLONASE  2 sprays by Each Nostril route daily     furosemide 40 MG tablet  Commonly known as: Lasix  Take 1 tablet by mouth daily     gabapentin 800 MG tablet  Commonly known as: NEURONTIN  Take 1 tablet by mouth 3 times daily for 30 days.     ibuprofen 800 MG tablet  Commonly known as: ADVIL;MOTRIN  Take 1 tablet by mouth every 8 hours as needed for Pain     metoprolol succinate 25 MG extended release tablet  Commonly known as: TOPROL XL  Take 1 tablet by mouth daily     naphazoline-pheniramine 0.025-0.3 % ophthalmic solution  Commonly known as: NAPHCON-A     nicotine 21 MG/24HR  Commonly known as: NICODERM CQ  Place 1 patch onto the skin daily     omeprazole 40 MG delayed release capsule  Commonly known as: PRILOSEC  Take 1 capsule by mouth daily     spironolactone 25 MG tablet  Commonly known as: ALDACTONE  Take 1 tablet by mouth daily     sucralfate 1 GM tablet  Commonly known as: Carafate  Take 1 tablet by mouth 4 times daily               Where to Get Your Medications        These medications were sent to [facility] [address] [place] [address] -  [phone] - F [phone]  [address] [place], [place] [address]      Phone: [phone]   HYDROcodone-acetaminophen 5-325 MG per

## 2024-07-07 NOTE — DISCHARGE INSTR - COC
COVID-19, KIA Casas, (age 12y+), IM, 50 mcg/0.5 mL 08/30/2021, 02/28/2022       Active Problems:  Patient Active Problem List   Diagnosis Code    Fibroid D21.9    Abnormal uterine bleeding (AUB) N93.9    Hx of tubal ligation (1994) Z98.51    Panic attacks F41.0    Tobacco abuse Z72.0    Hysteroscopy, D&C 5/17/19 Z98.890    Abnormal mammogram of left breast R92.8    Non-compliant patient Z91.199    Closed displaced fracture of proximal phalanx of right little finger S62.616A    Obesity with body mass index 30 or greater E66.9    Insomnia G47.00    Hypokalemia E87.6    Gastroesophageal reflux disease K21.9    Fracture of metacarpal bone S62.309A    Fibrocystic disease of breast N60.19    Anxiety F41.9    Ankylosis of finger M24.649    Acute right ankle pain M25.571    Dyspnea on exertion R06.09    Essential hypertension I10    Hyperlipidemia E78.5    Former smoker Z87.891    Neuropathy G62.9    Muscle spasm M62.838    Small pleural effusion J90    Obesity hypoventilation syndrome (HCC) E66.2    Prediabetes R73.03    Acute heart failure with preserved ejection fraction (HFpEF) ([facility]) I50.31    Acute on chronic hypoxic respiratory failure ([facility]) J96.21    Myoclonus G25.3    Intractable abdominal pain R10.9    Calculus of gallbladder without cholecystitis without obstruction K80.20    Status post laparoscopic cholecystectomy Z90.49       Isolation/Infection:   Isolation            No Isolation          Patient Infection Status       None to display                     Nurse Assessment:  Last Vital Signs: /75   Pulse 88   Temp 98.1 °F (36.7 °C) (Oral)   Resp 16   Ht 1.702 m (5' 7\")   Wt 134.3 kg (296 lb 1.6 oz)   LMP 04/25/2020   SpO2 90%   BMI 46.38 kg/m²     Last documented pain score (0-10 scale): Pain Level: 0  Last Weight:   Wt Readings from Last 1 Encounters:   07/07/24 134.3 kg (296 lb 1.6 oz)     Mental Status:  {IP PT MENTAL STATUS:20030}    IV Access:  { OTF IV ACCESS:[id number]}    Nursing

## 2024-07-07 NOTE — RT PROTOCOL NOTE
RT Inhaler-Nebulizer Bronchodilator Protocol Note    There is a bronchodilator order in the chart from a provider indicating to follow the RT Bronchodilator Protocol and there is an “Initiate RT Inhaler-Nebulizer Bronchodilator Protocol” order as well (see protocol at bottom of note).    CXR Findings:  No results found.    The findings from the last RT Protocol Assessment were as follows:   History Pulmonary Disease: Chronic pulmonary disease  Respiratory Pattern: Dyspnea on exertion or RR 21-25 bpm  Breath Sounds: Slightly diminished and/or crackles  Cough: Strong, spontaneous, non-productive  Indication for Bronchodilator Therapy: Decreased or absent breath sounds  Bronchodilator Assessment Score: 6    Aerosolized bronchodilator medication orders have been revised according to the RT Inhaler-Nebulizer Bronchodilator Protocol below.    Respiratory Therapist to perform RT Therapy Protocol Assessment initially then follow the protocol.  Repeat RT Therapy Protocol Assessment PRN for score 0-3 or on second treatment, BID, and PRN for scores above 3.    No Indications - adjust the frequency to every 6 hours PRN wheezing or bronchospasm, if no treatments needed after 48 hours then discontinue using Per Protocol order mode.     If indication present, adjust the RT bronchodilator orders based on the Bronchodilator Assessment Score as indicated below.  Use Inhaler orders unless patient has one or more of the following: on home nebulizer, not able to hold breath for 10 seconds, is not alert and oriented, cannot activate and use MDI correctly, or respiratory rate 25 breaths per minute or more, then use the equivalent nebulizer order(s) with same Frequency and PRN reasons based on the score.  If a patient is on this medication at home then do not decrease Frequency below that used at home.    0-3 - enter or revise RT bronchodilator order(s) to equivalent RT Bronchodilator order with Frequency of every 4 hours PRN for wheezing 
RT Inhaler-Nebulizer Bronchodilator Protocol Note    There is a bronchodilator order in the chart from a provider indicating to follow the RT Bronchodilator Protocol and there is an “Initiate RT Inhaler-Nebulizer Bronchodilator Protocol” order as well (see protocol at bottom of note).    CXR Findings:  No results found.    The findings from the last RT Protocol Assessment were as follows:   History Pulmonary Disease: Chronic pulmonary disease  Respiratory Pattern: Dyspnea on exertion or RR 21-25 bpm  Breath Sounds: Slightly diminished and/or crackles  Cough: Strong, spontaneous, non-productive  Indication for Bronchodilator Therapy: Decreased or absent breath sounds, On home bronchodilators  Bronchodilator Assessment Score: 6    Aerosolized bronchodilator medication orders have been revised according to the RT Inhaler-Nebulizer Bronchodilator Protocol below.    Respiratory Therapist to perform RT Therapy Protocol Assessment initially then follow the protocol.  Repeat RT Therapy Protocol Assessment PRN for score 0-3 or on second treatment, BID, and PRN for scores above 3.    No Indications - adjust the frequency to every 6 hours PRN wheezing or bronchospasm, if no treatments needed after 48 hours then discontinue using Per Protocol order mode.     If indication present, adjust the RT bronchodilator orders based on the Bronchodilator Assessment Score as indicated below.  Use Inhaler orders unless patient has one or more of the following: on home nebulizer, not able to hold breath for 10 seconds, is not alert and oriented, cannot activate and use MDI correctly, or respiratory rate 25 breaths per minute or more, then use the equivalent nebulizer order(s) with same Frequency and PRN reasons based on the score.  If a patient is on this medication at home then do not decrease Frequency below that used at home.    0-3 - enter or revise RT bronchodilator order(s) to equivalent RT Bronchodilator order with Frequency of every 
RT Inhaler-Nebulizer Bronchodilator Protocol Note    There is a bronchodilator order in the chart from a provider indicating to follow the RT Bronchodilator Protocol and there is an “Initiate RT Inhaler-Nebulizer Bronchodilator Protocol” order as well (see protocol at bottom of note).    CXR Findings:  No results found.    The findings from the last RT Protocol Assessment were as follows:   History Pulmonary Disease: Chronic pulmonary disease  Respiratory Pattern: Dyspnea on exertion or RR 21-25 bpm  Breath Sounds: Slightly diminished and/or crackles  Cough: Strong, spontaneous, non-productive  Indication for Bronchodilator Therapy: Decreased or absent breath sounds, On home bronchodilators  Bronchodilator Assessment Score: 6    Aerosolized bronchodilator medication orders have been revised according to the RT Inhaler-Nebulizer Bronchodilator Protocol below.    Respiratory Therapist to perform RT Therapy Protocol Assessment initially then follow the protocol.  Repeat RT Therapy Protocol Assessment PRN for score 0-3 or on second treatment, BID, and PRN for scores above 3.    No Indications - adjust the frequency to every 6 hours PRN wheezing or bronchospasm, if no treatments needed after 48 hours then discontinue using Per Protocol order mode.     If indication present, adjust the RT bronchodilator orders based on the Bronchodilator Assessment Score as indicated below.  Use Inhaler orders unless patient has one or more of the following: on home nebulizer, not able to hold breath for 10 seconds, is not alert and oriented, cannot activate and use MDI correctly, or respiratory rate 25 breaths per minute or more, then use the equivalent nebulizer order(s) with same Frequency and PRN reasons based on the score.  If a patient is on this medication at home then do not decrease Frequency below that used at home.    0-3 - enter or revise RT bronchodilator order(s) to equivalent RT Bronchodilator order with Frequency of every 
4 hours PRN for wheezing or increased work of breathing using Per Protocol order mode.        4-6 - enter or revise RT Bronchodilator order(s) to two equivalent RT bronchodilator orders with one order with BID Frequency and one order with Frequency of every 4 hours PRN wheezing or increased work of breathing using Per Protocol order mode.        7-10 - enter or revise RT Bronchodilator order(s) to two equivalent RT bronchodilator orders with one order with TID Frequency and one order with Frequency of every 4 hours PRN wheezing or increased work of breathing using Per Protocol order mode.       11-13 - enter or revise RT Bronchodilator order(s) to one equivalent RT bronchodilator order with QID Frequency and an Albuterol order with Frequency of every 4 hours PRN wheezing or increased work of breathing using Per Protocol order mode.      Greater than 13 - enter or revise RT Bronchodilator order(s) to one equivalent RT bronchodilator order with every 4 hours Frequency and an Albuterol order with Frequency of every 2 hours PRN wheezing or increased work of breathing using Per Protocol order mode.     RT to enter RT Home Evaluation for COPD & MDI Assessment order using Per Protocol order mode.    Electronically signed by Patti Diop RCP on 7/4/2024 at 8:19 PM  
equivalent RT bronchodilator order with QID Frequency and an Albuterol order with Frequency of every 4 hours PRN wheezing or increased work of breathing using Per Protocol order mode.      Greater than 13 - enter or revise RT Bronchodilator order(s) to one equivalent RT bronchodilator order with every 4 hours Frequency and an Albuterol order with Frequency of every 2 hours PRN wheezing or increased work of breathing using Per Protocol order mode.     RT to enter RT Home Evaluation for COPD & MDI Assessment order using Per Protocol order mode.    Electronically signed by NINI PIERRE RCP on 7/4/2024 at 1:21 PM

## 2024-07-07 NOTE — PLAN OF CARE
Problem: Discharge Planning  Goal: Discharge to home or other facility with appropriate resources  7/6/2024 0245 by Megan Solorio RN  Outcome: Progressing     Problem: Pain  Goal: Verbalizes/displays adequate comfort level or baseline comfort level  7/6/2024 0245 by Megan Solorio RN  Outcome: Progressing  Flowsheets (Taken 7/6/2024 0245)  Verbalizes/displays adequate comfort level or baseline comfort level:   Encourage patient to monitor pain and request assistance   Administer analgesics based on type and severity of pain and evaluate response   Consider cultural and social influences on pain and pain management   Assess pain using appropriate pain scale   Implement non-pharmacological measures as appropriate and evaluate response     Problem: Neurosensory - Adult  Goal: Achieves stable or improved neurological status  7/6/2024 0245 by Megan Solorio RN  Outcome: Progressing     Problem: Neurosensory - Adult  Goal: Absence of seizures  Outcome: Progressing     Problem: Neurosensory - Adult  Goal: Remains free of injury related to seizures activity  Outcome: Progressing     Problem: Neurosensory - Adult  Goal: Achieves maximal functionality and self care  7/6/2024 0245 by Megan Solorio RN  Outcome: Progressing     Problem: Respiratory - Adult  Goal: Achieves optimal ventilation and oxygenation  7/6/2024 0245 by Megan Solorio RN  Outcome: Progressing     Problem: Gastrointestinal - Adult  Goal: Minimal or absence of nausea and vomiting  7/6/2024 0245 by Megan Solorio RN  Outcome: Progressing     Problem: Gastrointestinal - Adult  Goal: Maintains or returns to baseline bowel function  7/6/2024 0245 by Megan Solorio RN  Outcome: Progressing     Problem: Gastrointestinal - Adult  Goal: Maintains adequate nutritional intake  7/6/2024 0245 by Megan Solorio RN  Outcome: Progressing     Problem: Gastrointestinal - Adult  Goal: Establish 
  Problem: Discharge Planning  Goal: Discharge to home or other facility with appropriate resources  7/6/2024 2331 by Aliyah Amaral RN  Outcome: Progressing  Flowsheets (Taken 7/6/2024 2000)  Discharge to home or other facility with appropriate resources:   Identify barriers to discharge with patient and caregiver   Arrange for needed discharge resources and transportation as appropriate   Identify discharge learning needs (meds, wound care, etc)   Refer to discharge planning if patient needs post-hospital services based on physician order or complex needs related to functional status, cognitive ability or social support system  7/6/2024 1833 by Charly Aviles RN  Outcome: Progressing     Problem: Pain  Goal: Verbalizes/displays adequate comfort level or baseline comfort level  7/6/2024 2331 by Aliyah Amaral RN  Outcome: Progressing  7/6/2024 1833 by Charly Aviles RN  Outcome: Progressing     Problem: Neurosensory - Adult  Goal: Achieves stable or improved neurological status  7/6/2024 2331 by Aliyah Amaral RN  Outcome: Progressing  Flowsheets (Taken 7/6/2024 2000)  Achieves stable or improved neurological status:   Assess for and report changes in neurological status   Maintain blood pressure and fluid volume within ordered parameters to optimize cerebral perfusion and minimize risk of hemorrhage   Monitor temperature, glucose, and sodium. Initiate appropriate interventions as ordered  7/6/2024 1833 by Charly Aviles RN  Outcome: Progressing  Goal: Absence of seizures  7/6/2024 2331 by Aliyah Amaral RN  Outcome: Progressing  Flowsheets (Taken 7/6/2024 2000)  Absence of seizures: Support airway/breathing, administer oxygen as needed  7/6/2024 1833 by Charly Aviles RN  Outcome: Progressing  Goal: Remains free of injury related to seizures activity  7/6/2024 2331 by Aliyah Amaral RN  Outcome: Progressing  Flowsheets (Taken 7/6/2024 2000)  Remains free of injury related to seizure 
  Problem: Discharge Planning  Goal: Discharge to home or other facility with appropriate resources  Outcome: Progressing     Problem: Pain  Goal: Verbalizes/displays adequate comfort level or baseline comfort level  Outcome: Progressing  Flowsheets (Taken 7/5/2024 6697)  Verbalizes/displays adequate comfort level or baseline comfort level:   Encourage patient to monitor pain and request assistance   Assess pain using appropriate pain scale   Administer analgesics based on type and severity of pain and evaluate response   Implement non-pharmacological measures as appropriate and evaluate response   Consider cultural and social influences on pain and pain management   Notify Licensed Independent Practitioner if interventions unsuccessful or patient reports new pain     Problem: Neurosensory - Adult  Goal: Achieves stable or improved neurological status  Outcome: Progressing  Goal: Achieves maximal functionality and self care  Outcome: Progressing     Problem: Respiratory - Adult  Goal: Achieves optimal ventilation and oxygenation  7/5/2024 1550 by Camelia Wilkes RN  Outcome: Progressing  7/5/2024 0835 by Anali Rich, P  Outcome: Progressing     Problem: Gastrointestinal - Adult  Goal: Minimal or absence of nausea and vomiting  Outcome: Progressing  Goal: Maintains or returns to baseline bowel function  Outcome: Progressing  Goal: Maintains adequate nutritional intake  Outcome: Progressing     Problem: Chronic Conditions and Co-morbidities  Goal: Patient's chronic conditions and co-morbidity symptoms are monitored and maintained or improved  Outcome: Progressing     
  Problem: Respiratory - Adult  Goal: Achieves optimal ventilation and oxygenation  7/4/2024 1957 by Patti Diop RCP  Outcome: Progressing     
  Problem: Respiratory - Adult  Goal: Achieves optimal ventilation and oxygenation  7/5/2024 0835 by Anali Rich RCP  Outcome: Progressing  7/4/2024 2343 by Parris Landeros, RN  Outcome: Progressing  Flowsheets (Taken 7/4/2024 2043)  Achieves optimal ventilation and oxygenation:   Assess for changes in respiratory status   Assess for changes in mentation and behavior   Position to facilitate oxygenation and minimize respiratory effort  7/4/2024 1957 by Patti Diop RCP  Outcome: Progressing     
  Problem: Respiratory - Adult  Goal: Achieves optimal ventilation and oxygenation  7/6/2024 2021 by Shelia Stout, RCNOAM  Outcome: Progressing     
  Problem: Respiratory - Adult  Goal: Achieves optimal ventilation and oxygenation  7/7/2024 0753 by Kala Gómez RCP  Outcome: Progressing  7/6/2024 2331 by Aliyah Amaral, RN  Outcome: Progressing  7/6/2024 2021 by Shelia Stout RCP  Outcome: Progressing  Flowsheets (Taken 7/6/2024 2000 by Aliyah Amaral, RN)  Achieves optimal ventilation and oxygenation:   Assess for changes in respiratory status   Assess for changes in mentation and behavior   Position to facilitate oxygenation and minimize respiratory effort   Oxygen supplementation based on oxygen saturation or arterial blood gases   Encourage broncho-pulmonary hygiene including cough, deep breathe, incentive spirometry   Assess the need for suctioning and aspirate as needed   Assess and instruct to report shortness of breath or any respiratory difficulty   Respiratory therapy support as indicated  7/6/2024 1833 by Charly Aviles, RN  Outcome: Progressing     
  Problem: Respiratory - Adult  Goal: Achieves optimal ventilation and oxygenation  Outcome: Progressing     
Patient medicated throughout shift for RUQ pain and right flank pain. Patient A,O x 4, calls with needs. NPO at midnight.   Problem: Discharge Planning  Goal: Discharge to home or other facility with appropriate resources  Outcome: Progressing  Flowsheets  Taken 7/4/2024 2043 by Parris Landeros RN  Discharge to home or other facility with appropriate resources:   Identify barriers to discharge with patient and caregiver   Arrange for needed discharge resources and transportation as appropriate   Identify discharge learning needs (meds, wound care, etc)   Refer to discharge planning if patient needs post-hospital services based on physician order or complex needs related to functional status, cognitive ability or social support system    Problem: Pain  Goal: Verbalizes/displays adequate comfort level or baseline comfort level  Outcome: Progressing  Flowsheets (Taken 7/4/2024 2343)  Verbalizes/displays adequate comfort level or baseline comfort level:   Encourage patient to monitor pain and request assistance   Assess pain using appropriate pain scale   Administer analgesics based on type and severity of pain and evaluate response   Implement non-pharmacological measures as appropriate and evaluate response   Consider cultural and social influences on pain and pain management     Problem: Neurosensory - Adult  Goal: Achieves stable or improved neurological status  Outcome: Progressing  Flowsheets (Taken 7/4/2024 2043)  Achieves stable or improved neurological status:   Assess for and report changes in neurological status   Initiate measures to prevent increased intracranial pressure     
Pt on 2l at 90-91%. Pt complaining of abd pain treated with prn pain meds. Pt satisfied. Pt had complaints of nausea, attending notified and scopamine patch ordered and given. Pt up as zaida. Pt voiding. Pt given nicotine patch, d/t wanting to go out and smoke. RN advised against d/t being on 2L and just having surgery, pt agreeable.  VSS. Pt safety maintained, pt now tolerating diet. Patient having pain on their abdomen and rates it a 6. Pain interventions includefrequent position changes, correct body alignment/body mechanics, relaxation techniques, medication, pillow support/positioning, diversional activities, regular rest periods, medicate per physician recommendation/order, medicate at the onset of pain before it interferes with regular functions , and medicate before exercise/activity. Patients goal for pain relief is  0 . The need for pain and symptom management will be considered in the discharge planning process to ensure patients comfort.    Problem: Discharge Planning  Goal: Discharge to home or other facility with appropriate resources  Outcome: Progressing     Problem: Pain  Goal: Verbalizes/displays adequate comfort level or baseline comfort level  Outcome: Progressing     Problem: Neurosensory - Adult  Goal: Achieves stable or improved neurological status  Outcome: Progressing     Problem: Neurosensory - Adult  Goal: Absence of seizures  Outcome: Progressing     Problem: Neurosensory - Adult  Goal: Remains free of injury related to seizures activity  Outcome: Progressing     Problem: Neurosensory - Adult  Goal: Achieves maximal functionality and self care  Outcome: Progressing     Problem: Respiratory - Adult  Goal: Achieves optimal ventilation and oxygenation  Outcome: Progressing     Problem: Gastrointestinal - Adult  Goal: Maintains or returns to baseline bowel function  Outcome: Progressing     Problem: Gastrointestinal - Adult  Goal: Maintains adequate nutritional intake  Outcome: Progressing   
Co-morbidities  Goal: Patient's chronic conditions and co-morbidity symptoms are monitored and maintained or improved  7/7/2024 1215 by Charly Aviles, RN  Outcome: Completed     Problem: Safety - Adult  Goal: Free from fall injury  7/7/2024 1215 by Charly Aviles, RN  Outcome: Completed

## 2024-07-08 DIAGNOSIS — G89.29 CHRONIC MIDLINE THORACIC BACK PAIN: Primary | ICD-10-CM

## 2024-07-08 DIAGNOSIS — M54.6 CHRONIC MIDLINE THORACIC BACK PAIN: Primary | ICD-10-CM

## 2024-07-10 ENCOUNTER — TELEPHONE (OUTPATIENT)
Dept: FAMILY MEDICINE CLINIC | Age: 49
End: 2024-07-10

## 2024-07-10 LAB
GLUCOSE BLD-MCNC: 143 MG/DL (ref 65–105)
SURGICAL PATHOLOGY REPORT: NORMAL

## 2024-07-10 NOTE — TELEPHONE ENCOUNTER
Care Transitions Initial Follow Up Call    Outreach made within 2 business days of discharge: Yes    Patient: Gaurang Fiore Patient : 1975   MRN: [id number]  Reason for Admission: There are no discharge diagnoses documented for the most recent discharge.  Discharge Date: 24       Spoke with: patient    Discharge department/facility: [facility] Interactive Patient Contact:  Was patient able to fill all prescriptions: Yes  Was patient instructed to bring all medications to the follow-up visit: Yes  Is patient taking all medications as directed in the discharge summary? Yes  Does patient understand their discharge instructions: Yes  Does patient have questions or concerns that need addressed prior to 7-14 day follow up office visit: no    Scheduled appointment with PCP within 7-14 days    Follow Up  Future Appointments   Date Time Provider Department Center   2024  8:00 AM Luthy, [facility]isi, [facility]   2024  9:30 AM Jarad Max DO [facility]   2024  7:30 AM [facility]       [place] Pineda MA

## 2024-07-17 ENCOUNTER — CARE COORDINATION (OUTPATIENT)
Dept: CARE COORDINATION | Age: 49
End: 2024-07-17

## 2024-07-17 DIAGNOSIS — I10 ESSENTIAL HYPERTENSION: ICD-10-CM

## 2024-07-17 RX ORDER — SPIRONOLACTONE 25 MG/1
25 TABLET ORAL DAILY
Qty: 90 TABLET | Refills: 1 | Status: SHIPPED | OUTPATIENT
Start: 2024-07-17

## 2024-07-17 NOTE — CARE COORDINATION
She had admit 7/4-7/7 for abd pain, had lap susu surgery 7/6 by Dr. Sandra Fitzpatrick. She was supposed to have surgery follow up 10-12 days post op. She has PCP appt next week.    Left VM message asking patient to call writer back for any concerns or questions.    Future Appointments   Date Time Provider Department Center   7/25/2024  8:00 AM Luthy, Deisi, APRN - NP [facility]   8/14/2024  9:30 AM Jarad Max,  [facility]   9/25/2024  7:30 AM Luthy, Deisi, APRN - NP [facility]

## 2024-07-20 DIAGNOSIS — G89.29 CHRONIC MIDLINE THORACIC BACK PAIN: ICD-10-CM

## 2024-07-20 DIAGNOSIS — Z02.89 MEDICATION MANAGEMENT CONTRACT AGREEMENT: ICD-10-CM

## 2024-07-20 DIAGNOSIS — M54.6 CHRONIC MIDLINE THORACIC BACK PAIN: ICD-10-CM

## 2024-07-22 RX ORDER — GABAPENTIN 800 MG/1
800 TABLET ORAL 3 TIMES DAILY
Qty: 90 TABLET | Refills: 0 | OUTPATIENT
Start: 2024-07-22

## 2024-07-25 ENCOUNTER — OFFICE VISIT (OUTPATIENT)
Dept: FAMILY MEDICINE CLINIC | Age: 49
End: 2024-07-25

## 2024-07-25 VITALS
HEART RATE: 105 BPM | WEIGHT: 277.8 LBS | TEMPERATURE: 98.1 F | BODY MASS INDEX: 43.51 KG/M2 | DIASTOLIC BLOOD PRESSURE: 82 MMHG | OXYGEN SATURATION: 95 % | SYSTOLIC BLOOD PRESSURE: 116 MMHG

## 2024-07-25 DIAGNOSIS — E04.1 THYROID NODULE: ICD-10-CM

## 2024-07-25 DIAGNOSIS — R10.12 LUQ ABDOMINAL PAIN: ICD-10-CM

## 2024-07-25 DIAGNOSIS — Z09 HOSPITAL DISCHARGE FOLLOW-UP: Primary | ICD-10-CM

## 2024-07-25 DIAGNOSIS — Z12.31 ENCOUNTER FOR SCREENING MAMMOGRAM FOR MALIGNANT NEOPLASM OF BREAST: ICD-10-CM

## 2024-07-25 DIAGNOSIS — L85.8 KERATOSIS PILARIS: ICD-10-CM

## 2024-07-25 DIAGNOSIS — Z12.11 SCREEN FOR COLON CANCER: ICD-10-CM

## 2024-07-25 DIAGNOSIS — Z87.891 PERSONAL HISTORY OF TOBACCO USE: ICD-10-CM

## 2024-07-25 RX ORDER — NICOTINE 21 MG/24HR
1 PATCH, TRANSDERMAL 24 HOURS TRANSDERMAL DAILY
Qty: 14 PATCH | Refills: 0 | Status: SHIPPED | OUTPATIENT
Start: 2024-09-05 | End: 2024-09-19

## 2024-07-25 RX ORDER — NICOTINE 21 MG/24HR
1 PATCH, TRANSDERMAL 24 HOURS TRANSDERMAL DAILY
Qty: 42 PATCH | Refills: 0 | Status: SHIPPED | OUTPATIENT
Start: 2024-07-25 | End: 2024-09-05

## 2024-07-25 ASSESSMENT — ENCOUNTER SYMPTOMS
ABDOMINAL PAIN: 0
DIARRHEA: 0
EYE PAIN: 0
NAUSEA: 0
CONSTIPATION: 0
COLOR CHANGE: 0
BACK PAIN: 0
SINUS PAIN: 0
SHORTNESS OF BREATH: 0
SINUS PRESSURE: 0
CHEST TIGHTNESS: 0
VOMITING: 0

## 2024-07-25 NOTE — PROGRESS NOTES
Discussed with the patient the current USPSTF guidelines released March 9, 2021 for screening for lung cancer.    For adults aged 50 to 80 years who have a 20 pack-year smoking history and currently smoke or have quit within the past 15 years the grade B recommendation is to:  Screen for lung cancer with low-dose computed tomography (LDCT) every year.  Stop screening once a person has not smoked for 15 years or has a health problem that limits life expectancy or the ability to have lung surgery.    The patient  reports that she has been smoking cigarettes. She started smoking about 40 years ago. She has a 20.3 pack-year smoking history. She has never used smokeless tobacco.. Discussed with patient the risks and benefits of screening, including over-diagnosis, false positive rate, and total radiation exposure.  The patient currently exhibits no signs or symptoms suggestive of lung cancer.  Discussed with patient the importance of compliance with yearly annual lung cancer screenings and willingness to undergo diagnosis and treatment if screening scan is positive.  In addition, the patient was counseled regarding the importance of remaining smoke free and/or total smoking cessation.    Also reviewed the following if the patient has Medicare that as of February 10, 2022, Medicare only covers LDCT screening in patients aged 50-77 with at least a 20 pack-year smoking history who currently smoke or have quit in the last 15 years

## 2024-07-25 NOTE — PROGRESS NOTES
Post-Discharge Transitional Care Follow Up      Gaurang Fiore   YOB: 1975    Date of Office Visit:  7/25/2024  Date of Hospital Admission: 7/4/24  Date of Hospital Discharge: 7/7/24  Readmission Risk Score (high >=14%. Medium >=10%):Readmission Risk Score: 20.2      Care management risk score Rising risk (score 2-5) and Complex Care (Scores >=6): No Risk Score On File     Non face to face  following discharge, date last encounter closed (first attempt may have been earlier): *No documented post hospital discharge outreach found in the last 14 days     Call initiated 2 business days of discharge: *No response recorded in the last 14 days     Hospital discharge follow-up  -     ME DISCHARGE MEDS RECONCILED W/ CURRENT OUTPATIENT MED LIST  Encounter for screening mammogram for malignant neoplasm of breast  -     KEVIN DIGITAL SCREEN W OR WO CAD BILATERAL; Future  Personal history of tobacco use  -     ME VISIT TO DISCUSS LUNG CA SCREEN W LDCT  -     CT Lung Screen (Initial/Annual/Baseline); Future  -     nicotine (NICODERM CQ) 21 MG/24HR; Place 1 patch onto the skin daily, Disp-42 patch, R-0Normal  -     nicotine (NICODERM CQ) 14 MG/24HR; Place 1 patch onto the skin daily for 14 days, Disp-14 patch, R-0Normal  Thyroid nodule  -     US HEAD NECK SOFT TISSUE THYROID; Future  Screen for colon cancer  -     Ellen Mason MD Gastroenterology, [facility] abdominal pain  Comments:  Resolved s/p choleycystectomy  Keratosis pilaris  Start OTC gold bond rough and bumpy or other lotion indicated for KP       On this date 7/25/2024 I have spent 40 minutes reviewing previous notes, test results and face to face with the patient discussing the diagnosis and importance of compliance with the treatment plan as well as documenting on the day of the visit.      Medical Decision Making: moderate complexity  Return in 3 months (on 10/25/2024) for 6 month follow up.           Subjective:   Presents for hospital

## 2024-07-30 ENCOUNTER — TELEPHONE (OUTPATIENT)
Dept: GASTROENTEROLOGY | Age: 49
End: 2024-07-30

## 2024-08-07 ENCOUNTER — TELEPHONE (OUTPATIENT)
Dept: GASTROENTEROLOGY | Age: 49
End: 2024-08-07

## 2024-08-07 NOTE — TELEPHONE ENCOUNTER
pt needs office visit prior to scheduling procedure due to congestive and acute heart failure and also respiratory failure called pt to johnathan no answer v/m is full

## 2024-08-11 DIAGNOSIS — R05.3 CHRONIC COUGH: ICD-10-CM

## 2024-08-12 RX ORDER — ALBUTEROL SULFATE 90 UG/1
AEROSOL, METERED RESPIRATORY (INHALATION)
Qty: 3 EACH | Refills: 1 | Status: SHIPPED | OUTPATIENT
Start: 2024-08-12

## 2024-08-12 NOTE — TELEPHONE ENCOUNTER
Last visit: 7/25/24  Last Med refill: 6/24/24  Does patient have enough medication for 72 hours: No:     Next Visit Date:  Future Appointments   Date Time Provider Department Center   10/28/2024  8:00 AM Deisi Cook APRN - NP [facility] DEP       Health Maintenance   Topic Date Due    Hepatitis C screen  Never done    DTaP/Tdap/Td vaccine (1 - Tdap) Never done    Colorectal Cancer Screen  Never done    Breast cancer screen  05/24/2021    Shingles vaccine (1 of 2) Never done    Lung Cancer Screening &/or Counseling  Never done    Flu vaccine (1) Never done    Hepatitis B vaccine (1 of 3 - 19+ 3-dose series) 05/23/2025 (Originally 5/20/1992)    COVID-19 Vaccine (4 - 2023-24 season) 05/23/2025 (Originally 9/1/2023)    Pneumococcal 0-64 years Vaccine (1 of 2 - PCV) 06/24/2025 (Originally 5/20/1979)    A1C test (Diabetic or Prediabetic)  03/29/2025    Lipids  03/30/2025    Depression Monitoring  05/23/2025    HIV screen  Completed    Hepatitis A vaccine  Aged Out    Hib vaccine  Aged Out    Polio vaccine  Aged Out    Meningococcal (ACWY) vaccine  Aged Out    Depression Screen  Discontinued    Diabetes screen  Discontinued    Cervical cancer screen  Discontinued       Hemoglobin A1C (%)   Date Value   03/29/2024 5.8   05/23/2019 5.4             ( goal A1C is < 7)   No components found for: \"LABMICR\"  No components found for: \"LDLCHOLESTEROL\", \"LDLCALC\"    (goal LDL is <100)   AST (U/L)   Date Value   07/05/2024 18     ALT (U/L)   Date Value   07/05/2024 21     BUN (mg/dL)   Date Value   07/07/2024 8     BP Readings from Last 3 Encounters:   07/25/24 116/82   07/07/24 120/75   06/24/24 118/74          (goal 120/80)    All Future Testing planned in [facility]  Lab Frequency Next Occurrence   CT Lung Screen (Initial/Annual/Baseline) Once 07/25/2024   KEVIN DIGITAL SCREEN W OR WO CAD BILATERAL Once 07/25/2024   US HEAD NECK SOFT TISSUE THYROID Once 07/25/2024               Patient Active Problem List:     Fibroid

## 2024-08-13 ENCOUNTER — HOSPITAL ENCOUNTER (EMERGENCY)
Age: 49
Discharge: HOME | End: 2024-08-13
Attending: EMERGENCY MEDICINE
Payer: COMMERCIAL

## 2024-08-13 VITALS
TEMPERATURE: 98.3 F | BODY MASS INDEX: 44.98 KG/M2 | SYSTOLIC BLOOD PRESSURE: 123 MMHG | HEIGHT: 65 IN | DIASTOLIC BLOOD PRESSURE: 93 MMHG | OXYGEN SATURATION: 95 % | HEART RATE: 94 BPM | WEIGHT: 270 LBS | RESPIRATION RATE: 17 BRPM

## 2024-08-13 DIAGNOSIS — R21 RASH: Primary | ICD-10-CM

## 2024-08-13 LAB
BASOPHILS # BLD: 0.03 K/UL (ref 0–0.2)
BASOPHILS NFR BLD: 0 % (ref 0–2)
EOSINOPHIL # BLD: 0.55 K/UL (ref 0–0.44)
EOSINOPHILS RELATIVE PERCENT: 8 % (ref 1–4)
ERYTHROCYTE [DISTWIDTH] IN BLOOD BY AUTOMATED COUNT: 14.8 % (ref 11.8–14.4)
HCT VFR BLD AUTO: 42.6 % (ref 36.3–47.1)
HGB BLD-MCNC: 13.6 G/DL (ref 11.9–15.1)
IMM GRANULOCYTES # BLD AUTO: 0.06 K/UL (ref 0–0.3)
IMM GRANULOCYTES NFR BLD: 1 %
LYMPHOCYTES NFR BLD: 2.54 K/UL (ref 1.1–3.7)
LYMPHOCYTES RELATIVE PERCENT: 35 % (ref 24–43)
MCH RBC QN AUTO: 30.4 PG (ref 25.2–33.5)
MCHC RBC AUTO-ENTMCNC: 31.9 G/DL (ref 28.4–34.8)
MCV RBC AUTO: 95.3 FL (ref 82.6–102.9)
MONOCYTES NFR BLD: 0.58 K/UL (ref 0.1–1.2)
MONOCYTES NFR BLD: 8 % (ref 3–12)
NEUTROPHILS NFR BLD: 48 % (ref 36–65)
NEUTS SEG NFR BLD: 3.59 K/UL (ref 1.5–8.1)
NRBC BLD-RTO: 0 PER 100 WBC
PLATELET # BLD AUTO: 210 K/UL (ref 138–453)
PMV BLD AUTO: 10.7 FL (ref 8.1–13.5)
RBC # BLD AUTO: 4.47 M/UL (ref 3.95–5.11)
RBC # BLD: ABNORMAL 10*6/UL
WBC OTHER # BLD: 7.4 K/UL (ref 3.5–11.3)

## 2024-08-13 PROCEDURE — 6360000002 HC RX W HCPCS: Performed by: NURSE PRACTITIONER

## 2024-08-13 PROCEDURE — 85025 COMPLETE CBC W/AUTO DIFF WBC: CPT

## 2024-08-13 PROCEDURE — 2580000003 HC RX 258: Performed by: NURSE PRACTITIONER

## 2024-08-13 PROCEDURE — 99284 EMERGENCY DEPT VISIT MOD MDM: CPT

## 2024-08-13 PROCEDURE — 96372 THER/PROPH/DIAG INJ SC/IM: CPT

## 2024-08-13 RX ORDER — HYDROXYZINE HYDROCHLORIDE 25 MG/1
25 TABLET, FILM COATED ORAL EVERY 8 HOURS PRN
Qty: 15 TABLET | Refills: 0 | Status: SHIPPED | OUTPATIENT
Start: 2024-08-13

## 2024-08-13 RX ORDER — PREDNISONE 10 MG/1
TABLET ORAL
Qty: 30 TABLET | Refills: 0 | Status: SHIPPED | OUTPATIENT
Start: 2024-08-13

## 2024-08-13 RX ADMIN — WATER 125 MG: 1 INJECTION INTRAMUSCULAR; INTRAVENOUS; SUBCUTANEOUS at 19:18

## 2024-08-13 ASSESSMENT — ENCOUNTER SYMPTOMS: SHORTNESS OF BREATH: 0

## 2024-08-13 ASSESSMENT — PAIN DESCRIPTION - LOCATION: LOCATION: LEG

## 2024-08-13 ASSESSMENT — PAIN DESCRIPTION - DESCRIPTORS: DESCRIPTORS: BURNING

## 2024-08-13 ASSESSMENT — PAIN - FUNCTIONAL ASSESSMENT: PAIN_FUNCTIONAL_ASSESSMENT: 0-10

## 2024-08-13 ASSESSMENT — PAIN DESCRIPTION - ORIENTATION: ORIENTATION: RIGHT;LEFT

## 2024-08-13 ASSESSMENT — PAIN DESCRIPTION - PAIN TYPE: TYPE: ACUTE PAIN

## 2024-08-13 ASSESSMENT — PAIN SCALES - GENERAL: PAINLEVEL_OUTOF10: 10

## 2024-08-13 NOTE — ED PROVIDER NOTES
[facility] ED  eMERGENCY dEPARTMENT eNCOUnter      Pt Name: Gaurang Fiore  MRN: [id number]  Birthdate 1975  Date of evaluation: 8/13/2024  Provider: TOR Alberts CNP    CHIEF COMPLAINT       Chief Complaint   Patient presents with    Rash     Bilateral legs since yesterday and burns         HISTORY OF PRESENT ILLNESS  (Location/Symptom, Timing/Onset, Context/Setting, Quality, Duration, Modifying Factors, Severity.)   Gaurang Fiore is a 49 y.o. female who presents to the emergency department. C/o itchy, burning rash to her legs, R>L. Onset was yesterday. She had miriam pants on yesterday. The rash is only on the areas of her lower legs that were exposed. Denies the use of new medications, soaps, detergents, foods, or other products. States she has not down any outside activities. She did go to work yesterday before the onset. Rates her pain 10/10.     Nursing Notes were reviewed.    ALLERGIES     Sulfa antibiotics, Sulfasalazine, Naproxen, and Shellfish-derived products    CURRENT MEDICATIONS       Discharge Medication List as of 8/13/2024  7:39 PM        CONTINUE these medications which have NOT CHANGED    Details   albuterol sulfate HFA (PROVENTIL;VENTOLIN;PROAIR) 108 (90 Base) MCG/ACT inhaler INHALE 2 PUFFS BY MOUTH 4 TIMES DAILY AS NEEDED FOR WHEEZING, Disp-3 each, R-1Normal      nicotine (NICODERM CQ) 21 MG/24HR Place 1 patch onto the skin daily, Disp-42 patch, R-0Normal      nicotine (NICODERM CQ) 14 MG/24HR Place 1 patch onto the skin daily for 14 days, Disp-14 patch, R-0Normal      spironolactone (ALDACTONE) 25 MG tablet Take 1 tablet by mouth daily, Disp-90 tablet, R-1Normal      atorvastatin (LIPITOR) 20 MG tablet Take 1 tablet by mouth daily, Disp-90 tablet, R-1Normal      amLODIPine (NORVASC) 5 MG tablet Take 1 tablet by mouth daily, Disp-90 tablet, R-1Normal      gabapentin (NEURONTIN) 800 MG tablet Take 1 tablet by mouth 3 times daily for 30 days., Disp-90 tablet,

## 2024-08-13 NOTE — ED PROVIDER NOTES
eMERGENCY dEPARTMENT eNCOUnter   Independent Attestation     Pt Name: Gaurang Fiore  MRN: [id number]  Birthdate 1975  Date of evaluation: 8/13/24     Gaurang Fiore is a 49 y.o. female with CC: Rash (Bilateral legs since yesterday and burns)        This visit was performed by both a physician and an APC. I performed all aspects of the MDM as documented.      Sophie Lancaster MD  Attending Emergency Physician            Sophie Lancaster MD  08/13/24 [id number]

## 2024-09-08 DIAGNOSIS — K21.9 GASTROESOPHAGEAL REFLUX DISEASE, UNSPECIFIED WHETHER ESOPHAGITIS PRESENT: ICD-10-CM

## 2024-09-09 RX ORDER — OMEPRAZOLE 40 MG/1
40 CAPSULE, DELAYED RELEASE ORAL DAILY
Qty: 30 CAPSULE | Refills: 2 | Status: SHIPPED | OUTPATIENT
Start: 2024-09-09

## 2024-10-03 ENCOUNTER — HOSPITAL ENCOUNTER (OUTPATIENT)
Dept: ULTRASOUND IMAGING | Age: 49
Discharge: HOME | End: 2024-10-05
Payer: COMMERCIAL

## 2024-10-03 ENCOUNTER — HOSPITAL ENCOUNTER (OUTPATIENT)
Dept: CT IMAGING | Age: 49
Discharge: HOME | End: 2024-10-05
Payer: COMMERCIAL

## 2024-10-03 ENCOUNTER — HOSPITAL ENCOUNTER (OUTPATIENT)
Dept: MAMMOGRAPHY | Age: 49
Discharge: HOME | End: 2024-10-05
Payer: COMMERCIAL

## 2024-10-03 DIAGNOSIS — E04.1 THYROID NODULE: ICD-10-CM

## 2024-10-03 DIAGNOSIS — Z12.31 ENCOUNTER FOR SCREENING MAMMOGRAM FOR MALIGNANT NEOPLASM OF BREAST: ICD-10-CM

## 2024-10-03 DIAGNOSIS — E04.1 THYROID NODULE: Primary | ICD-10-CM

## 2024-10-03 DIAGNOSIS — Z87.891 PERSONAL HISTORY OF TOBACCO USE: ICD-10-CM

## 2024-10-03 PROCEDURE — 76536 US EXAM OF HEAD AND NECK: CPT

## 2024-10-03 PROCEDURE — 71271 CT THORAX LUNG CANCER SCR C-: CPT

## 2024-10-03 PROCEDURE — 77063 BREAST TOMOSYNTHESIS BI: CPT

## 2024-10-07 DIAGNOSIS — R92.8 ABNORMAL MAMMOGRAM: Primary | ICD-10-CM

## 2024-10-16 ENCOUNTER — HOSPITAL ENCOUNTER (OUTPATIENT)
Dept: ULTRASOUND IMAGING | Age: 49
Discharge: HOME | End: 2024-10-18
Payer: COMMERCIAL

## 2024-10-16 ENCOUNTER — HOSPITAL ENCOUNTER (OUTPATIENT)
Dept: MAMMOGRAPHY | Age: 49
Discharge: HOME | End: 2024-10-18
Payer: COMMERCIAL

## 2024-10-16 DIAGNOSIS — R92.8 ABNORMAL MAMMOGRAM: ICD-10-CM

## 2024-10-16 DIAGNOSIS — R92.8 ABNORMAL MAMMOGRAM OF LEFT BREAST: Primary | ICD-10-CM

## 2024-10-16 PROCEDURE — G0279 TOMOSYNTHESIS, MAMMO: HCPCS

## 2024-10-16 PROCEDURE — 76642 ULTRASOUND BREAST LIMITED: CPT

## 2024-10-22 ENCOUNTER — HOSPITAL ENCOUNTER (OUTPATIENT)
Dept: MAMMOGRAPHY | Age: 49
Discharge: HOME | End: 2024-10-24
Payer: COMMERCIAL

## 2024-10-22 DIAGNOSIS — R92.8 ABNORMAL MAMMOGRAM OF LEFT BREAST: ICD-10-CM

## 2024-10-22 DIAGNOSIS — R92.8 ABNORMAL SCREENING MAMMOGRAM: ICD-10-CM

## 2024-10-22 PROCEDURE — 19081 BX BREAST 1ST LESION STRTCTC: CPT

## 2024-10-22 PROCEDURE — 77065 DX MAMMO INCL CAD UNI: CPT

## 2024-10-22 PROCEDURE — 2500000003 HC RX 250 WO HCPCS

## 2024-10-22 PROCEDURE — 88305 TISSUE EXAM BY PATHOLOGIST: CPT

## 2024-10-25 LAB — SURGICAL PATHOLOGY REPORT: NORMAL

## 2024-10-28 ENCOUNTER — OFFICE VISIT (OUTPATIENT)
Dept: FAMILY MEDICINE CLINIC | Age: 49
End: 2024-10-28

## 2024-10-28 VITALS
HEART RATE: 89 BPM | SYSTOLIC BLOOD PRESSURE: 126 MMHG | BODY MASS INDEX: 46.15 KG/M2 | DIASTOLIC BLOOD PRESSURE: 80 MMHG | OXYGEN SATURATION: 98 % | RESPIRATION RATE: 18 BRPM | WEIGHT: 277 LBS | HEIGHT: 65 IN

## 2024-10-28 DIAGNOSIS — E66.01 CLASS 3 SEVERE OBESITY WITH SERIOUS COMORBIDITY AND BODY MASS INDEX (BMI) OF 45.0 TO 49.9 IN ADULT, UNSPECIFIED OBESITY TYPE: Primary | ICD-10-CM

## 2024-10-28 DIAGNOSIS — K59.00 CONSTIPATION, UNSPECIFIED CONSTIPATION TYPE: ICD-10-CM

## 2024-10-28 DIAGNOSIS — E66.813 CLASS 3 SEVERE OBESITY WITH SERIOUS COMORBIDITY AND BODY MASS INDEX (BMI) OF 45.0 TO 49.9 IN ADULT, UNSPECIFIED OBESITY TYPE: Primary | ICD-10-CM

## 2024-10-28 PROCEDURE — 3079F DIAST BP 80-89 MM HG: CPT | Performed by: NURSE PRACTITIONER

## 2024-10-28 PROCEDURE — 99214 OFFICE O/P EST MOD 30 MIN: CPT | Performed by: NURSE PRACTITIONER

## 2024-10-28 PROCEDURE — 3074F SYST BP LT 130 MM HG: CPT | Performed by: NURSE PRACTITIONER

## 2024-10-28 RX ORDER — DOCUSATE SODIUM 100 MG/1
100 CAPSULE, LIQUID FILLED ORAL 2 TIMES DAILY
Qty: 60 CAPSULE | Refills: 0 | Status: SHIPPED | OUTPATIENT
Start: 2024-10-28 | End: 2024-11-27

## 2024-10-28 RX ORDER — PHENTERMINE HYDROCHLORIDE 37.5 MG/1
18.75 TABLET ORAL
Qty: 15 TABLET | Refills: 0 | Status: SHIPPED | OUTPATIENT
Start: 2024-10-28 | End: 2024-11-27

## 2024-10-28 ASSESSMENT — ENCOUNTER SYMPTOMS
DIARRHEA: 0
VOMITING: 0
NAUSEA: 0
ABDOMINAL DISTENTION: 0
ANAL BLEEDING: 0
ABDOMINAL PAIN: 0
CONSTIPATION: 1
BLOOD IN STOOL: 0

## 2024-10-28 NOTE — PROGRESS NOTES
Gaurang Fiore (:  1975) is a 49 y.o. female,Established patient, here for evaluation of the following chief complaint(s):  3 Month Follow-Up (Would like to discuss her breast biopsy. ) and Health Maintenance (GI referral for colon. /)         Assessment & Plan  Class 3 severe obesity with serious comorbidity and body mass index (BMI) of 45.0 to 49.9 in adult, unspecified obesity type    Discussed this medication may worsen symptoms of bioplar disorder - if that occurs please stop medication immediately. Verbalized understanding of this.     History reviewed, exam appropriate, BMI appropriate, ruled out contraindication for controlled substance utilization, patient is free from signs of drug or alcohol abuse, [place]AR[place] reviewed without evidence of suspicious or aberrant behavior and we have developed a treatment plan which includes medication, diet and exercise of help with weight loss.     Discussed if BMI gets below 30 medication will be terminated. Patient needs to have a BMI above 27 with obesity related comorbid factors to continue prescription.     Discussed we will see the patient monthly and in the first 3 months, they needs to lose 5% of their body weight in order to continue the medication. If patient gains weight while on the adipex or does not lose the 5% body weight loss after 3 months, adipex will be stopped.   Adipex will also be stopped if the patient has made any false or misleading statements to me related to the patient's use of drugs or alcohol, the patient has consumed other stimulants from other providers, the patient has repeatedly failed to comply with my treatment recommendations, patient has not tolerated the  medication or if the patient becomes pregnant.       Orders:    phentermine (ADIPEX-P) 37.5 MG tablet; Take 0.5 tablets by mouth every morning (before breakfast) for 30 days. Max Daily Amount: 18.75 mg    Constipation, unspecified constipation type  Discussed constipation

## 2024-11-25 ENCOUNTER — OFFICE VISIT (OUTPATIENT)
Dept: FAMILY MEDICINE CLINIC | Age: 49
End: 2024-11-25
Payer: MEDICAID

## 2024-11-25 VITALS
RESPIRATION RATE: 18 BRPM | HEART RATE: 97 BPM | WEIGHT: 275.2 LBS | SYSTOLIC BLOOD PRESSURE: 120 MMHG | BODY MASS INDEX: 45.85 KG/M2 | OXYGEN SATURATION: 90 % | HEIGHT: 65 IN | DIASTOLIC BLOOD PRESSURE: 68 MMHG

## 2024-11-25 DIAGNOSIS — K21.9 GASTROESOPHAGEAL REFLUX DISEASE, UNSPECIFIED WHETHER ESOPHAGITIS PRESENT: ICD-10-CM

## 2024-11-25 DIAGNOSIS — E66.01 CLASS 3 SEVERE OBESITY WITH SERIOUS COMORBIDITY AND BODY MASS INDEX (BMI) OF 45.0 TO 49.9 IN ADULT, UNSPECIFIED OBESITY TYPE: Primary | ICD-10-CM

## 2024-11-25 DIAGNOSIS — E66.813 CLASS 3 SEVERE OBESITY WITH SERIOUS COMORBIDITY AND BODY MASS INDEX (BMI) OF 45.0 TO 49.9 IN ADULT, UNSPECIFIED OBESITY TYPE: Primary | ICD-10-CM

## 2024-11-25 PROCEDURE — 3074F SYST BP LT 130 MM HG: CPT | Performed by: NURSE PRACTITIONER

## 2024-11-25 PROCEDURE — 3078F DIAST BP <80 MM HG: CPT | Performed by: NURSE PRACTITIONER

## 2024-11-25 PROCEDURE — 99214 OFFICE O/P EST MOD 30 MIN: CPT | Performed by: NURSE PRACTITIONER

## 2024-11-25 RX ORDER — SUCRALFATE 1 G/1
1 TABLET ORAL 2 TIMES DAILY
Qty: 60 TABLET | Refills: 3 | Status: SHIPPED | OUTPATIENT
Start: 2024-11-25

## 2024-11-25 RX ORDER — PHENTERMINE HYDROCHLORIDE 15 MG/1
15 CAPSULE ORAL EVERY MORNING
Qty: 30 CAPSULE | Refills: 0 | Status: SHIPPED | OUTPATIENT
Start: 2024-11-25 | End: 2024-12-25

## 2024-11-25 NOTE — ASSESSMENT & PLAN NOTE
Renew carafate today. Continue prilosec 40mg daily.Referral to GI if no improvement.   Orders:    sucralfate (CARAFATE) 1 GM tablet; Take 1 tablet by mouth in the morning and at bedtime

## 2024-11-25 NOTE — PROGRESS NOTES
Gaurang Fiore (:  1975) is a 49 y.o. female,Established patient, here for evaluation of the following chief complaint(s):  Obesity (4 week follow up), Medication Refill (Would like an increase on Prilosec. Pt states it feels like the burning just sits in her throat. /), and Headache (Wakes up all the time with headaches. )         Assessment & Plan  Class 3 severe obesity with serious comorbidity and body mass index (BMI) of 45.0 to 49.9 in adult, unspecified obesity type    2lb weight loss since last OV. Rx for second month Adipex 15mg daily. Continue portion control.  PDMP reviewed.     Orders:    phentermine 15 MG capsule; Take 1 capsule by mouth every morning for 30 days. Max Daily Amount: 15 mg    Gastroesophageal reflux disease, unspecified whether esophagitis present  Renew carafate today. Continue prilosec 40mg daily.Referral to GI if no improvement.   Orders:    sucralfate (CARAFATE) 1 GM tablet; Take 1 tablet by mouth in the morning and at bedtime      Return in about 4 weeks (around 2024) for Weight Check.       Subjective   Presents for follow up of chronic conditions  Down 2 lbs since last OV - taking half tablet of adipex daily   Has been walking every day. Drinking a lot more water.   Has not had s/e with use of med. Mood is stable.     Waking up with headaches every morning for the last 2 weeks  Starts in the back of her head and radiates to forehead. Stretching neck alleviates pain.   Hx of HTN. Has not tried checking BP during HA.     GERD: Worsening GERD   Taking prilosec daily which slightly helps  Avoids greasy, spicy foods. Sometimes gerd triggered by water.   Denies hematochezia, nausea, vomiting         Review of Systems   Constitutional:  Negative for activity change, appetite change and unexpected weight change.   Gastrointestinal:  Negative for abdominal distention, abdominal pain, anal bleeding, blood in stool, constipation, diarrhea, nausea and vomiting.        GERD

## 2024-11-27 ASSESSMENT — ENCOUNTER SYMPTOMS
VOMITING: 0
ABDOMINAL PAIN: 0
CONSTIPATION: 0
ABDOMINAL DISTENTION: 0
DIARRHEA: 0
NAUSEA: 0
BLOOD IN STOOL: 0
ANAL BLEEDING: 0

## 2024-12-11 DIAGNOSIS — I50.31 ACUTE HEART FAILURE WITH PRESERVED EJECTION FRACTION (HFPEF) (HCC): ICD-10-CM

## 2024-12-11 DIAGNOSIS — K21.9 GASTROESOPHAGEAL REFLUX DISEASE, UNSPECIFIED WHETHER ESOPHAGITIS PRESENT: ICD-10-CM

## 2024-12-11 DIAGNOSIS — R09.82 PND (POST-NASAL DRIP): ICD-10-CM

## 2024-12-11 DIAGNOSIS — E78.5 HYPERLIPIDEMIA, UNSPECIFIED HYPERLIPIDEMIA TYPE: ICD-10-CM

## 2024-12-11 DIAGNOSIS — I10 ESSENTIAL HYPERTENSION: ICD-10-CM

## 2024-12-11 RX ORDER — ATORVASTATIN CALCIUM 20 MG/1
20 TABLET, FILM COATED ORAL DAILY
Qty: 90 TABLET | Refills: 1 | Status: SHIPPED | OUTPATIENT
Start: 2024-12-11

## 2024-12-11 RX ORDER — OMEPRAZOLE 40 MG/1
40 CAPSULE, DELAYED RELEASE ORAL DAILY
Qty: 90 CAPSULE | Refills: 1 | Status: SHIPPED | OUTPATIENT
Start: 2024-12-11

## 2024-12-11 RX ORDER — AMLODIPINE BESYLATE 5 MG/1
5 TABLET ORAL DAILY
Qty: 90 TABLET | Refills: 1 | Status: SHIPPED | OUTPATIENT
Start: 2024-12-11

## 2024-12-11 RX ORDER — FUROSEMIDE 40 MG/1
40 TABLET ORAL DAILY
Qty: 90 TABLET | Refills: 1 | Status: SHIPPED | OUTPATIENT
Start: 2024-12-11

## 2024-12-11 RX ORDER — FLUTICASONE PROPIONATE 50 MCG
2 SPRAY, SUSPENSION (ML) NASAL DAILY
Qty: 16 G | Refills: 3 | Status: SHIPPED | OUTPATIENT
Start: 2024-12-11

## 2024-12-11 NOTE — TELEPHONE ENCOUNTER
Last visit: 11/25/24  Last Med refill: 09/10/24 not sure of others  Does patient have enough medication for 72 hours: Yes    Next Visit Date:  Future Appointments   Date Time Provider Department Center   12/23/2024  8:30 AM Deisi Cook APRN - NP [facility] DEP       Health Maintenance   Topic Date Due    Hepatitis C screen  Never done    DTaP/Tdap/Td vaccine (1 - Tdap) Never done    Colorectal Cancer Screen  Never done    Shingles vaccine (1 of 2) Never done    Flu vaccine (1) Never done    COVID-19 Vaccine (4 - 2023-24 season) 09/01/2024    Hepatitis B vaccine (1 of 3 - 19+ 3-dose series) 05/23/2025 (Originally 5/20/1992)    Pneumococcal 0-64 years Vaccine (1 of 2 - PCV) 06/24/2025 (Originally 5/20/1979)    A1C test (Diabetic or Prediabetic)  03/29/2025    Lipids  03/30/2025    Depression Monitoring  05/23/2025    Lung Cancer Screening &/or Counseling  10/03/2025    Breast cancer screen  10/16/2026    HIV screen  Completed    Hepatitis A vaccine  Aged Out    Hib vaccine  Aged Out    Polio vaccine  Aged Out    Meningococcal (ACWY) vaccine  Aged Out    Depression Screen  Discontinued    Diabetes screen  Discontinued    Cervical cancer screen  Discontinued       Hemoglobin A1C (%)   Date Value   03/29/2024 5.8   05/23/2019 5.4             ( goal A1C is < 7)   No components found for: \"LABMICR\"  No components found for: \"LDLCHOLESTEROL\", \"LDLCALC\"    (goal LDL is <100)   AST (U/L)   Date Value   07/05/2024 18     ALT (U/L)   Date Value   07/05/2024 21     BUN (mg/dL)   Date Value   07/07/2024 8     BP Readings from Last 3 Encounters:   11/25/24 120/68   10/28/24 126/80   08/13/24 (!) 123/93          (goal 120/80)    All Future Testing planned in [facility]  Lab Frequency Next Occurrence   US HEAD NECK SOFT TISSUE THYROID Once 10/04/2025   KEVIN PHILLIP DIGITAL DIAGNOSTIC UNILATERAL LEFT Once 10/07/2024               Patient Active Problem List:     Fibroid     Abnormal uterine bleeding (AUB)     Hx of tubal

## 2024-12-14 ENCOUNTER — APPOINTMENT (OUTPATIENT)
Dept: CT IMAGING | Age: 49
End: 2024-12-14
Payer: MEDICAID

## 2024-12-14 ENCOUNTER — HOSPITAL ENCOUNTER (EMERGENCY)
Age: 49
Discharge: HOME | End: 2024-12-14
Attending: STUDENT IN AN ORGANIZED HEALTH CARE EDUCATION/TRAINING PROGRAM
Payer: MEDICAID

## 2024-12-14 VITALS
DIASTOLIC BLOOD PRESSURE: 107 MMHG | SYSTOLIC BLOOD PRESSURE: 148 MMHG | OXYGEN SATURATION: 91 % | RESPIRATION RATE: 21 BRPM | HEART RATE: 99 BPM | TEMPERATURE: 98.1 F

## 2024-12-14 DIAGNOSIS — I50.31 ACUTE HEART FAILURE WITH PRESERVED EJECTION FRACTION (HFPEF) (HCC): ICD-10-CM

## 2024-12-14 DIAGNOSIS — R20.2 PARESTHESIAS: Primary | ICD-10-CM

## 2024-12-14 LAB
ALBUMIN SERPL-MCNC: 4.4 G/DL (ref 3.5–5.2)
ALBUMIN/GLOB SERPL: 1.5 {RATIO} (ref 1–2.5)
ALP SERPL-CCNC: 100 U/L (ref 35–104)
ALT SERPL-CCNC: 34 U/L (ref 10–35)
ANION GAP SERPL CALCULATED.3IONS-SCNC: 13 MMOL/L (ref 9–16)
AST SERPL-CCNC: 28 U/L (ref 10–35)
BASOPHILS # BLD: 0.03 K/UL (ref 0–0.2)
BASOPHILS NFR BLD: 0 % (ref 0–2)
BILIRUB SERPL-MCNC: 0.4 MG/DL (ref 0–1.2)
BUN SERPL-MCNC: 17 MG/DL (ref 6–20)
CALCIUM SERPL-MCNC: 9.7 MG/DL (ref 8.6–10.4)
CHLORIDE SERPL-SCNC: 101 MMOL/L (ref 98–107)
CO2 SERPL-SCNC: 27 MMOL/L (ref 20–31)
CREAT SERPL-MCNC: 0.9 MG/DL (ref 0.5–0.9)
EOSINOPHIL # BLD: 0.38 K/UL (ref 0–0.44)
EOSINOPHILS RELATIVE PERCENT: 5 % (ref 1–4)
ERYTHROCYTE [DISTWIDTH] IN BLOOD BY AUTOMATED COUNT: 15.3 % (ref 11.8–14.4)
GFR, ESTIMATED: 78 ML/MIN/1.73M2
GLUCOSE SERPL-MCNC: 121 MG/DL (ref 74–99)
HCT VFR BLD AUTO: 48.3 % (ref 36.3–47.1)
HGB BLD-MCNC: 15.8 G/DL (ref 11.9–15.1)
IMM GRANULOCYTES # BLD AUTO: 0.05 K/UL (ref 0–0.3)
IMM GRANULOCYTES NFR BLD: 1 %
LYMPHOCYTES NFR BLD: 3.14 K/UL (ref 1.1–3.7)
LYMPHOCYTES RELATIVE PERCENT: 37 % (ref 24–43)
MCH RBC QN AUTO: 30.3 PG (ref 25.2–33.5)
MCHC RBC AUTO-ENTMCNC: 32.7 G/DL (ref 28.4–34.8)
MCV RBC AUTO: 92.7 FL (ref 82.6–102.9)
MONOCYTES NFR BLD: 0.62 K/UL (ref 0.1–1.2)
MONOCYTES NFR BLD: 7 % (ref 3–12)
NEUTROPHILS NFR BLD: 50 % (ref 36–65)
NEUTS SEG NFR BLD: 4.17 K/UL (ref 1.5–8.1)
NRBC BLD-RTO: 0 PER 100 WBC
PLATELET # BLD AUTO: 231 K/UL (ref 138–453)
PMV BLD AUTO: 10.8 FL (ref 8.1–13.5)
POTASSIUM SERPL-SCNC: 4 MMOL/L (ref 3.7–5.3)
PROT SERPL-MCNC: 7.3 G/DL (ref 6.6–8.7)
RBC # BLD AUTO: 5.21 M/UL (ref 3.95–5.11)
RBC # BLD: ABNORMAL 10*6/UL
SODIUM SERPL-SCNC: 141 MMOL/L (ref 136–145)
WBC OTHER # BLD: 8.4 K/UL (ref 3.5–11.3)

## 2024-12-14 PROCEDURE — 2580000003 HC RX 258: Performed by: STUDENT IN AN ORGANIZED HEALTH CARE EDUCATION/TRAINING PROGRAM

## 2024-12-14 PROCEDURE — 70450 CT HEAD/BRAIN W/O DYE: CPT

## 2024-12-14 PROCEDURE — 96374 THER/PROPH/DIAG INJ IV PUSH: CPT

## 2024-12-14 PROCEDURE — 6360000004 HC RX CONTRAST MEDICATION: Performed by: STUDENT IN AN ORGANIZED HEALTH CARE EDUCATION/TRAINING PROGRAM

## 2024-12-14 PROCEDURE — 85025 COMPLETE CBC W/AUTO DIFF WBC: CPT

## 2024-12-14 PROCEDURE — 6360000002 HC RX W HCPCS: Performed by: STUDENT IN AN ORGANIZED HEALTH CARE EDUCATION/TRAINING PROGRAM

## 2024-12-14 PROCEDURE — 80053 COMPREHEN METABOLIC PANEL: CPT

## 2024-12-14 PROCEDURE — 70498 CT ANGIOGRAPHY NECK: CPT

## 2024-12-14 PROCEDURE — 96375 TX/PRO/DX INJ NEW DRUG ADDON: CPT

## 2024-12-14 PROCEDURE — 99285 EMERGENCY DEPT VISIT HI MDM: CPT

## 2024-12-14 RX ORDER — IBUPROFEN 600 MG/1
600 TABLET, FILM COATED ORAL EVERY 6 HOURS PRN
Qty: 30 TABLET | Refills: 0 | Status: SHIPPED | OUTPATIENT
Start: 2024-12-14

## 2024-12-14 RX ORDER — SODIUM CHLORIDE 0.9 % (FLUSH) 0.9 %
10 SYRINGE (ML) INJECTION PRN
Status: DISCONTINUED | OUTPATIENT
Start: 2024-12-14 | End: 2024-12-15 | Stop reason: HOSPADM

## 2024-12-14 RX ORDER — CYCLOBENZAPRINE HCL 10 MG
10 TABLET ORAL 3 TIMES DAILY PRN
Qty: 21 TABLET | Refills: 0 | Status: SHIPPED | OUTPATIENT
Start: 2024-12-14 | End: 2024-12-24

## 2024-12-14 RX ORDER — 0.9 % SODIUM CHLORIDE 0.9 %
80 INTRAVENOUS SOLUTION INTRAVENOUS ONCE
Status: COMPLETED | OUTPATIENT
Start: 2024-12-14 | End: 2024-12-14

## 2024-12-14 RX ORDER — DEXAMETHASONE SODIUM PHOSPHATE 10 MG/ML
10 INJECTION, SOLUTION INTRAMUSCULAR; INTRAVENOUS ONCE
Status: COMPLETED | OUTPATIENT
Start: 2024-12-14 | End: 2024-12-14

## 2024-12-14 RX ORDER — KETOROLAC TROMETHAMINE 30 MG/ML
30 INJECTION, SOLUTION INTRAMUSCULAR; INTRAVENOUS ONCE
Status: COMPLETED | OUTPATIENT
Start: 2024-12-14 | End: 2024-12-14

## 2024-12-14 RX ORDER — METHYLPREDNISOLONE 4 MG/1
TABLET ORAL
Qty: 21 TABLET | Refills: 0 | Status: SHIPPED | OUTPATIENT
Start: 2024-12-14

## 2024-12-14 RX ORDER — IOPAMIDOL 755 MG/ML
75 INJECTION, SOLUTION INTRAVASCULAR
Status: COMPLETED | OUTPATIENT
Start: 2024-12-14 | End: 2024-12-14

## 2024-12-14 RX ADMIN — KETOROLAC TROMETHAMINE 30 MG: 30 INJECTION, SOLUTION INTRAMUSCULAR at 23:47

## 2024-12-14 RX ADMIN — IOPAMIDOL 75 ML: 755 INJECTION, SOLUTION INTRAVENOUS at 22:09

## 2024-12-14 RX ADMIN — SODIUM CHLORIDE, PRESERVATIVE FREE 10 ML: 5 INJECTION INTRAVENOUS at 22:09

## 2024-12-14 RX ADMIN — DEXAMETHASONE SODIUM PHOSPHATE 10 MG: 10 INJECTION, SOLUTION INTRAMUSCULAR; INTRAVENOUS at 23:47

## 2024-12-14 RX ADMIN — SODIUM CHLORIDE 80 ML: 9 INJECTION, SOLUTION INTRAVENOUS at 22:09

## 2024-12-15 NOTE — DISCHARGE INSTRUCTIONS
If given narcotics (opiates) or muscle relaxants during this Emergency Department visit, you should not drink, drive or operate any machinery for at least 6 hours.    Use an ice pack or bag filled with ice and apply to the injured area 3 - 4 times a day for 15 - 20 minutes each time.  If the injury is older than 3 days, then use a heating pad to help relax the muscles in your neck.    For pain use acetaminophen (Tylenol) or ibuprofen (Motrin / Advil), unless prescribed medications that have acetaminophen or ibuprofen (or similar medications) in it.  You can take over the counter acetaminophen tablets (1 - 2 tablets of the 500-mg strength every 6 hours) or ibuprofen tablets (2 tablets every 4 hours).    PLEASE RETURN TO THE EMERGENCY DEPARTMENT IMMEDIATELY for worsening symptoms, inability to move your legs, tingling or loss of sensation, or if you develop any concerning symptoms such as: high fever not relieved by acetaminophen (Tylenol) and/or ibuprofen (Motrin / Advil), chills, shortness of breath, chest pain, feeling of your heart fluttering or racing, persistent nausea and/or vomiting, vomiting up blood, blood in your stool, loss of consciousness, numbness, weakness or tingling in the arms or legs or change in color of the extremities, changes in mental status, persistent headache, blurry vision, loss of bladder / bowel control, unable to follow up with your physician, or other any other care or concern.

## 2024-12-15 NOTE — ED NOTES
Pt arrived to ED with c/o hand numbness and eye problem. Pt states an hour PTA she noticed her left hand was numb. Pt then states she noticed her vision was blurry. Pt states \"I thought it would go away but it wasn't\". Pt also states she has pain at the back of her head. Pt states she does get regular headaches. Pt NIH scale was a 0.  Pt is A&Ox4.

## 2024-12-16 RX ORDER — METOPROLOL SUCCINATE 25 MG/1
25 TABLET, EXTENDED RELEASE ORAL DAILY
Qty: 90 TABLET | Refills: 1 | Status: SHIPPED | OUTPATIENT
Start: 2024-12-16

## 2024-12-16 NOTE — TELEPHONE ENCOUNTER
Last visit: 11/25/2024  Last Med refill: 09/17/2024  Does patient have enough medication for 72 hours: No:     Next Visit Date:  Future Appointments   Date Time Provider Department Center   12/23/2024  8:30 AM Deisi Cook APRN - NP [facility] DEP       Health Maintenance   Topic Date Due    Hepatitis C screen  Never done    DTaP/Tdap/Td vaccine (1 - Tdap) Never done    Colorectal Cancer Screen  Never done    Shingles vaccine (1 of 2) Never done    Flu vaccine (1) Never done    COVID-19 Vaccine (4 - 2023-24 season) 09/01/2024    Hepatitis B vaccine (1 of 3 - 19+ 3-dose series) 05/23/2025 (Originally 5/20/1992)    Pneumococcal 0-64 years Vaccine (1 of 2 - PCV) 06/24/2025 (Originally 5/20/1979)    A1C test (Diabetic or Prediabetic)  03/29/2025    Lipids  03/30/2025    Depression Monitoring  05/23/2025    Lung Cancer Screening &/or Counseling  10/03/2025    Breast cancer screen  10/16/2026    HIV screen  Completed    Hepatitis A vaccine  Aged Out    Hib vaccine  Aged Out    Polio vaccine  Aged Out    Meningococcal (ACWY) vaccine  Aged Out    Depression Screen  Discontinued    Diabetes screen  Discontinued    Cervical cancer screen  Discontinued       Hemoglobin A1C (%)   Date Value   03/29/2024 5.8   05/23/2019 5.4             ( goal A1C is < 7)   No components found for: \"LABMICR\"  No components found for: \"LDLCHOLESTEROL\", \"LDLCALC\"    (goal LDL is <100)   AST (U/L)   Date Value   12/14/2024 28     ALT (U/L)   Date Value   12/14/2024 34     BUN (mg/dL)   Date Value   12/14/2024 17     BP Readings from Last 3 Encounters:   12/14/24 (!) 148/107   11/25/24 120/68   10/28/24 126/80          (goal 120/80)    All Future Testing planned in [facility]  Lab Frequency Next Occurrence   US HEAD NECK SOFT TISSUE THYROID Once 10/04/2025   KEVIN PHILLIP DIGITAL DIAGNOSTIC UNILATERAL LEFT Once 10/07/2024               Patient Active Problem List:     Fibroid     Abnormal uterine bleeding (AUB)     Hx of tubal ligation

## 2024-12-17 NOTE — ED PROVIDER NOTES
FOLLOW UP THE PATIENT:  Deisi Cook, APRN - NP  [id number]  [place] [id number]  [phone]    Schedule an appointment as soon as possible for a visit in 1 week  As needed      DISCHARGE MEDICATIONS:  Discharge Medication List as of 12/14/2024 11:42 PM        START taking these medications    Details   methylPREDNISolone (MEDROL, KACIE,) 4 MG tablet Take 6 tablets on day 1 Take 5 tablets on day 2 Take 4 tablets on day 3 Take 3 tablets on day 4 Take 2 tablets on day 5 Take 1 tablet on day 6, Disp-21 tablet, R-0Normal             Zbigniew Ybarra DO  EmergencyMedicine Attending    (Please note that portions of this note were completed with a voice recognition program.  Efforts were made to edit the dictations but occasionally words are mis-transcribed.)     Zbigniew Ybarra DO  12/17/24 [id number]

## 2024-12-19 DIAGNOSIS — R92.8 ABNORMAL MAMMOGRAM OF LEFT BREAST: Primary | ICD-10-CM

## 2024-12-20 DIAGNOSIS — R05.3 CHRONIC COUGH: ICD-10-CM

## 2024-12-20 RX ORDER — BUDESONIDE AND FORMOTEROL FUMARATE DIHYDRATE 160; 4.5 UG/1; UG/1
AEROSOL RESPIRATORY (INHALATION)
Qty: 33 G | Refills: 0 | Status: SHIPPED | OUTPATIENT
Start: 2024-12-20

## 2024-12-20 NOTE — TELEPHONE ENCOUNTER
Last visit: 11/25/2024  Last Med refill: 07/19/2024  Does patient have enough medication for 72 hours: No:     Next Visit Date:  Future Appointments   Date Time Provider Department Center   12/23/2024  8:30 AM Deisi Cook APRN - NP [facility] DEP       Health Maintenance   Topic Date Due    Hepatitis C screen  Never done    DTaP/Tdap/Td vaccine (1 - Tdap) Never done    Colorectal Cancer Screen  Never done    Shingles vaccine (1 of 2) Never done    Flu vaccine (1) Never done    COVID-19 Vaccine (4 - 2023-24 season) 09/01/2024    Hepatitis B vaccine (1 of 3 - 19+ 3-dose series) 05/23/2025 (Originally 5/20/1992)    Pneumococcal 0-64 years Vaccine (1 of 2 - PCV) 06/24/2025 (Originally 5/20/1979)    A1C test (Diabetic or Prediabetic)  03/29/2025    Lipids  03/30/2025    Depression Monitoring  05/23/2025    Lung Cancer Screening &/or Counseling  10/03/2025    Breast cancer screen  10/16/2026    HIV screen  Completed    Hepatitis A vaccine  Aged Out    Hib vaccine  Aged Out    Polio vaccine  Aged Out    Meningococcal (ACWY) vaccine  Aged Out    Depression Screen  Discontinued    Diabetes screen  Discontinued    Cervical cancer screen  Discontinued       Hemoglobin A1C (%)   Date Value   03/29/2024 5.8   05/23/2019 5.4             ( goal A1C is < 7)   No components found for: \"LABMICR\"  No components found for: \"LDLCHOLESTEROL\", \"LDLCALC\"    (goal LDL is <100)   AST (U/L)   Date Value   12/14/2024 28     ALT (U/L)   Date Value   12/14/2024 34     BUN (mg/dL)   Date Value   12/14/2024 17     BP Readings from Last 3 Encounters:   12/14/24 (!) 148/107   11/25/24 120/68   10/28/24 126/80          (goal 120/80)    All Future Testing planned in [facility]  Lab Frequency Next Occurrence   US HEAD NECK SOFT TISSUE THYROID Once 10/04/2025   KEVIN PHILLIP DIGITAL DIAGNOSTIC UNILATERAL LEFT Once 10/07/2024   KEVIN PHILLIP DIGITAL DIAGNOSTIC UNILATERAL LEFT Once 06/19/2025               Patient Active Problem List:     Fibroid

## 2024-12-23 ENCOUNTER — OFFICE VISIT (OUTPATIENT)
Dept: FAMILY MEDICINE CLINIC | Age: 49
End: 2024-12-23
Payer: MEDICAID

## 2024-12-23 VITALS
HEIGHT: 65 IN | WEIGHT: 283 LBS | HEART RATE: 74 BPM | SYSTOLIC BLOOD PRESSURE: 136 MMHG | OXYGEN SATURATION: 98 % | RESPIRATION RATE: 20 BRPM | DIASTOLIC BLOOD PRESSURE: 84 MMHG | BODY MASS INDEX: 47.15 KG/M2

## 2024-12-23 DIAGNOSIS — E66.01 CLASS 3 SEVERE OBESITY WITH SERIOUS COMORBIDITY AND BODY MASS INDEX (BMI) OF 45.0 TO 49.9 IN ADULT, UNSPECIFIED OBESITY TYPE: Primary | ICD-10-CM

## 2024-12-23 DIAGNOSIS — E66.813 CLASS 3 SEVERE OBESITY WITH SERIOUS COMORBIDITY AND BODY MASS INDEX (BMI) OF 45.0 TO 49.9 IN ADULT, UNSPECIFIED OBESITY TYPE: Primary | ICD-10-CM

## 2024-12-23 PROCEDURE — 3075F SYST BP GE 130 - 139MM HG: CPT | Performed by: NURSE PRACTITIONER

## 2024-12-23 PROCEDURE — 99212 OFFICE O/P EST SF 10 MIN: CPT | Performed by: NURSE PRACTITIONER

## 2024-12-23 PROCEDURE — 3079F DIAST BP 80-89 MM HG: CPT | Performed by: NURSE PRACTITIONER

## 2024-12-23 NOTE — PROGRESS NOTES
Gaurang Fiore (:  1975) is a 49 y.o. female,Established patient, here for evaluation of the following chief complaint(s):  Obesity (4 week check-changed diet, no pop. /) and Follow-up (-left arm numbness and radiating pain. )         Assessment & Plan  Class 3 severe obesity with serious comorbidity and body mass index (BMI) of 45.0 to 49.9 in adult, unspecified obesity type    8lb weight gain in 1 month. Adipex Rx discontinued today. Referral to [facility] per patient request.     Discussed she may not be active enough throughout the day.  Slowly work on increasing step goal to 10k steps a day. Bring     Orders:    Catalina Real CNP, Weight Management and B[facility] [place]      Return in about 6 months (around 2025) for physical.       Subjective   Presents for 3 month Adipex follow up  8lb weight gain since last OV  Frustrated today. Reports she eats off of a kids plate, focuses on portion control. \"I do not eat bad so you can't tell me it's my food\". Walks 1/2 mile a day, not much extra. Will be back to work in a few months.         Review of Systems   Constitutional:  Positive for unexpected weight change. Negative for activity change and appetite change.          Objective   Physical Exam  Vitals and nursing note reviewed.   Constitutional:       Appearance: Normal appearance. She is obese.   HENT:      Head: Normocephalic.      Right Ear: Hearing normal.      Left Ear: Hearing normal.      Nose: Nose normal.      Mouth/Throat:      Mouth: Mucous membranes are moist.      Pharynx: Oropharynx is clear.   Eyes:      Extraocular Movements: Extraocular movements intact.      Conjunctiva/sclera: Conjunctivae normal.      Pupils: Pupils are equal, round, and reactive to light.   Pulmonary:      Effort: Pulmonary effort is normal.      Breath sounds: Normal air entry. No decreased breath sounds.   Abdominal:      General: Abdomen is flat. Bowel sounds are normal.

## 2025-01-24 ENCOUNTER — TELEPHONE (OUTPATIENT)
Dept: FAMILY MEDICINE CLINIC | Age: 50
End: 2025-01-24

## 2025-01-24 DIAGNOSIS — K59.00 CONSTIPATION, UNSPECIFIED CONSTIPATION TYPE: Primary | ICD-10-CM

## 2025-01-24 DIAGNOSIS — S39.012D BACK STRAIN, SUBSEQUENT ENCOUNTER: ICD-10-CM

## 2025-01-24 NOTE — TELEPHONE ENCOUNTER
Patient contacted office asking for refills on stool softener powder and pain patches. [facility] on [address].    Unable to pend meds

## 2025-01-27 RX ORDER — LIDOCAINE 50 MG/G
1 PATCH TOPICAL DAILY
Qty: 10 PATCH | Refills: 2 | Status: SHIPPED | OUTPATIENT
Start: 2025-01-27 | End: 2025-02-26

## 2025-01-27 RX ORDER — POLYETHYLENE GLYCOL 3350 17 G/17G
17 POWDER, FOR SOLUTION ORAL 2 TIMES DAILY
Qty: 527 G | Refills: 1 | Status: SHIPPED | OUTPATIENT
Start: 2025-01-27 | End: 2025-02-26

## 2025-01-28 DIAGNOSIS — R05.3 CHRONIC COUGH: ICD-10-CM

## 2025-01-28 NOTE — TELEPHONE ENCOUNTER
Last visit: 12/23/24  Last Med refill: 8/12/24  Does patient have enough medication for 72 hours: Yes    Next Visit Date:  Future Appointments   Date Time Provider Department Center   6/10/2025 10:30 AM Catalina Vaca APRN - CNP bariatric [facility]       Health Maintenance   Topic Date Due    Hepatitis C screen  Never done    DTaP/Tdap/Td vaccine (1 - Tdap) Never done    Colorectal Cancer Screen  Never done    Shingles vaccine (1 of 2) Never done    Flu vaccine (1) Never done    COVID-19 Vaccine (4 - 2023-24 season) 09/01/2024    Hepatitis B vaccine (1 of 3 - 19+ 3-dose series) 05/23/2025 (Originally 5/20/1992)    Pneumococcal 0-64 years Vaccine (1 of 2 - PCV) 06/24/2025 (Originally 5/20/1979)    A1C test (Diabetic or Prediabetic)  03/29/2025    Lipids  03/30/2025    Depression Monitoring  05/23/2025    Lung Cancer Screening &/or Counseling  10/03/2025    Breast cancer screen  10/16/2026    HIV screen  Completed    Hepatitis A vaccine  Aged Out    Hib vaccine  Aged Out    Polio vaccine  Aged Out    Meningococcal (ACWY) vaccine  Aged Out    Depression Screen  Discontinued    Diabetes screen  Discontinued    Cervical cancer screen  Discontinued       Hemoglobin A1C (%)   Date Value   03/29/2024 5.8   05/23/2019 5.4             ( goal A1C is < 7)   No components found for: \"LABMICR\"  No components found for: \"LDLCHOLESTEROL\", \"LDLCALC\"    (goal LDL is <100)   AST (U/L)   Date Value   12/14/2024 28     ALT (U/L)   Date Value   12/14/2024 34     BUN (mg/dL)   Date Value   12/14/2024 17     BP Readings from Last 3 Encounters:   12/23/24 136/84   12/14/24 (!) 148/107   11/25/24 120/68          (goal 120/80)    All Future Testing planned in [facility]  Lab Frequency Next Occurrence   US HEAD NECK SOFT TISSUE THYROID Once 10/04/2025   KEVIN PHILLIP DIGITAL DIAGNOSTIC UNILATERAL LEFT Once 10/07/2024   KEVIN PHILLIP DIGITAL DIAGNOSTIC UNILATERAL LEFT Once 06/19/2025               Patient Active Problem List:     Fibroid     Abnormal

## 2025-01-29 RX ORDER — ALBUTEROL SULFATE 90 UG/1
INHALANT RESPIRATORY (INHALATION)
Qty: 3 EACH | Refills: 1 | Status: SHIPPED | OUTPATIENT
Start: 2025-01-29

## 2025-02-10 NOTE — TELEPHONE ENCOUNTER
Fibroid     Abnormal uterine bleeding (AUB)     Hx of tubal ligation (1994)     Panic attacks     Tobacco abuse     Hysteroscopy, D&C 5/17/19     Abnormal mammogram of left breast     Non-compliant patient     Closed displaced fracture of proximal phalanx of right little finger     Obesity with body mass index 30 or greater     Insomnia     Hypokalemia     Gastroesophageal reflux disease     Fracture of metacarpal bone     Fibrocystic disease of breast     Anxiety     Ankylosis of finger     Acute right ankle pain     Dyspnea on exertion     Essential hypertension     Hyperlipidemia     Former smoker     Neuropathy     Muscle spasm     Small pleural effusion     Obesity hypoventilation syndrome     Prediabetes     Acute heart failure with preserved ejection fraction (HFpEF) (HCC)     Acute on chronic hypoxic respiratory failure     Myoclonus     Intractable abdominal pain     Calculus of gallbladder without cholecystitis without obstruction     Status post laparoscopic cholecystectomy     Cholecystitis     Congestive heart failure (HCC)

## 2025-02-11 RX ORDER — IBUPROFEN 600 MG/1
600 TABLET, FILM COATED ORAL EVERY 6 HOURS PRN
Qty: 90 TABLET | Refills: 2 | Status: SHIPPED | OUTPATIENT
Start: 2025-02-11

## 2025-03-29 DIAGNOSIS — K21.9 GASTROESOPHAGEAL REFLUX DISEASE, UNSPECIFIED WHETHER ESOPHAGITIS PRESENT: ICD-10-CM

## 2025-03-31 RX ORDER — SUCRALFATE 1 G/1
1 TABLET ORAL 2 TIMES DAILY
Qty: 60 TABLET | Refills: 3 | Status: SHIPPED | OUTPATIENT
Start: 2025-03-31

## 2025-03-31 NOTE — TELEPHONE ENCOUNTER
Last visit:12/23/2024  Last Med refill: 11/25/2024  Does patient have enough medication for 72 hours: No    Next Visit Date:  Future Appointments   Date Time Provider Department Center   6/10/2025 10:30 AM Catalina Vaca APRN - CNP bariatric long [facility]       Health Maintenance   Topic Date Due    Hepatitis C screen  Never done    DTaP/Tdap/Td vaccine (1 - Tdap) Never done    Pneumococcal 50+ years Vaccine (1 of 2 - PCV) Never done    Colorectal Cancer Screen  Never done    Shingles vaccine (1 of 2) Never done    Flu vaccine (1) Never done    COVID-19 Vaccine (4 - 2024-25 season) 09/01/2024    A1C test (Diabetic or Prediabetic)  03/29/2025    Lipids  03/30/2025    Hepatitis B vaccine (1 of 3 - 19+ 3-dose series) 05/23/2025 (Originally 5/20/1992)    Depression Monitoring  05/23/2025    Lung Cancer Screening &/or Counseling  10/03/2025    Breast cancer screen  10/16/2026    HIV screen  Completed    Hepatitis A vaccine  Aged Out    Hib vaccine  Aged Out    Polio vaccine  Aged Out    Meningococcal (ACWY) vaccine  Aged Out    Meningococcal B vaccine  Aged Out    Depression Screen  Discontinued    Diabetes screen  Discontinued    Cervical cancer screen  Discontinued       Hemoglobin A1C (%)   Date Value   03/29/2024 5.8   05/23/2019 5.4             ( goal A1C is < 7)   No components found for: \"LABMICR\"  No components found for: \"LDLCHOLESTEROL\", \"LDLCALC\"    (goal LDL is <100)   AST (U/L)   Date Value   12/14/2024 28     ALT (U/L)   Date Value   12/14/2024 34     BUN (mg/dL)   Date Value   12/14/2024 17     BP Readings from Last 3 Encounters:   12/23/24 136/84   12/14/24 (!) 148/107   11/25/24 120/68          (goal 120/80)    All Future Testing planned in [facility]  Lab Frequency Next Occurrence   US HEAD NECK SOFT TISSUE THYROID Once 10/04/2025   KEVIN PHILLIP DIGITAL DIAGNOSTIC UNILATERAL LEFT Once 10/07/2024   KEVIN PHILLIP DIGITAL DIAGNOSTIC UNILATERAL LEFT Once 06/19/2025               Patient Active Problem List:     Fibroid

## 2025-04-10 ENCOUNTER — HOSPITAL ENCOUNTER (EMERGENCY)
Age: 50
Discharge: HOME | End: 2025-04-10
Attending: EMERGENCY MEDICINE
Payer: MEDICAID

## 2025-04-10 ENCOUNTER — APPOINTMENT (OUTPATIENT)
Dept: CT IMAGING | Age: 50
End: 2025-04-10
Payer: MEDICAID

## 2025-04-10 VITALS
RESPIRATION RATE: 18 BRPM | TEMPERATURE: 97.9 F | HEART RATE: 100 BPM | OXYGEN SATURATION: 100 % | SYSTOLIC BLOOD PRESSURE: 116 MMHG | DIASTOLIC BLOOD PRESSURE: 86 MMHG

## 2025-04-10 DIAGNOSIS — I50.31 ACUTE HEART FAILURE WITH PRESERVED EJECTION FRACTION (HFPEF) (HCC): ICD-10-CM

## 2025-04-10 DIAGNOSIS — S89.91XA INJURY OF RIGHT KNEE, INITIAL ENCOUNTER: Primary | ICD-10-CM

## 2025-04-10 LAB
ANION GAP SERPL CALCULATED.3IONS-SCNC: 13 MMOL/L (ref 9–16)
BASOPHILS # BLD: 0.03 K/UL (ref 0–0.2)
BASOPHILS NFR BLD: 0 % (ref 0–2)
BUN SERPL-MCNC: 13 MG/DL (ref 6–20)
CALCIUM SERPL-MCNC: 9.2 MG/DL (ref 8.6–10.4)
CHLORIDE SERPL-SCNC: 104 MMOL/L (ref 98–107)
CO2 SERPL-SCNC: 24 MMOL/L (ref 20–31)
CREAT SERPL-MCNC: 0.7 MG/DL (ref 0.5–0.9)
EOSINOPHIL # BLD: 0.2 K/UL (ref 0–0.44)
EOSINOPHILS RELATIVE PERCENT: 3 % (ref 1–4)
ERYTHROCYTE [DISTWIDTH] IN BLOOD BY AUTOMATED COUNT: 15 % (ref 11.8–14.4)
GFR, ESTIMATED: >90 ML/MIN/1.73M2
GLUCOSE SERPL-MCNC: 86 MG/DL (ref 74–99)
HCT VFR BLD AUTO: 49.4 % (ref 36.3–47.1)
HGB BLD-MCNC: 15.8 G/DL (ref 11.9–15.1)
IMM GRANULOCYTES # BLD AUTO: 0.02 K/UL (ref 0–0.3)
IMM GRANULOCYTES NFR BLD: 0 %
LYMPHOCYTES NFR BLD: 2.17 K/UL (ref 1.1–3.7)
LYMPHOCYTES RELATIVE PERCENT: 30 % (ref 24–43)
MCH RBC QN AUTO: 29.8 PG (ref 25.2–33.5)
MCHC RBC AUTO-ENTMCNC: 32 G/DL (ref 28.4–34.8)
MCV RBC AUTO: 93.2 FL (ref 82.6–102.9)
MONOCYTES NFR BLD: 0.59 K/UL (ref 0.1–1.2)
MONOCYTES NFR BLD: 8 % (ref 3–12)
NEUTROPHILS NFR BLD: 59 % (ref 36–65)
NEUTS SEG NFR BLD: 4.15 K/UL (ref 1.5–8.1)
NRBC BLD-RTO: 0 PER 100 WBC
PLATELET # BLD AUTO: 235 K/UL (ref 138–453)
PMV BLD AUTO: 11.1 FL (ref 8.1–13.5)
POTASSIUM SERPL-SCNC: 4.2 MMOL/L (ref 3.7–5.3)
RBC # BLD AUTO: 5.3 M/UL (ref 3.95–5.11)
RBC # BLD: ABNORMAL 10*6/UL
SODIUM SERPL-SCNC: 140 MMOL/L (ref 136–145)
WBC OTHER # BLD: 7.2 K/UL (ref 3.5–11.3)

## 2025-04-10 PROCEDURE — 80048 BASIC METABOLIC PNL TOTAL CA: CPT

## 2025-04-10 PROCEDURE — 6370000000 HC RX 637 (ALT 250 FOR IP): Performed by: EMERGENCY MEDICINE

## 2025-04-10 PROCEDURE — 73706 CT ANGIO LWR EXTR W/O&W/DYE: CPT

## 2025-04-10 PROCEDURE — 6360000004 HC RX CONTRAST MEDICATION: Performed by: EMERGENCY MEDICINE

## 2025-04-10 PROCEDURE — 99285 EMERGENCY DEPT VISIT HI MDM: CPT

## 2025-04-10 PROCEDURE — 73700 CT LOWER EXTREMITY W/O DYE: CPT

## 2025-04-10 PROCEDURE — 2500000003 HC RX 250 WO HCPCS: Performed by: EMERGENCY MEDICINE

## 2025-04-10 PROCEDURE — 85025 COMPLETE CBC W/AUTO DIFF WBC: CPT

## 2025-04-10 PROCEDURE — 2580000003 HC RX 258: Performed by: EMERGENCY MEDICINE

## 2025-04-10 RX ORDER — HYDROCODONE BITARTRATE AND ACETAMINOPHEN 5; 325 MG/1; MG/1
1 TABLET ORAL ONCE
Refills: 0 | Status: COMPLETED | OUTPATIENT
Start: 2025-04-10 | End: 2025-04-10

## 2025-04-10 RX ORDER — SODIUM CHLORIDE 0.9 % (FLUSH) 0.9 %
10 SYRINGE (ML) INJECTION PRN
Status: DISCONTINUED | OUTPATIENT
Start: 2025-04-10 | End: 2025-04-10 | Stop reason: HOSPADM

## 2025-04-10 RX ORDER — METHYLPREDNISOLONE 4 MG/1
TABLET ORAL
Qty: 1 KIT | Refills: 0 | Status: SHIPPED | OUTPATIENT
Start: 2025-04-10 | End: 2025-04-16

## 2025-04-10 RX ORDER — 0.9 % SODIUM CHLORIDE 0.9 %
80 INTRAVENOUS SOLUTION INTRAVENOUS ONCE
Status: COMPLETED | OUTPATIENT
Start: 2025-04-10 | End: 2025-04-10

## 2025-04-10 RX ORDER — IOPAMIDOL 755 MG/ML
75 INJECTION, SOLUTION INTRAVASCULAR
Status: COMPLETED | OUTPATIENT
Start: 2025-04-10 | End: 2025-04-10

## 2025-04-10 RX ADMIN — SODIUM CHLORIDE 80 ML: 9 INJECTION, SOLUTION INTRAVENOUS at 11:42

## 2025-04-10 RX ADMIN — SODIUM CHLORIDE, PRESERVATIVE FREE 10 ML: 5 INJECTION INTRAVENOUS at 11:42

## 2025-04-10 RX ADMIN — HYDROCODONE BITARTRATE AND ACETAMINOPHEN 1 TABLET: 5; 325 TABLET ORAL at 10:35

## 2025-04-10 RX ADMIN — IOPAMIDOL 75 ML: 755 INJECTION, SOLUTION INTRAVENOUS at 11:42

## 2025-04-10 ASSESSMENT — PAIN DESCRIPTION - ORIENTATION: ORIENTATION: RIGHT

## 2025-04-10 ASSESSMENT — PAIN DESCRIPTION - DESCRIPTORS: DESCRIPTORS: ACHING

## 2025-04-10 ASSESSMENT — PAIN SCALES - GENERAL
PAINLEVEL_OUTOF10: 8
PAINLEVEL_OUTOF10: 2
PAINLEVEL_OUTOF10: 2
PAINLEVEL_OUTOF10: 6

## 2025-04-10 ASSESSMENT — PAIN - FUNCTIONAL ASSESSMENT: PAIN_FUNCTIONAL_ASSESSMENT: 0-10

## 2025-04-10 ASSESSMENT — PAIN DESCRIPTION - LOCATION: LOCATION: KNEE

## 2025-04-10 NOTE — ED PROVIDER NOTES
Refill:  0     CONSULTS:  None    FINAL IMPRESSION      1. Injury of right knee, initial encounter          DISPOSITION/PLAN   DISPOSITION Decision To Discharge 04/10/2025 01:51:20 PM   DISPOSITION CONDITION Stable           PATIENT REFERRED TO:  Deisi Cook APRN - BRIAN  [address] [facility] [address] [facility] [address]  [phone]          Jaret Barros MD  [address]  [facility] [address]  [phone]          DISCHARGE MEDICATIONS:  New Prescriptions    METHYLPREDNISOLONE (MEDROL, KACIE,) 4 MG TABLET    Take by mouth.     Jus Davis MD  Attending Emergency Physician  [employer] voice recognition software used in portions of this document.                    Jus Davis MD  04/10/25 0257

## 2025-04-11 RX ORDER — FUROSEMIDE 40 MG/1
40 TABLET ORAL DAILY
Qty: 90 TABLET | Refills: 0 | OUTPATIENT
Start: 2025-04-11

## 2025-06-05 ENCOUNTER — HOSPITAL ENCOUNTER (EMERGENCY)
Age: 50
Discharge: HOME | End: 2025-06-06
Attending: EMERGENCY MEDICINE
Payer: MEDICAID

## 2025-06-05 ENCOUNTER — APPOINTMENT (OUTPATIENT)
Dept: GENERAL RADIOLOGY | Age: 50
End: 2025-06-05
Payer: MEDICAID

## 2025-06-05 DIAGNOSIS — S93.409A SPRAIN OF ANKLE, UNSPECIFIED LATERALITY, UNSPECIFIED LIGAMENT, INITIAL ENCOUNTER: Primary | ICD-10-CM

## 2025-06-05 PROCEDURE — 99283 EMERGENCY DEPT VISIT LOW MDM: CPT

## 2025-06-05 PROCEDURE — 73610 X-RAY EXAM OF ANKLE: CPT

## 2025-06-06 ENCOUNTER — APPOINTMENT (OUTPATIENT)
Dept: GENERAL RADIOLOGY | Age: 50
End: 2025-06-06
Payer: MEDICAID

## 2025-06-06 VITALS
SYSTOLIC BLOOD PRESSURE: 126 MMHG | HEART RATE: 95 BPM | WEIGHT: 293 LBS | TEMPERATURE: 98.6 F | DIASTOLIC BLOOD PRESSURE: 88 MMHG | RESPIRATION RATE: 16 BRPM | BODY MASS INDEX: 47.09 KG/M2 | OXYGEN SATURATION: 95 % | HEIGHT: 66 IN

## 2025-06-06 PROCEDURE — 6370000000 HC RX 637 (ALT 250 FOR IP): Performed by: EMERGENCY MEDICINE

## 2025-06-06 PROCEDURE — 73630 X-RAY EXAM OF FOOT: CPT

## 2025-06-06 RX ORDER — HYDROCODONE BITARTRATE AND ACETAMINOPHEN 5; 325 MG/1; MG/1
1 TABLET ORAL EVERY 6 HOURS PRN
Qty: 12 TABLET | Refills: 0 | Status: SHIPPED | OUTPATIENT
Start: 2025-06-06 | End: 2025-06-09

## 2025-06-06 RX ORDER — HYDROCODONE BITARTRATE AND ACETAMINOPHEN 5; 325 MG/1; MG/1
1 TABLET ORAL ONCE
Refills: 0 | Status: COMPLETED | OUTPATIENT
Start: 2025-06-06 | End: 2025-06-06

## 2025-06-06 RX ADMIN — HYDROCODONE BITARTRATE AND ACETAMINOPHEN 1 TABLET: 5; 325 TABLET ORAL at 00:44

## 2025-06-06 ASSESSMENT — PAIN SCALES - GENERAL: PAINLEVEL_OUTOF10: 10

## 2025-06-06 NOTE — ED PROVIDER NOTES
[facility] EMERGENCY DEPARTMENT  eMERGENCY dEPARTMENT eNCOUnter    Pt Name: Gaurang Fiore  MRN: [id number]  Birthdate 1975  Date of evaluation: 6/6/25  CHIEF COMPLAINT       Chief Complaint   Patient presents with    Ankle Pain     Left heel pain and felt a pop after standing up states feels a pule like pain      HISTORY OF PRESENT ILLNESS   HPI  Patient is a 50-year-old female presents emergency room with complaints of left ankle and foot pain that started after she had gotten up from the ground.  Patient felt a pop in her left posterior ankle and has had pain since then.  REVIEW OF SYSTEMS     Constitutional: No fever  Eye: No visual changes  Ear/Nose/Mouth/Throat: No sore throat  Respiratory: No shortness of breath  Cardiovascular: No chest pain  Gastrointestinal: No nausea, no vomiting, no diarrhea  Genitourinary: No dysuria  Musculoskeletal: As above  Integumentary: No rash  Neurologic: No dizziness  Psychiatric: No anxiety, no depression  All systems otherwise negative.        PAST MEDICAL HISTORY     Past Medical History:   Diagnosis Date    Abnormal uterine bleeding 05/2018    Anxiety     Bipolar 1 disorder (HCC)     No Rx.     Blood clot in vein 2000    right calf    Diabetes (HCC)     Fibroid 05/2018    Finger pain, right     Fracture, finger     right small finger    GERD (gastroesophageal reflux disease)     Heart failure (HCC)     HTN (hypertension)     Panic attacks     Sleep apnea     wears 6 liters at hs    Snores     Under care of team     No PCP     SURGICAL HISTORY       Past Surgical History:   Procedure Laterality Date    CHOLECYSTECTOMY N/A 7/6/2024    CHOLECYSTECTOMY LAPAROSCOPIC ROBOTIC XI performed by Sandra Fitzpatrick MD at [facility] OR    FINGER CLOSED REDUCTION Right 06/01/2021    CLOSED REDUCTION PERCUTANEOUS  PINNING, C-ARM (Right    FINGER SURGERY Right 6/1/2021    CLOSED REDUCTION PERCUTANEOUS  PINNING , RIGHT LITTLE FINGER , SPLINT C-ARM performed by Rafaela Paiz MD at [facility] OR

## 2025-06-09 DIAGNOSIS — R05.3 CHRONIC COUGH: ICD-10-CM

## 2025-06-09 DIAGNOSIS — I10 ESSENTIAL HYPERTENSION: ICD-10-CM

## 2025-06-09 DIAGNOSIS — I50.31 ACUTE HEART FAILURE WITH PRESERVED EJECTION FRACTION (HFPEF) (HCC): ICD-10-CM

## 2025-06-09 DIAGNOSIS — E78.5 HYPERLIPIDEMIA, UNSPECIFIED HYPERLIPIDEMIA TYPE: ICD-10-CM

## 2025-06-09 DIAGNOSIS — K21.9 GASTROESOPHAGEAL REFLUX DISEASE, UNSPECIFIED WHETHER ESOPHAGITIS PRESENT: ICD-10-CM

## 2025-06-09 DIAGNOSIS — R09.82 PND (POST-NASAL DRIP): ICD-10-CM

## 2025-06-09 NOTE — TELEPHONE ENCOUNTER
Last visit: 12/23/2024  Last Med refill: 03/31/2025  Does patient have enough medication for 72 hours: No:     Next Visit Date:  Future Appointments   Date Time Provider Department Center   6/10/2025 10:30 AM Catalina Vaca APRN - CNP bariatric long [facility]   6/20/2025  1:30 PM [facility] RM 1 [facility]       Health Maintenance   Topic Date Due    Hepatitis C screen  Never done    Hepatitis B vaccine (1 of 3 - 19+ 3-dose series) Never done    DTaP/Tdap/Td vaccine (1 - Tdap) Never done    Pneumococcal 50+ years Vaccine (1 of 2 - PCV) Never done    Colorectal Cancer Screen  Never done    Shingles vaccine (1 of 2) Never done    COVID-19 Vaccine (4 - 2024-25 season) 09/01/2024    A1C test (Diabetic or Prediabetic)  03/29/2025    Lipids  03/30/2025    Depression Monitoring  05/23/2025    Flu vaccine (Season Ended) 08/01/2025    Lung Cancer Screening &/or Counseling  10/03/2025    Breast cancer screen  10/16/2026    HIV screen  Completed    Hepatitis A vaccine  Aged Out    Hib vaccine  Aged Out    Polio vaccine  Aged Out    Meningococcal (ACWY) vaccine  Aged Out    Meningococcal B vaccine  Aged Out    Depression Screen  Discontinued    Diabetes screen  Discontinued    Cervical cancer screen  Discontinued       Hemoglobin A1C (%)   Date Value   03/29/2024 5.8   05/23/2019 5.4             ( goal A1C is < 7)   No components found for: \"LABMICR\"  No components found for: \"LDLCHOLESTEROL\", \"LDLCALC\"    (goal LDL is <100)   AST (U/L)   Date Value   12/14/2024 28     ALT (U/L)   Date Value   12/14/2024 34     BUN (mg/dL)   Date Value   04/10/2025 13     BP Readings from Last 3 Encounters:   06/06/25 126/88   04/10/25 116/86   12/23/24 136/84          (goal 120/80)    All Future Testing planned in [facility]  Lab Frequency Next Occurrence   US HEAD NECK SOFT TISSUE THYROID Once 10/04/2025   KEVIN PHILLIP DIGITAL DIAGNOSTIC UNILATERAL LEFT Once 10/07/2024   KEVIN PHILLIP DIGITAL DIAGNOSTIC UNILATERAL LEFT Once 06/19/2025

## 2025-06-10 ENCOUNTER — OFFICE VISIT (OUTPATIENT)
Dept: BARIATRICS/WEIGHT MGMT | Age: 50
End: 2025-06-10
Payer: MEDICAID

## 2025-06-10 VITALS
HEIGHT: 65 IN | DIASTOLIC BLOOD PRESSURE: 84 MMHG | WEIGHT: 273 LBS | OXYGEN SATURATION: 98 % | HEART RATE: 82 BPM | SYSTOLIC BLOOD PRESSURE: 122 MMHG | BODY MASS INDEX: 45.48 KG/M2

## 2025-06-10 DIAGNOSIS — E66.01 MORBID OBESITY WITH BMI OF 45.0-49.9, ADULT (HCC): ICD-10-CM

## 2025-06-10 DIAGNOSIS — R06.81 WITNESSED EPISODE OF APNEA: ICD-10-CM

## 2025-06-10 DIAGNOSIS — R06.83 SNORING: ICD-10-CM

## 2025-06-10 DIAGNOSIS — I50.9 CONGESTIVE HEART FAILURE, UNSPECIFIED HF CHRONICITY, UNSPECIFIED HEART FAILURE TYPE (HCC): ICD-10-CM

## 2025-06-10 DIAGNOSIS — K76.0 FATTY LIVER DISEASE, NONALCOHOLIC: ICD-10-CM

## 2025-06-10 DIAGNOSIS — E04.1 THYROID NODULE: ICD-10-CM

## 2025-06-10 DIAGNOSIS — G47.19 EXCESSIVE DAYTIME SLEEPINESS: Primary | ICD-10-CM

## 2025-06-10 DIAGNOSIS — Z72.0 TOBACCO ABUSE: ICD-10-CM

## 2025-06-10 DIAGNOSIS — R73.03 PREDIABETES: ICD-10-CM

## 2025-06-10 PROCEDURE — 3074F SYST BP LT 130 MM HG: CPT | Performed by: NURSE PRACTITIONER

## 2025-06-10 PROCEDURE — 99215 OFFICE O/P EST HI 40 MIN: CPT | Performed by: NURSE PRACTITIONER

## 2025-06-10 PROCEDURE — 3079F DIAST BP 80-89 MM HG: CPT | Performed by: NURSE PRACTITIONER

## 2025-06-10 RX ORDER — SPIRONOLACTONE 25 MG/1
25 TABLET ORAL DAILY
Qty: 90 TABLET | Refills: 1 | Status: SHIPPED | OUTPATIENT
Start: 2025-06-10

## 2025-06-10 RX ORDER — IBUPROFEN 800 MG/1
800 TABLET, FILM COATED ORAL 2 TIMES DAILY PRN
Qty: 60 TABLET | Refills: 0 | Status: SHIPPED | OUTPATIENT
Start: 2025-06-10

## 2025-06-10 RX ORDER — ALBUTEROL SULFATE 90 UG/1
2 INHALANT RESPIRATORY (INHALATION)
Qty: 3 EACH | Refills: 1 | Status: SHIPPED | OUTPATIENT
Start: 2025-06-10

## 2025-06-10 RX ORDER — ATORVASTATIN CALCIUM 20 MG/1
20 TABLET, FILM COATED ORAL DAILY
Qty: 90 TABLET | Refills: 1 | Status: SHIPPED | OUTPATIENT
Start: 2025-06-10

## 2025-06-10 RX ORDER — FLUTICASONE PROPIONATE 50 MCG
2 SPRAY, SUSPENSION (ML) NASAL DAILY
Qty: 16 G | Refills: 3 | Status: SHIPPED | OUTPATIENT
Start: 2025-06-10

## 2025-06-10 RX ORDER — OMEPRAZOLE 40 MG/1
40 CAPSULE, DELAYED RELEASE ORAL DAILY
Qty: 90 CAPSULE | Refills: 1 | Status: SHIPPED | OUTPATIENT
Start: 2025-06-10

## 2025-06-10 RX ORDER — METOPROLOL SUCCINATE 25 MG/1
25 TABLET, EXTENDED RELEASE ORAL DAILY
Qty: 90 TABLET | Refills: 1 | Status: SHIPPED | OUTPATIENT
Start: 2025-06-10

## 2025-06-10 RX ORDER — FUROSEMIDE 40 MG/1
40 TABLET ORAL DAILY
Qty: 90 TABLET | Refills: 1 | Status: SHIPPED | OUTPATIENT
Start: 2025-06-10

## 2025-06-10 RX ORDER — BUDESONIDE AND FORMOTEROL FUMARATE DIHYDRATE 160; 4.5 UG/1; UG/1
2 AEROSOL RESPIRATORY (INHALATION) 2 TIMES DAILY
Qty: 33 G | Refills: 3 | Status: SHIPPED | OUTPATIENT
Start: 2025-06-10

## 2025-06-10 RX ORDER — SUCRALFATE 1 G/1
1 TABLET ORAL 2 TIMES DAILY
Qty: 60 TABLET | Refills: 3 | Status: SHIPPED | OUTPATIENT
Start: 2025-06-10

## 2025-06-10 RX ORDER — PHENTERMINE HYDROCHLORIDE 37.5 MG/1
37.5 TABLET ORAL
Qty: 30 TABLET | Refills: 0 | Status: SHIPPED | OUTPATIENT
Start: 2025-06-10 | End: 2025-07-10

## 2025-06-10 RX ORDER — AMLODIPINE BESYLATE 5 MG/1
5 TABLET ORAL DAILY
Qty: 90 TABLET | Refills: 1 | Status: SHIPPED | OUTPATIENT
Start: 2025-06-10

## 2025-06-10 NOTE — PROGRESS NOTES
[facility] INVASIVE BARIATRIC SURG  [address]  [address]  [place] [id number]  Dept: [phone]  Fax: [phone]    MEDICATION WEIGHT LOSS MANAGEMENT PROGRAM  PROGRESS NOTE     Subjective:      Chief Complaint   Patient presents with    New Patient     Here for new patient consult for weight loss medication    Weight Loss         Gaurang Fiore is a 50 y.o. female who is here today for evaluation and treatment of obesity. Patient cites health, increased physical ability, self-image as reasons for wanting to lose weight.    Obesity History    Period of greatest weight gain: 100 lbs during mid adult years  Lowest adult weight: 160-170 lbs at age 28. She was happy at that weight.   Highest adult weight: 300 lbs at age 50  Amount of time at present weight: several years      History of Weight Loss Efforts    She states that atkins was helpful with weight loss. She states that once she stopped, she gained weight back.     She has tried Keto, atkins, low carb diet, cabbage soup diet, intermittent fasting without long term weight loss.     She states she has tried multiple different OTC weight loss medications without weight loss.     She was on adipex in October 2024 and November 2024. In October her weight was 277 lbs. In November she was 275 lbs. When she was seen in November she was taking 1/2 tab of the 37.5 mg so PCP ordered phentermine 15 mg. When she was seen in December she had gained weight and was 283 lbs so adipex was not refill.    She was on adderall at one time for her excessive daytime sleepiness. Her psychiatrist was ordering it. She hasn't been on it since February 2024 because [facility] stopped taking her insurance.      Current Exercise Habits no regular exercise. Struggles with bone spurs in her feet and has DDD in her low back.     Current Eating Habits  Number of regular meals per day: 1 - dinner. She states she has a cup

## 2025-06-20 ENCOUNTER — HOSPITAL ENCOUNTER (OUTPATIENT)
Dept: MAMMOGRAPHY | Age: 50
Discharge: HOME | End: 2025-06-22
Payer: MEDICAID

## 2025-06-20 DIAGNOSIS — R92.8 ABNORMAL MAMMOGRAM OF LEFT BREAST: ICD-10-CM

## 2025-06-20 PROCEDURE — G0279 TOMOSYNTHESIS, MAMMO: HCPCS

## 2025-06-23 ENCOUNTER — RESULTS FOLLOW-UP (OUTPATIENT)
Dept: FAMILY MEDICINE CLINIC | Age: 50
End: 2025-06-23

## 2025-07-03 ENCOUNTER — HOSPITAL ENCOUNTER (OUTPATIENT)
Age: 50
Setting detail: SPECIMEN
Discharge: HOME | End: 2025-07-03

## 2025-07-03 DIAGNOSIS — R73.03 PREDIABETES: ICD-10-CM

## 2025-07-03 DIAGNOSIS — E04.1 THYROID NODULE: ICD-10-CM

## 2025-07-03 DIAGNOSIS — K76.0 FATTY LIVER DISEASE, NONALCOHOLIC: ICD-10-CM

## 2025-07-03 LAB
ALBUMIN SERPL-MCNC: 4.3 G/DL (ref 3.5–5.2)
ALBUMIN/GLOB SERPL: 1.7 {RATIO} (ref 1–2.5)
ALP SERPL-CCNC: 75 U/L (ref 35–104)
ALT SERPL-CCNC: 21 U/L (ref 10–35)
ANION GAP SERPL CALCULATED.3IONS-SCNC: 12 MMOL/L (ref 9–16)
AST SERPL-CCNC: 19 U/L (ref 10–35)
BASOPHILS # BLD: 0.03 K/UL (ref 0–0.2)
BASOPHILS NFR BLD: 0 % (ref 0–2)
BILIRUB SERPL-MCNC: 0.4 MG/DL (ref 0–1.2)
BUN SERPL-MCNC: 13 MG/DL (ref 6–20)
CALCIUM SERPL-MCNC: 9.5 MG/DL (ref 8.6–10.4)
CHLORIDE SERPL-SCNC: 107 MMOL/L (ref 98–107)
CO2 SERPL-SCNC: 26 MMOL/L (ref 20–31)
CREAT SERPL-MCNC: 0.7 MG/DL (ref 0.6–0.9)
EOSINOPHIL # BLD: 0.28 K/UL (ref 0–0.44)
EOSINOPHILS RELATIVE PERCENT: 4 % (ref 1–4)
ERYTHROCYTE [DISTWIDTH] IN BLOOD BY AUTOMATED COUNT: 14.9 % (ref 11.8–14.4)
EST. AVERAGE GLUCOSE BLD GHB EST-MCNC: 126 MG/DL
GFR, ESTIMATED: >90 ML/MIN/1.73M2
GLUCOSE SERPL-MCNC: 78 MG/DL (ref 74–99)
HBA1C MFR BLD: 6 % (ref 4–6)
HCT VFR BLD AUTO: 46.4 % (ref 36.3–47.1)
HGB BLD-MCNC: 14.9 G/DL (ref 11.9–15.1)
IMM GRANULOCYTES # BLD AUTO: 0.03 K/UL (ref 0–0.3)
IMM GRANULOCYTES NFR BLD: 0 %
INSULIN REFERENCE RANGE:: NORMAL
INSULIN: 15.7 MU/L
LYMPHOCYTES NFR BLD: 2.24 K/UL (ref 1.1–3.7)
LYMPHOCYTES RELATIVE PERCENT: 33 % (ref 24–43)
MCH RBC QN AUTO: 29.4 PG (ref 25.2–33.5)
MCHC RBC AUTO-ENTMCNC: 32.1 G/DL (ref 28.4–34.8)
MCV RBC AUTO: 91.5 FL (ref 82.6–102.9)
MONOCYTES NFR BLD: 0.65 K/UL (ref 0.1–1.2)
MONOCYTES NFR BLD: 10 % (ref 3–12)
NEUTROPHILS NFR BLD: 53 % (ref 36–65)
NEUTS SEG NFR BLD: 3.48 K/UL (ref 1.5–8.1)
NRBC BLD-RTO: 0 PER 100 WBC
PLATELET # BLD AUTO: 233 K/UL (ref 138–453)
PMV BLD AUTO: 11.9 FL (ref 8.1–13.5)
POTASSIUM SERPL-SCNC: 4.2 MMOL/L (ref 3.7–5.3)
PROT SERPL-MCNC: 6.9 G/DL (ref 6.6–8.7)
RBC # BLD AUTO: 5.07 M/UL (ref 3.95–5.11)
RBC # BLD: ABNORMAL 10*6/UL
SODIUM SERPL-SCNC: 145 MMOL/L (ref 136–145)
T4 FREE SERPL-MCNC: 1 NG/DL (ref 0.92–1.68)
TSH SERPL DL<=0.05 MIU/L-ACNC: 1.32 UIU/ML (ref 0.27–4.2)
WBC OTHER # BLD: 6.7 K/UL (ref 3.5–11.3)

## 2025-07-07 ENCOUNTER — RESULTS FOLLOW-UP (OUTPATIENT)
Dept: BARIATRICS/WEIGHT MGMT | Age: 50
End: 2025-07-07

## 2025-07-11 ENCOUNTER — OFFICE VISIT (OUTPATIENT)
Age: 50
End: 2025-07-11
Payer: MEDICAID

## 2025-07-11 VITALS
HEIGHT: 65 IN | OXYGEN SATURATION: 94 % | DIASTOLIC BLOOD PRESSURE: 81 MMHG | WEIGHT: 264 LBS | SYSTOLIC BLOOD PRESSURE: 112 MMHG | BODY MASS INDEX: 43.99 KG/M2 | HEART RATE: 95 BPM

## 2025-07-11 DIAGNOSIS — E66.813 CLASS 3 SEVERE OBESITY DUE TO EXCESS CALORIES WITH SERIOUS COMORBIDITY AND BODY MASS INDEX (BMI) OF 40.0 TO 44.9 IN ADULT (HCC): ICD-10-CM

## 2025-07-11 DIAGNOSIS — R73.03 PREDIABETES: Primary | ICD-10-CM

## 2025-07-11 DIAGNOSIS — I10 ESSENTIAL HYPERTENSION: ICD-10-CM

## 2025-07-11 PROCEDURE — 3079F DIAST BP 80-89 MM HG: CPT | Performed by: NURSE PRACTITIONER

## 2025-07-11 PROCEDURE — 3074F SYST BP LT 130 MM HG: CPT | Performed by: NURSE PRACTITIONER

## 2025-07-11 PROCEDURE — 99214 OFFICE O/P EST MOD 30 MIN: CPT | Performed by: NURSE PRACTITIONER

## 2025-07-11 RX ORDER — PHENTERMINE HYDROCHLORIDE 37.5 MG/1
37.5 TABLET ORAL
Qty: 30 TABLET | Refills: 0 | Status: SHIPPED | OUTPATIENT
Start: 2025-07-11 | End: 2025-08-10

## 2025-07-11 ASSESSMENT — ENCOUNTER SYMPTOMS
VOMITING: 0
SHORTNESS OF BREATH: 0
WHEEZING: 0
CHEST TIGHTNESS: 0
COUGH: 0
ABDOMINAL PAIN: 0
NAUSEA: 0
CONSTIPATION: 0

## 2025-07-11 NOTE — PROGRESS NOTES
Medical Nutrition Therapy for Non-Surgical Weight Loss  Nutrition Follow-Up: Weight Loss Medication    Nutrition Assessment:  Patient is taking adipex per REBECA Haas for weight loss. Patient has lost 9 lbs since last visit.   Patient reports changes made since last month include eating more frequently, protein with all snacks and meals, walking more frequently.  Patient is eating three meals plus 0-2 snacks. Protein with all snacks and meals. Does like fruits and vegetables, eats them a couple per week.  Patient is drinking at least 80 oz of water. Patient is typically drinking water. Does take diuretics.  Patient reports walking 1 mile per day to remain active.   Discussed trial protein shake or yogurt drink for breakfast option if desired, goal of 1 serving of vegetables per day (discussed options for incorporating). Continue to incorporate protein at all meals. Limit portion of high fat protein options like cheese, discussed alternatives.  Provided Protein Powder and Protein Shakes Handout(s).    24-hr diet recall scanned into chart.    Vitals:   Vitals:    07/11/25 1248   BP: 112/81   Pulse: 95   SpO2: 94%   Weight: 119.7 kg (264 lb)   Height: 1.657 m (5' 5.25\")      Body mass index is 43.6 kg/m².    Estimated Energy Needs:  Calorie (MSJ x AF - 500): 1600 kcals  Protein: 60-80 grams protein  Fluids: minimum 64 oz fluid    Nutrition Diagnosis:  Overweight/Obesity related to complex combination of decreased energy needs, disordered eating patterns, physical inactivity, and increased psychological/life stress as evidenced by Body mass index is 43.6 kg/m².    Nutrition Intervention:  Diet: Low-Fat Diet, 60-80 gm of protein, and 64 oz of fluid/day.    Nutrition Education: Nutrition Care Manual    Goal:   Eat a variety of fruits and vegetables, lean protein, whole grains and low-fat dairy.   Sip on water or sugar-free fluid throughout the day.   Aim for 3 meals and 1-2 snacks daily.    Discuss activity

## 2025-07-11 NOTE — PROGRESS NOTES
[facility] INVASIVE BARIATRIC SURGERY  [address] [place] [address]  Dept: [phone]    MEDICATION WEIGHT LOSS MANAGEMENT PROGRAM  PROGRESS NOTE     CC: Weight Loss    Patient: Gaurang Fiore      Service Date: 7/11/2025  Visit:   1 month follow up on medications    Medical Record #: [id number]  Current Visit Weight Information  Weight: 119.7 kg (264 lb)   BMI: Body mass index is 43.6 kg/m².        History: 50 y.o. female who presents today for a 1 month follow up on weight loss medication. Patient has been following monthly with our office for the management of obesity since June 202.    Medication: Adipex  Amount of weight loss in the last month: 9 lbs  Amount of weight loss total: 9 lbs  Current dose: 37.5 mg  Side Effects: none.     Patient denies palpitations, hypertension, tachycardia, insomnia, anxiety, depression, mental health changes    Patient has done well with eating consistently during the day. Previously she was only eating 1 time per day. She has been having 3 meals and snacks throughout the day. She has done well with increasing protein intake along with reducing carbohydrates.     Labs completed last week.   Prediabetes stable at 6.0.  No insulin resistance or hypothyroidism.   CBC and CMP stable.     + hx of hypertension - BP well controlled today.     Height: 1.657 m (5' 5.25\")  Highest Weight:   273 lbs      Exercising?   She is walking 1 mile daily.   Review of Systems:     Review of Systems   Constitutional:  Negative for appetite change, chills, diaphoresis, fatigue and fever.        + obesity   Respiratory:  Negative for cough, chest tightness, shortness of breath and wheezing.    Cardiovascular:  Negative for chest pain and palpitations.   Gastrointestinal:  Negative for abdominal pain, constipation, nausea and vomiting.   Neurological:  Negative for dizziness, weakness, light-headedness and headaches.

## 2025-08-08 ENCOUNTER — OFFICE VISIT (OUTPATIENT)
Age: 50
End: 2025-08-08
Payer: MEDICAID

## 2025-08-08 VITALS
HEART RATE: 96 BPM | OXYGEN SATURATION: 93 % | BODY MASS INDEX: 42.99 KG/M2 | HEIGHT: 65 IN | SYSTOLIC BLOOD PRESSURE: 111 MMHG | DIASTOLIC BLOOD PRESSURE: 81 MMHG | WEIGHT: 258 LBS

## 2025-08-08 DIAGNOSIS — R73.03 PREDIABETES: ICD-10-CM

## 2025-08-08 DIAGNOSIS — E66.813 CLASS 3 SEVERE OBESITY DUE TO EXCESS CALORIES WITH SERIOUS COMORBIDITY AND BODY MASS INDEX (BMI) OF 40.0 TO 44.9 IN ADULT (HCC): ICD-10-CM

## 2025-08-08 DIAGNOSIS — K59.09 CHRONIC CONSTIPATION: Primary | ICD-10-CM

## 2025-08-08 DIAGNOSIS — I10 ESSENTIAL HYPERTENSION: ICD-10-CM

## 2025-08-08 PROCEDURE — 3079F DIAST BP 80-89 MM HG: CPT | Performed by: NURSE PRACTITIONER

## 2025-08-08 PROCEDURE — 3074F SYST BP LT 130 MM HG: CPT | Performed by: NURSE PRACTITIONER

## 2025-08-08 PROCEDURE — 99214 OFFICE O/P EST MOD 30 MIN: CPT | Performed by: NURSE PRACTITIONER

## 2025-08-08 RX ORDER — IBUPROFEN 800 MG/1
800 TABLET, FILM COATED ORAL 2 TIMES DAILY PRN
Qty: 60 TABLET | Refills: 2 | Status: SHIPPED | OUTPATIENT
Start: 2025-08-08

## 2025-08-08 RX ORDER — PHENTERMINE HYDROCHLORIDE 37.5 MG/1
37.5 TABLET ORAL
Qty: 30 TABLET | Refills: 0 | Status: SHIPPED | OUTPATIENT
Start: 2025-08-08 | End: 2025-09-07

## 2025-08-08 RX ORDER — POLYETHYLENE GLYCOL 3350 17 G/17G
17 POWDER ORAL DAILY
Qty: 510 G | Refills: 3 | Status: SHIPPED | OUTPATIENT
Start: 2025-08-08

## 2025-08-08 ASSESSMENT — ENCOUNTER SYMPTOMS
ABDOMINAL PAIN: 0
CHEST TIGHTNESS: 0
VOMITING: 0
CONSTIPATION: 0
COUGH: 0
WHEEZING: 0
SHORTNESS OF BREATH: 0
NAUSEA: 0

## 2025-08-27 ENCOUNTER — HOSPITAL ENCOUNTER (EMERGENCY)
Age: 50
Discharge: HOME | End: 2025-08-27
Attending: EMERGENCY MEDICINE
Payer: MEDICAID

## 2025-08-27 ENCOUNTER — APPOINTMENT (OUTPATIENT)
Dept: GENERAL RADIOLOGY | Age: 50
End: 2025-08-27
Payer: MEDICAID

## 2025-08-27 VITALS
OXYGEN SATURATION: 98 % | HEART RATE: 94 BPM | BODY MASS INDEX: 42.94 KG/M2 | SYSTOLIC BLOOD PRESSURE: 125 MMHG | WEIGHT: 260 LBS | TEMPERATURE: 97.9 F | DIASTOLIC BLOOD PRESSURE: 85 MMHG | RESPIRATION RATE: 16 BRPM

## 2025-08-27 DIAGNOSIS — S96.911A RIGHT FOOT STRAIN, INITIAL ENCOUNTER: Primary | ICD-10-CM

## 2025-08-27 PROCEDURE — 6370000000 HC RX 637 (ALT 250 FOR IP): Performed by: PHYSICIAN ASSISTANT

## 2025-08-27 PROCEDURE — 73630 X-RAY EXAM OF FOOT: CPT

## 2025-08-27 PROCEDURE — 99283 EMERGENCY DEPT VISIT LOW MDM: CPT

## 2025-08-27 RX ORDER — TRAMADOL HYDROCHLORIDE 50 MG/1
50 TABLET ORAL EVERY 8 HOURS PRN
Qty: 15 TABLET | Refills: 0 | Status: SHIPPED | OUTPATIENT
Start: 2025-08-27 | End: 2025-09-01

## 2025-08-27 RX ORDER — ACETAMINOPHEN 500 MG
1000 TABLET ORAL ONCE
Status: COMPLETED | OUTPATIENT
Start: 2025-08-27 | End: 2025-08-27

## 2025-08-27 RX ADMIN — ACETAMINOPHEN 1000 MG: 500 TABLET ORAL at 16:49

## 2025-08-27 ASSESSMENT — PAIN DESCRIPTION - ORIENTATION: ORIENTATION: RIGHT

## 2025-08-27 ASSESSMENT — PAIN DESCRIPTION - LOCATION: LOCATION: FOOT

## 2025-08-27 ASSESSMENT — PAIN DESCRIPTION - DESCRIPTORS: DESCRIPTORS: ACHING;DISCOMFORT

## 2025-08-27 ASSESSMENT — PAIN - FUNCTIONAL ASSESSMENT: PAIN_FUNCTIONAL_ASSESSMENT: 0-10

## 2025-08-27 ASSESSMENT — PAIN SCALES - GENERAL: PAINLEVEL_OUTOF10: 8

## 2025-09-05 ENCOUNTER — OFFICE VISIT (OUTPATIENT)
Age: 50
End: 2025-09-05
Payer: MEDICAID

## 2025-09-05 VITALS
WEIGHT: 258 LBS | HEART RATE: 85 BPM | DIASTOLIC BLOOD PRESSURE: 87 MMHG | OXYGEN SATURATION: 92 % | BODY MASS INDEX: 42.99 KG/M2 | SYSTOLIC BLOOD PRESSURE: 125 MMHG | HEIGHT: 65 IN

## 2025-09-05 DIAGNOSIS — L65.9 HAIR THINNING: Primary | ICD-10-CM

## 2025-09-05 DIAGNOSIS — I10 ESSENTIAL HYPERTENSION: ICD-10-CM

## 2025-09-05 DIAGNOSIS — E66.813 CLASS 3 SEVERE OBESITY DUE TO EXCESS CALORIES WITH SERIOUS COMORBIDITY AND BODY MASS INDEX (BMI) OF 40.0 TO 44.9 IN ADULT (HCC): ICD-10-CM

## 2025-09-05 PROCEDURE — 3079F DIAST BP 80-89 MM HG: CPT | Performed by: NURSE PRACTITIONER

## 2025-09-05 PROCEDURE — 3074F SYST BP LT 130 MM HG: CPT | Performed by: NURSE PRACTITIONER

## 2025-09-05 PROCEDURE — 99214 OFFICE O/P EST MOD 30 MIN: CPT | Performed by: NURSE PRACTITIONER

## 2025-09-05 RX ORDER — PHENTERMINE HYDROCHLORIDE 37.5 MG/1
37.5 TABLET ORAL
Qty: 30 TABLET | Refills: 0 | Status: SHIPPED | OUTPATIENT
Start: 2025-09-05 | End: 2025-10-05

## 2025-09-05 ASSESSMENT — ENCOUNTER SYMPTOMS
COUGH: 0
WHEEZING: 0
CHEST TIGHTNESS: 0
VOMITING: 0
ROS SKIN COMMENTS: + HAIR THINNING
NAUSEA: 0
CONSTIPATION: 0
SHORTNESS OF BREATH: 0

## (undated) DEVICE — TRAP TISS FLUENT FLD MGMT SYS

## (undated) DEVICE — GLOVE SURG SZ 65 THK91MIL LTX FREE SYN POLYISOPRENE

## (undated) DEVICE — SUTURE VICRYL + SZ 0 L27IN ABSRB VLT L26MM UR-6 5/8 CIR VCP603H

## (undated) DEVICE — ARM DRAPE

## (undated) DEVICE — DRAPE,REIN 53X77,STERILE: Brand: MEDLINE

## (undated) DEVICE — SOLUTION IRRIG 1000ML 0.9% SOD CHL USP POUR PLAS BTL

## (undated) DEVICE — GLOVE ORANGE PI 8   MSG9080

## (undated) DEVICE — SUTURE MONOCRYL SZ 4-0 L27IN ABSRB UD L19MM PS-2 1/2 CIR PRIM Y426H

## (undated) DEVICE — C-ARM: Brand: UNBRANDED

## (undated) DEVICE — BLADELESS OBTURATOR: Brand: WECK VISTA

## (undated) DEVICE — SPLINT ORTH W4XL15IN LAYERED FBRGLS FOAM PD BRTH BK MOLD

## (undated) DEVICE — TISSUE REMOVAL SYSTEM FLUID MANAGEMENT ACCESSORIES: Brand: SYMPHION

## (undated) DEVICE — GLOVE SURG SZ 65 CRM LTX FREE POLYISOPRENE POLYMER BEAD ANTI

## (undated) DEVICE — ELECTRO LUBE IS A SINGLE PATIENT USE DEVICE THAT IS INTENDED TO BE USED ON ELECTROSURGICAL ELECTRODES TO REDUCE STICKING.: Brand: KEY SURGICAL ELECTRO LUBE

## (undated) DEVICE — TISSUE REMOVAL SYSTEM RESECTING DEVICE: Brand: SYMPHION

## (undated) DEVICE — SINGLE USE DEVICE INTENDED TO COVER EXPOSED ENDS OF ORTHOPEDIC PIN AND K-WIRES TO HELP PROTECT THE EXPOSED WIRE FROM SNAGGING ON CLOTHING.: Brand: OXBORO™ PIN COVER

## (undated) DEVICE — GAUZE,SPONGE,FLUFF,6"X6.75",STRL,5/TRAY: Brand: MEDLINE

## (undated) DEVICE — Z DISCONTINUED BY MEDLINE USE 2711682 TRAY SKIN PREP DRY W/ PREM GLV

## (undated) DEVICE — BANDAGE ELASTIC COTN LF REUSE 4.0INX5.0YD

## (undated) DEVICE — 4-PORT MANIFOLD: Brand: NEPTUNE 2

## (undated) DEVICE — SVMMC GYN MIN PK

## (undated) DEVICE — TROCARS: Brand: KII® BALLOON BLUNT TIP SYSTEM

## (undated) DEVICE — SPLINT ORTH W3XL12IN LAYERED FBRGLS FOAM PD BRTH BK MOLD

## (undated) DEVICE — SOLUTION IRRIG 500ML 0.9% SOD CHLO USP POUR PLAS BTL

## (undated) DEVICE — TUBING, SUCTION, 9/32" X 20', STRAIGHT: Brand: MEDLINE INDUSTRIES, INC.

## (undated) DEVICE — SVMMC HND

## (undated) DEVICE — FLUID MGMT SYS FLUENT KIT 6/PK

## (undated) DEVICE — GOWN,AURORA,NONRNF,XL,30/CS: Brand: MEDLINE

## (undated) DEVICE — GLOVE ORANGE PI 7   MSG9070

## (undated) DEVICE — SOLUTION SCRB 4OZ 10% POVIDONE IOD ANTIMIC BTL

## (undated) DEVICE — STRAP RESTRAIN W3.5XL19IN TECLIN STRRP POS LEG DURING LITH

## (undated) DEVICE — SEAL

## (undated) DEVICE — Device

## (undated) DEVICE — CONTROL SYRINGE LUER-LOCK TIP: Brand: MONOJECT

## (undated) DEVICE — 1016 S-DRAPE IRRIG POUCH 10/BOX: Brand: STERI-DRAPE™

## (undated) DEVICE — SOLUTION ANTIFOG VIS SYS CLEARIFY LAPSCP

## (undated) DEVICE — GLOVE ORANGE PI 8 1/2   MSG9085

## (undated) DEVICE — TROCAR: Brand: KII FIOS FIRST ENTRY

## (undated) DEVICE — SPLINT ORTH W4XL15IN WHT FBRGLS CNFRM STR LTWT SCOTCHCAST

## (undated) DEVICE — BAG WASTE MYSOSURE FLUENT

## (undated) DEVICE — CYSTO/BLADDER IRRIGATION SET, REGULATING CLAMP

## (undated) DEVICE — STAZ ROBOT: Brand: MEDLINE INDUSTRIES, INC.

## (undated) DEVICE — SOLUTION SCRB 4OZ 4% CHG H2O AIDED FOR PREOPERATIVE SKIN

## (undated) DEVICE — BAG SPEC REM 224ML W4XL6IN DIA10MM 1 HND GYN DISP ENDOPCH

## (undated) DEVICE — GARMENT,MEDLINE,DVT,INT,CALF,MED, GEN2: Brand: MEDLINE